# Patient Record
Sex: FEMALE | Race: WHITE | Employment: OTHER
[De-identification: names, ages, dates, MRNs, and addresses within clinical notes are randomized per-mention and may not be internally consistent; named-entity substitution may affect disease eponyms.]

---

## 2017-01-04 ENCOUNTER — OFFICE VISIT (OUTPATIENT)
Dept: FAMILY MEDICINE CLINIC | Facility: CLINIC | Age: 27
End: 2017-01-04

## 2017-01-04 VITALS
DIASTOLIC BLOOD PRESSURE: 70 MMHG | RESPIRATION RATE: 18 BRPM | SYSTOLIC BLOOD PRESSURE: 116 MMHG | HEART RATE: 70 BPM | BODY MASS INDEX: 38.09 KG/M2 | HEIGHT: 63 IN | WEIGHT: 215 LBS

## 2017-01-04 DIAGNOSIS — F31.60 BIPOLAR 1 DISORDER, MIXED (HCC): Primary | ICD-10-CM

## 2017-01-04 DIAGNOSIS — Z23 NEED FOR INFLUENZA VACCINATION: ICD-10-CM

## 2017-01-04 DIAGNOSIS — J30.2 OTHER SEASONAL ALLERGIC RHINITIS: ICD-10-CM

## 2017-01-04 PROCEDURE — 90686 IIV4 VACC NO PRSV 0.5 ML IM: CPT | Performed by: NURSE PRACTITIONER

## 2017-01-04 PROCEDURE — 90471 IMMUNIZATION ADMIN: CPT | Performed by: NURSE PRACTITIONER

## 2017-01-04 PROCEDURE — 99213 OFFICE O/P EST LOW 20 MIN: CPT | Performed by: NURSE PRACTITIONER

## 2017-01-04 RX ORDER — LORATADINE 10 MG/1
10 TABLET ORAL DAILY
Qty: 90 TABLET | Refills: 1 | Status: SHIPPED | OUTPATIENT
Start: 2017-01-04 | End: 2017-11-06 | Stop reason: SDUPTHER

## 2017-01-04 ASSESSMENT — ENCOUNTER SYMPTOMS
SINUS PRESSURE: 0
ABDOMINAL DISTENTION: 0
RHINORRHEA: 0
EYES NEGATIVE: 1
COUGH: 0

## 2017-04-21 ENCOUNTER — HOSPITAL ENCOUNTER (EMERGENCY)
Age: 27
Discharge: PSYCHIATRIC OR SUBSTANCE USE FACILITY | End: 2017-04-22
Attending: EMERGENCY MEDICINE
Payer: COMMERCIAL

## 2017-04-21 DIAGNOSIS — F10.920 ACUTE ALCOHOLIC INTOXICATION, UNCOMPLICATED (HCC): ICD-10-CM

## 2017-04-21 DIAGNOSIS — F31.9 BIPOLAR 1 DISORDER (HCC): Primary | ICD-10-CM

## 2017-04-21 PROCEDURE — 99285 EMERGENCY DEPT VISIT HI MDM: CPT

## 2017-04-21 PROCEDURE — 93005 ELECTROCARDIOGRAM TRACING: CPT

## 2017-04-21 ASSESSMENT — ENCOUNTER SYMPTOMS
ABDOMINAL PAIN: 0
DIARRHEA: 0
NAUSEA: 0
VISUAL CHANGE: 0
INGESTION: 1
VOMITING: 0
COUGH: 0
SHORTNESS OF BREATH: 0

## 2017-04-22 VITALS
HEART RATE: 80 BPM | WEIGHT: 220 LBS | OXYGEN SATURATION: 98 % | RESPIRATION RATE: 18 BRPM | SYSTOLIC BLOOD PRESSURE: 116 MMHG | DIASTOLIC BLOOD PRESSURE: 70 MMHG | BODY MASS INDEX: 38.97 KG/M2 | TEMPERATURE: 98.5 F

## 2017-04-22 LAB
-: NORMAL
ABSOLUTE EOS #: 0.4 K/UL (ref 0–0.4)
ABSOLUTE LYMPH #: 2.9 K/UL (ref 1.1–2.7)
ABSOLUTE MONO #: 0.9 K/UL (ref 0–1)
ACETAMINOPHEN LEVEL: <10 UG/ML (ref 10–30)
AMORPHOUS: NORMAL
AMPHETAMINE SCREEN URINE: NEGATIVE
ANION GAP SERPL CALCULATED.3IONS-SCNC: 18 MMOL/L (ref 9–17)
BACTERIA: NORMAL
BARBITURATE SCREEN URINE: NEGATIVE
BASOPHILS # BLD: 3 % (ref 0–2)
BASOPHILS ABSOLUTE: 0.3 K/UL (ref 0–0.2)
BENZODIAZEPINE SCREEN, URINE: NEGATIVE
BILIRUBIN URINE: NEGATIVE
BUN BLDV-MCNC: 9 MG/DL (ref 6–20)
BUN/CREAT BLD: 14 (ref 9–20)
BUPRENORPHINE URINE: NORMAL
CALCIUM SERPL-MCNC: 9.4 MG/DL (ref 8.6–10.4)
CANNABINOID SCREEN URINE: NEGATIVE
CASTS UA: NORMAL /LPF
CHLORIDE BLD-SCNC: 102 MMOL/L (ref 98–107)
CO2: 21 MMOL/L (ref 20–31)
COCAINE METABOLITE, URINE: NEGATIVE
COLOR: ABNORMAL
COMMENT UA: ABNORMAL
CREAT SERPL-MCNC: 0.63 MG/DL (ref 0.5–0.9)
CRYSTALS, UA: NORMAL /HPF
DIFFERENTIAL TYPE: YES
EOSINOPHILS RELATIVE PERCENT: 4 % (ref 0–5)
EPITHELIAL CELLS UA: NORMAL /HPF
ETHANOL PERCENT: 0.03 %
ETHANOL: 27 MG/DL
GFR AFRICAN AMERICAN: >60 ML/MIN
GFR NON-AFRICAN AMERICAN: >60 ML/MIN
GFR SERPL CREATININE-BSD FRML MDRD: ABNORMAL ML/MIN/{1.73_M2}
GFR SERPL CREATININE-BSD FRML MDRD: ABNORMAL ML/MIN/{1.73_M2}
GLUCOSE BLD-MCNC: 115 MG/DL (ref 70–99)
GLUCOSE URINE: NEGATIVE
HCG(URINE) PREGNANCY TEST: NEGATIVE
HCT VFR BLD CALC: 41.4 % (ref 36–46)
HEMOGLOBIN: 14.1 G/DL (ref 12–16)
KETONES, URINE: NEGATIVE
LEUKOCYTE ESTERASE, URINE: NEGATIVE
LYMPHOCYTES # BLD: 28 % (ref 15–40)
MCH RBC QN AUTO: 28.9 PG (ref 26–34)
MCHC RBC AUTO-ENTMCNC: 34.1 G/DL (ref 31–37)
MCV RBC AUTO: 84.7 FL (ref 80–100)
MDMA URINE: NORMAL
METHADONE SCREEN, URINE: NEGATIVE
METHAMPHETAMINE, URINE: NEGATIVE
MONOCYTES # BLD: 8 % (ref 4–8)
MUCUS: NORMAL
NITRITE, URINE: NEGATIVE
OPIATES, URINE: NEGATIVE
OTHER OBSERVATIONS UA: NORMAL
OXYCODONE SCREEN URINE: NEGATIVE
PDW BLD-RTO: 13.3 % (ref 12.1–15.2)
PH UA: 6.5 (ref 5–8)
PHENCYCLIDINE, URINE: NEGATIVE
PLATELET # BLD: 372 K/UL (ref 140–450)
PLATELET ESTIMATE: ABNORMAL
PMV BLD AUTO: ABNORMAL FL (ref 6–12)
POTASSIUM SERPL-SCNC: 3.9 MMOL/L (ref 3.7–5.3)
PROPOXYPHENE, URINE: NEGATIVE
PROTEIN UA: NEGATIVE
RBC # BLD: 4.89 M/UL (ref 4–5.2)
RBC # BLD: ABNORMAL 10*6/UL
RBC UA: NORMAL /HPF (ref 0–2)
RENAL EPITHELIAL, UA: NORMAL /HPF
SALICYLATE LEVEL: <1 MG/DL (ref 3–10)
SEG NEUTROPHILS: 57 % (ref 47–75)
SEGMENTED NEUTROPHILS ABSOLUTE COUNT: 6.1 K/UL (ref 2.5–7)
SODIUM BLD-SCNC: 141 MMOL/L (ref 135–144)
SPECIFIC GRAVITY UA: 1.01 (ref 1–1.03)
TEST INFORMATION: NORMAL
TRICHOMONAS: NORMAL
TRICYCLIC ANTIDEPRESSANTS, UR: NEGATIVE
TSH SERPL DL<=0.05 MIU/L-ACNC: 6.01 MIU/L (ref 0.3–5)
TURBIDITY: CLEAR
URINE HGB: ABNORMAL
UROBILINOGEN, URINE: NORMAL
WBC # BLD: 10.7 K/UL (ref 3.5–11)
WBC # BLD: ABNORMAL 10*3/UL
WBC UA: NORMAL /HPF
YEAST: NORMAL

## 2017-04-22 PROCEDURE — 80048 BASIC METABOLIC PNL TOTAL CA: CPT

## 2017-04-22 PROCEDURE — 81001 URINALYSIS AUTO W/SCOPE: CPT

## 2017-04-22 PROCEDURE — 6370000000 HC RX 637 (ALT 250 FOR IP)

## 2017-04-22 PROCEDURE — 2580000003 HC RX 258: Performed by: EMERGENCY MEDICINE

## 2017-04-22 PROCEDURE — 85025 COMPLETE CBC W/AUTO DIFF WBC: CPT

## 2017-04-22 PROCEDURE — 84703 CHORIONIC GONADOTROPIN ASSAY: CPT

## 2017-04-22 PROCEDURE — 84443 ASSAY THYROID STIM HORMONE: CPT

## 2017-04-22 PROCEDURE — 80307 DRUG TEST PRSMV CHEM ANLYZR: CPT

## 2017-04-22 PROCEDURE — 80306 DRUG TEST PRSMV INSTRMNT: CPT

## 2017-04-22 PROCEDURE — G0480 DRUG TEST DEF 1-7 CLASSES: HCPCS

## 2017-04-22 RX ORDER — IBUPROFEN 200 MG
400 TABLET ORAL ONCE
Status: COMPLETED | OUTPATIENT
Start: 2017-04-22 | End: 2017-04-22

## 2017-04-22 RX ORDER — IBUPROFEN 200 MG
TABLET ORAL
Status: COMPLETED
Start: 2017-04-22 | End: 2017-04-22

## 2017-04-22 RX ADMIN — SODIUM CHLORIDE 500 ML: 9 INJECTION, SOLUTION INTRAVENOUS at 00:25

## 2017-04-22 RX ADMIN — IBUPROFEN 400 MG: 200 TABLET, FILM COATED ORAL at 04:15

## 2017-04-22 RX ADMIN — Medication 400 MG: at 04:15

## 2017-04-22 ASSESSMENT — PAIN SCALES - GENERAL: PAINLEVEL_OUTOF10: 6

## 2017-04-23 LAB
EKG ATRIAL RATE: 115 BPM
EKG P AXIS: 37 DEGREES
EKG P-R INTERVAL: 128 MS
EKG Q-T INTERVAL: 320 MS
EKG QRS DURATION: 84 MS
EKG QTC CALCULATION (BAZETT): 442 MS
EKG R AXIS: 44 DEGREES
EKG T AXIS: 33 DEGREES
EKG VENTRICULAR RATE: 115 BPM

## 2017-05-02 ENCOUNTER — OFFICE VISIT (OUTPATIENT)
Dept: FAMILY MEDICINE CLINIC | Age: 27
End: 2017-05-02
Payer: COMMERCIAL

## 2017-05-02 VITALS
SYSTOLIC BLOOD PRESSURE: 120 MMHG | HEART RATE: 68 BPM | WEIGHT: 220 LBS | RESPIRATION RATE: 18 BRPM | DIASTOLIC BLOOD PRESSURE: 80 MMHG | HEIGHT: 63 IN | BODY MASS INDEX: 38.98 KG/M2

## 2017-05-02 DIAGNOSIS — M25.461 KNEE EFFUSION, RIGHT: ICD-10-CM

## 2017-05-02 DIAGNOSIS — S76.311A STRAIN OF PIRIFORMIS MUSCLE, RIGHT, INITIAL ENCOUNTER: Primary | ICD-10-CM

## 2017-05-02 DIAGNOSIS — F31.77 BIPOLAR DISORDER, IN PARTIAL REMISSION, MOST RECENT EPISODE MIXED (HCC): ICD-10-CM

## 2017-05-02 PROCEDURE — 99213 OFFICE O/P EST LOW 20 MIN: CPT | Performed by: NURSE PRACTITIONER

## 2017-05-02 RX ORDER — LAMOTRIGINE 25 MG/1
25 TABLET ORAL 2 TIMES DAILY
COMMUNITY
End: 2017-11-06 | Stop reason: ALTCHOICE

## 2017-05-02 RX ORDER — DOXEPIN HYDROCHLORIDE 25 MG/1
50 CAPSULE ORAL NIGHTLY
COMMUNITY
End: 2018-04-13 | Stop reason: ALTCHOICE

## 2017-05-02 RX ORDER — GABAPENTIN 300 MG/1
300 CAPSULE ORAL 3 TIMES DAILY
COMMUNITY
End: 2017-06-16 | Stop reason: ALTCHOICE

## 2017-05-02 RX ORDER — HYDROXYZINE PAMOATE 50 MG/1
50 CAPSULE ORAL 2 TIMES DAILY
COMMUNITY
End: 2018-05-09 | Stop reason: HOSPADM

## 2017-05-03 ASSESSMENT — ENCOUNTER SYMPTOMS
COUGH: 0
ABDOMINAL DISTENTION: 0
EYES NEGATIVE: 1
RHINORRHEA: 0

## 2017-05-04 ENCOUNTER — HOSPITAL ENCOUNTER (EMERGENCY)
Age: 27
Discharge: HOME | End: 2017-05-04
Payer: COMMERCIAL

## 2017-05-04 VITALS
TEMPERATURE: 99 F | SYSTOLIC BLOOD PRESSURE: 104 MMHG | OXYGEN SATURATION: 96 % | HEART RATE: 102 BPM | RESPIRATION RATE: 18 BRPM | DIASTOLIC BLOOD PRESSURE: 62 MMHG

## 2017-05-04 DIAGNOSIS — R19.7 DIARRHEA, UNSPECIFIED TYPE: ICD-10-CM

## 2017-05-04 DIAGNOSIS — R11.0 NAUSEA: Primary | ICD-10-CM

## 2017-05-04 DIAGNOSIS — R11.11 NON-INTRACTABLE VOMITING WITHOUT NAUSEA, UNSPECIFIED VOMITING TYPE: ICD-10-CM

## 2017-05-04 DIAGNOSIS — N39.0 URINARY TRACT INFECTION, SITE UNSPECIFIED: ICD-10-CM

## 2017-05-04 LAB
-: ABNORMAL
ABSOLUTE BANDS #: 0.76 K/UL (ref 0–1)
ABSOLUTE EOS #: 0.19 K/UL (ref 0–0.4)
ABSOLUTE LYMPH #: 0.19 K/UL (ref 1–4.8)
ABSOLUTE MONO #: 0.57 K/UL (ref 0–1)
AMORPHOUS: ABNORMAL
ANION GAP SERPL CALCULATED.3IONS-SCNC: 15 MMOL/L (ref 9–17)
BACTERIA: ABNORMAL
BANDS: 4 %
BASOPHILS # BLD: 0 %
BASOPHILS ABSOLUTE: 0 K/UL (ref 0–0.2)
BILIRUBIN URINE: NEGATIVE
BUN BLDV-MCNC: 15 MG/DL (ref 6–20)
BUN/CREAT BLD: 29 (ref 9–20)
CALCIUM SERPL-MCNC: 9.6 MG/DL (ref 8.6–10.4)
CASTS UA: ABNORMAL /LPF
CHLORIDE BLD-SCNC: 95 MMOL/L (ref 98–107)
CO2: 24 MMOL/L (ref 20–31)
COLOR: YELLOW
COMMENT UA: ABNORMAL
CREAT SERPL-MCNC: 0.51 MG/DL (ref 0.5–0.9)
CRYSTALS, UA: ABNORMAL /HPF
DIFFERENTIAL TYPE: ABNORMAL
EOSINOPHILS RELATIVE PERCENT: 1 %
EPITHELIAL CELLS UA: ABNORMAL /HPF (ref 0–25)
GFR AFRICAN AMERICAN: >60 ML/MIN
GFR NON-AFRICAN AMERICAN: >60 ML/MIN
GFR SERPL CREATININE-BSD FRML MDRD: ABNORMAL ML/MIN/{1.73_M2}
GFR SERPL CREATININE-BSD FRML MDRD: ABNORMAL ML/MIN/{1.73_M2}
GLUCOSE BLD-MCNC: 121 MG/DL (ref 70–99)
GLUCOSE URINE: NEGATIVE
HCG(URINE) PREGNANCY TEST: NEGATIVE
HCT VFR BLD CALC: 45.8 % (ref 36–46)
HEMOGLOBIN: 15.4 G/DL (ref 12–16)
KETONES, URINE: ABNORMAL
LEUKOCYTE ESTERASE, URINE: ABNORMAL
LYMPHOCYTES # BLD: 1 %
MCH RBC QN AUTO: 28.6 PG (ref 26–34)
MCHC RBC AUTO-ENTMCNC: 33.6 G/DL (ref 31–37)
MCV RBC AUTO: 85 FL (ref 80–100)
MONOCYTES # BLD: 3 %
MORPHOLOGY: NORMAL
MUCUS: ABNORMAL
NITRITE, URINE: NEGATIVE
OTHER OBSERVATIONS UA: ABNORMAL
PDW BLD-RTO: 13.2 % (ref 12.1–15.2)
PH UA: 6 (ref 5–9)
PLATELET # BLD: 370 K/UL (ref 140–450)
PLATELET ESTIMATE: ABNORMAL
PMV BLD AUTO: ABNORMAL FL (ref 6–12)
POTASSIUM SERPL-SCNC: 3.8 MMOL/L (ref 3.7–5.3)
PROTEIN UA: NEGATIVE
RBC # BLD: 5.39 M/UL (ref 4–5.2)
RBC # BLD: ABNORMAL 10*6/UL
RBC UA: ABNORMAL /HPF (ref 0–2)
RENAL EPITHELIAL, UA: ABNORMAL /HPF
SEG NEUTROPHILS: 91 %
SEGMENTED NEUTROPHILS ABSOLUTE COUNT: 17.19 K/UL (ref 1.8–7.7)
SODIUM BLD-SCNC: 134 MMOL/L (ref 135–144)
SPECIFIC GRAVITY UA: 1.01 (ref 1.01–1.02)
TRICHOMONAS: ABNORMAL
TURBIDITY: ABNORMAL
URINE HGB: ABNORMAL
UROBILINOGEN, URINE: NORMAL
WBC # BLD: 18.9 K/UL (ref 3.5–11)
WBC # BLD: ABNORMAL 10*3/UL
WBC UA: ABNORMAL /HPF (ref 0–5)
YEAST: ABNORMAL

## 2017-05-04 PROCEDURE — 87086 URINE CULTURE/COLONY COUNT: CPT

## 2017-05-04 PROCEDURE — 6360000002 HC RX W HCPCS: Performed by: PHYSICIAN ASSISTANT

## 2017-05-04 PROCEDURE — 36415 COLL VENOUS BLD VENIPUNCTURE: CPT

## 2017-05-04 PROCEDURE — 80048 BASIC METABOLIC PNL TOTAL CA: CPT

## 2017-05-04 PROCEDURE — 2580000003 HC RX 258: Performed by: PHYSICIAN ASSISTANT

## 2017-05-04 PROCEDURE — 85025 COMPLETE CBC W/AUTO DIFF WBC: CPT

## 2017-05-04 PROCEDURE — 99284 EMERGENCY DEPT VISIT MOD MDM: CPT

## 2017-05-04 PROCEDURE — 96374 THER/PROPH/DIAG INJ IV PUSH: CPT

## 2017-05-04 PROCEDURE — 81001 URINALYSIS AUTO W/SCOPE: CPT

## 2017-05-04 PROCEDURE — 84703 CHORIONIC GONADOTROPIN ASSAY: CPT

## 2017-05-04 PROCEDURE — 96372 THER/PROPH/DIAG INJ SC/IM: CPT

## 2017-05-04 RX ORDER — ONDANSETRON 4 MG/1
4 TABLET, ORALLY DISINTEGRATING ORAL EVERY 8 HOURS PRN
Qty: 12 TABLET | Refills: 0 | Status: SHIPPED | OUTPATIENT
Start: 2017-05-04 | End: 2017-11-26 | Stop reason: ALTCHOICE

## 2017-05-04 RX ORDER — 0.9 % SODIUM CHLORIDE 0.9 %
1000 INTRAVENOUS SOLUTION INTRAVENOUS ONCE
Status: COMPLETED | OUTPATIENT
Start: 2017-05-04 | End: 2017-05-04

## 2017-05-04 RX ORDER — PROMETHAZINE HYDROCHLORIDE 25 MG/ML
12.5 INJECTION, SOLUTION INTRAMUSCULAR; INTRAVENOUS ONCE
Status: COMPLETED | OUTPATIENT
Start: 2017-05-04 | End: 2017-05-04

## 2017-05-04 RX ORDER — ONDANSETRON 2 MG/ML
4 INJECTION INTRAMUSCULAR; INTRAVENOUS ONCE
Status: COMPLETED | OUTPATIENT
Start: 2017-05-04 | End: 2017-05-04

## 2017-05-04 RX ORDER — NITROFURANTOIN 25; 75 MG/1; MG/1
100 CAPSULE ORAL 2 TIMES DAILY
Qty: 14 CAPSULE | Refills: 0 | Status: SHIPPED | OUTPATIENT
Start: 2017-05-04 | End: 2017-05-11

## 2017-05-04 RX ADMIN — SODIUM CHLORIDE 1000 ML: 9 INJECTION, SOLUTION INTRAVENOUS at 15:28

## 2017-05-04 RX ADMIN — ONDANSETRON 4 MG: 2 INJECTION INTRAMUSCULAR; INTRAVENOUS at 13:25

## 2017-05-04 RX ADMIN — SODIUM CHLORIDE 1000 ML: 9 INJECTION, SOLUTION INTRAVENOUS at 13:22

## 2017-05-04 RX ADMIN — PROMETHAZINE HYDROCHLORIDE 12.5 MG: 25 INJECTION INTRAMUSCULAR; INTRAVENOUS at 14:41

## 2017-05-04 ASSESSMENT — ENCOUNTER SYMPTOMS
SHORTNESS OF BREATH: 0
EYE REDNESS: 0
ABDOMINAL PAIN: 0
CHEST TIGHTNESS: 0
SORE THROAT: 0
COUGH: 0
CONSTIPATION: 0
VOMITING: 1
BLOOD IN STOOL: 0
RHINORRHEA: 0
EYE DISCHARGE: 0
WHEEZING: 0
NAUSEA: 1
DIARRHEA: 1
BACK PAIN: 0

## 2017-05-04 ASSESSMENT — PAIN DESCRIPTION - DESCRIPTORS: DESCRIPTORS: CRAMPING

## 2017-05-04 ASSESSMENT — PAIN DESCRIPTION - PAIN TYPE: TYPE: ACUTE PAIN

## 2017-05-04 ASSESSMENT — PAIN DESCRIPTION - LOCATION: LOCATION: ABDOMEN

## 2017-05-04 ASSESSMENT — PAIN SCALES - GENERAL: PAINLEVEL_OUTOF10: 4

## 2017-05-06 LAB
CULTURE: NORMAL
CULTURE: NORMAL
Lab: NORMAL
Lab: NORMAL
SPECIMEN DESCRIPTION: NORMAL
SPECIMEN DESCRIPTION: NORMAL
STATUS: NORMAL

## 2017-06-16 ENCOUNTER — HOSPITAL ENCOUNTER (EMERGENCY)
Age: 27
Discharge: HOME | End: 2017-06-16
Attending: EMERGENCY MEDICINE
Payer: COMMERCIAL

## 2017-06-16 VITALS
TEMPERATURE: 99.5 F | HEART RATE: 107 BPM | OXYGEN SATURATION: 100 % | SYSTOLIC BLOOD PRESSURE: 111 MMHG | DIASTOLIC BLOOD PRESSURE: 74 MMHG | RESPIRATION RATE: 18 BRPM

## 2017-06-16 DIAGNOSIS — R42 DIZZINESS: ICD-10-CM

## 2017-06-16 DIAGNOSIS — T73.3XXA FATIGUE DUE TO EXCESSIVE EXERTION, INITIAL ENCOUNTER: Primary | ICD-10-CM

## 2017-06-16 LAB
-: ABNORMAL
ABSOLUTE EOS #: 0.6 K/UL (ref 0–0.4)
ABSOLUTE LYMPH #: 2.9 K/UL (ref 1–4.8)
ABSOLUTE MONO #: 0.7 K/UL (ref 0.2–0.8)
ALBUMIN SERPL-MCNC: 4.4 G/DL (ref 3.5–5.2)
ALBUMIN/GLOBULIN RATIO: 1.5 (ref 1–2.5)
ALP BLD-CCNC: 60 U/L (ref 35–104)
ALT SERPL-CCNC: 16 U/L (ref 5–33)
AMORPHOUS: ABNORMAL
AMPHETAMINE SCREEN URINE: NEGATIVE
ANION GAP SERPL CALCULATED.3IONS-SCNC: 14 MMOL/L (ref 9–17)
AST SERPL-CCNC: 14 U/L
BACTERIA: ABNORMAL
BARBITURATE SCREEN URINE: NEGATIVE
BASOPHILS # BLD: 2 %
BASOPHILS ABSOLUTE: 0.3 K/UL (ref 0–0.2)
BENZODIAZEPINE SCREEN, URINE: NEGATIVE
BILIRUB SERPL-MCNC: 0.19 MG/DL (ref 0.3–1.2)
BILIRUBIN URINE: NEGATIVE
BUN BLDV-MCNC: 12 MG/DL (ref 6–20)
BUN/CREAT BLD: 15 (ref 9–20)
BUPRENORPHINE URINE: NEGATIVE
CALCIUM SERPL-MCNC: 8.9 MG/DL (ref 8.6–10.4)
CANNABINOID SCREEN URINE: NEGATIVE
CASTS UA: ABNORMAL /LPF
CHLORIDE BLD-SCNC: 100 MMOL/L (ref 98–107)
CO2: 20 MMOL/L (ref 20–31)
COCAINE METABOLITE, URINE: NEGATIVE
COLOR: YELLOW
COMMENT UA: ABNORMAL
CREAT SERPL-MCNC: 0.81 MG/DL (ref 0.5–0.9)
CRYSTALS, UA: ABNORMAL /HPF
DIFFERENTIAL TYPE: ABNORMAL
EOSINOPHILS RELATIVE PERCENT: 5 %
EPITHELIAL CELLS UA: ABNORMAL /HPF (ref 0–25)
GFR AFRICAN AMERICAN: >60 ML/MIN
GFR NON-AFRICAN AMERICAN: >60 ML/MIN
GFR SERPL CREATININE-BSD FRML MDRD: ABNORMAL ML/MIN/{1.73_M2}
GFR SERPL CREATININE-BSD FRML MDRD: ABNORMAL ML/MIN/{1.73_M2}
GLUCOSE BLD-MCNC: 90 MG/DL (ref 70–99)
GLUCOSE URINE: NEGATIVE
HCG(URINE) PREGNANCY TEST: NEGATIVE
HCT VFR BLD CALC: 42.3 % (ref 36–46)
HEMOGLOBIN: 14.5 G/DL (ref 12–16)
KETONES, URINE: NEGATIVE
LEUKOCYTE ESTERASE, URINE: NEGATIVE
LYMPHOCYTES # BLD: 23 %
MCH RBC QN AUTO: 28.8 PG (ref 26–34)
MCHC RBC AUTO-ENTMCNC: 34.3 G/DL (ref 31–37)
MCV RBC AUTO: 84.1 FL (ref 80–100)
MDMA URINE: ABNORMAL
METHADONE SCREEN, URINE: NEGATIVE
METHAMPHETAMINE, URINE: NEGATIVE
MONOCYTES # BLD: 6 %
MUCUS: ABNORMAL
NITRITE, URINE: NEGATIVE
OPIATES, URINE: NEGATIVE
OTHER OBSERVATIONS UA: ABNORMAL
OXYCODONE SCREEN URINE: NEGATIVE
PDW BLD-RTO: 12.2 % (ref 12.1–15.2)
PH UA: 6 (ref 5–9)
PHENCYCLIDINE, URINE: NEGATIVE
PLATELET # BLD: 389 K/UL (ref 140–450)
PLATELET ESTIMATE: ABNORMAL
PMV BLD AUTO: ABNORMAL FL (ref 6–12)
POTASSIUM SERPL-SCNC: 4.1 MMOL/L (ref 3.7–5.3)
PROPOXYPHENE, URINE: NEGATIVE
PROTEIN UA: NEGATIVE
RBC # BLD: 5.02 M/UL (ref 4–5.2)
RBC # BLD: ABNORMAL 10*6/UL
RBC UA: ABNORMAL /HPF (ref 0–2)
RENAL EPITHELIAL, UA: ABNORMAL /HPF
SEG NEUTROPHILS: 64 %
SEGMENTED NEUTROPHILS ABSOLUTE COUNT: 8.2 K/UL (ref 1.8–7.7)
SODIUM BLD-SCNC: 134 MMOL/L (ref 135–144)
SPECIFIC GRAVITY UA: 1.02 (ref 1.01–1.02)
TEST INFORMATION: ABNORMAL
TOTAL PROTEIN: 7.4 G/DL (ref 6.4–8.3)
TRICHOMONAS: ABNORMAL
TRICYCLIC ANTIDEPRESSANTS, UR: POSITIVE
TSH SERPL DL<=0.05 MIU/L-ACNC: 2.69 MIU/L (ref 0.3–5)
TURBIDITY: CLEAR
URINE HGB: ABNORMAL
UROBILINOGEN, URINE: NORMAL
WBC # BLD: 12.7 K/UL (ref 3.5–11)
WBC # BLD: ABNORMAL 10*3/UL
WBC UA: ABNORMAL /HPF (ref 0–5)
YEAST: ABNORMAL

## 2017-06-16 PROCEDURE — 84443 ASSAY THYROID STIM HORMONE: CPT

## 2017-06-16 PROCEDURE — 80053 COMPREHEN METABOLIC PANEL: CPT

## 2017-06-16 PROCEDURE — 99284 EMERGENCY DEPT VISIT MOD MDM: CPT

## 2017-06-16 PROCEDURE — 80306 DRUG TEST PRSMV INSTRMNT: CPT

## 2017-06-16 PROCEDURE — 84703 CHORIONIC GONADOTROPIN ASSAY: CPT

## 2017-06-16 PROCEDURE — 81001 URINALYSIS AUTO W/SCOPE: CPT

## 2017-06-16 PROCEDURE — 85025 COMPLETE CBC W/AUTO DIFF WBC: CPT

## 2017-06-16 RX ORDER — ZOLPIDEM TARTRATE 5 MG/1
5 TABLET ORAL NIGHTLY PRN
COMMUNITY
End: 2017-11-26 | Stop reason: ALTCHOICE

## 2017-06-16 ASSESSMENT — ENCOUNTER SYMPTOMS
EYES NEGATIVE: 1
RESPIRATORY NEGATIVE: 1
ALLERGIC/IMMUNOLOGIC NEGATIVE: 1
GASTROINTESTINAL NEGATIVE: 1

## 2017-08-16 ENCOUNTER — HOSPITAL ENCOUNTER (EMERGENCY)
Age: 27
Discharge: HOME | End: 2017-08-16
Payer: COMMERCIAL

## 2017-08-16 VITALS
HEART RATE: 104 BPM | HEIGHT: 63 IN | BODY MASS INDEX: 38.98 KG/M2 | DIASTOLIC BLOOD PRESSURE: 71 MMHG | OXYGEN SATURATION: 96 % | RESPIRATION RATE: 16 BRPM | SYSTOLIC BLOOD PRESSURE: 127 MMHG | TEMPERATURE: 98.5 F | WEIGHT: 220 LBS

## 2017-08-16 DIAGNOSIS — T14.8XXA MUSCLE STRAIN: Primary | ICD-10-CM

## 2017-08-16 PROCEDURE — 99282 EMERGENCY DEPT VISIT SF MDM: CPT

## 2017-08-16 RX ORDER — IBUPROFEN 800 MG/1
800 TABLET ORAL EVERY 8 HOURS PRN
Qty: 21 TABLET | Refills: 0 | Status: SHIPPED | OUTPATIENT
Start: 2017-08-16 | End: 2017-11-28 | Stop reason: ALTCHOICE

## 2017-08-16 ASSESSMENT — ENCOUNTER SYMPTOMS
CHEST TIGHTNESS: 0
BACK PAIN: 0
EYE DISCHARGE: 0
COUGH: 0
DIARRHEA: 0
NAUSEA: 0
ABDOMINAL PAIN: 0
SHORTNESS OF BREATH: 0
WHEEZING: 0
SORE THROAT: 0
RHINORRHEA: 0
VOMITING: 0
BLOOD IN STOOL: 0
CONSTIPATION: 0
EYE REDNESS: 0

## 2017-08-16 ASSESSMENT — PAIN DESCRIPTION - ORIENTATION: ORIENTATION: RIGHT;LEFT;UPPER

## 2017-08-16 ASSESSMENT — PAIN DESCRIPTION - PAIN TYPE: TYPE: ACUTE PAIN

## 2017-08-16 ASSESSMENT — PAIN SCALES - GENERAL: PAINLEVEL_OUTOF10: 8

## 2017-08-16 ASSESSMENT — PAIN DESCRIPTION - DESCRIPTORS: DESCRIPTORS: SHARP

## 2017-08-16 ASSESSMENT — PAIN DESCRIPTION - LOCATION: LOCATION: LEG

## 2017-08-16 ASSESSMENT — PAIN DESCRIPTION - FREQUENCY: FREQUENCY: INTERMITTENT

## 2017-09-04 ENCOUNTER — APPOINTMENT (OUTPATIENT)
Dept: GENERAL RADIOLOGY | Age: 27
End: 2017-09-04
Payer: COMMERCIAL

## 2017-09-04 ENCOUNTER — HOSPITAL ENCOUNTER (EMERGENCY)
Age: 27
Discharge: HOME | End: 2017-09-04
Attending: FAMILY MEDICINE
Payer: COMMERCIAL

## 2017-09-04 VITALS
SYSTOLIC BLOOD PRESSURE: 136 MMHG | DIASTOLIC BLOOD PRESSURE: 93 MMHG | TEMPERATURE: 98.3 F | HEART RATE: 105 BPM | RESPIRATION RATE: 18 BRPM | OXYGEN SATURATION: 97 %

## 2017-09-04 DIAGNOSIS — R07.81 PLEURITIC CHEST PAIN: Primary | ICD-10-CM

## 2017-09-04 DIAGNOSIS — R79.82 ELEVATED C-REACTIVE PROTEIN (CRP): ICD-10-CM

## 2017-09-04 LAB
ABSOLUTE EOS #: 0.4 K/UL (ref 0–0.4)
ABSOLUTE LYMPH #: 3.7 K/UL (ref 1–4.8)
ABSOLUTE MONO #: 0.9 K/UL (ref 0–1)
ANION GAP SERPL CALCULATED.3IONS-SCNC: 16 MMOL/L (ref 9–17)
BASOPHILS # BLD: 1 %
BASOPHILS ABSOLUTE: 0.2 K/UL (ref 0–0.2)
BUN BLDV-MCNC: 11 MG/DL (ref 6–20)
BUN/CREAT BLD: 16 (ref 9–20)
C-REACTIVE PROTEIN: 9.3 MG/L (ref 0–5)
CALCIUM SERPL-MCNC: 9.7 MG/DL (ref 8.6–10.4)
CHLORIDE BLD-SCNC: 100 MMOL/L (ref 98–107)
CO2: 21 MMOL/L (ref 20–31)
CREAT SERPL-MCNC: 0.69 MG/DL (ref 0.5–0.9)
D-DIMER QUANTITATIVE: <0.19 MG/L FEU (ref 0.19–0.5)
DIFFERENTIAL TYPE: ABNORMAL
EOSINOPHILS RELATIVE PERCENT: 3 %
GFR AFRICAN AMERICAN: >60 ML/MIN
GFR NON-AFRICAN AMERICAN: >60 ML/MIN
GFR SERPL CREATININE-BSD FRML MDRD: ABNORMAL ML/MIN/{1.73_M2}
GFR SERPL CREATININE-BSD FRML MDRD: ABNORMAL ML/MIN/{1.73_M2}
GLUCOSE BLD-MCNC: 91 MG/DL (ref 70–99)
HCT VFR BLD CALC: 43.2 % (ref 36–46)
HEMOGLOBIN: 14.9 G/DL (ref 12–16)
INR BLD: 1 (ref 0.9–1.2)
LYMPHOCYTES # BLD: 27 %
MCH RBC QN AUTO: 29.1 PG (ref 26–34)
MCHC RBC AUTO-ENTMCNC: 34.5 G/DL (ref 31–37)
MCV RBC AUTO: 84.3 FL (ref 80–100)
MONOCYTES # BLD: 7 %
PDW BLD-RTO: 13.2 % (ref 12.1–15.2)
PLATELET # BLD: 400 K/UL (ref 140–450)
PLATELET ESTIMATE: ABNORMAL
PMV BLD AUTO: 8 FL (ref 6–12)
POTASSIUM SERPL-SCNC: 3.6 MMOL/L (ref 3.7–5.3)
PROTHROMBIN TIME: 10.4 SEC (ref 9.7–12.2)
RBC # BLD: 5.13 M/UL (ref 4–5.2)
RBC # BLD: ABNORMAL 10*6/UL
SEG NEUTROPHILS: 62 %
SEGMENTED NEUTROPHILS ABSOLUTE COUNT: 8.6 K/UL (ref 1.8–7.7)
SODIUM BLD-SCNC: 137 MMOL/L (ref 135–144)
TROPONIN INTERP: NORMAL
TROPONIN T: <0.03 NG/ML
WBC # BLD: 13.8 K/UL (ref 3.5–11)
WBC # BLD: ABNORMAL 10*3/UL

## 2017-09-04 PROCEDURE — 6370000000 HC RX 637 (ALT 250 FOR IP): Performed by: FAMILY MEDICINE

## 2017-09-04 PROCEDURE — 85610 PROTHROMBIN TIME: CPT

## 2017-09-04 PROCEDURE — 71010 XR CHEST PORTABLE: CPT

## 2017-09-04 PROCEDURE — 85379 FIBRIN DEGRADATION QUANT: CPT

## 2017-09-04 PROCEDURE — 80048 BASIC METABOLIC PNL TOTAL CA: CPT

## 2017-09-04 PROCEDURE — 93005 ELECTROCARDIOGRAM TRACING: CPT

## 2017-09-04 PROCEDURE — 36415 COLL VENOUS BLD VENIPUNCTURE: CPT

## 2017-09-04 PROCEDURE — 99285 EMERGENCY DEPT VISIT HI MDM: CPT

## 2017-09-04 PROCEDURE — 86140 C-REACTIVE PROTEIN: CPT

## 2017-09-04 PROCEDURE — 84484 ASSAY OF TROPONIN QUANT: CPT

## 2017-09-04 PROCEDURE — 85025 COMPLETE CBC W/AUTO DIFF WBC: CPT

## 2017-09-04 RX ORDER — AZITHROMYCIN 250 MG/1
TABLET, FILM COATED ORAL
Qty: 6 TABLET | Refills: 0 | Status: SHIPPED | OUTPATIENT
Start: 2017-09-04 | End: 2017-09-14

## 2017-09-04 RX ORDER — ASPIRIN 81 MG/1
324 TABLET, CHEWABLE ORAL ONCE
Status: COMPLETED | OUTPATIENT
Start: 2017-09-04 | End: 2017-09-04

## 2017-09-04 RX ORDER — NAPROXEN 500 MG/1
500 TABLET ORAL 2 TIMES DAILY
Qty: 30 TABLET | Refills: 0 | Status: SHIPPED | OUTPATIENT
Start: 2017-09-04 | End: 2020-06-08

## 2017-09-04 RX ADMIN — ASPIRIN 81 MG 324 MG: 81 TABLET ORAL at 22:14

## 2017-09-04 ASSESSMENT — PAIN SCALES - GENERAL: PAINLEVEL_OUTOF10: 6

## 2017-09-04 ASSESSMENT — PAIN DESCRIPTION - LOCATION: LOCATION: CHEST

## 2017-09-04 ASSESSMENT — PAIN DESCRIPTION - PAIN TYPE: TYPE: ACUTE PAIN

## 2017-09-06 LAB
EKG ATRIAL RATE: 91 BPM
EKG P AXIS: 47 DEGREES
EKG P-R INTERVAL: 130 MS
EKG Q-T INTERVAL: 352 MS
EKG QRS DURATION: 86 MS
EKG QTC CALCULATION (BAZETT): 432 MS
EKG R AXIS: 51 DEGREES
EKG T AXIS: 44 DEGREES
EKG VENTRICULAR RATE: 91 BPM

## 2017-10-09 ENCOUNTER — HOSPITAL ENCOUNTER (EMERGENCY)
Age: 27
Discharge: HOME | End: 2017-10-09
Attending: FAMILY MEDICINE
Payer: COMMERCIAL

## 2017-10-09 VITALS
HEIGHT: 62 IN | OXYGEN SATURATION: 97 % | HEART RATE: 118 BPM | DIASTOLIC BLOOD PRESSURE: 74 MMHG | SYSTOLIC BLOOD PRESSURE: 111 MMHG | WEIGHT: 210 LBS | BODY MASS INDEX: 38.64 KG/M2 | RESPIRATION RATE: 16 BRPM | TEMPERATURE: 98.7 F

## 2017-10-09 DIAGNOSIS — G47.00 INSOMNIA, UNSPECIFIED TYPE: Primary | ICD-10-CM

## 2017-10-09 PROCEDURE — 99283 EMERGENCY DEPT VISIT LOW MDM: CPT

## 2017-10-10 NOTE — ED PROVIDER NOTES
tablet, R-0Print      hydrOXYzine (VISTARIL) 50 MG capsule Take 50 mg by mouth 2 times daily Indications: Take one capsule by mouth twice daily as needed for anxietyHistorical Med      lamoTRIgine (LAMICTAL) 25 MG tablet Take 25 mg by mouth 2 times daily Indications: Take one tablet by mouth once daily Historical Med      ibuprofen (ADVIL;MOTRIN) 800 MG tablet Take 1 tablet by mouth every 8 hours as needed for Pain, Disp-21 tablet, R-0Print      zolpidem (AMBIEN) 5 MG tablet Take 5 mg by mouth nightly as needed for SleepHistorical Med      ondansetron (ZOFRAN ODT) 4 MG disintegrating tablet Take 1 tablet by mouth every 8 hours as needed for Nausea or Vomiting, Disp-12 tablet, R-0Print      doxepin (SINEQUAN) 25 MG capsule Take 50 mg by mouth nightly Indications: take once capsule by mouth once daily at bedtime. Historical Med      loratadine (CLARITIN) 10 MG tablet Take 1 tablet by mouth daily, Disp-90 tablet, R-1      Multiple Vitamin (MULTI VITAMIN PO) Take 1 tablet by mouth daily             ALLERGIES     is allergic to doxycycline; other; pollen extract; seroquel [quetiapine fumarate]; symbicort [budesonide-formoterol fumarate]; and topamax [topiramate]. FAMILY HISTORY     indicated that her mother is alive. She indicated that her father is alive. family history includes Asthma in her mother; Diabetes in her father; High Blood Pressure in her father. SOCIAL HISTORY      reports that she has never smoked. She has never used smokeless tobacco. She reports that she does not drink alcohol or use drugs. PHYSICAL EXAM     INITIAL VITALS:  height is 5' 2\" (1.575 m) and weight is 210 lb (95.3 kg). Her tympanic temperature is 98.7 °F (37.1 °C). Her blood pressure is 111/74 and her pulse is 118. Her respiration is 16 and oxygen saturation is 97%. Physical Exam   Constitutional: Patient is oriented to person, place, and time. Patient appears well-developed and well-nourished.  Patient is active and medications, importance of follow up with either her psychiatrist or PCP. Patient will be driven home by patient's boyfriend who is present at bedside throughout. FINAL IMPRESSION      1. Insomnia, unspecified type          DISPOSITION/PLAN   Discharge    PATIENT REFERRED TO:  ADRIAN Osborne [address] [facility]  [phone]    Call       Judith Mccordlin  [address] [place] [address]  [phone]    Call         DISCHARGE MEDICATIONS:  Discharge Medication List as of 10/9/2017 10:32 PM              Summation      Patient Course:  Discharge    ED Medications administered this visit:  Medications - No data to display    New Prescriptions from this visit:    Discharge Medication List as of 10/9/2017 10:32 PM          Follow-up:  ADRIAN Osborne [address] [facility]  [phone]    Call       Judith Mccordlin  [address] [place] [address]  [phone]    Call           Final Impression:   1.  Insomnia, unspecified type               (Please note that portions of this note were completed with a voice recognition program.  Efforts were made to edit the dictations but occasionally words are mis-transcribed.)    MD Seda Hawkins MD  10/09/17 6442

## 2017-11-06 ENCOUNTER — OFFICE VISIT (OUTPATIENT)
Dept: FAMILY MEDICINE CLINIC | Age: 27
End: 2017-11-06
Payer: COMMERCIAL

## 2017-11-06 VITALS
WEIGHT: 213 LBS | HEIGHT: 62 IN | OXYGEN SATURATION: 98 % | DIASTOLIC BLOOD PRESSURE: 80 MMHG | RESPIRATION RATE: 18 BRPM | HEART RATE: 95 BPM | SYSTOLIC BLOOD PRESSURE: 120 MMHG | BODY MASS INDEX: 39.2 KG/M2

## 2017-11-06 DIAGNOSIS — J30.2 OTHER SEASONAL ALLERGIC RHINITIS: Primary | ICD-10-CM

## 2017-11-06 DIAGNOSIS — F31.4 SEVERE BIPOLAR I DISORDER, CURRENT OR MOST RECENT EPISODE DEPRESSED (HCC): ICD-10-CM

## 2017-11-06 DIAGNOSIS — Z23 INFLUENZA VACCINE NEEDED: ICD-10-CM

## 2017-11-06 PROCEDURE — 90686 IIV4 VACC NO PRSV 0.5 ML IM: CPT | Performed by: NURSE PRACTITIONER

## 2017-11-06 PROCEDURE — 99213 OFFICE O/P EST LOW 20 MIN: CPT | Performed by: NURSE PRACTITIONER

## 2017-11-06 PROCEDURE — G8427 DOCREV CUR MEDS BY ELIG CLIN: HCPCS | Performed by: NURSE PRACTITIONER

## 2017-11-06 PROCEDURE — 90471 IMMUNIZATION ADMIN: CPT | Performed by: NURSE PRACTITIONER

## 2017-11-06 PROCEDURE — G8484 FLU IMMUNIZE NO ADMIN: HCPCS | Performed by: NURSE PRACTITIONER

## 2017-11-06 PROCEDURE — G8417 CALC BMI ABV UP PARAM F/U: HCPCS | Performed by: NURSE PRACTITIONER

## 2017-11-06 PROCEDURE — 1036F TOBACCO NON-USER: CPT | Performed by: NURSE PRACTITIONER

## 2017-11-06 RX ORDER — LAMOTRIGINE 100 MG/1
100 TABLET ORAL 2 TIMES DAILY
COMMUNITY
Start: 2017-10-23 | End: 2019-02-04 | Stop reason: ALTCHOICE

## 2017-11-06 RX ORDER — ALBUTEROL SULFATE 90 UG/1
2 AEROSOL, METERED RESPIRATORY (INHALATION) EVERY 6 HOURS PRN
Qty: 1 INHALER | Refills: 3 | Status: ON HOLD | OUTPATIENT
Start: 2017-11-06 | End: 2019-12-31 | Stop reason: HOSPADM

## 2017-11-06 RX ORDER — LORATADINE 10 MG/1
10 TABLET ORAL DAILY
Qty: 90 TABLET | Refills: 1 | Status: SHIPPED | OUTPATIENT
Start: 2017-11-06 | End: 2018-04-13 | Stop reason: ALTCHOICE

## 2017-11-06 RX ORDER — MONTELUKAST SODIUM 10 MG/1
10 TABLET ORAL NIGHTLY
Qty: 30 TABLET | Refills: 1 | Status: SHIPPED | OUTPATIENT
Start: 2017-11-06 | End: 2018-04-13 | Stop reason: SDUPTHER

## 2017-11-06 ASSESSMENT — ENCOUNTER SYMPTOMS
SINUS PRESSURE: 0
ABDOMINAL DISTENTION: 0
SHORTNESS OF BREATH: 1
EYES NEGATIVE: 1
SORE THROAT: 0
CHEST TIGHTNESS: 0

## 2017-11-06 NOTE — PROGRESS NOTES
[facility]  [facility]  [address] [place] [id number]  Dept: [phone]  Dept Fax: [phone]    Last encounter Visit date not found  Visit Information    Have you changed or started any medications since your last visit including any over-the-counter medicines, vitamins, or herbal medicines? no   Are you having any side effects from any of your medications? -  no  Have you stopped taking any of your medications? Is so, why? -  yes - Manulea Gilman     Have you seen any other physician or provider since your last visit? Yes - Records Obtained  Have you had any other diagnostic tests since your last visit? Yes - Records Obtained  Have you been seen in the emergency room and/or had an admission to a hospital since we last saw you? Yes - Records Obtained  Have you had your routine dental cleaning in the past 6 months? no    Have you activated your [employer] account? If not, what are your barriers? Yes     Patient Care Team:  Jai Jeter NP as PCP - General    Medical History Review  Past Medical, Family, and Social History reviewed and does contribute to the patient presenting condition    Health Maintenance   Topic Date Due    Cervical cancer screen  06/15/2013    DTaP/Tdap/Td vaccine (2 - Td) 06/25/2024    Flu vaccine  Completed    HIV screen  Completed       HPI:   Laura Burton is a 24 y.o. female who presents today for her medical conditions/complaints as noted below. Laura Burton is c/o of Check-Up (Flu vaccine bumps on neck has had 3years. Seen Tyra Person 1 year ago but has knew bumps on right side. Also check high blood pressure. Keeps getting UTI )      HPI      Recurrent UTI- recently with bactrim treatment. Cotton underwear. Taking   Showers instead of baths, living with boyfriend. Describes boyfriend as kind, raised [employer]ism [employer]'s witness and is a . Nexplannon implant in right upper arm. 2017 January.      Planned Wheezing 1 Inhaler 3    loratadine (CLARITIN) 10 MG tablet Take 1 tablet by mouth daily 90 tablet 1    hydrOXYzine (VISTARIL) 50 MG capsule Take 50 mg by mouth 2 times daily Indications: Take one capsule by mouth twice daily as needed for anxiety      doxepin (SINEQUAN) 25 MG capsule Take 50 mg by mouth nightly Indications: take once capsule by mouth once daily at bedtime.  Multiple Vitamin (MULTI VITAMIN PO) Take 1 tablet by mouth daily      naproxen (NAPROSYN) 500 MG tablet Take 1 tablet by mouth 2 times daily 30 tablet 0    ibuprofen (ADVIL;MOTRIN) 800 MG tablet Take 1 tablet by mouth every 8 hours as needed for Pain 21 tablet 0    zolpidem (AMBIEN) 5 MG tablet Take 5 mg by mouth nightly as needed for Sleep      ondansetron (ZOFRAN ODT) 4 MG disintegrating tablet Take 1 tablet by mouth every 8 hours as needed for Nausea or Vomiting 12 tablet 0     No current facility-administered medications for this visit. Allergies   Allergen Reactions    Doxycycline      Tongue itch, throat began to swell    Other      Serriquill -   Paranoid made mood worse     Pollen Extract      Grass    Seroquel [Quetiapine Fumarate] Other (See Comments)     Paranoid made mood worse    Symbicort [Budesonide-Formoterol Fumarate] Other (See Comments)     Delusional, paranoid, hallucinations.  Topamax [Topiramate] Other (See Comments)     Numbness in legs       Health Maintenance   Topic Date Due    Cervical cancer screen  06/15/2013    DTaP/Tdap/Td vaccine (2 - Td) 06/25/2024    Flu vaccine  Completed    HIV screen  Completed       Subjective:      Review of Systems   Constitutional: Negative for activity change, appetite change and fever. HENT: Negative for congestion, postnasal drip, sinus pressure and sore throat. Eyes: Negative. Respiratory: Positive for shortness of breath (with hiking recently). Negative for chest tightness. Cardiovascular: Negative for chest pain, palpitations and leg swelling. Gastrointestinal: Negative for abdominal distention. Genitourinary: Negative for difficulty urinating. Musculoskeletal: Negative for arthralgias. Psychiatric/Behavioral: Negative for agitation. Objective:     Physical Exam   Constitutional: She is oriented to person, place, and time. She appears well-developed and well-nourished. No distress. HENT:   Head: Normocephalic and atraumatic. Right Ear: External ear normal.   Left Ear: External ear normal.   Mouth/Throat: No oropharyngeal exudate. Eyes: EOM are normal. Pupils are equal, round, and reactive to light. Neck: Normal range of motion. Neck supple. No thyromegaly present. Occipital protuberance tenderness no lymphadenopathy   Cardiovascular: Normal rate, regular rhythm and normal heart sounds. No murmur heard. Pulmonary/Chest: Effort normal and breath sounds normal. She has no wheezes. She has no rales. Abdominal: Soft. She exhibits no distension. There is no tenderness. Musculoskeletal: She exhibits no edema. Lymphadenopathy:     She has no cervical adenopathy. Neurological: She is alert and oriented to person, place, and time. No cranial nerve deficit. Skin: Skin is warm and dry. No rash noted. Psychiatric: She has a normal mood and affect. Her behavior is normal. Judgment and thought content normal.   Nursing note and vitals reviewed.     /80 (Site: Left Arm, Position: Sitting, Cuff Size: Medium Adult)   Pulse 95   Resp 18   Ht 5' 2\" (1.575 m)   Wt 213 lb (96.6 kg)   SpO2 98%   BMI 38.96 kg/m²     Data:     Lab Results   Component Value Date     09/04/2017    K 3.6 09/04/2017     09/04/2017    CO2 21 09/04/2017    BUN 11 09/04/2017    CREATININE 0.69 09/04/2017    GLUCOSE 91 09/04/2017    PROT 7.4 06/16/2017    LABALBU 4.4 06/16/2017    BILITOT 0.19 06/16/2017    ALKPHOS 60 06/16/2017    AST 14 06/16/2017    ALT 16 06/16/2017     Lab Results   Component Value Date    WBC 13.8 09/04/2017    RBC 5.13 Refill:  1     Orders Placed This Encounter   Procedures    INFLUENZA, QUADV, 3 YRS AND OLDER, IM, PF, PREFILL SYR OR SDV, 0.5ML (FLUZONE QUADV, PF)         Rachael received counseling on the following healthy behaviors: nutrition, exercise and medication adherence  Reviewed prior labs and health maintenance. Continue current medications, diet and exercise. Discussed use, benefit, and side effects of prescribed medications. Barriers to medication compliance addressed. Patient given educational materials - see patient instructions. All patient questions answered. Patient voiced understanding.           Electronically signed by Hemant Suero NP on 11/6/2017 at 11:13 PM

## 2017-11-26 ENCOUNTER — HOSPITAL ENCOUNTER (EMERGENCY)
Age: 27
Discharge: HOME | End: 2017-11-26
Attending: FAMILY MEDICINE
Payer: COMMERCIAL

## 2017-11-26 VITALS
TEMPERATURE: 98.7 F | WEIGHT: 208 LBS | OXYGEN SATURATION: 98 % | DIASTOLIC BLOOD PRESSURE: 77 MMHG | SYSTOLIC BLOOD PRESSURE: 125 MMHG | BODY MASS INDEX: 38.28 KG/M2 | HEART RATE: 90 BPM | HEIGHT: 62 IN | RESPIRATION RATE: 16 BRPM

## 2017-11-26 DIAGNOSIS — R30.0 DYSURIA: Primary | ICD-10-CM

## 2017-11-26 LAB
BILIRUBIN URINE: NEGATIVE
COLOR: YELLOW
COMMENT UA: NORMAL
GLUCOSE URINE: NEGATIVE
HCG(URINE) PREGNANCY TEST: NEGATIVE
KETONES, URINE: NEGATIVE
LEUKOCYTE ESTERASE, URINE: NEGATIVE
NITRITE, URINE: NEGATIVE
PH UA: 7 (ref 5–8)
PROTEIN UA: NEGATIVE
SPECIFIC GRAVITY UA: 1 (ref 1–1.03)
TURBIDITY: CLEAR
URINE HGB: NEGATIVE
UROBILINOGEN, URINE: NORMAL

## 2017-11-26 PROCEDURE — 81003 URINALYSIS AUTO W/O SCOPE: CPT

## 2017-11-26 PROCEDURE — 84703 CHORIONIC GONADOTROPIN ASSAY: CPT

## 2017-11-26 PROCEDURE — 99283 EMERGENCY DEPT VISIT LOW MDM: CPT

## 2017-11-26 RX ORDER — CIPROFLOXACIN 250 MG/1
250 TABLET, FILM COATED ORAL 2 TIMES DAILY
Qty: 6 TABLET | Refills: 0 | Status: SHIPPED | OUTPATIENT
Start: 2017-11-26 | End: 2018-01-23 | Stop reason: ALTCHOICE

## 2017-11-26 NOTE — ED PROVIDER NOTES
eMERGENCY dEPARTMENT eNCOUnter      Sharri Hernandez    Chief Complaint   Patient presents with   Susy Kanaris Infection       HPI    Ella Eastman is a 24 y.o. female who presents with dysuria for the last 2 days. She took one dose of the nasal dye yesterday. She does have urgency. This did not help the pain. The pain is noted when she urinates. No hematuria. No flank pain. No fever or diaphoresis. FDLMP 2 months ago. No pelvic pain. REVIEW OF SYSTEMS    All body systems reviewed and otherwise negative. PAST MEDICAL HISTORY    Past Medical History:   Diagnosis Date    Asthma     Back pain     Bipolar 1 disorder ([address]     Chicken pox     Lordosis     Pneumonia     UTI (urinary tract infection)        SURGICAL HISTORY    Past Surgical History:   Procedure Laterality Date    NECK SURGERY Left lesion biopsy    UPPER GASTROINTESTINAL ENDOSCOPY      chicken stuck in throat    WISDOM TOOTH EXTRACTION         CURRENT MEDICATIONS  Has progesterone-type contraceptive implant in her arm  Current Outpatient Rx   Medication Sig Dispense Refill    lamoTRIgine (LAMICTAL) 100 MG tablet       lurasidone (LATUDA) 40 MG TABS tablet       montelukast (SINGULAIR) 10 MG tablet Take 1 tablet by mouth nightly 30 tablet 1    albuterol sulfate HFA (PROAIR HFA) 108 (90 Base) MCG/ACT inhaler Inhale 2 puffs into the lungs every 6 hours as needed for Wheezing 1 Inhaler 3    loratadine (CLARITIN) 10 MG tablet Take 1 tablet by mouth daily 90 tablet 1    hydrOXYzine (VISTARIL) 50 MG capsule Take 50 mg by mouth 2 times daily Indications: Take one capsule by mouth twice daily as needed for anxiety      doxepin (SINEQUAN) 25 MG capsule Take 50 mg by mouth nightly Indications: take once capsule by mouth once daily at bedtime.        naproxen (NAPROSYN) 500 MG tablet Take 1 tablet by mouth 2 times daily 30 tablet 0    ibuprofen (ADVIL;MOTRIN) 800 MG tablet Take 1 tablet by mouth every 8 hours as needed for Pain is aligned with no paraspinal tenderness. Integument:  Well hydrated   Neurologic:  Alert & oriented x 3, no focal deficits     ED COURSE & MEDICAL DECISION MAKING    Pertinent Labs & Imaging studies reviewed. (See chart for details)     Summation      Patient Course: 25 yo female with dysuria is evaluate. History of UTI's. Did take phenazo tablet. UA stable and HCG negative. Cipro for 3 days. Return to ER if condition worsens. ED Medications administered this visit:  Medications - No data to display    New Prescriptions from this visit:    New Prescriptions    CIPROFLOXACIN (CIPRO) 250 MG TABLET    Take 1 tablet by mouth 2 times daily       Follow-up:  [facility]  [address] [address]  [phone]  Go to   If symptoms worsen        Final Impression:   1.  Dysuria               (Please note that portions of this note were completed with a voice recognition program.  Efforts were made to edit the dictations but occasionally words are mis-transcribed.)          Cheyenne Lemon, DO  11/28/17 0107

## 2017-11-28 ENCOUNTER — HOSPITAL ENCOUNTER (EMERGENCY)
Age: 27
Discharge: HOME | End: 2017-11-28
Attending: EMERGENCY MEDICINE
Payer: COMMERCIAL

## 2017-11-28 VITALS
HEART RATE: 97 BPM | SYSTOLIC BLOOD PRESSURE: 134 MMHG | DIASTOLIC BLOOD PRESSURE: 80 MMHG | OXYGEN SATURATION: 100 % | RESPIRATION RATE: 18 BRPM | TEMPERATURE: 98.4 F

## 2017-11-28 DIAGNOSIS — N73.0 PID (ACUTE PELVIC INFLAMMATORY DISEASE): Primary | ICD-10-CM

## 2017-11-28 LAB
-: NORMAL
AMORPHOUS: NORMAL
BACTERIA: NORMAL
BILIRUBIN URINE: NEGATIVE
CASTS UA: NORMAL /LPF
COLOR: YELLOW
COMMENT UA: ABNORMAL
CRYSTALS, UA: NORMAL /HPF
DIRECT EXAM: ABNORMAL
EPITHELIAL CELLS UA: NORMAL /HPF
GLUCOSE URINE: NEGATIVE
KETONES, URINE: NEGATIVE
LEUKOCYTE ESTERASE, URINE: NEGATIVE
Lab: ABNORMAL
MUCUS: NORMAL
NITRITE, URINE: NEGATIVE
OTHER OBSERVATIONS UA: NORMAL
PH UA: 6.5 (ref 5–8)
PROTEIN UA: NEGATIVE
RBC UA: NORMAL /HPF (ref 0–2)
RENAL EPITHELIAL, UA: NORMAL /HPF
SPECIFIC GRAVITY UA: 1.01 (ref 1–1.03)
SPECIMEN DESCRIPTION: ABNORMAL
STATUS: ABNORMAL
TRICHOMONAS: NORMAL
TURBIDITY: CLEAR
URINE HGB: ABNORMAL
UROBILINOGEN, URINE: NORMAL
WBC UA: NORMAL /HPF
YEAST: NORMAL

## 2017-11-28 PROCEDURE — 6360000002 HC RX W HCPCS: Performed by: EMERGENCY MEDICINE

## 2017-11-28 PROCEDURE — 96372 THER/PROPH/DIAG INJ SC/IM: CPT

## 2017-11-28 PROCEDURE — 2500000003 HC RX 250 WO HCPCS: Performed by: EMERGENCY MEDICINE

## 2017-11-28 PROCEDURE — 87491 CHLMYD TRACH DNA AMP PROBE: CPT

## 2017-11-28 PROCEDURE — 99284 EMERGENCY DEPT VISIT MOD MDM: CPT

## 2017-11-28 PROCEDURE — 87591 N.GONORRHOEAE DNA AMP PROB: CPT

## 2017-11-28 PROCEDURE — 81001 URINALYSIS AUTO W/SCOPE: CPT

## 2017-11-28 PROCEDURE — 87210 SMEAR WET MOUNT SALINE/INK: CPT

## 2017-11-28 RX ORDER — CLINDAMYCIN HYDROCHLORIDE 300 MG/1
300 CAPSULE ORAL 3 TIMES DAILY
Qty: 30 CAPSULE | Refills: 0 | Status: SHIPPED | OUTPATIENT
Start: 2017-11-28 | End: 2017-12-08

## 2017-11-28 RX ADMIN — LIDOCAINE HYDROCHLORIDE 1 G: 10 INJECTION, SOLUTION INFILTRATION; PERINEURAL at 22:30

## 2017-11-28 ASSESSMENT — PAIN DESCRIPTION - LOCATION: LOCATION: ABDOMEN

## 2017-11-28 ASSESSMENT — PAIN DESCRIPTION - DESCRIPTORS: DESCRIPTORS: STABBING

## 2017-11-28 ASSESSMENT — PAIN DESCRIPTION - ORIENTATION: ORIENTATION: LOWER

## 2017-11-28 ASSESSMENT — PAIN SCALES - GENERAL
PAINLEVEL_OUTOF10: 8
PAINLEVEL_OUTOF10: 6

## 2017-11-28 ASSESSMENT — PAIN DESCRIPTION - PAIN TYPE: TYPE: ACUTE PAIN

## 2017-11-29 NOTE — ED TRIAGE NOTES
List of medications patient is currently taking is complete. Source of medications in list are per patient.

## 2017-11-29 NOTE — ED PROVIDER NOTES
MG tablet Take 1 tablet by mouth 2 times daily (Patient taking differently: Take 250 mg by mouth 2 times daily ) 30 tablet 0    hydrOXYzine (VISTARIL) 50 MG capsule Take 50 mg by mouth 2 times daily Indications: Take one capsule by mouth twice daily as needed for anxiety      doxepin (SINEQUAN) 25 MG capsule Take 50 mg by mouth nightly Indications: take once capsule by mouth once daily at bedtime.  ciprofloxacin (CIPRO) 250 MG tablet Take 1 tablet by mouth 2 times daily 6 tablet 0       ALLERGIES    Allergies   Allergen Reactions    Doxycycline      Tongue itch, throat began to swell    Other      Serriquill -   Paranoid made mood worse     Pollen Extract      Grass    Seroquel [Quetiapine Fumarate] Other (See Comments)     Paranoid made mood worse    Symbicort [Budesonide-Formoterol Fumarate] Other (See Comments)     Delusional, paranoid, hallucinations.  Topamax [Topiramate] Other (See Comments)     Numbness in legs       FAMILY HISTORY    Family History   Problem Relation Age of Onset    Asthma Mother     High Blood Pressure Father     Diabetes Father        SOCIAL HISTORY    Social History     Social History    Marital status: Single     Spouse name: N/A    Number of children: N/A    Years of education: N/A     Social History Main Topics    Smoking status: Never Smoker    Smokeless tobacco: Never Used    Alcohol use No      Comment: Rare    Drug use: No    Sexual activity: Not Asked     Other Topics Concern    None     Social History Narrative    None       PHYSICAL EXAM    VITAL SIGNS: /80   Pulse 97   Temp 98.4 °F (36.9 °C) (Oral)   Resp 18   LMP 09/25/2017 Comment: approx.  2 months ago  SpO2 100%   Constitutional:  Well developed, well nourished, no acute distress, non-toxic appearance   Eyes:  PERRL, conjunctiva normal   HENT:  Atraumatic, external ears normal, nose normal, oropharynx moist.  Neck- supple   Respiratory:  No respiratory distress, normal breath sounds

## 2017-11-30 LAB
C TRACH DNA GENITAL QL NAA+PROBE: NEGATIVE
N. GONORRHOEAE DNA: NEGATIVE

## 2017-12-16 ENCOUNTER — HOSPITAL ENCOUNTER (EMERGENCY)
Age: 27
Discharge: PSYCHIATRIC OR SUBSTANCE USE FACILITY | End: 2017-12-16
Payer: COMMERCIAL

## 2017-12-16 VITALS
DIASTOLIC BLOOD PRESSURE: 94 MMHG | RESPIRATION RATE: 16 BRPM | OXYGEN SATURATION: 99 % | SYSTOLIC BLOOD PRESSURE: 126 MMHG | TEMPERATURE: 99.2 F | HEART RATE: 113 BPM

## 2017-12-16 DIAGNOSIS — R45.851 SUICIDAL IDEATION: ICD-10-CM

## 2017-12-16 DIAGNOSIS — R44.3 HALLUCINATIONS: Primary | ICD-10-CM

## 2017-12-16 LAB
-: NORMAL
ABSOLUTE EOS #: 0.8 K/UL (ref 0–0.4)
ABSOLUTE IMMATURE GRANULOCYTE: ABNORMAL K/UL (ref 0–0.3)
ABSOLUTE LYMPH #: 2.7 K/UL (ref 1–4.8)
ABSOLUTE MONO #: 0.7 K/UL (ref 0.2–0.8)
ACETAMINOPHEN LEVEL: <10 UG/ML (ref 10–30)
AMORPHOUS: NORMAL
AMPHETAMINE SCREEN URINE: NEGATIVE
ANION GAP SERPL CALCULATED.3IONS-SCNC: 15 MMOL/L (ref 9–17)
BACTERIA: NORMAL
BARBITURATE SCREEN URINE: NEGATIVE
BASOPHILS # BLD: 0 % (ref 0–2)
BASOPHILS ABSOLUTE: 0 K/UL (ref 0–0.2)
BENZODIAZEPINE SCREEN, URINE: NEGATIVE
BILIRUBIN URINE: NEGATIVE
BUN BLDV-MCNC: 11 MG/DL (ref 6–20)
BUN/CREAT BLD: 15 (ref 9–20)
BUPRENORPHINE URINE: NEGATIVE
CALCIUM SERPL-MCNC: 9.1 MG/DL (ref 8.6–10.4)
CANNABINOID SCREEN URINE: NEGATIVE
CASTS UA: NORMAL /LPF
CHLORIDE BLD-SCNC: 101 MMOL/L (ref 98–107)
CO2: 22 MMOL/L (ref 20–31)
COCAINE METABOLITE, URINE: NEGATIVE
COLOR: YELLOW
COMMENT UA: ABNORMAL
CREAT SERPL-MCNC: 0.71 MG/DL (ref 0.5–0.9)
CRYSTALS, UA: NORMAL /HPF
DIFFERENTIAL TYPE: ABNORMAL
EKG ATRIAL RATE: 99 BPM
EKG P AXIS: 33 DEGREES
EKG P-R INTERVAL: 114 MS
EKG Q-T INTERVAL: 342 MS
EKG QRS DURATION: 86 MS
EKG QTC CALCULATION (BAZETT): 438 MS
EKG R AXIS: 45 DEGREES
EKG T AXIS: 39 DEGREES
EKG VENTRICULAR RATE: 99 BPM
EOSINOPHILS RELATIVE PERCENT: 8 % (ref 1–4)
EPITHELIAL CELLS UA: NORMAL /HPF (ref 0–25)
ETHANOL PERCENT: <0.01 %
ETHANOL: <10 MG/DL
GFR AFRICAN AMERICAN: >60 ML/MIN
GFR NON-AFRICAN AMERICAN: >60 ML/MIN
GFR SERPL CREATININE-BSD FRML MDRD: ABNORMAL ML/MIN/{1.73_M2}
GFR SERPL CREATININE-BSD FRML MDRD: ABNORMAL ML/MIN/{1.73_M2}
GLUCOSE BLD-MCNC: 119 MG/DL (ref 70–99)
GLUCOSE URINE: NEGATIVE
HCG(URINE) PREGNANCY TEST: NEGATIVE
HCT VFR BLD CALC: 42.3 % (ref 36–46)
HEMOGLOBIN: 14.4 G/DL (ref 12–16)
IMMATURE GRANULOCYTES: ABNORMAL %
KETONES, URINE: NEGATIVE
LEUKOCYTE ESTERASE, URINE: NEGATIVE
LYMPHOCYTES # BLD: 27 % (ref 24–44)
MCH RBC QN AUTO: 28.3 PG (ref 26–34)
MCHC RBC AUTO-ENTMCNC: 34 G/DL (ref 31–37)
MCV RBC AUTO: 83.1 FL (ref 80–100)
MDMA URINE: NORMAL
METHADONE SCREEN, URINE: NEGATIVE
METHAMPHETAMINE, URINE: NEGATIVE
MONOCYTES # BLD: 7 % (ref 1–7)
MUCUS: NORMAL
NITRITE, URINE: NEGATIVE
OPIATES, URINE: NEGATIVE
OTHER OBSERVATIONS UA: NORMAL
OXYCODONE SCREEN URINE: NEGATIVE
PDW BLD-RTO: 12 % (ref 12.1–15.2)
PH UA: 7 (ref 5–9)
PHENCYCLIDINE, URINE: NEGATIVE
PLATELET # BLD: 475 K/UL (ref 140–450)
PLATELET ESTIMATE: ABNORMAL
PMV BLD AUTO: 7.3 FL (ref 6–12)
POTASSIUM SERPL-SCNC: 3.8 MMOL/L (ref 3.7–5.3)
PROPOXYPHENE, URINE: NEGATIVE
PROTEIN UA: NEGATIVE
RBC # BLD: 5.09 M/UL (ref 4–5.2)
RBC # BLD: ABNORMAL 10*6/UL
RBC UA: NORMAL /HPF (ref 0–2)
RENAL EPITHELIAL, UA: NORMAL /HPF
SALICYLATE LEVEL: 1 MG/DL (ref 3–10)
SEG NEUTROPHILS: 58 % (ref 36–66)
SEGMENTED NEUTROPHILS ABSOLUTE COUNT: 6 K/UL (ref 1.8–7.7)
SODIUM BLD-SCNC: 138 MMOL/L (ref 135–144)
SPECIFIC GRAVITY UA: 1.01 (ref 1.01–1.02)
TEST INFORMATION: NORMAL
TRICHOMONAS: NORMAL
TRICYCLIC ANTIDEPRESSANTS, UR: NEGATIVE
TSH SERPL DL<=0.05 MIU/L-ACNC: 2.24 MIU/L (ref 0.3–5)
TURBIDITY: CLEAR
URINE HGB: ABNORMAL
UROBILINOGEN, URINE: NORMAL
WBC # BLD: 10.2 K/UL (ref 3.5–11)
WBC # BLD: ABNORMAL 10*3/UL
WBC UA: NORMAL /HPF (ref 0–5)
YEAST: NORMAL

## 2017-12-16 PROCEDURE — 93005 ELECTROCARDIOGRAM TRACING: CPT

## 2017-12-16 PROCEDURE — 84703 CHORIONIC GONADOTROPIN ASSAY: CPT

## 2017-12-16 PROCEDURE — 80306 DRUG TEST PRSMV INSTRMNT: CPT

## 2017-12-16 PROCEDURE — 81001 URINALYSIS AUTO W/SCOPE: CPT

## 2017-12-16 PROCEDURE — G0480 DRUG TEST DEF 1-7 CLASSES: HCPCS

## 2017-12-16 PROCEDURE — 80307 DRUG TEST PRSMV CHEM ANLYZR: CPT

## 2017-12-16 PROCEDURE — 80048 BASIC METABOLIC PNL TOTAL CA: CPT

## 2017-12-16 PROCEDURE — 84443 ASSAY THYROID STIM HORMONE: CPT

## 2017-12-16 PROCEDURE — 36415 COLL VENOUS BLD VENIPUNCTURE: CPT

## 2017-12-16 PROCEDURE — 85025 COMPLETE CBC W/AUTO DIFF WBC: CPT

## 2017-12-16 PROCEDURE — 99285 EMERGENCY DEPT VISIT HI MDM: CPT

## 2017-12-16 ASSESSMENT — SLEEP AND FATIGUE QUESTIONNAIRES
RESTFUL SLEEP: YES
SLEEP PATTERN: NORMAL
DIFFICULTY ARISING: NO
DIFFICULTY STAYING ASLEEP: NO
DO YOU HAVE DIFFICULTY SLEEPING: NO
DIFFICULTY FALLING ASLEEP: NO
AVERAGE NUMBER OF SLEEP HOURS: 10

## 2017-12-16 ASSESSMENT — ENCOUNTER SYMPTOMS
NAUSEA: 0
SORE THROAT: 0
WHEEZING: 0
COUGH: 0
VOMITING: 0
RHINORRHEA: 0
CONSTIPATION: 0
BACK PAIN: 0
SHORTNESS OF BREATH: 0
CHEST TIGHTNESS: 0
DIARRHEA: 0
BLOOD IN STOOL: 0
ABDOMINAL PAIN: 0
EYE REDNESS: 0
EYE DISCHARGE: 0

## 2017-12-16 ASSESSMENT — LIFESTYLE VARIABLES: HISTORY_ALCOHOL_USE: YES
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
## 2017-12-17 NOTE — ED PROVIDER NOTES
[facility] ED  eMERGENCY dEPARTMENT eNCOUnter      Pt Name: Eric Rodriguez  MRN: [id number]  [place] 1990  Date of evaluation: 12/16/2017  Provider: Logan Maguire Dr       Chief Complaint   Patient presents with    Hallucinations     patient is having visual hallucinations for past 2 days    Mental Health Problem         HISTORY OF PRESENT ILLNESS   (Location/Symptom, Timing/Onset, Context/Setting, Quality, Duration, Modifying Factors, Severity)  Note limiting factors. Eric Rodriguez is a 24 y.o. female who presents to the emergency departmentWith complaints of increased hallucinations over the past 2 days. Patient reports that these audible visual hallucinations are telling her to hurt herself. Reports she's had multiple admissions for this similar reasons 3 times this year so far. Reports that she typically has a couple days of increased hallucinations and they want to keep her in the hospital for further evaluation. She reports no plan or homicidal ideation. Reports no access to weapons. She is here with her boyfriend who she states they're getting along. She otherwise has no other complaints at this time denies chest pain shortness of breath denies any headache dizziness. No other complaints. HPI    Nursing Notes were reviewed. REVIEW OF SYSTEMS    (2-9 systems for level 4, 10 or more for level 5)     Review of Systems   Constitutional: Negative for chills, diaphoresis and fever. HENT: Negative for congestion, ear pain, rhinorrhea and sore throat. Eyes: Negative for discharge, redness and visual disturbance. Respiratory: Negative for cough, chest tightness, shortness of breath and wheezing. Cardiovascular: Negative for chest pain and palpitations. Gastrointestinal: Negative for abdominal pain, blood in stool, constipation, diarrhea, nausea and vomiting. Endocrine: Negative for polydipsia, polyphagia and polyuria.    Genitourinary: Negative for decreased urine volume, difficulty urinating, dysuria, frequency and hematuria. Musculoskeletal: Negative for arthralgias, back pain and myalgias. Skin: Negative for pallor and rash. Allergic/Immunologic: Negative for food allergies and immunocompromised state. Neurological: Negative for dizziness, syncope, weakness and light-headedness. Hematological: Negative for adenopathy. Does not bruise/bleed easily. Psychiatric/Behavioral: Positive for hallucinations and suicidal ideas. Negative for behavioral problems. The patient is not nervous/anxious. Except as noted above the remainder of the review of systems was reviewed and negative. PAST MEDICAL HISTORY     Past Medical History:   Diagnosis Date    Asthma     Back pain     Bipolar 1 disorder ([address]     Chicken pox     Lordosis     Pneumonia     UTI (urinary tract infection)          SURGICAL HISTORY       Past Surgical History:   Procedure Laterality Date    NECK SURGERY Left lesion biopsy    UPPER GASTROINTESTINAL ENDOSCOPY      chicken stuck in throat    WISDOM TOOTH EXTRACTION           CURRENT MEDICATIONS       Previous Medications    ALBUTEROL SULFATE HFA (PROAIR HFA) 108 (90 BASE) MCG/ACT INHALER    Inhale 2 puffs into the lungs every 6 hours as needed for Wheezing    CIPROFLOXACIN (CIPRO) 250 MG TABLET    Take 1 tablet by mouth 2 times daily    DOXEPIN (SINEQUAN) 25 MG CAPSULE    Take 50 mg by mouth nightly Indications: take once capsule by mouth once daily at bedtime. HYDROXYZINE (VISTARIL) 50 MG CAPSULE    Take 50 mg by mouth 2 times daily Indications:  Take one capsule by mouth twice daily as needed for anxiety    LAMOTRIGINE (LAMICTAL) 100 MG TABLET        LORATADINE (CLARITIN) 10 MG TABLET    Take 1 tablet by mouth daily    LURASIDONE (LATUDA) 40 MG TABS TABLET        MONTELUKAST (SINGULAIR) 10 MG TABLET    Take 1 tablet by mouth nightly    NAPROXEN (NAPROSYN) 500 MG TABLET    Take 1 tablet by mouth 2 times daily       ALLERGIES     Doxycycline; Other; Pollen extract; Seroquel [quetiapine fumarate]; Symbicort [budesonide-formoterol fumarate]; and Topamax [topiramate]    FAMILY HISTORY       Family History   Problem Relation Age of Onset    Asthma Mother     High Blood Pressure Father     Diabetes Father           SOCIAL HISTORY       Social History     Social History    Marital status: Single     Spouse name: N/A    Number of children: N/A    Years of education: N/A     Social History Main Topics    Smoking status: Never Smoker    Smokeless tobacco: Never Used    Alcohol use No      Comment: Rare    Drug use: No    Sexual activity: Not Asked     Other Topics Concern    None     Social History Narrative    None       SCREENINGS    [place]ace Coma Scale  Eye Opening: Spontaneous  Best Verbal Response: Oriented  Best Motor Response: Obeys commands  Wilson Coma Scale Score: 15        PHYSICAL EXAM    (up to 7 for level 4, 8 or more for level 5)     ED Triage Vitals [12/16/17 1749]   BP Temp Temp Source Pulse Resp SpO2 Height Weight   (!) 126/94 99.2 °F (37.3 °C) Tympanic 113 16 99 % -- --       Physical Exam   Constitutional: She is oriented to person, place, and time. She appears well-developed and well-nourished. No distress. HENT:   Head: Normocephalic and atraumatic. Right Ear: External ear normal.   Left Ear: External ear normal.   Mouth/Throat: Oropharynx is clear and moist. No oropharyngeal exudate. Eyes: Conjunctivae and EOM are normal. Pupils are equal, round, and reactive to light. Right eye exhibits no discharge. Left eye exhibits no discharge. No scleral icterus. Neck: Normal range of motion. Neck supple. No tracheal deviation present. Cardiovascular: Normal rate, regular rhythm and intact distal pulses. Exam reveals no gallop and no friction rub. No murmur heard. Pulmonary/Chest: Effort normal and breath sounds normal. No stridor. No respiratory distress. She has no wheezes. Abdominal: Soft. Bowel sounds are normal. She exhibits no distension. There is no tenderness. There is no rebound and no guarding. Musculoskeletal: Normal range of motion. She exhibits no edema, tenderness or deformity. Neurological: She is alert and oriented to person, place, and time. No cranial nerve deficit. Skin: Skin is warm and dry. No rash noted. She is not diaphoretic. No erythema. Psychiatric: She has a normal mood and affect. She is actively hallucinating. She expresses suicidal ideation. Nursing note and vitals reviewed.       DIAGNOSTIC RESULTS     EKG: All EKG's are interpreted by the Emergency Department Physician who either signs or Co-signs this chart in the absence of a cardiologist.      RADIOLOGY:   Non-plain film images such as CT, Ultrasound and MRI are read by the radiologist. Plain radiographic images are visualized and preliminarily interpreted by the emergency physician with the below findings:      Interpretation per the Radiologist below, if available at the time of this note:    No orders to display         ED BEDSIDE ULTRASOUND:   Performed by ED Physician - none    LABS:  Labs Reviewed   CBC WITH AUTO DIFFERENTIAL - Abnormal; Notable for the following:        Result Value    RDW 12.0 (*)     Platelets 973 (*)     Eosinophils % 8 (*)     Absolute Eos # 0.80 (*)     All other components within normal limits   BASIC METABOLIC PANEL - Abnormal; Notable for the following:     Glucose 119 (*)     All other components within normal limits   SALICYLATE LEVEL - Abnormal; Notable for the following:     Salicylate Lvl 1 (*)     All other components within normal limits   ACETAMINOPHEN LEVEL - Abnormal; Notable for the following:     Acetaminophen Level <10 (*)     All other components within normal limits   UA W/REFLEX CULTURE - Abnormal; Notable for the following:     Urine Hgb TRACE (*)     All other components within normal limits   PREGNANCY, URINE   URINE DRUG SCREEN   ETHANOL TSH WITHOUT REFLEX   MICROSCOPIC URINALYSIS       All other labs were within normal range or not returned as of this dictation. EMERGENCY DEPARTMENT COURSE and DIFFERENTIAL DIAGNOSIS/MDM:   Vitals:    Vitals:    12/16/17 1749   BP: (!) 126/94   Pulse: 113   Resp: 16   Temp: 99.2 °F (37.3 °C)   TempSrc: Tympanic   SpO2: 99%         MDM  24-year-old female with a long history of psychiatric disorders who presents due to visual and audible hallucinations which are worse over the past 2 days. They're telling her to hurt herself. This point we'll get labs to get patient medically appropriate for acute psychiatric care      12/16/17  7:29 PM  I see no medical reason that this patient cannot be treated acutely by psychiatric facility due to her audible and visual hallucinations which are telling her to hurt herself. Patient felt like the [employer] and was irritating her skin she had just a slight blanchable rash noted look like an irritant dermatitis all ready resolving once she was changed. She feels well no respiratory distress. Patient's daughter tachycardic. [facility]    Procedures    FINAL IMPRESSION      1. Hallucinations    2. Suicidal ideation          DISPOSITION/PLAN   DISPOSITION     PATIENT REFERRED TO:  No follow-up provider specified. DISCHARGE MEDICATIONS:  New Prescriptions    No medications on file              Summation      Patient Course:      ED Medications administered this visit:  Medications - No data to display    New Prescriptions from this visit:    New Prescriptions    No medications on file       Follow-up:  No follow-up provider specified. Final Impression:   1. Hallucinations    2.  Suicidal ideation               (Please note that portions of this note were completed with a voice recognition program.  Efforts were made to edit the dictations but occasionally words are mis-transcribed.)            Huy Mejia, ROSIE  12/16/17 [address] [place], [place]  12/16/17 [address] [facility], ROSIE  12/16/17 [address] [place], [place]  12/16/17 8110

## 2017-12-17 NOTE — ED NOTES
Contacted [facility] per [facility] request.  Will return my call within in the next 15 minutes.      Indu BEAULIEU Reser  12/16/17 1931

## 2018-01-23 ENCOUNTER — OFFICE VISIT (OUTPATIENT)
Dept: FAMILY MEDICINE CLINIC | Age: 28
End: 2018-01-23
Payer: COMMERCIAL

## 2018-01-23 VITALS
WEIGHT: 224 LBS | HEIGHT: 63 IN | BODY MASS INDEX: 39.69 KG/M2 | OXYGEN SATURATION: 98 % | RESPIRATION RATE: 18 BRPM | HEART RATE: 96 BPM | SYSTOLIC BLOOD PRESSURE: 110 MMHG | DIASTOLIC BLOOD PRESSURE: 68 MMHG

## 2018-01-23 DIAGNOSIS — Z00.00 ANNUAL PHYSICAL EXAM: Primary | ICD-10-CM

## 2018-01-23 DIAGNOSIS — F31.4 SEVERE BIPOLAR I DISORDER, CURRENT OR MOST RECENT EPISODE DEPRESSED (HCC): ICD-10-CM

## 2018-01-23 DIAGNOSIS — R13.14 PHARYNGOESOPHAGEAL DYSPHAGIA: ICD-10-CM

## 2018-01-23 DIAGNOSIS — K21.9 GASTROESOPHAGEAL REFLUX DISEASE WITHOUT ESOPHAGITIS: ICD-10-CM

## 2018-01-23 PROCEDURE — 99395 PREV VISIT EST AGE 18-39: CPT | Performed by: NURSE PRACTITIONER

## 2018-01-23 RX ORDER — RANITIDINE 150 MG/1
150 TABLET ORAL NIGHTLY
Qty: 30 TABLET | Refills: 1 | Status: SHIPPED | OUTPATIENT
Start: 2018-01-23 | End: 2018-04-13 | Stop reason: ALTCHOICE

## 2018-01-23 RX ORDER — LURASIDONE HYDROCHLORIDE 60 MG/1
120 TABLET, FILM COATED ORAL DAILY
COMMUNITY
Start: 2018-01-20 | End: 2018-04-13 | Stop reason: ALTCHOICE

## 2018-01-23 ASSESSMENT — ENCOUNTER SYMPTOMS
SINUS PRESSURE: 0
SINUS PAIN: 0
ABDOMINAL DISTENTION: 0
COUGH: 0
TROUBLE SWALLOWING: 1
RHINORRHEA: 0
EYES NEGATIVE: 1
SORE THROAT: 1

## 2018-01-23 NOTE — PROGRESS NOTES
body mass index (BMI) of 38.0 to 38.9 in adult  Increase exercise   Monitor portion sizes   Avoid high concentrated sweets   Limit caffeine    4. Severe bipolar I disorder, current or most recent episode depressed ([address]  Currently on medications no suicidal ideation, living with boyfriend of 10 months. Parents supportive    5. Gastroesophageal reflux disease without esophagitis  Restart zantac with throat irritation    - ranitidine (ZANTAC) 150 MG tablet; Take 1 tablet by mouth nightly  Dispense: 30 tablet; Refill: 1  - External Referral To Ent                  Completed Refills   Requested Prescriptions     Signed Prescriptions Disp Refills    ranitidine (ZANTAC) 150 MG tablet 30 tablet 1     Sig: Take 1 tablet by mouth nightly     No Follow-up on file. Orders Placed This Encounter   Medications    ranitidine (ZANTAC) 150 MG tablet     Sig: Take 1 tablet by mouth nightly     Dispense:  30 tablet     Refill:  1     Orders Placed This Encounter   Procedures    External Referral To Ent     Referral Priority:   Routine     Referral Type:   Consult for Advice and Opinion     Referral Reason:   Specialty Services Required     Referred to Provider:   Nikole Juarez. Requested Specialty:   Otolaryngology     Number of Visits Requested:   1         Rachael received counseling on the following healthy behaviors: nutrition, exercise and medication adherence  Reviewed prior labs and health maintenance. Continue current medications, diet and exercise. Discussed use, benefit, and side effects of prescribed medications. Barriers to medication compliance addressed. Patient given educational materials - see patient instructions. All patient questions answered. Patient voiced understanding.           Electronically signed by Koby Ashton NP on 1/23/2018 at 11:40 PM

## 2018-01-23 NOTE — PATIENT INSTRUCTIONS
Patient Education        Learning About the Diet for Swallowing Problems  What are swallowing problems? Difficulty swallowing is also called dysphagia (say \"sif-EEO-hue-uh\"). It is most often a sign of a problem with your throat or esophagus. This is the tube that moves food and liquids from the back of your mouth to your stomach. Trouble swallowing can occur when the muscles and nerves that move food through the throat and esophagus are not working right. To help you swallow food, your doctor or speech therapist may advise a special dysphagia diet for you. Why is a special diet important? A dysphagia diet can help you handle some problems that can occur when it's hard to swallow food and liquids easily. These problems can include:  · Malnutrition. This means you aren't getting enough healthy foods to keep your body working well. · Dehydration. This means you aren't getting enough liquids to keep your body healthy. · Aspiration. This means that food, liquid, or saliva goes down your windpipe (trachea) into your lungs, instead of down your esophagus to your stomach. This can lead to aspiration pneumonia, which is an inflammation of the lungs. What is the dysphagia diet? In the dysphagia diet, you change the foods you eat and the liquids you drink to make it easier to swallow them. You may:  · Change the texture of the foods you eat. Your doctor or speech therapist may advise you to eat one of these types of foods:  ¨ Solid, soft foods that have been cut up into small pieces. Extra gravy or sauce can help make these foods easier to swallow. ¨ Semi-solid foods, like ground meats or fruits and vegetables that are mashed with a fork. These foods require some chewing. Extra gravy or sauce is often served. ¨ Foods that are the texture of pudding. You don't have to chew these foods. Examples include mashed potatoes with gravy; yogurt; pudding; and pureed meats, fruits, and vegetables.   · Thicken the liquids you

## 2018-03-24 ENCOUNTER — HOSPITAL ENCOUNTER (EMERGENCY)
Age: 28
Discharge: HOME | End: 2018-03-24
Attending: EMERGENCY MEDICINE
Payer: COMMERCIAL

## 2018-03-24 VITALS
OXYGEN SATURATION: 99 % | RESPIRATION RATE: 17 BRPM | SYSTOLIC BLOOD PRESSURE: 120 MMHG | DIASTOLIC BLOOD PRESSURE: 89 MMHG | HEART RATE: 86 BPM

## 2018-03-24 DIAGNOSIS — F32.A DEPRESSION, UNSPECIFIED DEPRESSION TYPE: Primary | ICD-10-CM

## 2018-03-24 PROCEDURE — 99283 EMERGENCY DEPT VISIT LOW MDM: CPT

## 2018-03-25 NOTE — ED NOTES
Discharge instructions reviewed with patient, patient provided opportunity to ask questions, no questions/conerns at this time.         Peng Gauthier RN  03/24/18 0473

## 2018-03-25 NOTE — ED PROVIDER NOTES
[facility]  Emergency Medicine Department    Pt Name: Brianne Hernandez  MRN: [id number]  [place] 1990  Date of evaluation: 3/24/2018  Provider: Ryan Randall MD    CHIEF COMPLAINT       Chief Complaint   Patient presents with    Depression     pt states she is very, very depressed, just got out of [facility] and states she doesnt see her doctor until the 4th       HISTORY OF PRESENT ILLNESS  (Location/Symptom, Timing/Onset, Context/Setting, Quality, Duration, Modifying Factors, Severity.)   Brianne Hernandez is a 24 y.o. female with a history of depression and anxiety who presents to the emergency department complaining of feeling very very depressed this evening. She was recently discharged after an inpatient stay at [facility] this previous Thursday. She was feeling well upon discharge and only earlier this evening became feeling very depressed and weepy. She denies any thoughts of suicidal ideation. She denies any auditory or visual hallucinations. She admits to missing a dose of her Vistaril earlier today. She is otherwise been compliant with her medications. Nursing Notes were reviewed. ALLERGIES     Doxycycline; Pollen extract; Seroquel [quetiapine fumarate];  Symbicort [budesonide-formoterol fumarate]; and Topamax [topiramate]    CURRENT MEDICATIONS       Discharge Medication List as of 3/24/2018 11:40 PM      CONTINUE these medications which have NOT CHANGED    Details   !! LATUDA 60 MG TABS tablet DAWHistorical Med      ranitidine (ZANTAC) 150 MG tablet Take 1 tablet by mouth nightly, Disp-30 tablet, R-1Normal      lamoTRIgine (LAMICTAL) 100 MG tablet Historical Med      !! lurasidone (LATUDA) 40 MG TABS tablet Historical Med      montelukast (SINGULAIR) 10 MG tablet Take 1 tablet by mouth nightly, Disp-30 tablet, R-1Normal      albuterol sulfate HFA (PROAIR HFA) 108 (90 Base) MCG/ACT inhaler Inhale 2 puffs into the lungs every 6 hours as needed for Wheezing, Disp-1 Inhaler, R-3Normal      loratadine (CLARITIN) 10 MG tablet Take 1 tablet by mouth daily, Disp-90 tablet, R-1Normal      naproxen (NAPROSYN) 500 MG tablet Take 1 tablet by mouth 2 times daily, Disp-30 tablet, R-0Print      hydrOXYzine (VISTARIL) 50 MG capsule Take 50 mg by mouth 2 times daily Indications: Take one capsule by mouth twice daily as needed for anxietyHistorical Med      doxepin (SINEQUAN) 25 MG capsule Take 50 mg by mouth nightly Indications: take once capsule by mouth once daily at bedtime. Historical Med       !! - Potential duplicate medications found. Please discuss with provider. PAST MEDICAL HISTORY         Diagnosis Date    Asthma     Back pain     Bipolar 1 disorder (HCC)     Chicken pox     Lordosis     Pneumonia     UTI (urinary tract infection)        SURGICAL HISTORY           Procedure Laterality Date    NECK SURGERY Left lesion biopsy    UPPER GASTROINTESTINAL ENDOSCOPY      chicken stuck in throat    WISDOM TOOTH EXTRACTION         FAMILY HISTORY           Problem Relation Age of Onset    Asthma Mother     High Blood Pressure Father     Diabetes Father      Family Status   Relation Status    Mother Alive    Father Alive        SOCIAL HISTORY      reports that she has never smoked. She has never used smokeless tobacco. She reports that she does not drink alcohol or use drugs. REVIEW OF SYSTEMS    (2-9 systems for level 4, 10 or more for level 5)     Review of Systems  GEN: no fevers  GI: No abdominal pain, No Nausea, No Vomiting  Neuro: No HA, No numbness. No weakness  MSK: No msk pain, No msk injuries  Skin: No rashes  Pysch: +depression, No SI/HI, no hallucinations    Except as noted above the remainder of the review of systems was reviewed and negative.      PHYSICAL EXAM    (up to 7 for level 4, 8 or more for level 5)     ED Triage Vitals   BP Temp Temp src Pulse Resp SpO2 Height Weight   03/24/18 2350 -- -- 03/24/18 2350 03/24/18 2120 03/24/18 2120 -- -- 120/89   86 17 98 %       Physical Exam   Constitutional: She is oriented to person, place, and time. She appears well-developed and well-nourished. No distress. HENT:   Head: Normocephalic and atraumatic. Eyes: EOM are normal. Pupils are equal, round, and reactive to light. Neck: Normal range of motion. Neck supple. Cardiovascular: Normal rate, regular rhythm, normal heart sounds and intact distal pulses. Pulmonary/Chest: Effort normal and breath sounds normal. No respiratory distress. Abdominal: Soft. There is no tenderness. Musculoskeletal: Normal range of motion. She exhibits no tenderness or deformity. Neurological: She is alert and oriented to person, place, and time. Skin: Skin is warm and dry. Psychiatric: Her behavior is normal. Judgment normal.   Reports depressed mood   Nursing note and vitals reviewed. DIAGNOSTIC RESULTS     RADIOLOGY:   Non-plain film images such as CT, Ultrasound and MRI are read by the radiologist. Plain radiographic images are visualized and preliminarily interpreted by the emergency physician with the below findings:    None indicated    ED BEDSIDE ULTRASOUND:   Performed by ED Physician - none    LABS:  Labs Reviewed - No data to display    All other labs were within normal range or not returned as of this dictation. EMERGENCY DEPARTMENT COURSE and DIFFERENTIAL DIAGNOSIS/MDM:   Vitals:    Vitals:    03/24/18 2120 03/24/18 2350   BP:  120/89   Pulse:  86   Resp: 17    SpO2: 98% 80     57-year-old female with a history of anxiety and depression presenting with what she states is severe acute depressed mood without suicidal ideation. After taking her evening meds and being observed here in the ER while waiting for a social work consult, she reports her mood has improved significantly. She is observed to be in very good spirits. Seeing as that she did not at any point express any suicidal ideation, I feel she is safe for discharge home.   Discussed this with her mom at bedside who also feels the patient would be safe for discharge. We'll discharge home with return precautions which the patient has verbalized her understanding of. CONSULTS:  None    PROCEDURES:  None indicated    FINAL IMPRESSION      1.  Depression, unspecified depression type          DISPOSITION/PLAN   DISPOSITION Decision To Discharge 03/24/2018 11:39:59 PM    PATIENT REFERRED TO:   [place] [facility]    Call in 2 days  For Follow up    [facility] ED  [address] [facility]  [phone]  Go to   if experiencing any thoughts of self harm    DISCHARGE MEDICATIONS:     Discharge Medication List as of 3/24/2018 11:40 PM        (Please note that portions of this note were completed with a voice recognition program.  Efforts were made to edit the dictations but occasionally words are mis-transcribed.)    Cris Mayes MD  Attending Emergency Physician          Cris Mayes MD  03/25/18 6533

## 2018-03-25 NOTE — ED NOTES
Called [facility] spoke with Siomara Reynolds he is with another patient will call back when he is finished [address] [facility] Shivani Hodges  03/24/18 2208

## 2018-03-28 ENCOUNTER — HOSPITAL ENCOUNTER (EMERGENCY)
Age: 28
Discharge: HOME | End: 2018-03-28
Attending: EMERGENCY MEDICINE
Payer: COMMERCIAL

## 2018-03-28 VITALS
BODY MASS INDEX: 38.97 KG/M2 | OXYGEN SATURATION: 97 % | WEIGHT: 220 LBS | DIASTOLIC BLOOD PRESSURE: 80 MMHG | HEART RATE: 95 BPM | TEMPERATURE: 99.4 F | SYSTOLIC BLOOD PRESSURE: 122 MMHG | RESPIRATION RATE: 17 BRPM

## 2018-03-28 DIAGNOSIS — F32.0 MILD SINGLE CURRENT EPISODE OF MAJOR DEPRESSIVE DISORDER (HCC): Primary | ICD-10-CM

## 2018-03-28 LAB
ABSOLUTE EOS #: <0.03 K/UL (ref 0–0.44)
ABSOLUTE IMMATURE GRANULOCYTE: 0.06 K/UL (ref 0–0.3)
ABSOLUTE LYMPH #: 2.64 K/UL (ref 1.1–3.7)
ABSOLUTE MONO #: 0.73 K/UL (ref 0.1–1.2)
ACETAMINOPHEN LEVEL: <10 UG/ML (ref 10–30)
ALBUMIN SERPL-MCNC: 4.4 G/DL (ref 3.5–5.2)
ALBUMIN/GLOBULIN RATIO: 1.3 (ref 1–2.5)
ALP BLD-CCNC: 84 U/L (ref 35–104)
ALT SERPL-CCNC: 20 U/L (ref 5–33)
AMPHETAMINE SCREEN URINE: NEGATIVE
ANION GAP SERPL CALCULATED.3IONS-SCNC: 14 MMOL/L (ref 9–17)
AST SERPL-CCNC: 17 U/L
BARBITURATE SCREEN URINE: NEGATIVE
BASOPHILS # BLD: 0 % (ref 0–2)
BASOPHILS ABSOLUTE: <0.03 K/UL (ref 0–0.2)
BENZODIAZEPINE SCREEN, URINE: NEGATIVE
BILIRUB SERPL-MCNC: 0.16 MG/DL (ref 0.3–1.2)
BUN BLDV-MCNC: 11 MG/DL (ref 6–20)
BUN/CREAT BLD: 15 (ref 9–20)
BUPRENORPHINE URINE: NEGATIVE
CALCIUM SERPL-MCNC: 9.6 MG/DL (ref 8.6–10.4)
CANNABINOID SCREEN URINE: NEGATIVE
CHLORIDE BLD-SCNC: 103 MMOL/L (ref 98–107)
CO2: 22 MMOL/L (ref 20–31)
COCAINE METABOLITE, URINE: NEGATIVE
CREAT SERPL-MCNC: 0.74 MG/DL (ref 0.5–0.9)
DIFFERENTIAL TYPE: ABNORMAL
EKG ATRIAL RATE: 96 BPM
EKG P AXIS: 11 DEGREES
EKG P-R INTERVAL: 132 MS
EKG Q-T INTERVAL: 354 MS
EKG QRS DURATION: 86 MS
EKG QTC CALCULATION (BAZETT): 447 MS
EKG R AXIS: 21 DEGREES
EKG T AXIS: 22 DEGREES
EKG VENTRICULAR RATE: 96 BPM
EOSINOPHILS RELATIVE PERCENT: 0 % (ref 1–4)
ETHANOL PERCENT: <0.01 %
ETHANOL: <10 MG/DL
GFR AFRICAN AMERICAN: >60 ML/MIN
GFR NON-AFRICAN AMERICAN: >60 ML/MIN
GFR SERPL CREATININE-BSD FRML MDRD: ABNORMAL ML/MIN/{1.73_M2}
GFR SERPL CREATININE-BSD FRML MDRD: ABNORMAL ML/MIN/{1.73_M2}
GLUCOSE BLD-MCNC: 89 MG/DL (ref 70–99)
HCG QUALITATIVE: NEGATIVE
HCT VFR BLD CALC: 42.8 % (ref 36.3–47.1)
HEMOGLOBIN: 14.5 G/DL (ref 11.9–15.1)
IMMATURE GRANULOCYTES: 1 %
LYMPHOCYTES # BLD: 28 % (ref 24–43)
MCH RBC QN AUTO: 28.5 PG (ref 25.2–33.5)
MCHC RBC AUTO-ENTMCNC: 33.9 G/DL (ref 28.4–34.8)
MCV RBC AUTO: 84.1 FL (ref 82.6–102.9)
MDMA URINE: NORMAL
METHADONE SCREEN, URINE: NEGATIVE
METHAMPHETAMINE, URINE: NEGATIVE
MONOCYTES # BLD: 8 % (ref 3–12)
NRBC AUTOMATED: 0 PER 100 WBC
OPIATES, URINE: NEGATIVE
OXYCODONE SCREEN URINE: NEGATIVE
PDW BLD-RTO: 12.1 % (ref 11.8–14.4)
PHENCYCLIDINE, URINE: NEGATIVE
PLATELET # BLD: 387 K/UL (ref 138–453)
PLATELET ESTIMATE: ABNORMAL
PMV BLD AUTO: 9.1 FL (ref 8.1–13.5)
POTASSIUM SERPL-SCNC: 4.2 MMOL/L (ref 3.7–5.3)
PROPOXYPHENE, URINE: NEGATIVE
RBC # BLD: 5.09 M/UL (ref 3.95–5.11)
RBC # BLD: ABNORMAL 10*6/UL
SALICYLATE LEVEL: <1 MG/DL (ref 3–10)
SEG NEUTROPHILS: 64 % (ref 36–65)
SEGMENTED NEUTROPHILS ABSOLUTE COUNT: 6.08 K/UL (ref 1.5–8.1)
SODIUM BLD-SCNC: 139 MMOL/L (ref 135–144)
TEST INFORMATION: NORMAL
TOTAL PROTEIN: 7.9 G/DL (ref 6.4–8.3)
TRICYCLIC ANTIDEPRESSANTS, UR: NEGATIVE
WBC # BLD: 9.5 K/UL (ref 3.5–11.3)
WBC # BLD: ABNORMAL 10*3/UL

## 2018-03-28 PROCEDURE — 99285 EMERGENCY DEPT VISIT HI MDM: CPT

## 2018-03-28 PROCEDURE — 84703 CHORIONIC GONADOTROPIN ASSAY: CPT

## 2018-03-28 PROCEDURE — 80053 COMPREHEN METABOLIC PANEL: CPT

## 2018-03-28 PROCEDURE — 80307 DRUG TEST PRSMV CHEM ANLYZR: CPT

## 2018-03-28 PROCEDURE — G0480 DRUG TEST DEF 1-7 CLASSES: HCPCS

## 2018-03-28 PROCEDURE — 80306 DRUG TEST PRSMV INSTRMNT: CPT

## 2018-03-28 PROCEDURE — 36415 COLL VENOUS BLD VENIPUNCTURE: CPT

## 2018-03-28 PROCEDURE — 85025 COMPLETE CBC W/AUTO DIFF WBC: CPT

## 2018-03-28 PROCEDURE — 93005 ELECTROCARDIOGRAM TRACING: CPT

## 2018-03-28 ASSESSMENT — ENCOUNTER SYMPTOMS
RESPIRATORY NEGATIVE: 1
GASTROINTESTINAL NEGATIVE: 1

## 2018-04-06 ENCOUNTER — APPOINTMENT (OUTPATIENT)
Dept: CT IMAGING | Age: 28
End: 2018-04-06
Payer: COMMERCIAL

## 2018-04-06 ENCOUNTER — HOSPITAL ENCOUNTER (EMERGENCY)
Age: 28
Discharge: HOME | End: 2018-04-06
Payer: COMMERCIAL

## 2018-04-06 VITALS
TEMPERATURE: 99.1 F | RESPIRATION RATE: 14 BRPM | OXYGEN SATURATION: 99 % | SYSTOLIC BLOOD PRESSURE: 131 MMHG | DIASTOLIC BLOOD PRESSURE: 93 MMHG | HEART RATE: 105 BPM

## 2018-04-06 DIAGNOSIS — R10.9 FLANK PAIN: ICD-10-CM

## 2018-04-06 DIAGNOSIS — R30.0 DYSURIA: Primary | ICD-10-CM

## 2018-04-06 LAB
-: NORMAL
AMORPHOUS: NORMAL
BACTERIA: NORMAL
BILIRUBIN URINE: NEGATIVE
CASTS UA: NORMAL /LPF
COLOR: YELLOW
COMMENT UA: ABNORMAL
CRYSTALS, UA: NORMAL /HPF
EPITHELIAL CELLS UA: NORMAL /HPF (ref 0–25)
GLUCOSE URINE: NEGATIVE
HCG(URINE) PREGNANCY TEST: NEGATIVE
KETONES, URINE: NEGATIVE
LEUKOCYTE ESTERASE, URINE: NEGATIVE
MUCUS: NORMAL
NITRITE, URINE: NEGATIVE
OTHER OBSERVATIONS UA: NORMAL
PH UA: 6.5 (ref 5–9)
PROTEIN UA: NEGATIVE
RBC UA: NORMAL /HPF (ref 0–2)
RENAL EPITHELIAL, UA: NORMAL /HPF
SPECIFIC GRAVITY UA: <1.005 (ref 1.01–1.02)
TRICHOMONAS: NORMAL
TURBIDITY: CLEAR
URINE HGB: NEGATIVE
UROBILINOGEN, URINE: NORMAL
WBC UA: NORMAL /HPF (ref 0–5)
YEAST: NORMAL

## 2018-04-06 PROCEDURE — 84703 CHORIONIC GONADOTROPIN ASSAY: CPT

## 2018-04-06 PROCEDURE — 74176 CT ABD & PELVIS W/O CONTRAST: CPT

## 2018-04-06 PROCEDURE — 81001 URINALYSIS AUTO W/SCOPE: CPT

## 2018-04-06 PROCEDURE — 99284 EMERGENCY DEPT VISIT MOD MDM: CPT

## 2018-04-06 RX ORDER — ACETAMINOPHEN AND CODEINE PHOSPHATE 300; 30 MG/1; MG/1
1 TABLET ORAL EVERY 4 HOURS PRN
Qty: 20 TABLET | Refills: 0 | Status: SHIPPED | OUTPATIENT
Start: 2018-04-06 | End: 2018-04-11

## 2018-04-06 RX ORDER — PHENAZOPYRIDINE HYDROCHLORIDE 200 MG/1
200 TABLET, FILM COATED ORAL 3 TIMES DAILY PRN
Qty: 15 TABLET | Refills: 0 | Status: SHIPPED | OUTPATIENT
Start: 2018-04-06 | End: 2018-04-09

## 2018-04-06 ASSESSMENT — PAIN DESCRIPTION - DESCRIPTORS: DESCRIPTORS: BURNING

## 2018-04-06 ASSESSMENT — PAIN DESCRIPTION - ORIENTATION: ORIENTATION: LOWER

## 2018-04-06 ASSESSMENT — PAIN DESCRIPTION - PAIN TYPE: TYPE: ACUTE PAIN

## 2018-04-06 ASSESSMENT — PAIN DESCRIPTION - LOCATION: LOCATION: ABDOMEN

## 2018-04-09 ENCOUNTER — HOSPITAL ENCOUNTER (EMERGENCY)
Age: 28
Discharge: HOME | End: 2018-04-09
Payer: COMMERCIAL

## 2018-04-09 ENCOUNTER — HOSPITAL ENCOUNTER (OUTPATIENT)
Age: 28
Setting detail: SPECIMEN
Discharge: HOME | End: 2018-04-09
Payer: COMMERCIAL

## 2018-04-09 VITALS
OXYGEN SATURATION: 97 % | TEMPERATURE: 98.9 F | HEART RATE: 100 BPM | RESPIRATION RATE: 16 BRPM | SYSTOLIC BLOOD PRESSURE: 115 MMHG | DIASTOLIC BLOOD PRESSURE: 83 MMHG

## 2018-04-09 DIAGNOSIS — N73.9 FEMALE PELVIC INFLAMMATORY DISEASE: Primary | ICD-10-CM

## 2018-04-09 LAB
-: ABNORMAL
ALBUMIN SERPL-MCNC: 4.1 G/DL (ref 3.5–5.2)
ALBUMIN/GLOBULIN RATIO: 1.2 (ref 1–2.5)
ALP BLD-CCNC: 83 U/L (ref 35–104)
ALT SERPL-CCNC: 19 U/L (ref 5–33)
AMORPHOUS: ABNORMAL
ANION GAP SERPL CALCULATED.3IONS-SCNC: 15 MMOL/L (ref 9–17)
AST SERPL-CCNC: 15 U/L
BACTERIA: ABNORMAL
BILIRUB SERPL-MCNC: 0.17 MG/DL (ref 0.3–1.2)
BILIRUBIN URINE: NEGATIVE
BUN BLDV-MCNC: 12 MG/DL (ref 6–20)
BUN/CREAT BLD: 16 (ref 9–20)
CALCIUM SERPL-MCNC: 8.9 MG/DL (ref 8.6–10.4)
CASTS UA: ABNORMAL /LPF
CHLORIDE BLD-SCNC: 102 MMOL/L (ref 98–107)
CO2: 23 MMOL/L (ref 20–31)
COLOR: ABNORMAL
COMMENT UA: ABNORMAL
CREAT SERPL-MCNC: 0.74 MG/DL (ref 0.5–0.9)
CRYSTALS, UA: ABNORMAL /HPF
DIRECT EXAM: NORMAL
EPITHELIAL CELLS UA: ABNORMAL /HPF (ref 0–25)
GFR AFRICAN AMERICAN: >60 ML/MIN
GFR NON-AFRICAN AMERICAN: >60 ML/MIN
GFR SERPL CREATININE-BSD FRML MDRD: ABNORMAL ML/MIN/{1.73_M2}
GFR SERPL CREATININE-BSD FRML MDRD: ABNORMAL ML/MIN/{1.73_M2}
GLUCOSE BLD-MCNC: 133 MG/DL (ref 70–99)
GLUCOSE URINE: NEGATIVE
HCG(URINE) PREGNANCY TEST: NEGATIVE
HCT VFR BLD CALC: 42.8 % (ref 36.3–47.1)
HEMOGLOBIN: 14.5 G/DL (ref 11.9–15.1)
KETONES, URINE: NEGATIVE
LACTIC ACID, WHOLE BLOOD: NORMAL MMOL/L (ref 0.7–2.1)
LACTIC ACID: 1.3 MMOL/L (ref 0.5–2.2)
LEUKOCYTE ESTERASE, URINE: NEGATIVE
LIPASE: 42 U/L (ref 13–60)
Lab: NORMAL
MCH RBC QN AUTO: 28.4 PG (ref 25.2–33.5)
MCHC RBC AUTO-ENTMCNC: 33.9 G/DL (ref 28.4–34.8)
MCV RBC AUTO: 83.8 FL (ref 82.6–102.9)
MUCUS: ABNORMAL
NITRITE, URINE: ABNORMAL
NRBC AUTOMATED: 0 PER 100 WBC
OTHER OBSERVATIONS UA: ABNORMAL
PDW BLD-RTO: 12.1 % (ref 11.8–14.4)
PH UA: 6 (ref 5–9)
PLATELET # BLD: 379 K/UL (ref 138–453)
PMV BLD AUTO: 9.1 FL (ref 8.1–13.5)
POTASSIUM SERPL-SCNC: 3.7 MMOL/L (ref 3.7–5.3)
PROTEIN UA: ABNORMAL
RBC # BLD: 5.11 M/UL (ref 3.95–5.11)
RBC UA: ABNORMAL /HPF (ref 0–2)
RENAL EPITHELIAL, UA: ABNORMAL /HPF
SODIUM BLD-SCNC: 140 MMOL/L (ref 135–144)
SPECIFIC GRAVITY UA: >1.03 (ref 1.01–1.02)
SPECIMEN DESCRIPTION: NORMAL
STATUS: NORMAL
TOTAL PROTEIN: 7.4 G/DL (ref 6.4–8.3)
TRICHOMONAS: ABNORMAL
TURBIDITY: CLEAR
URINE HGB: NEGATIVE
UROBILINOGEN, URINE: NORMAL
WBC # BLD: 9.4 K/UL (ref 3.5–11.3)
WBC UA: ABNORMAL /HPF (ref 0–5)
YEAST: ABNORMAL

## 2018-04-09 PROCEDURE — 83605 ASSAY OF LACTIC ACID: CPT

## 2018-04-09 PROCEDURE — 96372 THER/PROPH/DIAG INJ SC/IM: CPT

## 2018-04-09 PROCEDURE — 6370000000 HC RX 637 (ALT 250 FOR IP): Performed by: EMERGENCY MEDICINE

## 2018-04-09 PROCEDURE — 87086 URINE CULTURE/COLONY COUNT: CPT

## 2018-04-09 PROCEDURE — 99283 EMERGENCY DEPT VISIT LOW MDM: CPT

## 2018-04-09 PROCEDURE — 87491 CHLMYD TRACH DNA AMP PROBE: CPT

## 2018-04-09 PROCEDURE — 81001 URINALYSIS AUTO W/SCOPE: CPT

## 2018-04-09 PROCEDURE — 6360000002 HC RX W HCPCS: Performed by: PHYSICIAN ASSISTANT

## 2018-04-09 PROCEDURE — 85027 COMPLETE CBC AUTOMATED: CPT

## 2018-04-09 PROCEDURE — 83690 ASSAY OF LIPASE: CPT

## 2018-04-09 PROCEDURE — 36415 COLL VENOUS BLD VENIPUNCTURE: CPT

## 2018-04-09 PROCEDURE — 84443 ASSAY THYROID STIM HORMONE: CPT

## 2018-04-09 PROCEDURE — 84703 CHORIONIC GONADOTROPIN ASSAY: CPT

## 2018-04-09 PROCEDURE — 6370000000 HC RX 637 (ALT 250 FOR IP): Performed by: PHYSICIAN ASSISTANT

## 2018-04-09 PROCEDURE — 87591 N.GONORRHOEAE DNA AMP PROB: CPT

## 2018-04-09 PROCEDURE — 87210 SMEAR WET MOUNT SALINE/INK: CPT

## 2018-04-09 PROCEDURE — 80053 COMPREHEN METABOLIC PANEL: CPT

## 2018-04-09 RX ORDER — METRONIDAZOLE 250 MG/1
500 TABLET ORAL ONCE
Status: COMPLETED | OUTPATIENT
Start: 2018-04-09 | End: 2018-04-09

## 2018-04-09 RX ORDER — AZITHROMYCIN 250 MG/1
1000 TABLET, FILM COATED ORAL DAILY
Status: DISCONTINUED | OUTPATIENT
Start: 2018-04-10 | End: 2018-04-09

## 2018-04-09 RX ORDER — CEFTRIAXONE SODIUM 250 MG/1
250 INJECTION, POWDER, FOR SOLUTION INTRAMUSCULAR; INTRAVENOUS ONCE
Status: COMPLETED | OUTPATIENT
Start: 2018-04-09 | End: 2018-04-09

## 2018-04-09 RX ORDER — METRONIDAZOLE 500 MG/1
500 TABLET ORAL 2 TIMES DAILY
Qty: 20 TABLET | Refills: 0 | Status: SHIPPED | OUTPATIENT
Start: 2018-04-09 | End: 2018-04-19

## 2018-04-09 RX ORDER — AZITHROMYCIN 250 MG/1
1000 TABLET, FILM COATED ORAL ONCE
Status: COMPLETED | OUTPATIENT
Start: 2018-04-09 | End: 2018-04-09

## 2018-04-09 RX ADMIN — AZITHROMYCIN 1000 MG: 250 TABLET, FILM COATED ORAL at 22:25

## 2018-04-09 RX ADMIN — CEFTRIAXONE SODIUM 250 MG: 250 INJECTION, POWDER, FOR SOLUTION INTRAMUSCULAR; INTRAVENOUS at 22:04

## 2018-04-09 RX ADMIN — METRONIDAZOLE 500 MG: 250 TABLET ORAL at 22:24

## 2018-04-09 ASSESSMENT — PAIN SCALES - GENERAL
PAINLEVEL_OUTOF10: 8
PAINLEVEL_OUTOF10: 0

## 2018-04-09 ASSESSMENT — PAIN DESCRIPTION - PAIN TYPE: TYPE: ACUTE PAIN

## 2018-04-09 ASSESSMENT — PAIN DESCRIPTION - LOCATION: LOCATION: ABDOMEN

## 2018-04-09 ASSESSMENT — PAIN DESCRIPTION - DESCRIPTORS: DESCRIPTORS: SHARP

## 2018-04-10 ENCOUNTER — TELEPHONE (OUTPATIENT)
Dept: FAMILY MEDICINE CLINIC | Age: 28
End: 2018-04-10

## 2018-04-11 LAB
C. TRACHOMATIS DNA ,URINE: NEGATIVE
N. GONORRHOEAE DNA, URINE: NEGATIVE

## 2018-04-13 ENCOUNTER — OFFICE VISIT (OUTPATIENT)
Dept: FAMILY MEDICINE CLINIC | Age: 28
End: 2018-04-13
Payer: COMMERCIAL

## 2018-04-13 VITALS
HEART RATE: 89 BPM | DIASTOLIC BLOOD PRESSURE: 84 MMHG | TEMPERATURE: 98.4 F | OXYGEN SATURATION: 98 % | WEIGHT: 225 LBS | HEIGHT: 63 IN | SYSTOLIC BLOOD PRESSURE: 118 MMHG | BODY MASS INDEX: 39.87 KG/M2

## 2018-04-13 DIAGNOSIS — F31.4 SEVERE BIPOLAR I DISORDER, CURRENT OR MOST RECENT EPISODE DEPRESSED (HCC): ICD-10-CM

## 2018-04-13 DIAGNOSIS — H61.22 HEARING LOSS DUE TO CERUMEN IMPACTION, LEFT: ICD-10-CM

## 2018-04-13 DIAGNOSIS — Z13.29 SCREENING FOR THYROID DISORDER: ICD-10-CM

## 2018-04-13 DIAGNOSIS — N73.0 PID (ACUTE PELVIC INFLAMMATORY DISEASE): Primary | ICD-10-CM

## 2018-04-13 LAB — TSH SERPL DL<=0.05 MIU/L-ACNC: 2.01 MIU/L (ref 0.3–5)

## 2018-04-13 PROCEDURE — G8417 CALC BMI ABV UP PARAM F/U: HCPCS | Performed by: NURSE PRACTITIONER

## 2018-04-13 PROCEDURE — 1036F TOBACCO NON-USER: CPT | Performed by: NURSE PRACTITIONER

## 2018-04-13 PROCEDURE — G8427 DOCREV CUR MEDS BY ELIG CLIN: HCPCS | Performed by: NURSE PRACTITIONER

## 2018-04-13 PROCEDURE — 99214 OFFICE O/P EST MOD 30 MIN: CPT | Performed by: NURSE PRACTITIONER

## 2018-04-13 RX ORDER — LURASIDONE HYDROCHLORIDE 80 MG/1
80 TABLET, FILM COATED ORAL DAILY
COMMUNITY
Start: 2018-04-04 | End: 2018-05-15 | Stop reason: DRUGHIGH

## 2018-04-13 RX ORDER — MONTELUKAST SODIUM 10 MG/1
10 TABLET ORAL NIGHTLY
Qty: 30 TABLET | Refills: 5 | Status: SHIPPED | OUTPATIENT
Start: 2018-04-13 | End: 2018-09-12 | Stop reason: ALTCHOICE

## 2018-04-13 RX ORDER — DOXEPIN HYDROCHLORIDE 75 MG/1
75 CAPSULE ORAL NIGHTLY
COMMUNITY
Start: 2018-02-26 | End: 2018-05-15 | Stop reason: DRUGHIGH

## 2018-04-13 RX ORDER — PHENAZOPYRIDINE HYDROCHLORIDE 100 MG/1
100 TABLET, FILM COATED ORAL 3 TIMES DAILY PRN
Qty: 9 TABLET | Refills: 0 | Status: SHIPPED | OUTPATIENT
Start: 2018-04-13 | End: 2018-04-16

## 2018-04-13 ASSESSMENT — ENCOUNTER SYMPTOMS
SHORTNESS OF BREATH: 0
ABDOMINAL DISTENTION: 0
SINUS PRESSURE: 0
NAUSEA: 0
EYES NEGATIVE: 1

## 2018-04-14 ENCOUNTER — HOSPITAL ENCOUNTER (EMERGENCY)
Age: 28
Discharge: ANOTHER HOSPITAL | End: 2018-04-14
Attending: EMERGENCY MEDICINE
Payer: COMMERCIAL

## 2018-04-14 VITALS
DIASTOLIC BLOOD PRESSURE: 81 MMHG | OXYGEN SATURATION: 98 % | HEART RATE: 104 BPM | RESPIRATION RATE: 16 BRPM | WEIGHT: 226 LBS | SYSTOLIC BLOOD PRESSURE: 136 MMHG | TEMPERATURE: 96.9 F | BODY MASS INDEX: 40.03 KG/M2

## 2018-04-14 DIAGNOSIS — F32.A DEPRESSION WITH SUICIDAL IDEATION: ICD-10-CM

## 2018-04-14 DIAGNOSIS — R45.851 DEPRESSION WITH SUICIDAL IDEATION: ICD-10-CM

## 2018-04-14 DIAGNOSIS — R45.851 SUICIDAL IDEATION: Primary | ICD-10-CM

## 2018-04-14 LAB
-: ABNORMAL
ABSOLUTE EOS #: <0.03 K/UL (ref 0–0.44)
ABSOLUTE IMMATURE GRANULOCYTE: 0.03 K/UL (ref 0–0.3)
ABSOLUTE LYMPH #: 2.13 K/UL (ref 1.1–3.7)
ABSOLUTE MONO #: 0.64 K/UL (ref 0.1–1.2)
ACETAMINOPHEN LEVEL: <10 UG/ML (ref 10–30)
ALBUMIN SERPL-MCNC: 4.4 G/DL (ref 3.5–5.2)
ALBUMIN/GLOBULIN RATIO: 1.3 (ref 1–2.5)
ALP BLD-CCNC: 76 U/L (ref 35–104)
ALT SERPL-CCNC: 31 U/L (ref 5–33)
AMORPHOUS: ABNORMAL
AMPHETAMINE SCREEN URINE: NEGATIVE
ANION GAP SERPL CALCULATED.3IONS-SCNC: 13 MMOL/L (ref 9–17)
AST SERPL-CCNC: 21 U/L
BACTERIA: ABNORMAL
BARBITURATE SCREEN URINE: NEGATIVE
BASOPHILS # BLD: 0 % (ref 0–2)
BASOPHILS ABSOLUTE: <0.03 K/UL (ref 0–0.2)
BENZODIAZEPINE SCREEN, URINE: NEGATIVE
BILIRUB SERPL-MCNC: 0.35 MG/DL (ref 0.3–1.2)
BILIRUBIN URINE: NEGATIVE
BUN BLDV-MCNC: 9 MG/DL (ref 6–20)
BUN/CREAT BLD: 11 (ref 9–20)
BUPRENORPHINE URINE: NEGATIVE
CALCIUM SERPL-MCNC: 9.5 MG/DL (ref 8.6–10.4)
CANNABINOID SCREEN URINE: NEGATIVE
CASTS UA: ABNORMAL /LPF
CHLORIDE BLD-SCNC: 103 MMOL/L (ref 98–107)
CO2: 22 MMOL/L (ref 20–31)
COCAINE METABOLITE, URINE: NEGATIVE
COLOR: ABNORMAL
COMMENT UA: ABNORMAL
CREAT SERPL-MCNC: 0.85 MG/DL (ref 0.5–0.9)
CRYSTALS, UA: ABNORMAL /HPF
DIFFERENTIAL TYPE: ABNORMAL
EKG ATRIAL RATE: 88 BPM
EKG P AXIS: 13 DEGREES
EKG P-R INTERVAL: 120 MS
EKG Q-T INTERVAL: 360 MS
EKG QRS DURATION: 86 MS
EKG QTC CALCULATION (BAZETT): 435 MS
EKG R AXIS: 20 DEGREES
EKG T AXIS: 23 DEGREES
EKG VENTRICULAR RATE: 88 BPM
EOSINOPHILS RELATIVE PERCENT: 0 % (ref 1–4)
EPITHELIAL CELLS UA: ABNORMAL /HPF (ref 0–25)
ETHANOL PERCENT: <0.01 %
ETHANOL: <10 MG/DL
GFR AFRICAN AMERICAN: >60 ML/MIN
GFR NON-AFRICAN AMERICAN: >60 ML/MIN
GFR SERPL CREATININE-BSD FRML MDRD: ABNORMAL ML/MIN/{1.73_M2}
GFR SERPL CREATININE-BSD FRML MDRD: ABNORMAL ML/MIN/{1.73_M2}
GLUCOSE BLD-MCNC: 102 MG/DL (ref 70–99)
GLUCOSE URINE: ABNORMAL
HCG QUALITATIVE: NEGATIVE
HCT VFR BLD CALC: 43.7 % (ref 36.3–47.1)
HEMOGLOBIN: 14.9 G/DL (ref 11.9–15.1)
IMMATURE GRANULOCYTES: 0 %
KETONES, URINE: NEGATIVE
LEUKOCYTE ESTERASE, URINE: NEGATIVE
LIPASE: 34 U/L (ref 13–60)
LYMPHOCYTES # BLD: 32 % (ref 24–43)
MCH RBC QN AUTO: 28.4 PG (ref 25.2–33.5)
MCHC RBC AUTO-ENTMCNC: 34.1 G/DL (ref 28.4–34.8)
MCV RBC AUTO: 83.4 FL (ref 82.6–102.9)
MDMA URINE: ABNORMAL
METHADONE SCREEN, URINE: NEGATIVE
METHAMPHETAMINE, URINE: NEGATIVE
MONOCYTES # BLD: 10 % (ref 3–12)
MUCUS: ABNORMAL
NITRITE, URINE: POSITIVE
NRBC AUTOMATED: 0 PER 100 WBC
OPIATES, URINE: POSITIVE
OTHER OBSERVATIONS UA: ABNORMAL
OXYCODONE SCREEN URINE: NEGATIVE
PDW BLD-RTO: 12.3 % (ref 11.8–14.4)
PH UA: 7.5 (ref 5–9)
PHENCYCLIDINE, URINE: NEGATIVE
PLATELET # BLD: 367 K/UL (ref 138–453)
PLATELET ESTIMATE: ABNORMAL
PMV BLD AUTO: 9.2 FL (ref 8.1–13.5)
POTASSIUM SERPL-SCNC: 4.2 MMOL/L (ref 3.7–5.3)
PROPOXYPHENE, URINE: NEGATIVE
PROTEIN UA: ABNORMAL
RBC # BLD: 5.24 M/UL (ref 3.95–5.11)
RBC # BLD: ABNORMAL 10*6/UL
RBC UA: ABNORMAL /HPF (ref 0–2)
RENAL EPITHELIAL, UA: ABNORMAL /HPF
SALICYLATE LEVEL: <1 MG/DL (ref 3–10)
SEG NEUTROPHILS: 58 % (ref 36–65)
SEGMENTED NEUTROPHILS ABSOLUTE COUNT: 3.96 K/UL (ref 1.5–8.1)
SODIUM BLD-SCNC: 138 MMOL/L (ref 135–144)
SPECIFIC GRAVITY UA: 1.01 (ref 1.01–1.02)
TEST INFORMATION: ABNORMAL
THYROXINE, FREE: 1.36 NG/DL (ref 0.93–1.7)
TOTAL PROTEIN: 7.7 G/DL (ref 6.4–8.3)
TRICHOMONAS: ABNORMAL
TRICYCLIC ANTIDEPRESSANTS, UR: POSITIVE
TSH SERPL DL<=0.05 MIU/L-ACNC: 2.71 MIU/L (ref 0.3–5)
TURBIDITY: ABNORMAL
URINE HGB: NEGATIVE
UROBILINOGEN, URINE: ABNORMAL
WBC # BLD: 6.8 K/UL (ref 3.5–11.3)
WBC # BLD: ABNORMAL 10*3/UL
WBC UA: ABNORMAL /HPF (ref 0–5)
YEAST: ABNORMAL

## 2018-04-14 PROCEDURE — 84439 ASSAY OF FREE THYROXINE: CPT

## 2018-04-14 PROCEDURE — 85025 COMPLETE CBC W/AUTO DIFF WBC: CPT

## 2018-04-14 PROCEDURE — 81001 URINALYSIS AUTO W/SCOPE: CPT

## 2018-04-14 PROCEDURE — 84703 CHORIONIC GONADOTROPIN ASSAY: CPT

## 2018-04-14 PROCEDURE — 83690 ASSAY OF LIPASE: CPT

## 2018-04-14 PROCEDURE — G0480 DRUG TEST DEF 1-7 CLASSES: HCPCS

## 2018-04-14 PROCEDURE — 99285 EMERGENCY DEPT VISIT HI MDM: CPT

## 2018-04-14 PROCEDURE — 84443 ASSAY THYROID STIM HORMONE: CPT

## 2018-04-14 PROCEDURE — 80307 DRUG TEST PRSMV CHEM ANLYZR: CPT

## 2018-04-14 PROCEDURE — 36415 COLL VENOUS BLD VENIPUNCTURE: CPT

## 2018-04-14 PROCEDURE — 93005 ELECTROCARDIOGRAM TRACING: CPT

## 2018-04-14 PROCEDURE — 80053 COMPREHEN METABOLIC PANEL: CPT

## 2018-04-14 PROCEDURE — 80306 DRUG TEST PRSMV INSTRMNT: CPT

## 2018-04-14 PROCEDURE — 6370000000 HC RX 637 (ALT 250 FOR IP): Performed by: EMERGENCY MEDICINE

## 2018-04-14 RX ORDER — HYDROXYZINE 50 MG/1
50 TABLET, FILM COATED ORAL ONCE
Status: COMPLETED | OUTPATIENT
Start: 2018-04-14 | End: 2018-04-14

## 2018-04-14 RX ADMIN — HYDROXYZINE HYDROCHLORIDE 50 MG: 50 TABLET, FILM COATED ORAL at 16:28

## 2018-04-14 ASSESSMENT — PAIN DESCRIPTION - PAIN TYPE: TYPE: ACUTE PAIN

## 2018-04-14 ASSESSMENT — ENCOUNTER SYMPTOMS
BACK PAIN: 0
ABDOMINAL PAIN: 1
SHORTNESS OF BREATH: 0
COLOR CHANGE: 0
COUGH: 0
ABDOMINAL DISTENTION: 0

## 2018-04-14 ASSESSMENT — SLEEP AND FATIGUE QUESTIONNAIRES
DIFFICULTY STAYING ASLEEP: YES
RESTFUL SLEEP: NO
AVERAGE NUMBER OF SLEEP HOURS: 8
DO YOU USE A SLEEP AID: YES
DIFFICULTY FALLING ASLEEP: YES
SLEEP PATTERN: INSOMNIA;DIFFICULTY FALLING ASLEEP
DO YOU HAVE DIFFICULTY SLEEPING: YES
DIFFICULTY ARISING: YES

## 2018-04-14 ASSESSMENT — PAIN DESCRIPTION - ORIENTATION: ORIENTATION: LOWER;MID

## 2018-04-14 ASSESSMENT — LIFESTYLE VARIABLES: HISTORY_ALCOHOL_USE: NO

## 2018-04-14 ASSESSMENT — PAIN DESCRIPTION - LOCATION: LOCATION: ABDOMEN

## 2018-04-14 ASSESSMENT — PATIENT HEALTH QUESTIONNAIRE - PHQ9: SUM OF ALL RESPONSES TO PHQ QUESTIONS 1-9: 20

## 2018-04-14 ASSESSMENT — PAIN SCALES - GENERAL: PAINLEVEL_OUTOF10: 8

## 2018-04-15 ASSESSMENT — ENCOUNTER SYMPTOMS
COUGH: 0
BACK PAIN: 0
ABDOMINAL DISTENTION: 0
ABDOMINAL PAIN: 1
SHORTNESS OF BREATH: 0
COLOR CHANGE: 0

## 2018-04-20 ENCOUNTER — TELEPHONE (OUTPATIENT)
Dept: FAMILY MEDICINE CLINIC | Age: 28
End: 2018-04-20

## 2018-04-20 DIAGNOSIS — R00.0 TACHYCARDIA: Primary | ICD-10-CM

## 2018-04-24 ENCOUNTER — HOSPITAL ENCOUNTER (OUTPATIENT)
Dept: NON INVASIVE DIAGNOSTICS | Age: 28
Discharge: HOME | End: 2018-04-24
Payer: COMMERCIAL

## 2018-04-24 DIAGNOSIS — R00.0 TACHYCARDIA: ICD-10-CM

## 2018-04-24 PROCEDURE — 93225 XTRNL ECG REC<48 HRS REC: CPT

## 2018-04-30 DIAGNOSIS — R00.0 TACHYCARDIA: Primary | ICD-10-CM

## 2018-05-04 ENCOUNTER — HOSPITAL ENCOUNTER (OUTPATIENT)
Age: 28
Setting detail: SPECIMEN
Discharge: HOME | End: 2018-05-04
Payer: COMMERCIAL

## 2018-05-04 PROCEDURE — 81050 URINALYSIS VOLUME MEASURE: CPT

## 2018-05-04 PROCEDURE — 82384 ASSAY THREE CATECHOLAMINES: CPT

## 2018-05-04 PROCEDURE — 83835 ASSAY OF METANEPHRINES: CPT

## 2018-05-07 LAB
CATECHOL/URINE INTERP: NORMAL
CREATININE URINE /24 HR: 1304 MG/D (ref 700–1600)
CREATININE URINE /VOLUME: 111 MG/DL
DOPAMINE 24 HOUR URINE: 209 UG/D (ref 77–324)
DOPAMINE, (UG/L): 178 UG/L
DOPAMINE, UR/G CRT: 160 UG/G CRT (ref 0–250)
EPINEPHRINE 24 HOUR URINE: 1 UG/D (ref 1–7)
EPINEPHRINE, (UG/L): 1 UG/L
EPINEPHRINE, UR/G CRT: 1 UG/G CRT (ref 0–20)
EPINEPHRINE, UR/G CRT: 23 UG/G CRT (ref 0–45)
HOURS COLLECTED: 24
NOREPINEPHRINE 24 HOUR URINE: 31 UG/D (ref 16–71)
NOREPINEPHRINE, (UG/L): 26 UG/L
URINE VOLUME: 1175

## 2018-05-08 LAB
CREATININE URINE /24 HR: 1363 MG/D (ref 700–1600)
CREATININE URINE /VOLUME: 116 MG/DL
HOURS COLLECTED: 24
METANEPHRINE UF INTERPRETATION: NORMAL
METANEPHRINE UG/G CRE: 53 UG/G CRT (ref 0–300)
METANEPHRINES 24 HOUR URINE: 72 UG/D (ref 39–143)
METANEPHRINES, URINE (UMOL/L): 61 UG/L
NORMETANEPHRINE, (G/CRT): 241 UG/G CRT (ref 0–400)
NORMETANEPHRINE, (NMOL/DAY): 329 UG/D (ref 109–393)
NORMETANEPHRINES, NMOL/L: 280 UG/L
URINE VOLUME: 1175

## 2018-05-09 ENCOUNTER — HOSPITAL ENCOUNTER (EMERGENCY)
Age: 28
Discharge: HOME | End: 2018-05-09
Payer: COMMERCIAL

## 2018-05-09 VITALS
RESPIRATION RATE: 16 BRPM | WEIGHT: 222 LBS | HEIGHT: 63 IN | HEART RATE: 92 BPM | SYSTOLIC BLOOD PRESSURE: 120 MMHG | BODY MASS INDEX: 39.34 KG/M2 | TEMPERATURE: 98.1 F | DIASTOLIC BLOOD PRESSURE: 72 MMHG | OXYGEN SATURATION: 99 %

## 2018-05-09 DIAGNOSIS — J02.9 SORE THROAT: Primary | ICD-10-CM

## 2018-05-09 LAB
DIRECT EXAM: NORMAL
Lab: NORMAL
SPECIMEN DESCRIPTION: NORMAL
STATUS: NORMAL

## 2018-05-09 PROCEDURE — 99283 EMERGENCY DEPT VISIT LOW MDM: CPT

## 2018-05-09 PROCEDURE — 87651 STREP A DNA AMP PROBE: CPT

## 2018-05-09 RX ORDER — ARIPIPRAZOLE 20 MG/1
10 TABLET ORAL DAILY
COMMUNITY
End: 2019-03-04

## 2018-05-09 ASSESSMENT — ENCOUNTER SYMPTOMS
DIARRHEA: 0
RHINORRHEA: 0
EYE DISCHARGE: 0
SHORTNESS OF BREATH: 0
EYE REDNESS: 0
ABDOMINAL PAIN: 0
NAUSEA: 0
WHEEZING: 0
CONSTIPATION: 0
BACK PAIN: 0
BLOOD IN STOOL: 0
VOMITING: 0
COUGH: 0
SORE THROAT: 1
CHEST TIGHTNESS: 0

## 2018-05-09 ASSESSMENT — PAIN DESCRIPTION - PAIN TYPE: TYPE: ACUTE PAIN

## 2018-05-09 ASSESSMENT — PAIN DESCRIPTION - DESCRIPTORS: DESCRIPTORS: BURNING

## 2018-05-09 ASSESSMENT — PAIN SCALES - GENERAL: PAINLEVEL_OUTOF10: 8

## 2018-05-09 ASSESSMENT — PAIN DESCRIPTION - LOCATION: LOCATION: THROAT

## 2018-05-10 ENCOUNTER — CARE COORDINATION (OUTPATIENT)
Dept: CARE COORDINATION | Age: 28
End: 2018-05-10

## 2018-05-10 LAB
DIRECT EXAM: NORMAL
DIRECT EXAM: NORMAL
Lab: NORMAL
SPECIMEN DESCRIPTION: NORMAL
SPECIMEN DESCRIPTION: NORMAL
STATUS: NORMAL

## 2018-05-15 ENCOUNTER — HOSPITAL ENCOUNTER (OUTPATIENT)
Age: 28
Setting detail: SPECIMEN
Discharge: HOME | End: 2018-05-15
Payer: COMMERCIAL

## 2018-05-15 ENCOUNTER — OFFICE VISIT (OUTPATIENT)
Dept: FAMILY MEDICINE CLINIC | Age: 28
End: 2018-05-15
Payer: COMMERCIAL

## 2018-05-15 VITALS
WEIGHT: 229 LBS | HEIGHT: 63 IN | OXYGEN SATURATION: 98 % | RESPIRATION RATE: 18 BRPM | HEART RATE: 124 BPM | DIASTOLIC BLOOD PRESSURE: 64 MMHG | SYSTOLIC BLOOD PRESSURE: 122 MMHG | BODY MASS INDEX: 40.57 KG/M2

## 2018-05-15 DIAGNOSIS — F31.4 SEVERE BIPOLAR I DISORDER, CURRENT OR MOST RECENT EPISODE DEPRESSED (HCC): ICD-10-CM

## 2018-05-15 DIAGNOSIS — R00.0 TACHYCARDIA: Primary | ICD-10-CM

## 2018-05-15 DIAGNOSIS — J03.80 ACUTE BACTERIAL TONSILLITIS: ICD-10-CM

## 2018-05-15 DIAGNOSIS — B96.89 ACUTE BACTERIAL TONSILLITIS: ICD-10-CM

## 2018-05-15 LAB
AMPHETAMINE SCREEN URINE: NEGATIVE
BARBITURATE SCREEN URINE: NEGATIVE
BENZODIAZEPINE SCREEN, URINE: NEGATIVE
BUPRENORPHINE URINE: ABNORMAL
CANNABINOID SCREEN URINE: NEGATIVE
COCAINE METABOLITE, URINE: NEGATIVE
MDMA URINE: ABNORMAL
METHADONE SCREEN, URINE: NEGATIVE
METHAMPHETAMINE, URINE: NEGATIVE
OPIATES, URINE: NEGATIVE
OXYCODONE SCREEN URINE: NEGATIVE
PHENCYCLIDINE, URINE: NEGATIVE
PROPOXYPHENE, URINE: NEGATIVE
TEST INFORMATION: ABNORMAL
TRICYCLIC ANTIDEPRESSANTS, UR: POSITIVE

## 2018-05-15 PROCEDURE — 99214 OFFICE O/P EST MOD 30 MIN: CPT | Performed by: NURSE PRACTITIONER

## 2018-05-15 PROCEDURE — G8427 DOCREV CUR MEDS BY ELIG CLIN: HCPCS | Performed by: NURSE PRACTITIONER

## 2018-05-15 PROCEDURE — G8417 CALC BMI ABV UP PARAM F/U: HCPCS | Performed by: NURSE PRACTITIONER

## 2018-05-15 PROCEDURE — 1036F TOBACCO NON-USER: CPT | Performed by: NURSE PRACTITIONER

## 2018-05-15 PROCEDURE — 80306 DRUG TEST PRSMV INSTRMNT: CPT

## 2018-05-15 RX ORDER — LURASIDONE HYDROCHLORIDE 20 MG/1
TABLET, FILM COATED ORAL
COMMUNITY
Start: 2018-05-08 | End: 2018-09-12 | Stop reason: ALTCHOICE

## 2018-05-15 RX ORDER — DOXEPIN HYDROCHLORIDE 100 MG/1
CAPSULE ORAL
COMMUNITY
Start: 2018-04-18 | End: 2019-03-19 | Stop reason: ALTCHOICE

## 2018-05-15 RX ORDER — CEPHALEXIN 250 MG/5ML
500 POWDER, FOR SUSPENSION ORAL 2 TIMES DAILY
Qty: 140 ML | Refills: 0 | Status: SHIPPED | OUTPATIENT
Start: 2018-05-15 | End: 2018-05-22

## 2018-05-16 ASSESSMENT — ENCOUNTER SYMPTOMS
SORE THROAT: 0
EYES NEGATIVE: 1
WHEEZING: 0
COUGH: 0
RHINORRHEA: 0
CHEST TIGHTNESS: 0
SHORTNESS OF BREATH: 0
ABDOMINAL DISTENTION: 0

## 2018-05-25 ENCOUNTER — TELEPHONE (OUTPATIENT)
Dept: FAMILY MEDICINE CLINIC | Age: 28
End: 2018-05-25

## 2018-05-25 ENCOUNTER — HOSPITAL ENCOUNTER (OUTPATIENT)
Dept: NON INVASIVE DIAGNOSTICS | Age: 28
Discharge: HOME | End: 2018-05-25
Payer: COMMERCIAL

## 2018-05-25 DIAGNOSIS — B96.89 ACUTE BACTERIAL TONSILLITIS: ICD-10-CM

## 2018-05-25 DIAGNOSIS — R00.0 TACHYCARDIA: ICD-10-CM

## 2018-05-25 DIAGNOSIS — J03.80 ACUTE BACTERIAL TONSILLITIS: ICD-10-CM

## 2018-05-25 LAB
LV EF: 60 %
LVEF MODALITY: NORMAL

## 2018-05-25 PROCEDURE — 93306 TTE W/DOPPLER COMPLETE: CPT

## 2018-05-25 RX ORDER — FLUCONAZOLE 150 MG/1
150 TABLET ORAL DAILY
Qty: 3 TABLET | Refills: 0 | Status: SHIPPED | OUTPATIENT
Start: 2018-05-25 | End: 2018-05-28

## 2018-05-31 ENCOUNTER — OFFICE VISIT (OUTPATIENT)
Dept: CARDIOLOGY | Age: 28
End: 2018-05-31
Payer: COMMERCIAL

## 2018-05-31 VITALS
BODY MASS INDEX: 40.68 KG/M2 | HEIGHT: 63 IN | OXYGEN SATURATION: 98 % | WEIGHT: 229.6 LBS | SYSTOLIC BLOOD PRESSURE: 129 MMHG | RESPIRATION RATE: 18 BRPM | DIASTOLIC BLOOD PRESSURE: 84 MMHG | HEART RATE: 109 BPM

## 2018-05-31 DIAGNOSIS — R00.0 TACHYCARDIA: ICD-10-CM

## 2018-05-31 DIAGNOSIS — R42 EPISODE OF DIZZINESS: Primary | ICD-10-CM

## 2018-05-31 DIAGNOSIS — R00.2 HEART PALPITATIONS: ICD-10-CM

## 2018-05-31 PROCEDURE — G8417 CALC BMI ABV UP PARAM F/U: HCPCS | Performed by: INTERNAL MEDICINE

## 2018-05-31 PROCEDURE — 99243 OFF/OP CNSLTJ NEW/EST LOW 30: CPT | Performed by: INTERNAL MEDICINE

## 2018-05-31 PROCEDURE — G8427 DOCREV CUR MEDS BY ELIG CLIN: HCPCS | Performed by: INTERNAL MEDICINE

## 2018-05-31 RX ORDER — DILTIAZEM HYDROCHLORIDE 180 MG/1
180 CAPSULE, EXTENDED RELEASE ORAL DAILY
Qty: 90 CAPSULE | Refills: 3 | Status: SHIPPED | OUTPATIENT
Start: 2018-05-31 | End: 2018-06-04 | Stop reason: DRUGHIGH

## 2018-06-04 RX ORDER — DILTIAZEM HYDROCHLORIDE 60 MG/1
60 TABLET, FILM COATED ORAL 3 TIMES DAILY
Qty: 270 TABLET | Refills: 3 | Status: SHIPPED | OUTPATIENT
Start: 2018-06-04 | End: 2018-09-12 | Stop reason: SINTOL

## 2018-06-05 ENCOUNTER — TELEPHONE (OUTPATIENT)
Dept: CARDIOLOGY | Age: 28
End: 2018-06-05

## 2018-08-18 ENCOUNTER — HOSPITAL ENCOUNTER (EMERGENCY)
Age: 28
Discharge: HOME | End: 2018-08-18
Attending: EMERGENCY MEDICINE
Payer: COMMERCIAL

## 2018-08-18 ENCOUNTER — APPOINTMENT (OUTPATIENT)
Dept: GENERAL RADIOLOGY | Age: 28
End: 2018-08-18
Payer: COMMERCIAL

## 2018-08-18 VITALS
HEIGHT: 63 IN | HEART RATE: 97 BPM | OXYGEN SATURATION: 96 % | RESPIRATION RATE: 20 BRPM | SYSTOLIC BLOOD PRESSURE: 116 MMHG | TEMPERATURE: 98.6 F | DIASTOLIC BLOOD PRESSURE: 76 MMHG | WEIGHT: 220 LBS | BODY MASS INDEX: 38.98 KG/M2

## 2018-08-18 DIAGNOSIS — I73.00 RAYNAUD'S DISEASE WITHOUT GANGRENE: ICD-10-CM

## 2018-08-18 DIAGNOSIS — R07.9 CHEST PAIN, UNSPECIFIED TYPE: Primary | ICD-10-CM

## 2018-08-18 LAB
ABSOLUTE EOS #: <0.03 K/UL (ref 0–0.44)
ABSOLUTE IMMATURE GRANULOCYTE: 0.03 K/UL (ref 0–0.3)
ABSOLUTE LYMPH #: 1.35 K/UL (ref 1.1–3.7)
ABSOLUTE MONO #: 0.55 K/UL (ref 0.1–1.2)
ANION GAP SERPL CALCULATED.3IONS-SCNC: 13 MMOL/L (ref 9–17)
BASOPHILS # BLD: 0 % (ref 0–2)
BASOPHILS ABSOLUTE: 0.03 K/UL (ref 0–0.2)
BUN BLDV-MCNC: 9 MG/DL (ref 6–20)
BUN/CREAT BLD: 14 (ref 9–20)
CALCIUM SERPL-MCNC: 9.1 MG/DL (ref 8.6–10.4)
CHLORIDE BLD-SCNC: 101 MMOL/L (ref 98–107)
CO2: 22 MMOL/L (ref 20–31)
CREAT SERPL-MCNC: 0.66 MG/DL (ref 0.5–0.9)
DIFFERENTIAL TYPE: ABNORMAL
EKG ATRIAL RATE: 112 BPM
EKG P AXIS: 51 DEGREES
EKG P-R INTERVAL: 128 MS
EKG Q-T INTERVAL: 328 MS
EKG QRS DURATION: 86 MS
EKG QTC CALCULATION (BAZETT): 447 MS
EKG R AXIS: 46 DEGREES
EKG T AXIS: 49 DEGREES
EKG VENTRICULAR RATE: 112 BPM
EOSINOPHILS RELATIVE PERCENT: 0 % (ref 1–4)
GFR AFRICAN AMERICAN: >60 ML/MIN
GFR NON-AFRICAN AMERICAN: >60 ML/MIN
GFR SERPL CREATININE-BSD FRML MDRD: ABNORMAL ML/MIN/{1.73_M2}
GFR SERPL CREATININE-BSD FRML MDRD: ABNORMAL ML/MIN/{1.73_M2}
GLUCOSE BLD-MCNC: 125 MG/DL (ref 70–99)
HCT VFR BLD CALC: 42.4 % (ref 36.3–47.1)
HEMOGLOBIN: 14.5 G/DL (ref 11.9–15.1)
IMMATURE GRANULOCYTES: 0 %
LYMPHOCYTES # BLD: 17 % (ref 24–43)
MCH RBC QN AUTO: 28.7 PG (ref 25.2–33.5)
MCHC RBC AUTO-ENTMCNC: 34.2 G/DL (ref 28.4–34.8)
MCV RBC AUTO: 83.8 FL (ref 82.6–102.9)
MONOCYTES # BLD: 7 % (ref 3–12)
NRBC AUTOMATED: 0 PER 100 WBC
PDW BLD-RTO: 12.7 % (ref 11.8–14.4)
PLATELET # BLD: 322 K/UL (ref 138–453)
PLATELET ESTIMATE: ABNORMAL
PMV BLD AUTO: 9.7 FL (ref 8.1–13.5)
POTASSIUM SERPL-SCNC: 3.4 MMOL/L (ref 3.7–5.3)
RBC # BLD: 5.06 M/UL (ref 3.95–5.11)
RBC # BLD: ABNORMAL 10*6/UL
RHEUMATOID FACTOR: <10 IU/ML
SEG NEUTROPHILS: 76 % (ref 36–65)
SEGMENTED NEUTROPHILS ABSOLUTE COUNT: 6.05 K/UL (ref 1.5–8.1)
SODIUM BLD-SCNC: 136 MMOL/L (ref 135–144)
TROPONIN INTERP: NORMAL
TROPONIN T: <0.03 NG/ML
WBC # BLD: 8 K/UL (ref 3.5–11.3)
WBC # BLD: ABNORMAL 10*3/UL

## 2018-08-18 PROCEDURE — 86431 RHEUMATOID FACTOR QUANT: CPT

## 2018-08-18 PROCEDURE — 36415 COLL VENOUS BLD VENIPUNCTURE: CPT

## 2018-08-18 PROCEDURE — 99285 EMERGENCY DEPT VISIT HI MDM: CPT

## 2018-08-18 PROCEDURE — 86038 ANTINUCLEAR ANTIBODIES: CPT

## 2018-08-18 PROCEDURE — 93005 ELECTROCARDIOGRAM TRACING: CPT

## 2018-08-18 PROCEDURE — 85025 COMPLETE CBC W/AUTO DIFF WBC: CPT

## 2018-08-18 PROCEDURE — 71045 X-RAY EXAM CHEST 1 VIEW: CPT

## 2018-08-18 PROCEDURE — 6370000000 HC RX 637 (ALT 250 FOR IP): Performed by: EMERGENCY MEDICINE

## 2018-08-18 PROCEDURE — 84484 ASSAY OF TROPONIN QUANT: CPT

## 2018-08-18 PROCEDURE — 80048 BASIC METABOLIC PNL TOTAL CA: CPT

## 2018-08-18 RX ADMIN — LIDOCAINE HYDROCHLORIDE: 20 SOLUTION ORAL; TOPICAL at 17:15

## 2018-08-18 ASSESSMENT — PAIN SCALES - GENERAL: PAINLEVEL_OUTOF10: 2

## 2018-08-18 ASSESSMENT — PAIN DESCRIPTION - ORIENTATION: ORIENTATION: MID

## 2018-08-18 ASSESSMENT — PAIN DESCRIPTION - LOCATION: LOCATION: CHEST

## 2018-08-18 ASSESSMENT — PAIN DESCRIPTION - PAIN TYPE: TYPE: ACUTE PAIN

## 2018-08-18 NOTE — ED NOTES
Discharge education reviewed with patient at this time, she verbalizes understanding of need to follow-up with cardiology, rheumatology, and PCP. She denies further questions/concerns at this time.       Nae Tijerina RN  08/18/18 0231

## 2018-08-18 NOTE — ED NOTES
Patient ambulated to bathroom at this time and tolerated well. Denies any dizziness/lightheadedness.       Ana Santamaria RN  08/18/18 6402

## 2018-08-22 LAB — ANTI-NUCLEAR ANTIBODY (ANA): ABNORMAL

## 2018-08-24 NOTE — ED PROVIDER NOTES
Problem Relation Age of Onset    Asthma Mother     High Blood Pressure Father     Diabetes Father           SOCIAL HISTORY       Social History     Social History    Marital status: Single     Spouse name: N/A    Number of children: N/A    Years of education: N/A     Social History Main Topics    Smoking status: Never Smoker    Smokeless tobacco: Never Used    Alcohol use No      Comment: Rare    Drug use: No    Sexual activity: Not Asked     Other Topics Concern    None     Social History Narrative    None       SCREENINGS    Wilson Coma Scale  Eye Opening: Spontaneous  Best Verbal Response: Oriented  Best Motor Response: Obeys commands  [place] Coma Scale Score: 15        PHYSICAL EXAM    (up to 7 for level 4, 8 or more for level 5)     ED Triage Vitals   BP Temp Temp Source Pulse Resp SpO2 Height Weight   08/18/18 1552 08/18/18 1553 08/18/18 1553 08/18/18 1552 08/18/18 1552 08/18/18 1552 08/18/18 1553 08/18/18 1553   130/84 98.6 °F (37 °C) Tympanic 118 22 98 % 5' 3\" (1.6 m) 220 lb (99.8 kg)       Physical Exam   Constitutional: She is oriented to person, place, and time. She appears well-developed and well-nourished. HENT:   Head: Normocephalic and atraumatic. Right Ear: External ear normal.   Left Ear: External ear normal.   Nose: Nose normal.   Eyes: Pupils are equal, round, and reactive to light. Conjunctivae and EOM are normal.   Neck: Normal range of motion. Neck supple. Cardiovascular: Normal rate, regular rhythm and normal heart sounds. Exam reveals no friction rub. No murmur heard. Pulmonary/Chest: Effort normal and breath sounds normal.   Abdominal: Soft. Bowel sounds are normal. There is tenderness (epigastric abdominal that reproduces \"chest pain' on palpation). Musculoskeletal: Normal range of motion. Neurological: She is alert and oriented to person, place, and time. Skin: Skin is warm and dry. Bluish tint to all fingers of bilateral hands. Normal rom.   Normal

## 2018-08-26 ENCOUNTER — HOSPITAL ENCOUNTER (EMERGENCY)
Age: 28
Discharge: HOME | End: 2018-08-26
Attending: EMERGENCY MEDICINE
Payer: COMMERCIAL

## 2018-08-26 VITALS
OXYGEN SATURATION: 98 % | HEART RATE: 90 BPM | TEMPERATURE: 98.2 F | DIASTOLIC BLOOD PRESSURE: 80 MMHG | RESPIRATION RATE: 16 BRPM | SYSTOLIC BLOOD PRESSURE: 110 MMHG

## 2018-08-26 DIAGNOSIS — R19.7 DIARRHEA, UNSPECIFIED TYPE: Primary | ICD-10-CM

## 2018-08-26 LAB
ABSOLUTE EOS #: 0.23 K/UL (ref 0–0.44)
ABSOLUTE IMMATURE GRANULOCYTE: 0.07 K/UL (ref 0–0.3)
ABSOLUTE LYMPH #: 2.02 K/UL (ref 1.1–3.7)
ABSOLUTE MONO #: 0.66 K/UL (ref 0.1–1.2)
ALBUMIN SERPL-MCNC: 4.5 G/DL (ref 3.5–5.2)
ALBUMIN/GLOBULIN RATIO: 1.6 (ref 1–2.5)
ALP BLD-CCNC: 87 U/L (ref 35–104)
ALT SERPL-CCNC: 21 U/L (ref 5–33)
ANION GAP SERPL CALCULATED.3IONS-SCNC: 11 MMOL/L (ref 9–17)
AST SERPL-CCNC: 14 U/L
BASOPHILS # BLD: 1 % (ref 0–2)
BASOPHILS ABSOLUTE: 0.05 K/UL (ref 0–0.2)
BILIRUB SERPL-MCNC: 0.35 MG/DL (ref 0.3–1.2)
BUN BLDV-MCNC: 13 MG/DL (ref 6–20)
BUN/CREAT BLD: 18 (ref 9–20)
CALCIUM SERPL-MCNC: 9.5 MG/DL (ref 8.6–10.4)
CHLORIDE BLD-SCNC: 104 MMOL/L (ref 98–107)
CO2: 24 MMOL/L (ref 20–31)
CREAT SERPL-MCNC: 0.74 MG/DL (ref 0.5–0.9)
DIFFERENTIAL TYPE: ABNORMAL
EOSINOPHILS RELATIVE PERCENT: 3 % (ref 1–4)
GFR AFRICAN AMERICAN: >60 ML/MIN
GFR NON-AFRICAN AMERICAN: >60 ML/MIN
GFR SERPL CREATININE-BSD FRML MDRD: NORMAL ML/MIN/{1.73_M2}
GFR SERPL CREATININE-BSD FRML MDRD: NORMAL ML/MIN/{1.73_M2}
GLUCOSE BLD-MCNC: 87 MG/DL (ref 70–99)
HCT VFR BLD CALC: 42.5 % (ref 36.3–47.1)
HEMOGLOBIN: 14.6 G/DL (ref 11.9–15.1)
IMMATURE GRANULOCYTES: 1 %
LIPASE: 31 U/L (ref 13–60)
LYMPHOCYTES # BLD: 26 % (ref 24–43)
MCH RBC QN AUTO: 28.8 PG (ref 25.2–33.5)
MCHC RBC AUTO-ENTMCNC: 34.4 G/DL (ref 28.4–34.8)
MCV RBC AUTO: 83.8 FL (ref 82.6–102.9)
MONOCYTES # BLD: 9 % (ref 3–12)
NRBC AUTOMATED: 0 PER 100 WBC
PDW BLD-RTO: 12.7 % (ref 11.8–14.4)
PLATELET # BLD: 329 K/UL (ref 138–453)
PLATELET ESTIMATE: ABNORMAL
PMV BLD AUTO: 9.2 FL (ref 8.1–13.5)
POTASSIUM SERPL-SCNC: 4.3 MMOL/L (ref 3.7–5.3)
RBC # BLD: 5.07 M/UL (ref 3.95–5.11)
RBC # BLD: ABNORMAL 10*6/UL
SEG NEUTROPHILS: 60 % (ref 36–65)
SEGMENTED NEUTROPHILS ABSOLUTE COUNT: 4.7 K/UL (ref 1.5–8.1)
SODIUM BLD-SCNC: 139 MMOL/L (ref 135–144)
TOTAL PROTEIN: 7.4 G/DL (ref 6.4–8.3)
WBC # BLD: 7.7 K/UL (ref 3.5–11.3)
WBC # BLD: ABNORMAL 10*3/UL

## 2018-08-26 PROCEDURE — 6360000002 HC RX W HCPCS: Performed by: EMERGENCY MEDICINE

## 2018-08-26 PROCEDURE — 80053 COMPREHEN METABOLIC PANEL: CPT

## 2018-08-26 PROCEDURE — 85025 COMPLETE CBC W/AUTO DIFF WBC: CPT

## 2018-08-26 PROCEDURE — 2580000003 HC RX 258: Performed by: EMERGENCY MEDICINE

## 2018-08-26 PROCEDURE — 83690 ASSAY OF LIPASE: CPT

## 2018-08-26 PROCEDURE — 96374 THER/PROPH/DIAG INJ IV PUSH: CPT

## 2018-08-26 PROCEDURE — 36415 COLL VENOUS BLD VENIPUNCTURE: CPT

## 2018-08-26 PROCEDURE — 99284 EMERGENCY DEPT VISIT MOD MDM: CPT

## 2018-08-26 RX ORDER — 0.9 % SODIUM CHLORIDE 0.9 %
1000 INTRAVENOUS SOLUTION INTRAVENOUS ONCE
Status: COMPLETED | OUTPATIENT
Start: 2018-08-26 | End: 2018-08-26

## 2018-08-26 RX ORDER — KETOROLAC TROMETHAMINE 15 MG/ML
15 INJECTION, SOLUTION INTRAMUSCULAR; INTRAVENOUS ONCE
Status: COMPLETED | OUTPATIENT
Start: 2018-08-26 | End: 2018-08-26

## 2018-08-26 RX ADMIN — KETOROLAC TROMETHAMINE 15 MG: 15 INJECTION, SOLUTION INTRAMUSCULAR; INTRAVENOUS at 13:59

## 2018-08-26 RX ADMIN — SODIUM CHLORIDE 1000 ML: 9 INJECTION, SOLUTION INTRAVENOUS at 13:59

## 2018-08-26 ASSESSMENT — ENCOUNTER SYMPTOMS
DIARRHEA: 0
EYE PAIN: 0
RECTAL PAIN: 0
CHEST TIGHTNESS: 0
VOMITING: 0
ABDOMINAL PAIN: 0
NAUSEA: 0
DIARRHEA: 1
COLOR CHANGE: 1
SHORTNESS OF BREATH: 0
WHEEZING: 0
RESPIRATORY NEGATIVE: 1
ABDOMINAL PAIN: 0
SHORTNESS OF BREATH: 0

## 2018-08-26 ASSESSMENT — PAIN SCALES - GENERAL: PAINLEVEL_OUTOF10: 5

## 2018-08-26 ASSESSMENT — PAIN DESCRIPTION - PAIN TYPE: TYPE: ACUTE PAIN

## 2018-08-26 ASSESSMENT — PAIN DESCRIPTION - LOCATION: LOCATION: RIB CAGE

## 2018-08-26 ASSESSMENT — PAIN DESCRIPTION - ORIENTATION: ORIENTATION: LEFT

## 2018-08-26 NOTE — ED PROVIDER NOTES
[facility] ED  eMERGENCY dEPARTMENT eNCOUnter      Pt Name: Viviana Mason  MRN: [id number]  [place] 1990  Date of evaluation: 8/26/2018  Provider: Chani Sotelo MD    CHIEF COMPLAINT       Chief Complaint   Patient presents with    Diarrhea     diarrhea started yesterday morning.  Other     patient states she was seen here last week for her hands turning blue and having episodes of increased heart rate. she has had a few more episodes of this since last week. her insurance is expiring at the end of the month and she would like to have this problem fixed before then    Leg Swelling     hand and feet swelling       HISTORY OF PRESENT Miladis Tang is a 34 y.o. female who presents to the emergency department For evaluation of diarrhea. Symptoms have been ongoing for the past 2 days. She states she has 5 episodes of diarrhea daily. She denies any associated pain with this. She states she did have swelling in her feet yesterday but this has since improved. Patient also complains of her hands turning blue intermittently over the last several weeks. Patient was seen here in the emergency department for this that time. She denies any change to this. She denies any chest pain, shortness of breath, abdominal pain, nausea, vomiting. PAST MEDICAL HISTORY     Past Medical History:   Diagnosis Date    Asthma     Back pain     Bipolar 1 disorder ([facility] [address]     Chicken pox     H/O echocardiogram 05/18/2018    EF 60%    History of Holter monitoring 04/27/2018    Baseline ECG shows sinus rhythm. Holter showed sinus tachy 61% of the time. Rare premature ventricular complexes were recorded. No episodes of superventricuarl or ventriuclar tacycardial was seen. Pt reported episodes of SOB and speedy heart with monitroing showing sinus tachy.      Lordosis     Pneumonia     PTSD (post-traumatic stress disorder)     UTI (urinary tract infection)        SURGICAL HISTORY Past Surgical History:   Procedure Laterality Date    NECK SURGERY Left lesion biopsy    UPPER GASTROINTESTINAL ENDOSCOPY      chicken stuck in throat    WISDOM TOOTH EXTRACTION         CURRENT MEDICATIONS       Previous Medications    ALBUTEROL SULFATE HFA (PROAIR HFA) 108 (90 BASE) MCG/ACT INHALER    Inhale 2 puffs into the lungs every 6 hours as needed for Wheezing    ARIPIPRAZOLE (ABILIFY) 20 MG TABLET    Take 20 mg by mouth daily    DILTIAZEM (CARDIZEM) 60 MG TABLET    Take 1 tablet by mouth 3 times daily    DOXEPIN (SINEQUAN) 100 MG CAPSULE        LAMOTRIGINE (LAMICTAL) 100 MG TABLET    Take 100 mg by mouth 2 times daily     LATUDA 20 MG TABS TABLET        MAGIC MOUTHWASH (MIRACLE MOUTHWASH)    1 part viscous lidocaine 2% +1 part Maalox +1 part diphenhydramine 12.5 mg per 5 mL elixir area    Swish, gargle and spit 1-2 teaspoonfuls every 4-6 hours as needed for sore throat. MONTELUKAST (SINGULAIR) 10 MG TABLET    Take 1 tablet by mouth nightly    NAPROXEN (NAPROSYN) 500 MG TABLET    Take 1 tablet by mouth 2 times daily       ALLERGIES     Doxycycline; Pollen extract; Seroquel [quetiapine fumarate]; Symbicort [budesonide-formoterol fumarate]; and Topamax [topiramate]    FAMILY HISTORY       Family History   Problem Relation Age of Onset    Asthma Mother     High Blood Pressure Father     Diabetes Father         SOCIAL HISTORY       Social History     Social History    Marital status: Single     Spouse name: N/A    Number of children: N/A    Years of education: N/A     Social History Main Topics    Smoking status: Never Smoker    Smokeless tobacco: Never Used    Alcohol use No      Comment: Rare    Drug use: No    Sexual activity: Not Asked     Other Topics Concern    None     Social History Narrative    None       REVIEW OF SYSTEMS       Review of Systems   Constitutional: Negative for fever. HENT: Negative for congestion. Eyes: Negative for pain.    Respiratory: Negative for shortness of breath. Cardiovascular: Negative for chest pain. Gastrointestinal: Positive for diarrhea. Negative for abdominal pain. Genitourinary: Negative for dysuria. Skin: Negative for rash. Allergic/Immunologic: Negative for immunocompromised state. Neurological: Negative for headaches. PHYSICAL EXAM    (up to 7 for level 4, 8 or more for level 5)     ED Triage Vitals [08/26/18 1327]   BP Temp Temp Source Pulse Resp SpO2 Height Weight   (!) 116/97 98.2 °F (36.8 °C) Tympanic 114 14 98 % -- --       Physical Exam   Constitutional: She is oriented to person, place, and time. She appears well-developed. No distress. HENT:   Head: Normocephalic and atraumatic. Eyes: Conjunctivae are normal. Right eye exhibits no discharge. Left eye exhibits no discharge. Neck: Neck supple. Cardiovascular: Regular rhythm. Pulmonary/Chest: Effort normal. No stridor. No respiratory distress. Abdominal: Soft. She exhibits no distension. There is no tenderness. There is no rebound and no guarding. Musculoskeletal: She exhibits no edema or tenderness. Neurological: She is alert and oriented to person, place, and time. Skin: Skin is warm and dry. No cyanosis or erythema. No pallor. Nails show no clubbing. Capillary refill less than 3 seconds bilateral upper extremities. Psychiatric: She has a normal mood and affect. Nursing note and vitals reviewed. DIAGNOSTIC RESULTS     RADIOLOGY: (none if blank)   Interpretation per the Radiologist below, if available at the time of this note:    No orders to display       LABS:  Labs Reviewed   CBC WITH AUTO DIFFERENTIAL - Abnormal; Notable for the following:        Result Value    Immature Granulocytes 1 (*)     All other components within normal limits   COMPREHENSIVE METABOLIC PANEL   LIPASE       All other labs were within normal range or not returned as of this dictation. EMERGENCY DEPARTMENT COURSE and Medical Decision Making:      On evaluation, patient is

## 2018-08-27 ENCOUNTER — CARE COORDINATION (OUTPATIENT)
Dept: CARE COORDINATION | Age: 28
End: 2018-08-27

## 2018-09-11 ENCOUNTER — OFFICE VISIT (OUTPATIENT)
Dept: PRIMARY CARE CLINIC | Age: 28
End: 2018-09-11
Payer: MEDICAID

## 2018-09-11 VITALS
WEIGHT: 231 LBS | BODY MASS INDEX: 40.92 KG/M2 | SYSTOLIC BLOOD PRESSURE: 124 MMHG | DIASTOLIC BLOOD PRESSURE: 84 MMHG | OXYGEN SATURATION: 97 % | TEMPERATURE: 98 F | HEART RATE: 104 BPM

## 2018-09-11 DIAGNOSIS — J02.9 SORE THROAT: ICD-10-CM

## 2018-09-11 DIAGNOSIS — K52.9 GASTROENTERITIS: Primary | ICD-10-CM

## 2018-09-11 LAB
CONTROL: PRESENT
PREGNANCY TEST URINE, POC: NEGATIVE
S PYO AG THROAT QL: NORMAL

## 2018-09-11 PROCEDURE — 99213 OFFICE O/P EST LOW 20 MIN: CPT | Performed by: NURSE PRACTITIONER

## 2018-09-11 PROCEDURE — 81025 URINE PREGNANCY TEST: CPT | Performed by: NURSE PRACTITIONER

## 2018-09-11 PROCEDURE — 87880 STREP A ASSAY W/OPTIC: CPT | Performed by: NURSE PRACTITIONER

## 2018-09-11 ASSESSMENT — ENCOUNTER SYMPTOMS
COUGH: 0
NAUSEA: 1
SORE THROAT: 0

## 2018-09-11 NOTE — PROGRESS NOTES
[facility] [facility] WALK-IN [facility] Jeyson Leung [id number]  Dept: [phone]  Dept Fax: [phone]    Pura Serrano is a 34 y.o. female who presents to the [address] [place] [address] in Care today for her medical conditions/complaints as noted below. Pura Serrano is c/o of Pharyngitis (patient presents today for sore throat, headache, nausea since yesterday, Patient states she took old [address] at home for vomiting, patient states no fever but has body aches)      HPI:   HPI  Pura Serrano is a 34 y.o. female who presents x 2 days of continued sore throat with headache, nausea and nausea. She reports that she awakened during the night with numerous amounts of emesis that turned into dry heaving. She reports taking some old phenergan with good relief and she was able to eat a granola bar with water and retain around noon. She reports last emesis as \"during the night sometime\". There has been no abdominal pain. No fever. No diarrhea. She feels her sore throat is related to the emesis as well as her body aches and headache. Maria G Smith has a [place] but reports she did become pregnant approximately \"3 months ago\" that resulted in a miscarriage. Rachael denies use of recent antibiotic use. No recent travel. No suspicious foods or water exposure. 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 She attempted to go to work today but left early. Headache is reported as dull and does not interfere with her vision. She has not tried any OTC tylenol of ibuprofen.   Rachael reports that she normally has a \"fast heart rate\" and she is currently being followed up by a cardiologist.    Past Medical History:   Diagnosis Date    Asthma     Back pain     Bipolar 1 disorder ([address]     Chicken pox     H/O echocardiogram 05/18/2018    EF 60%    History of Holter monitoring 04/27/2018    Baseline ECG shows sinus rhythm. Holter showed sinus tachy 61% of the time. Rare premature ventricular complexes were recorded. No episodes of superventricuarl or ventriuclar tacycardial was seen. Pt reported episodes of SOB and speedy heart with monitroing showing sinus tachy.  Lordosis     Pneumonia     PTSD (post-traumatic stress disorder)     UTI (urinary tract infection)         Current Outpatient Prescriptions   Medication Sig Dispense Refill    diltiazem (CARDIZEM) 60 MG tablet Take 1 tablet by mouth 3 times daily 270 tablet 3    doxepin (SINEQUAN) 100 MG capsule       ARIPiprazole (ABILIFY) 20 MG tablet Take 20 mg by mouth daily      montelukast (SINGULAIR) 10 MG tablet Take 1 tablet by mouth nightly 30 tablet 5    lamoTRIgine (LAMICTAL) 100 MG tablet Take 100 mg by mouth 2 times daily       albuterol sulfate HFA (PROAIR HFA) 108 (90 Base) MCG/ACT inhaler Inhale 2 puffs into the lungs every 6 hours as needed for Wheezing 1 Inhaler 3    naproxen (NAPROSYN) 500 MG tablet Take 1 tablet by mouth 2 times daily (Patient taking differently: Take 250 mg by mouth 2 times daily ) 30 tablet 0    LATUDA 20 MG TABS tablet        No current facility-administered medications for this visit. Allergies   Allergen Reactions    Doxycycline      Tongue itch, throat began to swell    Pollen Extract      Grass    Seroquel [Quetiapine Fumarate] Other (See Comments)     Paranoid made mood worse    Symbicort [Budesonide-Formoterol Fumarate] Other (See Comments)     Delusional, paranoid, hallucinations.  Topamax [Topiramate] Other (See Comments)     Numbness in legs       Subjective:      Review of Systems   HENT: Negative for sore throat. Respiratory: Negative for cough. Gastrointestinal: Positive for nausea. Neurological: Positive for headaches. Objective:     Physical Exam   Constitutional: She does not appear ill.    Appears to be of stated age with warm, dry skin; normal coloration without rash of the exposed does not induce vomiting; encouraged to sip on broth and or sport drinks and discouraged from sodas and fruit juices. Strict follow up and return precautions have been discussed; If symptoms start to get worse or any concerns, they are to go to the ER for further evaluation. All patient questions answered. Pt voiced understanding. Return for ED for worsening symptoms, As Previously Scheduled with Your PCP. No orders of the defined types were placed in this encounter.            Electronically signed by OSKAR Banuelos CNP on 9/11/2018 at 1:57 PM

## 2018-09-11 NOTE — LETTER
[facility] [id number]  Phone: [phone]  Fax: [address] [facility], [address], APRN - CNP        September 11, 2018     Patient: Ashley Jones   YOB: 1990   Date of Visit: 9/11/2018       To Whom it May Concern:    Sterling Gonzalez was seen in my clinic on 9/11/2018. She may return to work on 09/12/2018. If you have any questions or concerns, please don't hesitate to call.     Sincerely,         Hernan Bowens, APRN - CNP

## 2018-09-11 NOTE — PATIENT INSTRUCTIONS
hot foods hot and cold foods cold. · Do not eat meats, dressings, salads, or other foods that have been kept at room temperature for more than 2 hours. · Use a thermometer to check your refrigerator. It should be between 34°F and 40°F.  · Defrost meats in the refrigerator or microwave, not on the kitchen counter. · Keep your hands and your kitchen clean. Wash your hands, cutting boards, and countertops with hot soapy water frequently. · Cook meat until it is well done. · Do not eat raw eggs or uncooked sauces made with raw eggs. · Do not take chances. If food looks or tastes spoiled, throw it out. When should you call for help? Call 911 anytime you think you may need emergency care. For example, call if:    · You vomit blood or what looks like coffee grounds.     · You passed out (lost consciousness).     · You pass maroon or very bloody stools.    Call your doctor now or seek immediate medical care if:    · You have severe belly pain.     · You have signs of needing more fluids. You have sunken eyes, a dry mouth, and pass only a little dark urine.     · You feel like you are going to faint.     · You have increased belly pain that does not go away in 1 to 2 days.     · You have new or increased nausea, or you are vomiting.     · You have a new or higher fever.     · Your stools are black and tarlike or have streaks of blood.    Watch closely for changes in your health, and be sure to contact your doctor if:    · You are dizzy or lightheaded.     · You urinate less than usual, or your urine is dark yellow or brown.     · You do not feel better with each day that goes by. Where can you learn more? Go to [email or web address] org and sign in to your [address] account. Enter N142 in the Triplify box to learn more about \"Gastroenteritis: Care Instructions. \"     If you do not have an account, please click on the \"Sign Up Now\" link.   Current as of: November 18, 2017  Content [address] Version: 3.03. Revision date: 12/9/2016. Care instructions adapted under license by [facility] ([facility]). If you have questions about a medical condition or this instruction, always ask your healthcare professional. [place] [address] any warranty or liability for your use of this information.

## 2018-09-12 ENCOUNTER — OFFICE VISIT (OUTPATIENT)
Dept: FAMILY MEDICINE CLINIC | Age: 28
End: 2018-09-12
Payer: MEDICAID

## 2018-09-12 VITALS
HEART RATE: 94 BPM | SYSTOLIC BLOOD PRESSURE: 118 MMHG | TEMPERATURE: 98.1 F | OXYGEN SATURATION: 98 % | DIASTOLIC BLOOD PRESSURE: 60 MMHG | WEIGHT: 235 LBS | BODY MASS INDEX: 41.63 KG/M2

## 2018-09-12 DIAGNOSIS — E66.01 CLASS 3 SEVERE OBESITY DUE TO EXCESS CALORIES WITHOUT SERIOUS COMORBIDITY WITH BODY MASS INDEX (BMI) OF 40.0 TO 44.9 IN ADULT (HCC): ICD-10-CM

## 2018-09-12 DIAGNOSIS — E78.2 MIXED HYPERLIPIDEMIA: Primary | ICD-10-CM

## 2018-09-12 PROCEDURE — 99214 OFFICE O/P EST MOD 30 MIN: CPT | Performed by: NURSE PRACTITIONER

## 2018-09-12 ASSESSMENT — ENCOUNTER SYMPTOMS
COUGH: 0
NAUSEA: 0
DIARRHEA: 0
SHORTNESS OF BREATH: 0
VOMITING: 0

## 2018-09-12 NOTE — PROGRESS NOTES
HPI Notes    Name: Pura Serrano  : 1990         Chief Complaint:     Chief Complaint   Patient presents with    Weight Gain     Patient here today for weight gain over the past few months.  Discuss Labs     Patient would like to discuss having labs done, she said her labs done at [facility] and lipids were elevated. History of Present Illness:        HPI  Pt is a 35 yo female who reports for complaints of weight gain. Pt would also like to discuss getting blood work done. Pt was at [facility] and had a finger stick done. [facility] told her that her cholesterol was elevated and should have it checked. Pt noticed that over the past 6 months she has slowly gained weight. Over the past month she has gained 15#. Pt states that she goes to the gym 5 days a week. Combination of treadmill, sit ups, push ups, and medicine ball work. Pt does drink water throughout the day. Pt states that she \"eats healthy\". Typical breakfast is 2 eggs and 2 pieces of toast  Lunch: salad with spinach with light [place] dressing (2tsp)  Snack: nutrigrain bar  Dinner: some type of meat, if pasta she only eats 1/2cup, broccoli    Past Medical History:     Past Medical History:   Diagnosis Date    Asthma     Back pain     Bipolar 1 disorder ([address]     Chicken pox     H/O echocardiogram 2018    EF 60%    History of Holter monitoring 2018    Baseline ECG shows sinus rhythm. Holter showed sinus tachy 61% of the time. Rare premature ventricular complexes were recorded. No episodes of superventricuarl or ventriuclar tacycardial was seen. Pt reported episodes of SOB and speedy heart with monitroing showing sinus tachy.      Lordosis     Pneumonia     PTSD (post-traumatic stress disorder)     UTI (urinary tract infection)       Reviewed all health maintenance requirements and ordered appropriate tests  Health Maintenance Due   Topic Date Due    Cervical cancer screen  06/15/2013    Flu vaccine (1) 09/01/2018       Past Surgical History:     Past Surgical History:   Procedure Laterality Date    NECK SURGERY Left lesion biopsy    UPPER GASTROINTESTINAL ENDOSCOPY      chicken stuck in throat    WISDOM TOOTH EXTRACTION          Medications:       Prior to Admission medications    Medication Sig Start Date End Date Taking? Authorizing Provider   doxepin (SINEQUAN) 100 MG capsule  4/18/18  Yes Historical Provider, MD   ARIPiprazole (ABILIFY) 20 MG tablet Take 20 mg by mouth daily   Yes Historical Provider, MD   lamoTRIgine (LAMICTAL) 100 MG tablet Take 100 mg by mouth 2 times daily  10/23/17  Yes Historical Provider, MD   albuterol sulfate HFA (PROAIR HFA) 108 (90 Base) MCG/ACT inhaler Inhale 2 puffs into the lungs every 6 hours as needed for Wheezing 11/6/17   OSKAR Tinoco CNP   naproxen (NAPROSYN) 500 MG tablet Take 1 tablet by mouth 2 times daily  Patient taking differently: Take 250 mg by mouth 2 times daily  9/4/17   Yareli Shipley MD        Allergies:       Doxycycline; Pollen extract; Seroquel [quetiapine fumarate]; Symbicort [budesonide-formoterol fumarate]; and Topamax [topiramate]    Social History:     Tobacco:    reports that she has never smoked. She has never used smokeless tobacco.  Alcohol:      reports that she does not drink alcohol. Drug Use:  reports that she does not use drugs. Family History:     Family History   Problem Relation Age of Onset    Asthma Mother     High Blood Pressure Father     Diabetes Father        Review of Systems:         Review of Systems   Constitutional: Negative for chills and fever. Respiratory: Negative for cough and shortness of breath. Cardiovascular: Negative for chest pain and palpitations. Gastrointestinal: Negative for diarrhea, nausea and vomiting. Psychiatric/Behavioral: Negative for depression, substance abuse and suicidal ideas. The patient is not nervous/anxious.           Physical Exam:     Vitals:  /60   Pulse 94   Temp 98.1 °F (36.7 °C) (Oral)   Wt 235 lb (106.6 kg)   SpO2 98%   BMI 41.63 kg/m²       Physical Exam   Constitutional: She is oriented to person, place, and time. Vital signs are normal. She appears well-developed and well-nourished. HENT:   Head: Normocephalic. Right Ear: Hearing, tympanic membrane and external ear normal.   Left Ear: Hearing, tympanic membrane and external ear normal.   Nose: Nose normal.   Mouth/Throat: Uvula is midline, oropharynx is clear and moist and mucous membranes are normal.   Eyes: Pupils are equal, round, and reactive to light. Conjunctivae and EOM are normal.   Neck: Trachea normal and normal range of motion. Carotid bruit is not present. No thyroid mass present. Cardiovascular: Normal rate, regular rhythm, S1 normal, S2 normal, normal heart sounds and intact distal pulses. Pulses:       Dorsalis pedis pulses are 2+ on the right side, and 2+ on the left side. Pulmonary/Chest: Effort normal and breath sounds normal.   Abdominal: Soft. Normal appearance. There is no tenderness. Musculoskeletal: Normal range of motion. Neurological: She is alert and oriented to person, place, and time. She has normal strength and normal reflexes. GCS eye subscore is 4. GCS verbal subscore is 5. GCS motor subscore is 6. Skin: Skin is warm, dry and intact. No rash noted. Psychiatric: She has a normal mood and affect. Her speech is normal and behavior is normal. Judgment and thought content normal. Cognition and memory are normal.   Nursing note and vitals reviewed.             Data:     Lab Results   Component Value Date     08/26/2018    K 4.3 08/26/2018     08/26/2018    CO2 24 08/26/2018    BUN 13 08/26/2018    CREATININE 0.74 08/26/2018    GLUCOSE 87 08/26/2018    PROT 7.4 08/26/2018    LABALBU 4.5 08/26/2018    BILITOT 0.35 08/26/2018    ALKPHOS 87 08/26/2018    AST 14 08/26/2018    ALT 21 08/26/2018     Lab Results   Component Value Date    WBC 7.7 08/26/2018

## 2018-09-12 NOTE — PATIENT INSTRUCTIONS
SURVEY:    You may be receiving a survey from [employer] regarding your visit today. Please complete the survey to enable us to provide the highest quality of care to you and your family. If you cannot score us a very good on any question, please call the office to discuss how we could have made your experience a very good one. Thank you.

## 2018-09-25 ENCOUNTER — HOSPITAL ENCOUNTER (EMERGENCY)
Age: 28
Discharge: HOME | End: 2018-09-25
Attending: EMERGENCY MEDICINE

## 2018-09-25 ENCOUNTER — TELEPHONE (OUTPATIENT)
Dept: FAMILY MEDICINE CLINIC | Age: 28
End: 2018-09-25

## 2018-09-25 ENCOUNTER — APPOINTMENT (OUTPATIENT)
Dept: GENERAL RADIOLOGY | Age: 28
End: 2018-09-25

## 2018-09-25 VITALS
DIASTOLIC BLOOD PRESSURE: 78 MMHG | HEART RATE: 82 BPM | OXYGEN SATURATION: 100 % | RESPIRATION RATE: 12 BRPM | TEMPERATURE: 98.1 F | SYSTOLIC BLOOD PRESSURE: 123 MMHG

## 2018-09-25 DIAGNOSIS — R00.0 TACHYCARDIA: Primary | ICD-10-CM

## 2018-09-25 LAB
-: ABNORMAL
AMORPHOUS: ABNORMAL
AMPHETAMINE SCREEN URINE: NEGATIVE
ANION GAP SERPL CALCULATED.3IONS-SCNC: 11 MMOL/L (ref 9–17)
BACTERIA: ABNORMAL
BARBITURATE SCREEN URINE: NEGATIVE
BENZODIAZEPINE SCREEN, URINE: NEGATIVE
BILIRUBIN URINE: NEGATIVE
BNP INTERPRETATION: NORMAL
BUN BLDV-MCNC: 9 MG/DL (ref 6–20)
BUN/CREAT BLD: 17 (ref 9–20)
BUPRENORPHINE URINE: NEGATIVE
CALCIUM SERPL-MCNC: 9.5 MG/DL (ref 8.6–10.4)
CANNABINOID SCREEN URINE: NEGATIVE
CASTS UA: ABNORMAL /LPF
CHLORIDE BLD-SCNC: 101 MMOL/L (ref 98–107)
CHOLESTEROL/HDL RATIO: 4.4
CHOLESTEROL: 201 MG/DL
CO2: 23 MMOL/L (ref 20–31)
COCAINE METABOLITE, URINE: NEGATIVE
COLOR: YELLOW
COMMENT UA: ABNORMAL
CREAT SERPL-MCNC: 0.54 MG/DL (ref 0.5–0.9)
CRYSTALS, UA: ABNORMAL /HPF
D-DIMER QUANTITATIVE: 0.3 MG/L FEU (ref 0.19–0.5)
EKG ATRIAL RATE: 103 BPM
EKG P AXIS: 45 DEGREES
EKG P-R INTERVAL: 126 MS
EKG Q-T INTERVAL: 348 MS
EKG QRS DURATION: 88 MS
EKG QTC CALCULATION (BAZETT): 455 MS
EKG R AXIS: 37 DEGREES
EKG T AXIS: 50 DEGREES
EKG VENTRICULAR RATE: 103 BPM
EPITHELIAL CELLS UA: ABNORMAL /HPF (ref 0–25)
GFR AFRICAN AMERICAN: >60 ML/MIN
GFR NON-AFRICAN AMERICAN: >60 ML/MIN
GFR SERPL CREATININE-BSD FRML MDRD: ABNORMAL ML/MIN/{1.73_M2}
GFR SERPL CREATININE-BSD FRML MDRD: ABNORMAL ML/MIN/{1.73_M2}
GLUCOSE BLD-MCNC: 103 MG/DL (ref 70–99)
GLUCOSE URINE: NEGATIVE
HCT VFR BLD CALC: 44 % (ref 36.3–47.1)
HDLC SERPL-MCNC: 46 MG/DL
HEMOGLOBIN: 15 G/DL (ref 11.9–15.1)
KETONES, URINE: NEGATIVE
LDL CHOLESTEROL: 128 MG/DL (ref 0–130)
LEUKOCYTE ESTERASE, URINE: NEGATIVE
MCH RBC QN AUTO: 28.6 PG (ref 25.2–33.5)
MCHC RBC AUTO-ENTMCNC: 34.1 G/DL (ref 28.4–34.8)
MCV RBC AUTO: 84 FL (ref 82.6–102.9)
MDMA URINE: NORMAL
METHADONE SCREEN, URINE: NEGATIVE
METHAMPHETAMINE, URINE: NEGATIVE
MUCUS: ABNORMAL
NITRITE, URINE: NEGATIVE
NRBC AUTOMATED: 0 PER 100 WBC
OPIATES, URINE: NEGATIVE
OTHER OBSERVATIONS UA: ABNORMAL
OXYCODONE SCREEN URINE: NEGATIVE
PDW BLD-RTO: 12.8 % (ref 11.8–14.4)
PH UA: 7 (ref 5–9)
PHENCYCLIDINE, URINE: NEGATIVE
PLATELET # BLD: 391 K/UL (ref 138–453)
PMV BLD AUTO: 9.5 FL (ref 8.1–13.5)
POTASSIUM SERPL-SCNC: 4.1 MMOL/L (ref 3.7–5.3)
PRO-BNP: <20 PG/ML
PROPOXYPHENE, URINE: NEGATIVE
PROTEIN UA: NEGATIVE
RBC # BLD: 5.24 M/UL (ref 3.95–5.11)
RBC UA: ABNORMAL /HPF (ref 0–2)
RENAL EPITHELIAL, UA: ABNORMAL /HPF
SODIUM BLD-SCNC: 135 MMOL/L (ref 135–144)
SPECIFIC GRAVITY UA: 1.01 (ref 1.01–1.02)
TEST INFORMATION: NORMAL
TRICHOMONAS: ABNORMAL
TRICYCLIC ANTIDEPRESSANTS, UR: NEGATIVE
TRIGL SERPL-MCNC: 133 MG/DL
TSH SERPL DL<=0.05 MIU/L-ACNC: 2.66 MIU/L (ref 0.3–5)
TURBIDITY: CLEAR
URINE HGB: ABNORMAL
UROBILINOGEN, URINE: NORMAL
VLDLC SERPL CALC-MCNC: ABNORMAL MG/DL (ref 1–30)
WBC # BLD: 7.1 K/UL (ref 3.5–11.3)
WBC UA: ABNORMAL /HPF (ref 0–5)
YEAST: ABNORMAL

## 2018-09-25 PROCEDURE — 85379 FIBRIN DEGRADATION QUANT: CPT

## 2018-09-25 PROCEDURE — 93005 ELECTROCARDIOGRAM TRACING: CPT

## 2018-09-25 PROCEDURE — 81001 URINALYSIS AUTO W/SCOPE: CPT

## 2018-09-25 PROCEDURE — 2580000003 HC RX 258: Performed by: EMERGENCY MEDICINE

## 2018-09-25 PROCEDURE — 85027 COMPLETE CBC AUTOMATED: CPT

## 2018-09-25 PROCEDURE — 80048 BASIC METABOLIC PNL TOTAL CA: CPT

## 2018-09-25 PROCEDURE — 71046 X-RAY EXAM CHEST 2 VIEWS: CPT

## 2018-09-25 PROCEDURE — 80061 LIPID PANEL: CPT

## 2018-09-25 PROCEDURE — 84443 ASSAY THYROID STIM HORMONE: CPT

## 2018-09-25 PROCEDURE — 83880 ASSAY OF NATRIURETIC PEPTIDE: CPT

## 2018-09-25 PROCEDURE — 80306 DRUG TEST PRSMV INSTRMNT: CPT

## 2018-09-25 PROCEDURE — 36415 COLL VENOUS BLD VENIPUNCTURE: CPT

## 2018-09-25 PROCEDURE — 99285 EMERGENCY DEPT VISIT HI MDM: CPT

## 2018-09-25 RX ORDER — HYDROXYZINE PAMOATE 50 MG/1
50 CAPSULE ORAL 4 TIMES DAILY PRN
Status: ON HOLD | COMMUNITY
End: 2019-12-31 | Stop reason: HOSPADM

## 2018-09-25 RX ORDER — 0.9 % SODIUM CHLORIDE 0.9 %
1000 INTRAVENOUS SOLUTION INTRAVENOUS ONCE
Status: COMPLETED | OUTPATIENT
Start: 2018-09-25 | End: 2018-09-25

## 2018-09-25 RX ADMIN — SODIUM CHLORIDE 1000 ML: 9 INJECTION, SOLUTION INTRAVENOUS at 12:25

## 2018-09-25 ASSESSMENT — ENCOUNTER SYMPTOMS
SORE THROAT: 0
NAUSEA: 0
RHINORRHEA: 0
WHEEZING: 0
DIARRHEA: 0
COUGH: 0
SHORTNESS OF BREATH: 1
VOMITING: 0
CONSTIPATION: 0
ABDOMINAL DISTENTION: 0

## 2018-09-25 NOTE — ED PROVIDER NOTES
NEG    Urobilinogen, Urine Normal NORM    Nitrite, Urine NEGATIVE NEG    Leukocyte Esterase, Urine NEGATIVE NEG    Urinalysis Comments NOT REPORTED    D-dimer, quantitative   Result Value Ref Range    D-Dimer, Quant 0.30 0.19 - 0.50 mg/L FEU   Brain Natriuretic Peptide   Result Value Ref Range    Pro-BNP <20 <300 pg/mL    BNP Interpretation         Lipid Panel   Result Value Ref Range    Cholesterol 201 (H) <200 mg/dL    HDL 46 >40 mg/dL    LDL Cholesterol 128 0 - 130 mg/dL    Chol/HDL Ratio 4.4 <5    Triglycerides 133 <150 mg/dL    VLDL NOT REPORTED 1 - 30 mg/dL   Microscopic Urinalysis   Result Value Ref Range    -          WBC, UA None 0 - 5 /HPF    RBC, UA 5 TO 10 0 - 2 /HPF    Casts UA NOT REPORTED /LPF    Crystals UA NOT REPORTED NONE /HPF    Epithelial Cells UA 0 TO 2 0 - 25 /HPF    Renal Epithelial, Urine NOT REPORTED 0 /HPF    Bacteria, UA TRACE (A) NONE    Mucus, UA NOT REPORTED NONE    Trichomonas, UA NOT REPORTED NONE    Amorphous, UA NOT REPORTED NONE    Other Observations UA NOT REPORTED NREQ    Yeast, UA NOT REPORTED NONE       IMPRESSION: Palpitations    RADIOLOGY:  Xr Chest Standard (2 Vw)    Result Date: 9/25/2018  EXAMINATION: TWO VIEWS OF THE CHEST 9/25/2018 12:24 pm COMPARISON: August 18, 2018 HISTORY: ORDERING SYSTEM PROVIDED HISTORY: palpiations FINDINGS: The lungs are without acute focal process. There is no effusion or pneumothorax. The cardiomediastinal silhouette is normal.  The osseous structures are intact without acute process. Unremarkable chest.       EKG:  EKG shows sinus tachycardia, Q waves present in lead 3 and no other ST or T-wave changes noted, ventricular rate of 103, AK interval of 126, QRS of 88, QTC of 455        EMERGENCY DEPARTMENT COURSE:  Updated patient on results, and plan for discharge at this time. She is tolerating oral intake. We'll have her follow-up with her nurse practitioner, and cardiology.   She has follow up appointment with [facility] in 2 days      FINAL IMPRESSION      1.  Tachycardia          DISPOSITION / PLAN     DISPOSITION  discharge      PATIENT REFERRED TO:  OSKAR Acosta - CNP  [facility] [address] [place] [address]  [phone]    Schedule an appointment as soon as possible for a visit in 2 days  for your scheduled appointment      DISCHARGE MEDICATIONS:  New Prescriptions    No medications on file       Angela Onofre  1:36 PM    Attending Emergency Physician  [facility] ED    (Please note that portions of this note were completed with a voice recognition program.  Efforts were made to edit the dictations but occasionally words are mis-transcribed.)              Jude Mercado DO  09/25/18 2213

## 2018-09-26 ENCOUNTER — CARE COORDINATION (OUTPATIENT)
Dept: CARE COORDINATION | Age: 28
End: 2018-09-26

## 2018-09-27 ENCOUNTER — OFFICE VISIT (OUTPATIENT)
Dept: FAMILY MEDICINE CLINIC | Age: 28
End: 2018-09-27
Payer: MEDICAID

## 2018-09-27 VITALS
BODY MASS INDEX: 41.27 KG/M2 | WEIGHT: 233 LBS | SYSTOLIC BLOOD PRESSURE: 112 MMHG | HEART RATE: 84 BPM | DIASTOLIC BLOOD PRESSURE: 80 MMHG | TEMPERATURE: 98.4 F | OXYGEN SATURATION: 98 %

## 2018-09-27 DIAGNOSIS — R00.0 TACHYCARDIA: Primary | ICD-10-CM

## 2018-09-27 DIAGNOSIS — F31.4 SEVERE BIPOLAR I DISORDER, CURRENT OR MOST RECENT EPISODE DEPRESSED (HCC): ICD-10-CM

## 2018-09-27 DIAGNOSIS — K21.9 GASTROESOPHAGEAL REFLUX DISEASE WITHOUT ESOPHAGITIS: ICD-10-CM

## 2018-09-27 DIAGNOSIS — R63.5 WEIGHT GAIN: ICD-10-CM

## 2018-09-27 PROCEDURE — 99213 OFFICE O/P EST LOW 20 MIN: CPT | Performed by: NURSE PRACTITIONER

## 2018-09-27 RX ORDER — OMEPRAZOLE 20 MG/1
20 CAPSULE, DELAYED RELEASE ORAL
Qty: 30 CAPSULE | Refills: 2 | Status: SHIPPED | OUTPATIENT
Start: 2018-09-27 | End: 2019-01-10 | Stop reason: ALTCHOICE

## 2018-09-27 RX ORDER — AMLODIPINE BESYLATE 2.5 MG/1
2.5 TABLET ORAL DAILY
Qty: 30 TABLET | Refills: 0 | Status: SHIPPED | OUTPATIENT
Start: 2018-09-27 | End: 2019-02-04 | Stop reason: SDUPTHER
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
## 2018-09-29 ASSESSMENT — ENCOUNTER SYMPTOMS
ABDOMINAL DISTENTION: 0
EYES NEGATIVE: 1
CHEST TIGHTNESS: 0
SINUS PAIN: 0
COUGH: 0

## 2018-10-16 ENCOUNTER — HOSPITAL ENCOUNTER (EMERGENCY)
Age: 28
Discharge: HOME | End: 2018-10-16

## 2018-10-16 VITALS
OXYGEN SATURATION: 99 % | SYSTOLIC BLOOD PRESSURE: 126 MMHG | RESPIRATION RATE: 16 BRPM | HEART RATE: 87 BPM | TEMPERATURE: 98.4 F | DIASTOLIC BLOOD PRESSURE: 78 MMHG

## 2018-10-16 DIAGNOSIS — F33.9 RECURRENT MAJOR DEPRESSIVE DISORDER, REMISSION STATUS UNSPECIFIED (HCC): Primary | ICD-10-CM

## 2018-10-16 LAB
-: ABNORMAL
ACETAMINOPHEN LEVEL: <5 UG/ML (ref 10–30)
AMORPHOUS: ABNORMAL
AMPHETAMINE SCREEN URINE: NEGATIVE
ANION GAP SERPL CALCULATED.3IONS-SCNC: 13 MMOL/L (ref 9–17)
BACTERIA: ABNORMAL
BARBITURATE SCREEN URINE: NEGATIVE
BENZODIAZEPINE SCREEN, URINE: NEGATIVE
BILIRUBIN URINE: NEGATIVE
BUN BLDV-MCNC: 8 MG/DL (ref 6–20)
BUN/CREAT BLD: 15 (ref 9–20)
BUPRENORPHINE URINE: NEGATIVE
CALCIUM SERPL-MCNC: 9.1 MG/DL (ref 8.6–10.4)
CANNABINOID SCREEN URINE: NEGATIVE
CASTS UA: ABNORMAL /LPF
CHLORIDE BLD-SCNC: 101 MMOL/L (ref 98–107)
CO2: 23 MMOL/L (ref 20–31)
COCAINE METABOLITE, URINE: NEGATIVE
COLOR: YELLOW
COMMENT UA: ABNORMAL
CREAT SERPL-MCNC: 0.55 MG/DL (ref 0.5–0.9)
CRYSTALS, UA: ABNORMAL /HPF
EPITHELIAL CELLS UA: ABNORMAL /HPF (ref 0–25)
ETHANOL PERCENT: <0.01 %
ETHANOL: <10 MG/DL
GFR AFRICAN AMERICAN: >60 ML/MIN
GFR NON-AFRICAN AMERICAN: >60 ML/MIN
GFR SERPL CREATININE-BSD FRML MDRD: NORMAL ML/MIN/{1.73_M2}
GFR SERPL CREATININE-BSD FRML MDRD: NORMAL ML/MIN/{1.73_M2}
GLUCOSE BLD-MCNC: 87 MG/DL (ref 70–99)
GLUCOSE URINE: NEGATIVE
HCG(URINE) PREGNANCY TEST: NEGATIVE
HCT VFR BLD CALC: 43.9 % (ref 36.3–47.1)
HEMOGLOBIN: 15 G/DL (ref 11.9–15.1)
KETONES, URINE: NEGATIVE
LEUKOCYTE ESTERASE, URINE: NEGATIVE
MCH RBC QN AUTO: 29.1 PG (ref 25.2–33.5)
MCHC RBC AUTO-ENTMCNC: 34.2 G/DL (ref 28.4–34.8)
MCV RBC AUTO: 85.1 FL (ref 82.6–102.9)
MDMA URINE: NORMAL
METHADONE SCREEN, URINE: NEGATIVE
METHAMPHETAMINE, URINE: NEGATIVE
MUCUS: ABNORMAL
NITRITE, URINE: NEGATIVE
NRBC AUTOMATED: 0 PER 100 WBC
OPIATES, URINE: NEGATIVE
OTHER OBSERVATIONS UA: ABNORMAL
OXYCODONE SCREEN URINE: NEGATIVE
PDW BLD-RTO: 13.1 % (ref 11.8–14.4)
PH UA: 6 (ref 5–9)
PHENCYCLIDINE, URINE: NEGATIVE
PLATELET # BLD: 400 K/UL (ref 138–453)
PMV BLD AUTO: 9.4 FL (ref 8.1–13.5)
POTASSIUM SERPL-SCNC: 3.8 MMOL/L (ref 3.7–5.3)
PROPOXYPHENE, URINE: NEGATIVE
PROTEIN UA: NEGATIVE
RBC # BLD: 5.16 M/UL (ref 3.95–5.11)
RBC UA: ABNORMAL /HPF (ref 0–2)
RENAL EPITHELIAL, UA: ABNORMAL /HPF
SALICYLATE LEVEL: <1 MG/DL (ref 3–10)
SODIUM BLD-SCNC: 137 MMOL/L (ref 135–144)
SPECIFIC GRAVITY UA: 1.02 (ref 1.01–1.02)
TEST INFORMATION: NORMAL
TRICHOMONAS: ABNORMAL
TRICYCLIC ANTIDEPRESSANTS, UR: NEGATIVE
TURBIDITY: CLEAR
URINE HGB: ABNORMAL
UROBILINOGEN, URINE: NORMAL
WBC # BLD: 11.2 K/UL (ref 3.5–11.3)
WBC UA: ABNORMAL /HPF (ref 0–5)
YEAST: ABNORMAL

## 2018-10-16 PROCEDURE — 81001 URINALYSIS AUTO W/SCOPE: CPT

## 2018-10-16 PROCEDURE — 80306 DRUG TEST PRSMV INSTRMNT: CPT

## 2018-10-16 PROCEDURE — 85027 COMPLETE CBC AUTOMATED: CPT

## 2018-10-16 PROCEDURE — 80048 BASIC METABOLIC PNL TOTAL CA: CPT

## 2018-10-16 PROCEDURE — 80307 DRUG TEST PRSMV CHEM ANLYZR: CPT

## 2018-10-16 PROCEDURE — 36415 COLL VENOUS BLD VENIPUNCTURE: CPT

## 2018-10-16 PROCEDURE — G0480 DRUG TEST DEF 1-7 CLASSES: HCPCS

## 2018-10-16 PROCEDURE — 84703 CHORIONIC GONADOTROPIN ASSAY: CPT

## 2018-10-16 PROCEDURE — 99284 EMERGENCY DEPT VISIT MOD MDM: CPT

## 2018-10-16 ASSESSMENT — ENCOUNTER SYMPTOMS
EYE DISCHARGE: 0
CHEST TIGHTNESS: 0
WHEEZING: 0
SORE THROAT: 0
BACK PAIN: 0
CONSTIPATION: 0
NAUSEA: 0
BLOOD IN STOOL: 0
RHINORRHEA: 0
DIARRHEA: 0
ABDOMINAL PAIN: 0
COUGH: 0
SHORTNESS OF BREATH: 0
EYE REDNESS: 0
VOMITING: 0

## 2018-10-16 NOTE — ED NOTES
Patient ambulated to bathroom at this time to obtain urine specimen.       Paul Regalado RN  10/16/18 7486

## 2018-10-16 NOTE — ED PROVIDER NOTES
icterus. Neck: Normal range of motion. Neck supple. No tracheal deviation present. Cardiovascular: Normal rate, regular rhythm and intact distal pulses. Exam reveals no gallop and no friction rub. No murmur heard. Pulmonary/Chest: Effort normal and breath sounds normal. No stridor. No respiratory distress. She has no wheezes. She has no rales. Abdominal: Soft. Bowel sounds are normal. She exhibits no distension and no mass. There is no tenderness. There is no rebound and no guarding. Musculoskeletal: Normal range of motion. She exhibits no edema, tenderness or deformity. Neurological: She is alert and oriented to person, place, and time. She has normal reflexes. She displays normal reflexes. No cranial nerve deficit. She exhibits normal muscle tone. Coordination normal.   Skin: Skin is warm and dry. No rash noted. She is not diaphoretic. No erythema. Psychiatric: Her behavior is normal. She exhibits a depressed mood. She expresses suicidal ideation. She expresses no homicidal ideation. She expresses no suicidal plans and no homicidal plans. Nursing note and vitals reviewed.       DIAGNOSTIC RESULTS     EKG: All EKG's are interpreted by the Emergency Department Physician who either signs orCo-signs this chart in the absence of a cardiologist.      RADIOLOGY:   Non-plainfilm images such as CT, Ultrasound and MRI are read by the radiologist. Plain radiographic images are visualized and preliminarily interpreted by the emergency physician with the below findings:      Interpretationper the Radiologist below, if available at the time of this note:    No orders to display         ED BEDSIDE ULTRASOUND:   Performed by ED Physician - none    LABS:  Labs Reviewed   CBC - Abnormal; Notable for the following:        Result Value    RBC 5.16 (*)     All other components within normal limits   URINE RT REFLEX TO CULTURE - Abnormal; Notable for the following:     Specific Gravity, UA 1.025 (*)     Urine Hgb 2+

## 2018-10-17 ENCOUNTER — CARE COORDINATION (OUTPATIENT)
Dept: CARE COORDINATION | Age: 28
End: 2018-10-17

## 2018-11-30 ENCOUNTER — APPOINTMENT (OUTPATIENT)
Dept: CT IMAGING | Age: 28
End: 2018-11-30
Payer: COMMERCIAL

## 2018-11-30 ENCOUNTER — HOSPITAL ENCOUNTER (EMERGENCY)
Age: 28
Discharge: HOME | End: 2018-11-30
Attending: EMERGENCY MEDICINE
Payer: COMMERCIAL

## 2018-11-30 VITALS
TEMPERATURE: 98.8 F | RESPIRATION RATE: 14 BRPM | HEART RATE: 88 BPM | DIASTOLIC BLOOD PRESSURE: 83 MMHG | SYSTOLIC BLOOD PRESSURE: 117 MMHG | OXYGEN SATURATION: 97 %

## 2018-11-30 DIAGNOSIS — R07.89 CHEST WALL PAIN: Primary | ICD-10-CM

## 2018-11-30 LAB
-: ABNORMAL
AMORPHOUS: ABNORMAL
BACTERIA: ABNORMAL
BILIRUBIN URINE: NEGATIVE
CASTS UA: ABNORMAL /LPF
COLOR: YELLOW
COMMENT UA: ABNORMAL
CRYSTALS, UA: ABNORMAL /HPF
EPITHELIAL CELLS UA: ABNORMAL /HPF (ref 0–25)
GLUCOSE URINE: NEGATIVE
HCG(URINE) PREGNANCY TEST: NEGATIVE
KETONES, URINE: NEGATIVE
LEUKOCYTE ESTERASE, URINE: NEGATIVE
MUCUS: ABNORMAL
NITRITE, URINE: NEGATIVE
OTHER OBSERVATIONS UA: ABNORMAL
PH UA: 6.5 (ref 5–9)
PROTEIN UA: NEGATIVE
RBC UA: ABNORMAL /HPF (ref 0–2)
RENAL EPITHELIAL, UA: ABNORMAL /HPF
SPECIFIC GRAVITY UA: <1.005 (ref 1.01–1.02)
TRICHOMONAS: ABNORMAL
TURBIDITY: CLEAR
URINE HGB: NEGATIVE
UROBILINOGEN, URINE: NORMAL
WBC UA: ABNORMAL /HPF (ref 0–5)
YEAST: ABNORMAL

## 2018-11-30 PROCEDURE — 81001 URINALYSIS AUTO W/SCOPE: CPT

## 2018-11-30 PROCEDURE — 99284 EMERGENCY DEPT VISIT MOD MDM: CPT

## 2018-11-30 PROCEDURE — 71260 CT THORAX DX C+: CPT

## 2018-11-30 PROCEDURE — 6360000004 HC RX CONTRAST MEDICATION: Performed by: PHYSICIAN ASSISTANT

## 2018-11-30 PROCEDURE — 84703 CHORIONIC GONADOTROPIN ASSAY: CPT

## 2018-11-30 RX ORDER — IBUPROFEN 600 MG/1
600 TABLET ORAL 4 TIMES DAILY PRN
Qty: 30 TABLET | Refills: 0 | Status: ON HOLD | OUTPATIENT
Start: 2018-11-30 | End: 2019-12-31 | Stop reason: HOSPADM

## 2018-11-30 RX ADMIN — IOPAMIDOL 75 ML: 755 INJECTION, SOLUTION INTRAVENOUS at 17:05

## 2018-11-30 ASSESSMENT — ENCOUNTER SYMPTOMS
SHORTNESS OF BREATH: 0
COUGH: 0
NAUSEA: 0
VOMITING: 0
ABDOMINAL PAIN: 0

## 2018-11-30 ASSESSMENT — PAIN DESCRIPTION - FREQUENCY: FREQUENCY: CONTINUOUS

## 2018-11-30 ASSESSMENT — PAIN SCALES - GENERAL: PAINLEVEL_OUTOF10: 7

## 2018-11-30 ASSESSMENT — PAIN DESCRIPTION - PAIN TYPE: TYPE: ACUTE PAIN

## 2018-11-30 ASSESSMENT — PAIN DESCRIPTION - ORIENTATION: ORIENTATION: LEFT

## 2018-11-30 ASSESSMENT — PAIN DESCRIPTION - LOCATION: LOCATION: RIB CAGE

## 2018-11-30 ASSESSMENT — PAIN DESCRIPTION - DESCRIPTORS: DESCRIPTORS: ACHING;DISCOMFORT;SHARP

## 2018-11-30 NOTE — ED PROVIDER NOTES
[facility] ED  eMERGENCY dEPARTMENT eNCOUnter      Pt Name: Isidro Garcia  MRN: [id number]  [place] 1990  Date of evaluation: 11/30/2018  Provider: Carolyn Rocha PA-C,    [address] [place] [address]       Chief Complaint   Patient presents with    Flank Pain     left sided under the rib cage; hurts with touch and movement; onset 2 days ago       Marti Fung is a 34 y.o. female who presents to theemergency department from home. Patient complains of pain left lateral anterior chest wall just under her breast she states is gone for several months has gotten worse over the past 2 days. Patient states the pain is worse with palpation and certain movements. She denies any shortness of breath any nausea vomiting fever chills cough hemoptysis abdominal pain flank pain or any urinary symptoms. [employer] and Nursing notes were reviewed by myself. Any discrepancies are addressed above. PAST MEDICAL HISTORY     Past Medical History:   Diagnosis Date    Asthma     Back pain     Bipolar 1 disorder ([address]     Chicken pox     H/O echocardiogram 05/18/2018    EF 60%    History of Holter monitoring 04/27/2018    Baseline ECG shows sinus rhythm. Holter showed sinus tachy 61% of the time. Rare premature ventricular complexes were recorded. No episodes of superventricuarl or ventriuclar tacycardial was seen. Pt reported episodes of SOB and speedy heart with monitroing showing sinus tachy.      Lordosis     Pneumonia     PTSD (post-traumatic stress disorder)     UTI (urinary tract infection)        SURGICAL HISTORY       Past Surgical History:   Procedure Laterality Date    NECK SURGERY Left lesion biopsy    UPPER GASTROINTESTINAL ENDOSCOPY      chicken stuck in throat    WISDOM TOOTH EXTRACTION         CURRENT MEDICATIONS       Discharge Medication List as of 11/30/2018  5:42 PM      CONTINUE these medications which have NOT CHANGED    Details   omeprazole flank pain. Neurological: Negative for numbness. All other systems reviewed and are negative. Review of systems as in HPI & PMH otherwise negative    PHYSICAL EXAM    (up to 7 for level 4, 8 ormore for level 5)     ED Triage Vitals [11/30/18 1446]   BP Temp Temp Source Pulse Resp SpO2 Height Weight   117/83 98.8 °F (37.1 °C) Tympanic 88 14 97 % -- --       Physical Exam   Constitutional: She is oriented to person, place, and time. She appears well-developed and well-nourished. HENT:   Head: Normocephalic and atraumatic. Mouth/Throat: Oropharynx is clear and moist.   Eyes: Conjunctivae are normal. No scleral icterus. Neck: Neck supple. Cardiovascular: Normal rate and regular rhythm. Pulmonary/Chest: Effort normal and breath sounds normal.   Abdominal: Soft. Musculoskeletal: Normal range of motion. Pain on palpation right anterior lateral upper chest wall   Neurological: She is alert and oriented to person, place, and time. No cranial nerve deficit. Coordination normal.   Skin: Skin is warm and dry. Psychiatric: She has a normal mood and affect. Her behavior is normal. Judgment and thought content normal.   Nursing note and vitals reviewed. DIAGNOSTIC RESULTS     EKG: (none if blank)  All EKG's are interpreted by the Emergency Department Physician who either signs or Co-signs this chart in the absence of a cardiologist.        RADIOLOGY: (none if blank)   Interpretation per the Radiologist below, if available at the time of this note:    CT CHEST PULMONARY EMBOLISM W CONTRAST   Final Result   No evidence of pulmonary embolism or acute pulmonary abnormality.              LABS:  Labs Reviewed   URINE RT REFLEX TO CULTURE - Abnormal; Notable for the following:        Result Value    Specific Gravity, UA <1.005 (*)     All other components within normal limits   MICROSCOPIC URINALYSIS - Abnormal; Notable for the following:     Bacteria, UA TRACE (*)     All other components within normal limits

## 2018-12-01 ENCOUNTER — APPOINTMENT (OUTPATIENT)
Dept: CT IMAGING | Age: 28
End: 2018-12-01
Payer: COMMERCIAL

## 2018-12-01 ENCOUNTER — HOSPITAL ENCOUNTER (EMERGENCY)
Age: 28
Discharge: HOME | End: 2018-12-01
Attending: EMERGENCY MEDICINE
Payer: COMMERCIAL

## 2018-12-01 VITALS
BODY MASS INDEX: 40.75 KG/M2 | RESPIRATION RATE: 18 BRPM | DIASTOLIC BLOOD PRESSURE: 81 MMHG | WEIGHT: 230 LBS | SYSTOLIC BLOOD PRESSURE: 127 MMHG | OXYGEN SATURATION: 98 % | HEART RATE: 92 BPM | HEIGHT: 63 IN | TEMPERATURE: 99.4 F

## 2018-12-01 DIAGNOSIS — R51.9 ACUTE NONINTRACTABLE HEADACHE, UNSPECIFIED HEADACHE TYPE: Primary | ICD-10-CM

## 2018-12-01 LAB
EKG ATRIAL RATE: 88 BPM
EKG P AXIS: 25 DEGREES
EKG P-R INTERVAL: 124 MS
EKG Q-T INTERVAL: 356 MS
EKG QRS DURATION: 90 MS
EKG QTC CALCULATION (BAZETT): 430 MS
EKG R AXIS: 55 DEGREES
EKG T AXIS: 49 DEGREES
EKG VENTRICULAR RATE: 88 BPM

## 2018-12-01 PROCEDURE — 6370000000 HC RX 637 (ALT 250 FOR IP): Performed by: EMERGENCY MEDICINE

## 2018-12-01 PROCEDURE — 99284 EMERGENCY DEPT VISIT MOD MDM: CPT

## 2018-12-01 PROCEDURE — 70450 CT HEAD/BRAIN W/O DYE: CPT

## 2018-12-01 PROCEDURE — 93005 ELECTROCARDIOGRAM TRACING: CPT

## 2018-12-01 RX ADMIN — IBUPROFEN 800 MG: 100 SUSPENSION ORAL at 21:58

## 2018-12-01 ASSESSMENT — PAIN SCALES - GENERAL: PAINLEVEL_OUTOF10: 6

## 2018-12-02 NOTE — ED PROVIDER NOTES
nonintractable headache, unspecified headache type        PLAN:   Outpatient treatment, follow-up, and discharge instructions (see EMR)        Electronically signed by: Ping Nash MD, 12/1/2018 10:08 PM          Kecia Castillo MD  12/01/18 1414

## 2019-01-10 ENCOUNTER — OFFICE VISIT (OUTPATIENT)
Dept: PRIMARY CARE CLINIC | Age: 29
End: 2019-01-10
Payer: COMMERCIAL

## 2019-01-10 VITALS
DIASTOLIC BLOOD PRESSURE: 74 MMHG | WEIGHT: 232.2 LBS | HEART RATE: 81 BPM | TEMPERATURE: 98.4 F | SYSTOLIC BLOOD PRESSURE: 109 MMHG | BODY MASS INDEX: 41.13 KG/M2

## 2019-01-10 DIAGNOSIS — K52.9 AGE (ACUTE GASTROENTERITIS): Primary | ICD-10-CM

## 2019-01-10 PROCEDURE — 99213 OFFICE O/P EST LOW 20 MIN: CPT | Performed by: NURSE PRACTITIONER

## 2019-01-10 PROCEDURE — G8417 CALC BMI ABV UP PARAM F/U: HCPCS | Performed by: NURSE PRACTITIONER

## 2019-01-10 PROCEDURE — G8484 FLU IMMUNIZE NO ADMIN: HCPCS | Performed by: NURSE PRACTITIONER

## 2019-01-10 PROCEDURE — G8427 DOCREV CUR MEDS BY ELIG CLIN: HCPCS | Performed by: NURSE PRACTITIONER

## 2019-01-10 PROCEDURE — 1036F TOBACCO NON-USER: CPT | Performed by: NURSE PRACTITIONER

## 2019-01-10 RX ORDER — ESOMEPRAZOLE MAGNESIUM 20 MG/1
20 FOR SUSPENSION ORAL DAILY
COMMUNITY
End: 2019-02-04 | Stop reason: ALTCHOICE

## 2019-01-10 RX ORDER — PROMETHAZINE HYDROCHLORIDE 25 MG/1
25 TABLET ORAL 4 TIMES DAILY PRN
Qty: 20 TABLET | Refills: 0 | Status: SHIPPED | OUTPATIENT
Start: 2019-01-10 | End: 2019-01-17

## 2019-01-10 ASSESSMENT — ENCOUNTER SYMPTOMS
ABDOMINAL PAIN: 1
VOMITING: 1
DIARRHEA: 1
COUGH: 0

## 2019-01-22 ENCOUNTER — HOSPITAL ENCOUNTER (EMERGENCY)
Age: 29
Discharge: PSYCHIATRIC OR SUBSTANCE USE FACILITY | End: 2019-01-22
Attending: EMERGENCY MEDICINE
Payer: COMMERCIAL

## 2019-01-22 VITALS
DIASTOLIC BLOOD PRESSURE: 83 MMHG | BODY MASS INDEX: 40.92 KG/M2 | OXYGEN SATURATION: 97 % | RESPIRATION RATE: 16 BRPM | SYSTOLIC BLOOD PRESSURE: 141 MMHG | HEART RATE: 97 BPM | WEIGHT: 231 LBS | TEMPERATURE: 97.4 F

## 2019-01-22 DIAGNOSIS — F31.10 BIPOLAR I DISORDER WITH MANIA (HCC): Primary | ICD-10-CM

## 2019-01-22 LAB
ABSOLUTE EOS #: 0.39 K/UL (ref 0–0.44)
ABSOLUTE IMMATURE GRANULOCYTE: 0.05 K/UL (ref 0–0.3)
ABSOLUTE LYMPH #: 2.36 K/UL (ref 1.1–3.7)
ABSOLUTE MONO #: 0.7 K/UL (ref 0.1–1.2)
ACETAMINOPHEN LEVEL: <5 UG/ML (ref 10–30)
AMPHETAMINE SCREEN URINE: NEGATIVE
ANION GAP SERPL CALCULATED.3IONS-SCNC: 14 MMOL/L (ref 9–17)
BARBITURATE SCREEN URINE: NEGATIVE
BASOPHILS # BLD: 1 % (ref 0–2)
BASOPHILS ABSOLUTE: 0.06 K/UL (ref 0–0.2)
BENZODIAZEPINE SCREEN, URINE: NEGATIVE
BUN BLDV-MCNC: 11 MG/DL (ref 6–20)
BUN/CREAT BLD: 22 (ref 9–20)
BUPRENORPHINE URINE: NEGATIVE
CALCIUM SERPL-MCNC: 9 MG/DL (ref 8.6–10.4)
CANNABINOID SCREEN URINE: NEGATIVE
CHLORIDE BLD-SCNC: 103 MMOL/L (ref 98–107)
CO2: 21 MMOL/L (ref 20–31)
COCAINE METABOLITE, URINE: NEGATIVE
CREAT SERPL-MCNC: 0.51 MG/DL (ref 0.5–0.9)
DIFFERENTIAL TYPE: ABNORMAL
EOSINOPHILS RELATIVE PERCENT: 4 % (ref 1–4)
ETHANOL PERCENT: <0.01 %
ETHANOL: <10 MG/DL
GFR AFRICAN AMERICAN: >60 ML/MIN
GFR NON-AFRICAN AMERICAN: >60 ML/MIN
GFR SERPL CREATININE-BSD FRML MDRD: ABNORMAL ML/MIN/{1.73_M2}
GFR SERPL CREATININE-BSD FRML MDRD: ABNORMAL ML/MIN/{1.73_M2}
GLUCOSE BLD-MCNC: 103 MG/DL (ref 70–99)
HCG(URINE) PREGNANCY TEST: NEGATIVE
HCT VFR BLD CALC: 43.2 % (ref 36.3–47.1)
HEMOGLOBIN: 14.7 G/DL (ref 11.9–15.1)
IMMATURE GRANULOCYTES: 1 %
LYMPHOCYTES # BLD: 22 % (ref 24–43)
MCH RBC QN AUTO: 29.3 PG (ref 25.2–33.5)
MCHC RBC AUTO-ENTMCNC: 34 G/DL (ref 28.4–34.8)
MCV RBC AUTO: 86.1 FL (ref 82.6–102.9)
MDMA URINE: NORMAL
METHADONE SCREEN, URINE: NEGATIVE
METHAMPHETAMINE, URINE: NEGATIVE
MONOCYTES # BLD: 6 % (ref 3–12)
NRBC AUTOMATED: 0 PER 100 WBC
OPIATES, URINE: NEGATIVE
OXYCODONE SCREEN URINE: NEGATIVE
PDW BLD-RTO: 12.7 % (ref 11.8–14.4)
PHENCYCLIDINE, URINE: NEGATIVE
PLATELET # BLD: 378 K/UL (ref 138–453)
PLATELET ESTIMATE: ABNORMAL
PMV BLD AUTO: 9.4 FL (ref 8.1–13.5)
POTASSIUM SERPL-SCNC: 3.9 MMOL/L (ref 3.7–5.3)
PROPOXYPHENE, URINE: NEGATIVE
RBC # BLD: 5.02 M/UL (ref 3.95–5.11)
RBC # BLD: ABNORMAL 10*6/UL
SALICYLATE LEVEL: <1 MG/DL (ref 3–10)
SEG NEUTROPHILS: 66 % (ref 36–65)
SEGMENTED NEUTROPHILS ABSOLUTE COUNT: 7.43 K/UL (ref 1.5–8.1)
SODIUM BLD-SCNC: 138 MMOL/L (ref 135–144)
TEST INFORMATION: NORMAL
TRICYCLIC ANTIDEPRESSANTS, UR: NEGATIVE
WBC # BLD: 11 K/UL (ref 3.5–11.3)
WBC # BLD: ABNORMAL 10*3/UL

## 2019-01-22 PROCEDURE — 36415 COLL VENOUS BLD VENIPUNCTURE: CPT

## 2019-01-22 PROCEDURE — G0480 DRUG TEST DEF 1-7 CLASSES: HCPCS

## 2019-01-22 PROCEDURE — 96372 THER/PROPH/DIAG INJ SC/IM: CPT

## 2019-01-22 PROCEDURE — 80306 DRUG TEST PRSMV INSTRMNT: CPT

## 2019-01-22 PROCEDURE — 80048 BASIC METABOLIC PNL TOTAL CA: CPT

## 2019-01-22 PROCEDURE — 85025 COMPLETE CBC W/AUTO DIFF WBC: CPT

## 2019-01-22 PROCEDURE — 99284 EMERGENCY DEPT VISIT MOD MDM: CPT

## 2019-01-22 PROCEDURE — 80307 DRUG TEST PRSMV CHEM ANLYZR: CPT

## 2019-01-22 PROCEDURE — 6360000002 HC RX W HCPCS: Performed by: EMERGENCY MEDICINE

## 2019-01-22 PROCEDURE — 6370000000 HC RX 637 (ALT 250 FOR IP): Performed by: PHYSICIAN ASSISTANT

## 2019-01-22 PROCEDURE — 84703 CHORIONIC GONADOTROPIN ASSAY: CPT

## 2019-01-22 RX ORDER — ACETAMINOPHEN 325 MG/1
650 TABLET ORAL ONCE
Status: COMPLETED | OUTPATIENT
Start: 2019-01-22 | End: 2019-01-22

## 2019-01-22 RX ORDER — HALOPERIDOL 5 MG/ML
5 INJECTION INTRAMUSCULAR ONCE
Status: COMPLETED | OUTPATIENT
Start: 2019-01-22 | End: 2019-01-22

## 2019-01-22 RX ADMIN — HALOPERIDOL LACTATE 5 MG: 5 INJECTION INTRAMUSCULAR at 18:26

## 2019-01-22 RX ADMIN — ACETAMINOPHEN 650 MG: 325 TABLET, FILM COATED ORAL at 21:42

## 2019-01-22 ASSESSMENT — PAIN SCALES - GENERAL
PAINLEVEL_OUTOF10: 7
PAINLEVEL_OUTOF10: 0

## 2019-02-03 ENCOUNTER — HOSPITAL ENCOUNTER (EMERGENCY)
Age: 29
Discharge: HOME | End: 2019-02-03
Payer: COMMERCIAL

## 2019-02-03 ENCOUNTER — APPOINTMENT (OUTPATIENT)
Dept: GENERAL RADIOLOGY | Age: 29
End: 2019-02-03
Payer: COMMERCIAL

## 2019-02-03 VITALS
TEMPERATURE: 98 F | RESPIRATION RATE: 17 BRPM | SYSTOLIC BLOOD PRESSURE: 120 MMHG | HEART RATE: 90 BPM | OXYGEN SATURATION: 98 % | DIASTOLIC BLOOD PRESSURE: 90 MMHG

## 2019-02-03 DIAGNOSIS — S69.92XA INJURY OF LEFT WRIST, INITIAL ENCOUNTER: Primary | ICD-10-CM

## 2019-02-03 PROCEDURE — 99283 EMERGENCY DEPT VISIT LOW MDM: CPT

## 2019-02-03 PROCEDURE — 73110 X-RAY EXAM OF WRIST: CPT

## 2019-02-03 ASSESSMENT — ENCOUNTER SYMPTOMS
NAUSEA: 0
CHEST TIGHTNESS: 0
DIARRHEA: 0
SORE THROAT: 0
CONSTIPATION: 0
WHEEZING: 0
ABDOMINAL PAIN: 0
EYE DISCHARGE: 0
BACK PAIN: 0
SHORTNESS OF BREATH: 0
RHINORRHEA: 0
BLOOD IN STOOL: 0
COUGH: 0
VOMITING: 0
EYE REDNESS: 0

## 2019-02-03 ASSESSMENT — PAIN DESCRIPTION - LOCATION: LOCATION: WRIST

## 2019-02-03 ASSESSMENT — PAIN DESCRIPTION - PAIN TYPE: TYPE: ACUTE PAIN

## 2019-02-03 ASSESSMENT — PAIN DESCRIPTION - ORIENTATION: ORIENTATION: LEFT

## 2019-02-03 ASSESSMENT — PAIN SCALES - GENERAL: PAINLEVEL_OUTOF10: 6

## 2019-02-04 ENCOUNTER — OFFICE VISIT (OUTPATIENT)
Dept: FAMILY MEDICINE CLINIC | Age: 29
End: 2019-02-04
Payer: COMMERCIAL

## 2019-02-04 VITALS
TEMPERATURE: 97.8 F | WEIGHT: 233.6 LBS | HEART RATE: 83 BPM | DIASTOLIC BLOOD PRESSURE: 88 MMHG | BODY MASS INDEX: 41.38 KG/M2 | SYSTOLIC BLOOD PRESSURE: 128 MMHG | OXYGEN SATURATION: 98 %

## 2019-02-04 DIAGNOSIS — F31.4 SEVERE BIPOLAR I DISORDER, CURRENT OR MOST RECENT EPISODE DEPRESSED (HCC): ICD-10-CM

## 2019-02-04 DIAGNOSIS — S63.502D SPRAIN OF LEFT WRIST, SUBSEQUENT ENCOUNTER: Primary | ICD-10-CM

## 2019-02-04 DIAGNOSIS — R00.0 TACHYCARDIA: ICD-10-CM

## 2019-02-04 PROCEDURE — 99213 OFFICE O/P EST LOW 20 MIN: CPT | Performed by: NURSE PRACTITIONER

## 2019-02-04 PROCEDURE — G8427 DOCREV CUR MEDS BY ELIG CLIN: HCPCS | Performed by: NURSE PRACTITIONER

## 2019-02-04 PROCEDURE — 1036F TOBACCO NON-USER: CPT | Performed by: NURSE PRACTITIONER

## 2019-02-04 PROCEDURE — G8484 FLU IMMUNIZE NO ADMIN: HCPCS | Performed by: NURSE PRACTITIONER

## 2019-02-04 PROCEDURE — G8417 CALC BMI ABV UP PARAM F/U: HCPCS | Performed by: NURSE PRACTITIONER

## 2019-02-04 RX ORDER — VALPROIC ACID 250 MG/5ML
10 SOLUTION ORAL 2 TIMES DAILY
COMMUNITY
Start: 2019-01-29 | End: 2019-02-19 | Stop reason: DRUGHIGH

## 2019-02-04 RX ORDER — AMLODIPINE BESYLATE 2.5 MG/1
2.5 TABLET ORAL DAILY
Qty: 30 TABLET | Refills: 11 | Status: ON HOLD | OUTPATIENT
Start: 2019-02-04 | End: 2019-12-31 | Stop reason: HOSPADM

## 2019-02-04 ASSESSMENT — ENCOUNTER SYMPTOMS
ABDOMINAL DISTENTION: 0
SORE THROAT: 0
SHORTNESS OF BREATH: 0
CHEST TIGHTNESS: 0
EYES NEGATIVE: 1
BACK PAIN: 0

## 2019-02-09 ENCOUNTER — HOSPITAL ENCOUNTER (OUTPATIENT)
Age: 29
Discharge: HOME | End: 2019-02-09
Payer: COMMERCIAL

## 2019-02-09 LAB
ABSOLUTE EOS #: 0.76 K/UL (ref 0–0.44)
ABSOLUTE IMMATURE GRANULOCYTE: 0.15 K/UL (ref 0–0.3)
ABSOLUTE LYMPH #: 2.8 K/UL (ref 1.1–3.7)
ABSOLUTE MONO #: 0.61 K/UL (ref 0.1–1.2)
ALBUMIN SERPL-MCNC: 4 G/DL (ref 3.5–5.2)
ALBUMIN/GLOBULIN RATIO: 1.2 (ref 1–2.5)
ALP BLD-CCNC: 73 U/L (ref 35–104)
ALT SERPL-CCNC: 15 U/L (ref 5–33)
ANION GAP SERPL CALCULATED.3IONS-SCNC: 11 MMOL/L (ref 9–17)
AST SERPL-CCNC: 13 U/L
BASOPHILS # BLD: 1 % (ref 0–2)
BASOPHILS ABSOLUTE: 0.05 K/UL (ref 0–0.2)
BILIRUB SERPL-MCNC: 0.19 MG/DL (ref 0.3–1.2)
BUN BLDV-MCNC: 10 MG/DL (ref 6–20)
BUN/CREAT BLD: 15 (ref 9–20)
CALCIUM SERPL-MCNC: 9.1 MG/DL (ref 8.6–10.4)
CHLORIDE BLD-SCNC: 103 MMOL/L (ref 98–107)
CHOLESTEROL/HDL RATIO: 4.3
CHOLESTEROL: 190 MG/DL
CO2: 24 MMOL/L (ref 20–31)
CREAT SERPL-MCNC: 0.66 MG/DL (ref 0.5–0.9)
DIFFERENTIAL TYPE: ABNORMAL
EOSINOPHILS RELATIVE PERCENT: 8 % (ref 1–4)
GFR AFRICAN AMERICAN: >60 ML/MIN
GFR NON-AFRICAN AMERICAN: >60 ML/MIN
GFR SERPL CREATININE-BSD FRML MDRD: ABNORMAL ML/MIN/{1.73_M2}
GFR SERPL CREATININE-BSD FRML MDRD: ABNORMAL ML/MIN/{1.73_M2}
GLUCOSE BLD-MCNC: 133 MG/DL (ref 70–99)
HCT VFR BLD CALC: 43.7 % (ref 36.3–47.1)
HDLC SERPL-MCNC: 44 MG/DL
HEMOGLOBIN: 14.8 G/DL (ref 11.9–15.1)
IMMATURE GRANULOCYTES: 2 %
LDL CHOLESTEROL: 100 MG/DL (ref 0–130)
LYMPHOCYTES # BLD: 30 % (ref 24–43)
MCH RBC QN AUTO: 29 PG (ref 25.2–33.5)
MCHC RBC AUTO-ENTMCNC: 33.9 G/DL (ref 28.4–34.8)
MCV RBC AUTO: 85.5 FL (ref 82.6–102.9)
MONOCYTES # BLD: 7 % (ref 3–12)
NRBC AUTOMATED: 0 PER 100 WBC
PDW BLD-RTO: 12.6 % (ref 11.8–14.4)
PLATELET # BLD: 385 K/UL (ref 138–453)
PLATELET ESTIMATE: ABNORMAL
PMV BLD AUTO: 9.6 FL (ref 8.1–13.5)
POTASSIUM SERPL-SCNC: 4.1 MMOL/L (ref 3.7–5.3)
RBC # BLD: 5.11 M/UL (ref 3.95–5.11)
RBC # BLD: ABNORMAL 10*6/UL
SEG NEUTROPHILS: 52 % (ref 36–65)
SEGMENTED NEUTROPHILS ABSOLUTE COUNT: 5.08 K/UL (ref 1.5–8.1)
SODIUM BLD-SCNC: 138 MMOL/L (ref 135–144)
TOTAL PROTEIN: 7.4 G/DL (ref 6.4–8.3)
TRIGL SERPL-MCNC: 230 MG/DL
VALPROIC ACID LEVEL: 150 UG/ML (ref 50–125)
VALPROIC DATE LAST DOSE: ABNORMAL
VALPROIC DOSE AMOUNT: ABNORMAL
VALPROIC TIME LAST DOSE: ABNORMAL
VLDLC SERPL CALC-MCNC: ABNORMAL MG/DL (ref 1–30)
WBC # BLD: 9.5 K/UL (ref 3.5–11.3)
WBC # BLD: ABNORMAL 10*3/UL

## 2019-02-09 PROCEDURE — 80053 COMPREHEN METABOLIC PANEL: CPT

## 2019-02-09 PROCEDURE — 36415 COLL VENOUS BLD VENIPUNCTURE: CPT

## 2019-02-09 PROCEDURE — 85025 COMPLETE CBC W/AUTO DIFF WBC: CPT

## 2019-02-09 PROCEDURE — 80164 ASSAY DIPROPYLACETIC ACD TOT: CPT

## 2019-02-09 PROCEDURE — 80061 LIPID PANEL: CPT

## 2019-02-11 ENCOUNTER — TELEPHONE (OUTPATIENT)
Dept: FAMILY MEDICINE CLINIC | Age: 29
End: 2019-02-11

## 2019-02-19 ENCOUNTER — OFFICE VISIT (OUTPATIENT)
Dept: FAMILY MEDICINE CLINIC | Age: 29
End: 2019-02-19
Payer: COMMERCIAL

## 2019-02-19 VITALS
BODY MASS INDEX: 41.7 KG/M2 | WEIGHT: 235.4 LBS | OXYGEN SATURATION: 98 % | HEART RATE: 94 BPM | DIASTOLIC BLOOD PRESSURE: 78 MMHG | TEMPERATURE: 98.1 F | SYSTOLIC BLOOD PRESSURE: 118 MMHG

## 2019-02-19 DIAGNOSIS — Z02.1 PHYSICAL EXAM, PRE-EMPLOYMENT: Primary | ICD-10-CM

## 2019-02-19 DIAGNOSIS — R73.01 IFG (IMPAIRED FASTING GLUCOSE): ICD-10-CM

## 2019-02-19 DIAGNOSIS — R35.0 URINARY FREQUENCY: ICD-10-CM

## 2019-02-19 DIAGNOSIS — B37.31 VAGINAL YEAST INFECTION: ICD-10-CM

## 2019-02-19 LAB
BILIRUBIN, POC: NORMAL
BLOOD URINE, POC: NORMAL
CLARITY, POC: CLEAR
COLOR, POC: YELLOW
GLUCOSE URINE, POC: NORMAL
HBA1C MFR BLD: 5.7 %
KETONES, POC: NORMAL
LEUKOCYTE EST, POC: NORMAL
NITRITE, POC: NORMAL
PH, POC: 7.5
PROTEIN, POC: NORMAL
SPECIFIC GRAVITY, POC: 1.01
UROBILINOGEN, POC: 0.2

## 2019-02-19 PROCEDURE — 83036 HEMOGLOBIN GLYCOSYLATED A1C: CPT | Performed by: NURSE PRACTITIONER

## 2019-02-19 PROCEDURE — G8484 FLU IMMUNIZE NO ADMIN: HCPCS | Performed by: NURSE PRACTITIONER

## 2019-02-19 PROCEDURE — 81002 URINALYSIS NONAUTO W/O SCOPE: CPT | Performed by: NURSE PRACTITIONER

## 2019-02-19 PROCEDURE — 99395 PREV VISIT EST AGE 18-39: CPT | Performed by: NURSE PRACTITIONER

## 2019-02-20 ASSESSMENT — ENCOUNTER SYMPTOMS
COUGH: 0
SORE THROAT: 0
ABDOMINAL DISTENTION: 0
EYES NEGATIVE: 1

## 2019-03-04 ENCOUNTER — HOSPITAL ENCOUNTER (EMERGENCY)
Age: 29
Discharge: PSYCHIATRIC OR SUBSTANCE USE FACILITY | End: 2019-03-04
Payer: COMMERCIAL

## 2019-03-04 VITALS
SYSTOLIC BLOOD PRESSURE: 125 MMHG | HEART RATE: 93 BPM | WEIGHT: 230 LBS | DIASTOLIC BLOOD PRESSURE: 70 MMHG | OXYGEN SATURATION: 98 % | RESPIRATION RATE: 20 BRPM | BODY MASS INDEX: 40.74 KG/M2 | TEMPERATURE: 98 F

## 2019-03-04 DIAGNOSIS — Z13.9 ENCOUNTER FOR MEDICAL SCREENING EXAMINATION: ICD-10-CM

## 2019-03-04 DIAGNOSIS — R45.851 SUICIDAL IDEATION: Primary | ICD-10-CM

## 2019-03-04 LAB
-: NORMAL
ACETAMINOPHEN LEVEL: <5 UG/ML (ref 10–30)
AMORPHOUS: NORMAL
AMPHETAMINE SCREEN URINE: NEGATIVE
ANION GAP SERPL CALCULATED.3IONS-SCNC: 14 MMOL/L (ref 9–17)
BACTERIA: NORMAL
BARBITURATE SCREEN URINE: NEGATIVE
BENZODIAZEPINE SCREEN, URINE: NEGATIVE
BILIRUBIN URINE: NEGATIVE
BUN BLDV-MCNC: 13 MG/DL (ref 6–20)
BUN/CREAT BLD: 24 (ref 9–20)
BUPRENORPHINE URINE: NEGATIVE
CALCIUM SERPL-MCNC: 9.3 MG/DL (ref 8.6–10.4)
CANNABINOID SCREEN URINE: NEGATIVE
CASTS UA: NORMAL /LPF
CHLORIDE BLD-SCNC: 104 MMOL/L (ref 98–107)
CO2: 20 MMOL/L (ref 20–31)
COCAINE METABOLITE, URINE: NEGATIVE
COLOR: YELLOW
COMMENT UA: ABNORMAL
CREAT SERPL-MCNC: 0.55 MG/DL (ref 0.5–0.9)
CRYSTALS, UA: NORMAL /HPF
EPITHELIAL CELLS UA: NORMAL /HPF (ref 0–25)
GFR AFRICAN AMERICAN: >60 ML/MIN
GFR NON-AFRICAN AMERICAN: >60 ML/MIN
GFR SERPL CREATININE-BSD FRML MDRD: ABNORMAL ML/MIN/{1.73_M2}
GFR SERPL CREATININE-BSD FRML MDRD: ABNORMAL ML/MIN/{1.73_M2}
GLUCOSE BLD-MCNC: 98 MG/DL (ref 70–99)
GLUCOSE URINE: NEGATIVE
HCG(URINE) PREGNANCY TEST: NEGATIVE
HCT VFR BLD CALC: 41.1 % (ref 36.3–47.1)
HEMOGLOBIN: 13.7 G/DL (ref 11.9–15.1)
KETONES, URINE: ABNORMAL
LEUKOCYTE ESTERASE, URINE: NEGATIVE
MCH RBC QN AUTO: 29 PG (ref 25.2–33.5)
MCHC RBC AUTO-ENTMCNC: 33.3 G/DL (ref 28.4–34.8)
MCV RBC AUTO: 86.9 FL (ref 82.6–102.9)
MDMA URINE: NORMAL
METHADONE SCREEN, URINE: NEGATIVE
METHAMPHETAMINE, URINE: NEGATIVE
MUCUS: NORMAL
NITRITE, URINE: NEGATIVE
NRBC AUTOMATED: 0 PER 100 WBC
OPIATES, URINE: NEGATIVE
OTHER OBSERVATIONS UA: NORMAL
OXYCODONE SCREEN URINE: NEGATIVE
PDW BLD-RTO: 12.9 % (ref 11.8–14.4)
PH UA: 5.5 (ref 5–9)
PHENCYCLIDINE, URINE: NEGATIVE
PLATELET # BLD: 348 K/UL (ref 138–453)
PMV BLD AUTO: 9.6 FL (ref 8.1–13.5)
POTASSIUM SERPL-SCNC: 4 MMOL/L (ref 3.7–5.3)
PROPOXYPHENE, URINE: NEGATIVE
PROTEIN UA: NEGATIVE
RBC # BLD: 4.73 M/UL (ref 3.95–5.11)
RBC UA: NORMAL /HPF (ref 0–2)
RENAL EPITHELIAL, UA: NORMAL /HPF
SALICYLATE LEVEL: <1 MG/DL (ref 3–10)
SODIUM BLD-SCNC: 138 MMOL/L (ref 135–144)
SPECIFIC GRAVITY UA: 1.02 (ref 1.01–1.02)
TEST INFORMATION: NORMAL
TRICHOMONAS: NORMAL
TRICYCLIC ANTIDEPRESSANTS, UR: NEGATIVE
TURBIDITY: CLEAR
URINE HGB: NEGATIVE
UROBILINOGEN, URINE: NORMAL
WBC # BLD: 9.4 K/UL (ref 3.5–11.3)
WBC UA: NORMAL /HPF (ref 0–5)
YEAST: NORMAL

## 2019-03-04 PROCEDURE — 84703 CHORIONIC GONADOTROPIN ASSAY: CPT

## 2019-03-04 PROCEDURE — 85027 COMPLETE CBC AUTOMATED: CPT

## 2019-03-04 PROCEDURE — 36415 COLL VENOUS BLD VENIPUNCTURE: CPT

## 2019-03-04 PROCEDURE — 99285 EMERGENCY DEPT VISIT HI MDM: CPT

## 2019-03-04 PROCEDURE — 80307 DRUG TEST PRSMV CHEM ANLYZR: CPT

## 2019-03-04 PROCEDURE — 81001 URINALYSIS AUTO W/SCOPE: CPT

## 2019-03-04 PROCEDURE — 80306 DRUG TEST PRSMV INSTRMNT: CPT

## 2019-03-04 PROCEDURE — 80048 BASIC METABOLIC PNL TOTAL CA: CPT

## 2019-03-04 ASSESSMENT — ENCOUNTER SYMPTOMS
VOMITING: 0
COUGH: 0
SHORTNESS OF BREATH: 0
EYE REDNESS: 0
SORE THROAT: 0
EYE DISCHARGE: 0
CHEST TIGHTNESS: 0
WHEEZING: 0
BLOOD IN STOOL: 0
RHINORRHEA: 0
NAUSEA: 0
DIARRHEA: 0
CONSTIPATION: 0
BACK PAIN: 0
ABDOMINAL PAIN: 0

## 2019-03-04 ASSESSMENT — PAIN SCALES - GENERAL: PAINLEVEL_OUTOF10: 6

## 2019-03-04 ASSESSMENT — PAIN DESCRIPTION - LOCATION: LOCATION: ABDOMEN

## 2019-03-04 ASSESSMENT — PAIN DESCRIPTION - DESCRIPTORS: DESCRIPTORS: SHARP;TENDER

## 2019-03-04 ASSESSMENT — PAIN DESCRIPTION - PAIN TYPE: TYPE: ACUTE PAIN

## 2019-03-04 ASSESSMENT — PAIN DESCRIPTION - ORIENTATION: ORIENTATION: LEFT;UPPER

## 2019-03-19 ENCOUNTER — OFFICE VISIT (OUTPATIENT)
Dept: FAMILY MEDICINE CLINIC | Age: 29
End: 2019-03-19
Payer: COMMERCIAL

## 2019-03-19 VITALS
DIASTOLIC BLOOD PRESSURE: 68 MMHG | SYSTOLIC BLOOD PRESSURE: 104 MMHG | TEMPERATURE: 97.5 F | WEIGHT: 238.2 LBS | HEART RATE: 92 BPM | BODY MASS INDEX: 42.2 KG/M2 | OXYGEN SATURATION: 98 %

## 2019-03-19 DIAGNOSIS — E66.01 OBESITY, MORBID (HCC): ICD-10-CM

## 2019-03-19 DIAGNOSIS — R53.83 FATIGUE, UNSPECIFIED TYPE: ICD-10-CM

## 2019-03-19 DIAGNOSIS — R16.1 SPLENOMEGALY: ICD-10-CM

## 2019-03-19 DIAGNOSIS — R00.0 TACHYCARDIA: Primary | ICD-10-CM

## 2019-03-19 PROCEDURE — G8427 DOCREV CUR MEDS BY ELIG CLIN: HCPCS | Performed by: NURSE PRACTITIONER

## 2019-03-19 PROCEDURE — G8484 FLU IMMUNIZE NO ADMIN: HCPCS | Performed by: NURSE PRACTITIONER

## 2019-03-19 PROCEDURE — G8417 CALC BMI ABV UP PARAM F/U: HCPCS | Performed by: NURSE PRACTITIONER

## 2019-03-19 PROCEDURE — 1036F TOBACCO NON-USER: CPT | Performed by: NURSE PRACTITIONER

## 2019-03-19 PROCEDURE — 99213 OFFICE O/P EST LOW 20 MIN: CPT | Performed by: NURSE PRACTITIONER

## 2019-03-19 RX ORDER — LURASIDONE HYDROCHLORIDE 60 MG/1
60 TABLET, FILM COATED ORAL NIGHTLY
COMMUNITY
Start: 2019-03-17 | End: 2019-11-12 | Stop reason: ALTCHOICE

## 2019-03-19 RX ORDER — MONTELUKAST SODIUM 10 MG/1
10 TABLET ORAL DAILY
COMMUNITY
Start: 2019-02-22 | End: 2019-04-24 | Stop reason: SDUPTHER

## 2019-03-19 RX ORDER — HYDROXYZINE 50 MG/1
50 TABLET, FILM COATED ORAL 4 TIMES DAILY
COMMUNITY
Start: 2019-03-16 | End: 2019-03-19 | Stop reason: SDUPTHER

## 2019-03-19 RX ORDER — TRAZODONE HYDROCHLORIDE 50 MG/1
50 TABLET ORAL NIGHTLY
Status: ON HOLD | COMMUNITY
Start: 2019-03-08 | End: 2019-12-31 | Stop reason: HOSPADM

## 2019-03-19 ASSESSMENT — ENCOUNTER SYMPTOMS
EYES NEGATIVE: 1
BLOOD IN STOOL: 0
DIARRHEA: 0
SINUS PAIN: 0
ABDOMINAL PAIN: 1
SHORTNESS OF BREATH: 1
SINUS PRESSURE: 0
VOMITING: 0
NAUSEA: 0
CONSTIPATION: 0

## 2019-03-21 ENCOUNTER — HOSPITAL ENCOUNTER (OUTPATIENT)
Age: 29
Discharge: HOME | End: 2019-03-21
Payer: COMMERCIAL

## 2019-03-21 DIAGNOSIS — R53.83 FATIGUE, UNSPECIFIED TYPE: ICD-10-CM

## 2019-03-21 DIAGNOSIS — R16.1 SPLENOMEGALY: ICD-10-CM

## 2019-03-21 LAB
MONONUCLEOSIS SCREEN: NEGATIVE
VALPROIC ACID LEVEL: 38 UG/ML (ref 50–125)
VALPROIC DATE LAST DOSE: ABNORMAL
VALPROIC DOSE AMOUNT: ABNORMAL
VALPROIC TIME LAST DOSE: ABNORMAL

## 2019-03-21 PROCEDURE — 80164 ASSAY DIPROPYLACETIC ACD TOT: CPT

## 2019-03-21 PROCEDURE — 36415 COLL VENOUS BLD VENIPUNCTURE: CPT

## 2019-03-21 PROCEDURE — 86308 HETEROPHILE ANTIBODY SCREEN: CPT

## 2019-04-10 ENCOUNTER — APPOINTMENT (OUTPATIENT)
Dept: GENERAL RADIOLOGY | Age: 29
End: 2019-04-10
Payer: COMMERCIAL

## 2019-04-10 ENCOUNTER — HOSPITAL ENCOUNTER (EMERGENCY)
Age: 29
Discharge: HOME | End: 2019-04-10
Payer: COMMERCIAL

## 2019-04-10 VITALS
SYSTOLIC BLOOD PRESSURE: 110 MMHG | OXYGEN SATURATION: 97 % | DIASTOLIC BLOOD PRESSURE: 64 MMHG | WEIGHT: 236 LBS | HEART RATE: 95 BPM | BODY MASS INDEX: 41.81 KG/M2 | RESPIRATION RATE: 18 BRPM | TEMPERATURE: 98.2 F

## 2019-04-10 DIAGNOSIS — M54.50 ACUTE LEFT-SIDED LOW BACK PAIN WITHOUT SCIATICA: ICD-10-CM
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
## 2019-04-10 PROCEDURE — 72170 X-RAY EXAM OF PELVIS: CPT

## 2019-04-10 PROCEDURE — 72100 X-RAY EXAM L-S SPINE 2/3 VWS: CPT

## 2019-04-10 PROCEDURE — 99283 EMERGENCY DEPT VISIT LOW MDM: CPT

## 2019-04-10 RX ORDER — CYCLOBENZAPRINE HCL 10 MG
10 TABLET ORAL 3 TIMES DAILY PRN
COMMUNITY
End: 2019-04-29 | Stop reason: ALTCHOICE

## 2019-04-10 ASSESSMENT — ENCOUNTER SYMPTOMS
CONSTIPATION: 0
EYE DISCHARGE: 0
BLOOD IN STOOL: 0
ABDOMINAL PAIN: 0
BACK PAIN: 1
CHEST TIGHTNESS: 0
SHORTNESS OF BREATH: 0
WHEEZING: 0
DIARRHEA: 0
SORE THROAT: 0
COUGH: 0
NAUSEA: 0
EYE REDNESS: 0
RHINORRHEA: 0
VOMITING: 0

## 2019-04-10 ASSESSMENT — PAIN - FUNCTIONAL ASSESSMENT: PAIN_FUNCTIONAL_ASSESSMENT: 0-10

## 2019-04-10 ASSESSMENT — PAIN DESCRIPTION - DESCRIPTORS: DESCRIPTORS: SPASM

## 2019-04-10 ASSESSMENT — PAIN SCALES - GENERAL
PAINLEVEL_OUTOF10: 9
PAINLEVEL_OUTOF10: 9

## 2019-04-10 ASSESSMENT — PAIN DESCRIPTION - PAIN TYPE: TYPE: ACUTE PAIN

## 2019-04-10 ASSESSMENT — PAIN DESCRIPTION - LOCATION: LOCATION: BACK

## 2019-04-10 ASSESSMENT — PAIN DESCRIPTION - ORIENTATION: ORIENTATION: LOWER

## 2019-04-10 NOTE — ED PROVIDER NOTES
[facility] ED  eMERGENCY dEPARTMENT eNCOUnter      Pt Name: Federico Littlejohn  MRN: [id number]  [place] 1990  Date of evaluation: 4/10/2019  Provider: [facility], [address]     Chief Complaint   Patient presents with    Back Pain     pt states she fell in the shower this am at home and contiues to have low back pain         HISTORY OF PRESENT ILLNESS   (Location/Symptom, Timing/Onset, Context/Setting,Quality, Duration, Modifying Factors, Severity)  Note limiting factors. Federico Littlejohn is a35 y.o. female who presents to the emergency department with complaints of lower back pain. Patient reports that it occurred this morning when she actually slipped in the shower and fell landing on her back. She denies any head injury or loss of consciousness. She reports that she still has pain in her lower back so she came here for further evaluation. Denies any saddle anesthesia denies any bowel or bladder incontinence. Denies any extremity injury. She denies any chest pain or shortness of breath denies abdominal pain nausea vomiting. No other complaints at this time. HPI    Nursing Notes werereviewed. REVIEW OF SYSTEMS    (2-9 systems for level 4, 10 or more for level 5)     Review of Systems   Constitutional: Negative for chills, diaphoresis and fever. HENT: Negative for congestion, ear pain, rhinorrhea and sore throat. Eyes: Negative for discharge, redness and visual disturbance. Respiratory: Negative for cough, chest tightness, shortness of breath and wheezing. Cardiovascular: Negative for chest pain and palpitations. Gastrointestinal: Negative for abdominal pain, blood in stool, constipation, diarrhea, nausea and vomiting. Endocrine: Negative for polydipsia, polyphagia and polyuria. Genitourinary: Negative for decreased urine volume, difficulty urinating, dysuria, frequency and hematuria. Musculoskeletal: Positive for back pain.  Negative for arthralgias and myalgias. Skin: Negative for pallor and rash. Allergic/Immunologic: Negative for food allergies and immunocompromised state. Neurological: Negative for dizziness, syncope, weakness and light-headedness. Hematological: Negative for adenopathy. Does not bruise/bleed easily. Psychiatric/Behavioral: Negative for behavioral problems and suicidal ideas. The patient is not nervous/anxious. Except as noted above the remainder of the review of systems was reviewed and negative. PAST MEDICAL HISTORY     Past Medical History:   Diagnosis Date    Asthma     Back pain     B[address] 1 disorder ([facility] [address]     Chicken pox     H/O echocardiogram 05/18/2018    EF 60%    History of Holter monitoring 04/27/2018    Baseline ECG shows sinus rhythm. Holter showed sinus tachy 61% of the time. Rare premature ventricular complexes were recorded. No episodes of superventricuarl or ventriuclar tacycardial was seen. Pt reported episodes of SOB and speedy heart with monitroing showing sinus tachy.      Lordosis     Pneumonia     PTSD (post-traumatic stress disorder)     UTI (urinary tract infection)          SURGICALHISTORY       Past Surgical History:   Procedure Laterality Date    NECK SURGERY Left lesion biopsy    UPPER GASTROINTESTINAL ENDOSCOPY      chicken stuck in throat    WISDOM TOOTH EXTRACTION           CURRENT MEDICATIONS       Previous Medications    ALBUTEROL SULFATE HFA (PROAIR HFA) 108 (90 BASE) MCG/ACT INHALER    Inhale 2 puffs into the lungs every 6 hours as needed for Wheezing    AMLODIPINE (NORVASC) 2.5 MG TABLET    Take 1 tablet by mouth daily    ARIPIPRAZOLE (ABILIFY) 9.75 MG/1.3ML INJECTION    Inject 9.75 mg into the muscle every 30 days    CYCLOBENZAPRINE (FLEXERIL) 10 MG TABLET    Take 10 mg by mouth 3 times daily as needed for Muscle spasms    ESOMEPRAZOLE (NEXIUM) 20 MG DELAYED RELEASE CAPSULE    Take 1 capsule by mouth every morning (before breakfast)    HYDROXYZINE (VISTARIL) 50 MG CAPSULE    Take 50 mg by mouth 4 times daily as needed for Itching    IBUPROFEN (ADVIL;MOTRIN) 600 MG TABLET    Take 1 tablet by mouth 4 times daily as needed for Pain    LATUDA 60 MG TABS TABLET    Take 60 mg by mouth nightly    MONTELUKAST (SINGULAIR) 10 MG TABLET    Take 10 mg by mouth daily    NAPROXEN (NAPROSYN) 500 MG TABLET    Take 1 tablet by mouth 2 times daily    TRAZODONE (DESYREL) 50 MG TABLET    Take 50 mg by mouth nightly    VALPROATE (DEPAKENE) 250 MG/5ML SOLUTION    Take 5 mLs by mouth 2 times daily       ALLERGIES     Doxycycline; Pollen extract; Seroquel [quetiapine fumarate];  Symbicort [budesonide-formoterol fumarate]; and Topamax [topiramate]    FAMILY HISTORY       Family History   Problem Relation Age of Onset    Asthma Mother     High Blood Pressure Father     Diabetes Father           SOCIAL HISTORY       Social History     Socioeconomic History    Marital status: Single     Spouse name: None    Number of children: None    Years of education: None    Highest education level: None   Occupational History    None   Social Needs    Financial resource strain: None    Food insecurity:     Worry: None     Inability: None    Transportation needs:     Medical: None     Non-medical: None   Tobacco Use    Smoking status: Never Smoker    Smokeless tobacco: Never Used   Substance and Sexual Activity    Alcohol use: No     Comment: Rare    Drug use: No    Sexual activity: None   Lifestyle    Physical activity:     Days per week: None     Minutes per session: None    Stress: None   Relationships    Social connections:     Talks on phone: None     Gets together: None     Attends Yarsanism service: None     Active member of club or organization: None     Attends meetings of clubs or organizations: None     Relationship status: None    Intimate partner violence:     Fear of current or ex partner: None     Emotionally abused: None     Physically abused: None     Forced sexual activity: None   Other Topics Concern    None   Social History Narrative    None       SCREENINGS    [place] Coma Scale  Eye Opening: Spontaneous  Best Verbal Response: Oriented  Best Motor Response: Obeys commands  [place] Coma Scale Score: 15 @FLOW([id number])@      PHYSICAL EXAM    (up to 7 for level 4, 8 or more for level 5)     ED Triage Vitals [04/10/19 1613]   BP Temp Temp Source Pulse Resp SpO2 Height Weight   116/83 98.2 °F (36.8 °C) Tympanic 95 18 97 % -- 236 lb (107 kg)       Physical Exam   Constitutional: She is oriented to person, place, and time. She appears well-developed and well-nourished. No distress. HENT:   Head: Normocephalic and atraumatic. Right Ear: External ear normal.   Left Ear: External ear normal.   Mouth/Throat: Oropharynx is clear and moist. No oropharyngeal exudate. Eyes: Pupils are equal, round, and reactive to light. Conjunctivae and EOM are normal. Right eye exhibits no discharge. Left eye exhibits no discharge. No scleral icterus. Neck: Normal range of motion. Neck supple. No tracheal deviation present. Cardiovascular: Normal rate, regular rhythm and intact distal pulses. Exam reveals no gallop and no friction rub. No murmur heard. Pulmonary/Chest: Effort normal and breath sounds normal. No stridor. No respiratory distress. She has no wheezes. Abdominal: Soft. Bowel sounds are normal. She exhibits no distension and no mass. There is no tenderness. There is no rebound and no guarding. Musculoskeletal: Normal range of motion. She exhibits tenderness. She exhibits no edema or deformity. Patient has tenderness in the paraspinous region of the lumbar spine worsen the left. There is no midline bony spinal tenderness or step-off. Slight tenderness of the thigh. Patient is up and ambulatory. Intact distal pulses and sensation chemicals less than 3 seconds. No saddle anesthesia. Neurological: She is alert and oriented to person, place, and time.  She has warnings were given. Patient will return to the emergency room as needed with new or worsening complaints. Patient has been given information for orthopedic follow-up. They will call tomorrow to arrange follow-up within the next 48 hours. Procedures    FINAL IMPRESSION      1. Fall, initial encounter    2. Acute left-sided low back pain without sciatica        DISPOSITION/PLAN   DISPOSITION Decision To Discharge 04/10/2019 05:30:44 PM      PATIENT REFERRED TO:  [facility] ED  [address] [facility]  [address]  [phone]    If symptoms worsen, As needed    OSKAR Mena CNP  [facility] [address]  [phone]    Schedule an appointment as soon as possible for a visit in 1 day      Chung Vela MD  [address] Jackson [address] [place] [address]  [phone]            DISCHARGE MEDICATIONS:  New Prescriptions    No medications on file              [place]      Patient Course:      ED Medications administered this visit:  Medications - No data to display    New Prescriptions from this visit:    New Prescriptions    No medications on file       Follow-up:  [facility] ED  [address] [facility]  [address]  [phone]    If symptoms worsen, As needed    OSKAR Mena CNP  [facility] [address]  [phone]    Schedule an appointment as soon as possible for a visit in 1 day      Chung Vela MD  [address] C[address] Jackson [address] [place] [address]  [phone]              Final Impression:   1. Fall, initial encounter    2.  Acute left-sided low back pain without sciatica               (Please note that portions of this note were completed with a voice recognition program.  Efforts were made to edit the dictations but occasionally words are mis-transcribed.)           Melissa Ohara PA-C  04/10/19 3711

## 2019-04-12 ENCOUNTER — TELEPHONE (OUTPATIENT)
Dept: FAMILY MEDICINE CLINIC | Age: 29
End: 2019-04-12

## 2019-04-16 NOTE — TELEPHONE ENCOUNTER
Inform patient muscle relaxants are only used short term and increased hydration and stretching usually helps back spasms. Muscle relaxants actually cause more re-injury when taking long term after acute injury. Massage, warm shower, salon pas or biofreeze.

## 2019-04-19 ENCOUNTER — HOSPITAL ENCOUNTER (OUTPATIENT)
Dept: GENERAL RADIOLOGY | Age: 29
Discharge: HOME | End: 2019-04-21
Payer: COMMERCIAL

## 2019-04-19 DIAGNOSIS — R13.14 PHARYNGOESOPHAGEAL DYSPHAGIA: ICD-10-CM

## 2019-04-19 PROCEDURE — 6360000004 HC RX CONTRAST MEDICATION: Performed by: OTOLARYNGOLOGY

## 2019-04-19 PROCEDURE — 74220 X-RAY XM ESOPHAGUS 1CNTRST: CPT

## 2019-04-19 PROCEDURE — A4641 RADIOPHARM DX AGENT NOC: HCPCS | Performed by: OTOLARYNGOLOGY

## 2019-04-19 RX ADMIN — BARIUM SULFATE 355 ML: 0.6 SUSPENSION ORAL at 11:19

## 2019-04-24 ENCOUNTER — OFFICE VISIT (OUTPATIENT)
Dept: FAMILY MEDICINE CLINIC | Age: 29
End: 2019-04-24
Payer: COMMERCIAL

## 2019-04-24 VITALS
BODY MASS INDEX: 42.09 KG/M2 | DIASTOLIC BLOOD PRESSURE: 82 MMHG | TEMPERATURE: 97.8 F | SYSTOLIC BLOOD PRESSURE: 108 MMHG | HEART RATE: 91 BPM | WEIGHT: 237.6 LBS | OXYGEN SATURATION: 98 %

## 2019-04-24 DIAGNOSIS — S39.012A LUMBAR STRAIN, INITIAL ENCOUNTER: Primary | ICD-10-CM

## 2019-04-24 DIAGNOSIS — M46.1 SACROILIITIS (HCC): ICD-10-CM

## 2019-04-24 DIAGNOSIS — J30.2 OTHER SEASONAL ALLERGIC RHINITIS: ICD-10-CM

## 2019-04-24 PROCEDURE — 1036F TOBACCO NON-USER: CPT | Performed by: NURSE PRACTITIONER

## 2019-04-24 PROCEDURE — 99214 OFFICE O/P EST MOD 30 MIN: CPT | Performed by: NURSE PRACTITIONER

## 2019-04-24 PROCEDURE — G8417 CALC BMI ABV UP PARAM F/U: HCPCS | Performed by: NURSE PRACTITIONER

## 2019-04-24 PROCEDURE — G8427 DOCREV CUR MEDS BY ELIG CLIN: HCPCS | Performed by: NURSE PRACTITIONER

## 2019-04-24 RX ORDER — MONTELUKAST SODIUM 10 MG/1
10 TABLET ORAL DAILY
Qty: 90 TABLET | Refills: 1 | Status: SHIPPED | OUTPATIENT
Start: 2019-04-24 | End: 2019-09-19 | Stop reason: SDUPTHER

## 2019-04-24 ASSESSMENT — ENCOUNTER SYMPTOMS
EYES NEGATIVE: 1
ABDOMINAL DISTENTION: 0
SORE THROAT: 0
BACK PAIN: 1
COUGH: 0
BOWEL INCONTINENCE: 0
ABDOMINAL PAIN: 0

## 2019-04-24 NOTE — PATIENT INSTRUCTIONS
SURVEY:    You may be receiving a survey from [employer] regarding your visit today. Please complete the survey to enable us to provide the highest quality of care to you and your family. If you cannot score us a very good on any question, please call the office to discuss how we could have made your experience a very good one. Thank you. Patient Education        Back Strain: Care Instructions  Overview    A back strain happens when you overstretch, or pull, a muscle in your back. You may hurt your back in an accident or when you exercise or lift something. Sometimes you may not know how you hurt your back. Most back pain will get better with rest and time. You can take care of yourself at home to help your back heal.  Follow-up care is a key part of your treatment and safety. Be sure to make and go to all appointments, and call your doctor if you are having problems. It's also a good idea to know your test results and keep a list of the medicines you take. How can you care for yourself at home? · Try to stay as active as you can, but stop or reduce any activity that causes pain. · Put ice or a cold pack on the sore muscle for 10 to 20 minutes at a time to stop swelling. Try this every 1 to 2 hours for 3 days (when you are awake) or until the swelling goes down. Put a thin cloth between the ice pack and your skin. · After 2 or 3 days, apply a heating pad on low or a warm cloth to your back. Some doctors suggest that you go back and forth between hot and cold treatments. · Take pain medicines exactly as directed. ? If the doctor gave you a prescription medicine for pain, take it as prescribed. ? If you are not taking a prescription pain medicine, ask your doctor if you can take an over-the-counter medicine. · Try sleeping on your side with a pillow between your legs. Or put a pillow under your knees when you lie on your back. These measures can ease pain in your lower back.   · Return to your usual level of activity slowly. When should you call for help? Call 911 anytime you think you may need emergency care. For example, call if:    · You are unable to move a leg at all.   [facility] your doctor now or seek immediate medical care if:    · You have new or worse symptoms in your legs, belly, or buttocks. Symptoms may include:  ? Numbness or tingling. ? Weakness. ? Pain.     · You lose bladder or bowel control.    Watch closely for changes in your health, and be sure to contact your doctor if:    · You have a fever, lose weight, or don't feel well.     · You are not getting better as expected. Where can you learn more? Go to https://[email or web address]. org and sign in to your [employer] account. Enter G819 in the [employer] box to learn more about \"Back Strain: Care Instructions. \"     If you do not have an account, please click on the \"Sign Up Now\" link. Current as of: September 20, 2018  Content Version: 11.9  © 2271-2180 [employer]. Care instructions adapted under license by [facility] [place] ([facility]). If you have questions about a medical condition or this instruction, always ask your healthcare professional. [place] [address] any warranty or liability for your use of this information. Patient Education        Piriformis Syndrome: Exercises  Your Care Instructions  Here are some examples of typical rehabilitation exercises for your condition. Start each exercise slowly. Ease off the exercise if you start to have pain. Your doctor or physical therapist will tell you when you can start these exercises and which ones will work best for you. How to do the exercises  Hip rotator stretch    1. Lie on your back with both knees bent and your feet flat on the floor. 2. Put the ankle of your affected leg on your opposite thigh near your knee.   3. Use your hand to gently push your knee (on your affected leg) away from your body until you feel a gentle stretch around your hip. 4. Hold the stretch for 15 to 30 seconds. 5. Repeat 2 to 4 times. 6. Switch legs and repeat steps 1 through 5. Piriformis stretch    1. Lie on your back with your legs straight. 2. Lift your affected leg and bend your knee. With your opposite hand, reach across your body, and then gently pull your knee toward your opposite shoulder. 3. Hold the stretch for 15 to 30 seconds. 4. Repeat with your other leg. 5. Repeat 2 to 4 times on each side. Lower abdominal strengthening    1. Lie on your back with your knees bent and your feet flat on the floor. 2. Tighten your belly muscles by pulling your belly button in toward your spine. 3. Lift one foot off the floor and bring your knee toward your chest, so that your knee is straight above your hip and your leg is bent like the letter \"L. \"  4. Lift the other knee up to the same position. 5. Lower one leg at a time to the starting position. 6. Keep alternating legs until you have lifted each leg 8 to 12 times. 7. Be sure to keep your belly muscles tight and your back still as you are moving your legs. Be sure to breathe normally. Follow-up care is a key part of your treatment and safety. Be sure to make and go to all appointments, and call your doctor if you are having problems. It's also a good idea to know your test results and keep a list of the medicines you take. Current as of: September 20, 2018  Content Version: 11.9  © 3626-8317 [employer]. Care instructions adapted under license by [facility] ([facility]). If you have questions about a medical condition or this instruction, always ask your healthcare professional. [place] [address] any warranty or liability for your use of this information.

## 2019-04-24 NOTE — PROGRESS NOTES
[facility] PHYSICIANS  [facility] [place]  [facility] [facility] [address]  Dept: [phone]  Dept Fax: [phone]    Last encounter 3/19/2019    HPI:   Cheryln Gitelman is a 34 y.o. female who presentstoday for her medical conditions/complaints as noted below. Cheryln Gitelman is c/o of Back Pain (from fall on 4/10/19)      Back Pain   This is a chronic problem. The current episode started 1 to 4 weeks ago. The problem has been waxing and waning since onset. The pain is present in the lumbar spine and sacro-iliac. The quality of the pain is described as aching. The pain does not radiate. The pain is mild. The pain is worse during the day. The symptoms are aggravated by sitting and bending. Stiffness is present all day. Pertinent negatives include no abdominal pain, bladder incontinence, bowel incontinence, chest pain, fever, headaches, leg pain, pelvic pain, perianal numbness or weakness. Risk factors include lack of exercise, poor posture and sedentary lifestyle. She has tried muscle relaxant, NSAIDs, walking, ice, heat and home exercises for the symptoms. The treatment provided mild relief. Fall at home in shower on 4/10/19 turning around and slipped went sideways fell on right side, hurts still in low back. Seen in ER imaging done  No change in bowel or bladder habits. Pain in right SI joint   Taking naprosyn with food. Helps it quit a bit. Moist shower, heating pad  Tried ice  Gentle stretching. Even ground with good fitting shoes.      4/10/19 pelvis xray     Impression   No acute osseous abnormality of the lumbar spine or pelvis.             4/10/19 lumbar xray  Impression   No acute osseous abnormality of the lumbar spine or pelvis.               Past Medical History:   Diagnosis Date    Asthma     Back pain     Bipolar 1 disorder ([facility] [address]     Chicken pox     H/O echocardiogram 05/18/2018    EF 60%    History of Holter monitoring 04/27/2018 Baseline ECG shows sinus rhythm. Holter showed sinus tachy 61% of the time. Rare premature ventricular complexes were recorded. No episodes of superventricuarl or ventriuclar tacycardial was seen. Pt reported episodes of SOB and speedy heart with monitroing showing sinus tachy.      Lordosis     Pneumonia     PTSD (post-traumatic stress disorder)     UTI (urinary tract infection)       Past Surgical History:   Procedure Laterality Date    NECK SURGERY Left lesion biopsy    UPPER GASTROINTESTINAL ENDOSCOPY      chicken stuck in throat    WISDOM TOOTH EXTRACTION         Family History   Problem Relation Age of Onset    Asthma Mother     High Blood Pressure Father     Diabetes Father           Social History     Tobacco Use    Smoking status: Never Smoker    Smokeless tobacco: Never Used   Substance Use Topics    Alcohol use: No     Comment: Rare      Current Outpatient Medications   Medication Sig Dispense Refill    SPRINTEC 28 0.25-35 MG-MCG per tablet Take 1 tablet by mouth daily      montelukast (SINGULAIR) 10 MG tablet Take 1 tablet by mouth daily 90 tablet 1    cyclobenzaprine (FLEXERIL) 10 MG tablet Take 10 mg by mouth 3 times daily as needed for Muscle spasms      LATUDA 60 MG TABS tablet Take 60 mg by mouth nightly      traZODone (DESYREL) 50 MG tablet Take 50 mg by mouth nightly      ARIPiprazole (ABILIFY) 9.75 MG/1.3ML injection Inject 9.75 mg into the muscle every 30 days      valproate (DEPAKENE) 250 MG/5ML solution Take 5 mLs by mouth 2 times daily      amLODIPine (NORVASC) 2.5 MG tablet Take 1 tablet by mouth daily 30 tablet 11    esomeprazole (NEXIUM) 20 MG delayed release capsule Take 1 capsule by mouth every morning (before breakfast) 30 capsule 2    ibuprofen (ADVIL;MOTRIN) 600 MG tablet Take 1 tablet by mouth 4 times daily as needed for Pain 30 tablet 0    hydrOXYzine (VISTARIL) 50 MG capsule Take 50 mg by mouth 4 times daily as needed for Itching      albuterol sulfate HFA (PROAIR HFA) 108 (90 Base) MCG/ACT inhaler Inhale 2 puffs into the lungs every 6 hours as needed for Wheezing 1 Inhaler 3    naproxen (NAPROSYN) 500 MG tablet Take 1 tablet by mouth 2 times daily (Patient taking differently: Take 250 mg by mouth 2 times daily ) 30 tablet 0     No current facility-administered medications for this visit. Allergies   Allergen Reactions    Doxycycline      Tongue itch, throat began to swell    Pollen Extract      Grass    Seroquel [Quetiapine Fumarate] Other (See Comments)     Paranoid made mood worse    Symbicort [Budesonide-Formoterol Fumarate] Other (See Comments)     Delusional, paranoid, hallucinations.  Topamax [Topiramate] Other (See Comments)     Numbness in legs       Health Maintenance   Topic Date Due    HPV vaccine (1 - Female 3-dose series) 06/15/2007    Cervical cancer screen  06/15/2013    A1C test (Diabetic or Prediabetic)  02/19/2020    DTaP/Tdap/Td vaccine (2 - Td) 06/25/2024    Flu vaccine  Completed    HIV screen  Completed    Pneumococcal 0-64 years Vaccine  Aged Out       Subjective:      Review of Systems   Constitutional: Negative for activity change, appetite change and fever. HENT: Negative for congestion, postnasal drip and sore throat. Eyes: Negative. Respiratory: Negative for cough. Cardiovascular: Negative for chest pain. Gastrointestinal: Negative for abdominal distention, abdominal pain and bowel incontinence. Genitourinary: Negative for bladder incontinence, difficulty urinating and pelvic pain. Musculoskeletal: Positive for back pain. Neurological: Negative for weakness and headaches. Objective:     Physical Exam   Constitutional: She is oriented to person, place, and time. She appears well-developed and well-nourished. No distress. HENT:   Head: Normocephalic and atraumatic.    Right Ear: External ear normal.   Left Ear: External ear normal.   Mouth/Throat: Oropharynx is clear and moist.   Eyes: Pupils are equal, round, and reactive to light. No scleral icterus. Neck: Normal range of motion. Neck supple. No thyromegaly present. Cardiovascular: Normal rate, regular rhythm and normal heart sounds. No murmur heard. Pulmonary/Chest: Effort normal and breath sounds normal. No respiratory distress. She has no wheezes. Abdominal: Soft. There is no tenderness. Musculoskeletal: Normal range of motion. She exhibits tenderness (right SI joint. no spinal or paraspinal tenderness alf). Lymphadenopathy:     She has no cervical adenopathy. Neurological: She is alert and oriented to person, place, and time. Neg SLR alf   Skin: Skin is warm and dry. Psychiatric: She has a normal mood and affect. Her behavior is normal. Judgment and thought content normal.   Nursing note and vitals reviewed. /82 (Site: Left Upper Arm, Position: Sitting, Cuff Size: Large Adult)   Pulse 91   Temp 97.8 °F (36.6 °C) (Oral)   Wt 237 lb 9.6 oz (107.8 kg)   LMP 04/02/2019   SpO2 98%   BMI 42.09 kg/m²     Data:     Lab Results   Component Value Date     03/04/2019    K 4.0 03/04/2019     03/04/2019    CO2 20 03/04/2019    BUN 13 03/04/2019    CREATININE 0.55 03/04/2019    GLUCOSE 98 03/04/2019    PROT 7.4 02/09/2019    LABALBU 4.0 02/09/2019    BILITOT 0.19 02/09/2019    ALKPHOS 73 02/09/2019    AST 13 02/09/2019    ALT 15 02/09/2019     Lab Results   Component Value Date    WBC 9.4 03/04/2019    RBC 4.73 03/04/2019    HGB 13.7 03/04/2019    HCT 41.1 03/04/2019    MCV 86.9 03/04/2019    MCH 29.0 03/04/2019    MCHC 33.3 03/04/2019    RDW 12.9 03/04/2019     03/04/2019    MPV 9.6 03/04/2019     Lab Results   Component Value Date    TSH 2.66 09/25/2018     Lab Results   Component Value Date    CHOL 190 02/09/2019    HDL 44 02/09/2019    LABA1C 5.7 02/19/2019          Assessment & Plan       1.  Lumbar strain, initial encounter  Improving but slowly  Discussed anatomy with patient , gentle stretching and daily

## 2019-04-25 ENCOUNTER — HOSPITAL ENCOUNTER (OUTPATIENT)
Dept: PHYSICAL THERAPY | Age: 29
Setting detail: THERAPIES SERIES
Discharge: HOME | End: 2019-04-25
Payer: COMMERCIAL

## 2019-04-25 PROCEDURE — G0283 ELEC STIM OTHER THAN WOUND: HCPCS

## 2019-04-25 PROCEDURE — 97161 PT EVAL LOW COMPLEX 20 MIN: CPT

## 2019-04-25 PROCEDURE — 97110 THERAPEUTIC EXERCISES: CPT

## 2019-04-25 ASSESSMENT — PAIN SCALES - QUEBEC BACK PAIN DISABILITY SCALE
SIT IN A CHAIR FOR SEVERAL HOURS: 4
PULL OR PUSH HEAVY DOORS: 2
MAKE YOUR BED: 4
BEND OVER TO CLEAN THE BATHTUB: 4
QUEBEC DISABILITY INDEX: 40-59%
CLIMB ONE FLIGHT OF STAIRS: 2
THROW A BALL: 3
WALK SEVERAL KILOMETERS  OR MILES: 4
REACH UP TO HIGH SHELVES: 2
SLEEP THROUGH THE NIGHT: 0
LIFT AND CARRY A HEAVY SUITCASE: 4
PUT ON SOCKS OR PANYHOSE: 3
GET OUT OF BED: 1
STAND UP FOR 20 TO 30 MINUTES: 2
TAKE FOOD OUT OF THE REFRIGERATOR: 3
WALK A FEW BLOCKS OR 300 TO 400M: 2
MOVE A CHAIR: 2
CARRY TWO BAGS OF GROCERIES: 2
TURN OVER IN BED: 1
TOTAL SCORE: 53
QUEBEC CMS MODIFIER: CK
RIDE IN A CAR: 3
RUN ONE BLOCK OR 100M: 5

## 2019-04-25 NOTE — PROGRESS NOTES
Phone: [address]          Fax: [phone]                      Outpatient Physical Therapy                                                                      Evaluation  Date: 2019  Patient: Daniel Caal  : 1990  CSN #: [id number]  Referring Practitioner: Zunilda Martinez    Referral Date : 19     Diagnosis: Lumbar strain, S39.012A    Treatment Diagnosis: Low back pain  Onset Date: 04/10/19  PT Insurance Information: [address]  Total # of Visits Approved: 18   Total # of Visits to Date: 1  No Show: 0  Canceled Appointment: 0     Subjective  Subjective: Pt. reports she fell in the shower a couple weeks ago and fell on her R side and now has pain in the lower back and R side of the pelvis. Pt. had xrays on 4/10/19 which revealed DDD but no fracture. Pt. reports 4/10 pain on average and 7/10 at the worst with sitting too long. Pt. reports aleve and standing help with the pain and laying down relieves pain and she is able to sleep at night.    Additional Pertinent Hx: Panic attacks, anxiety, depression             Objective     Observation/Palpation  Posture: Fair  Palpation: TTP R PSIS, R lumbar paraspinals  Observation: Pt. stands with lumbar hyperlordosis and decreased thoracic kyphosis         Spine  Lumbar: flexion: able to touch toes with pain; B SB: able to touch B knee joint lines with no pain           Strength RLE  Comment: hip flexion/abd/add: 4-/5, knee flex/ext: 4/5, ankle DF: 4/5  Strength LLE  Comment: hip flexion/abd/add: 4-/5, knee flex/ext: 4/5, ankle DF: 4/5  Strength Other  Other: Abdominal: 4/5    Additional Measures  Special Tests: (-) B SLR, (+) R ELIZABETH, mild R hemipelvic rotation corrected with MET    Functional Outcome Measures  Get out of bed: Minimally difficult  Sleep through the night: Not difficult at all  Turn over in bed: Minimally difficult  Ride in a car: Fairly difficult  Stand up for 20-30 minutes: Somewhat difficult  Sit in a chair for several hours: Very difficult  Climb one flight of stairs: Somewhat difficult  Walk a few blocks (300-400 m): Somewhat difficult  Walk several kilometers - miles: Very difficult  Reach up to high shelves: Somewhat difficult  Throw a ball: Fairly difficult  Run one block (about 100 m): Unable to do  Take food out of the refrigerator: Fairly difficult  Make your bed: Very difficult  Put on socks (pantyhose): Fairly difficult  Bend over to clean the bathtub: Very difficult  Move a chair: Somewhat difficult  Pull or push heavy doors: Somewhat difficult  Carry two bags of groceries: Somewhat difficult  Lift and carry a heavy suitcase: Very difficult  Tej Total Score: 53      Assessment  Assessment: Pt. is 34year old female with dx of low back pain who presents with increased pain 4/10 constantly and 7/10 at worst with sitting for long periods of time. Pt. stands with lumbar hyperlordosis and decreased thoracic kyphosis and is TTP R PSIS, R lumbar paraspinals. Pt. has increased pain with lumbar AROM flexion but is able to reach her toes in standing. Pt. has decreased core strength: 4/5 and decreased B hip strength: hip flexion/abd/add: 4-/5, knee flex/ext: 4/5, ankle DF: 4/5. (-) B SLR, (+) R ELIZABETH, mild R hemipelvic rotation corrected with MET. Pt. to benefit from physical therapy to address these concerns and improve ROM and strength and decrease pain to improve functional mobility. Prognosis: Good        Decision Making: Low Complexity    Patient Education  Patient Education: PT eval, POC, HEP  Pt verbalized/demonstrated good understanding:     [X] Yes         [] No, pt required further clarification.       Goals  Short term goals  Time Frame for Short term goals: 3 weeks  Short term goal 1: Pt. to initiate HEP for core and B hip strengthening   Short term goal 2: Pt. to be able to bend forward and touch toes with decreased pain 2/10 at worst for improved mobility  Short term goal 3: Initiate modalities/manual techniques prn to decrease pain    Long term goals  Time Frame for Long term goals : 6 weeks  Long term goal 1: Pt. to demo improved core and B hip strength to 4+/5 grossly to improve posture in sitting and standing to improve functional mobility. Long term goal 2: Pt. to report decreased pain to 1/10 at worst and ability to sit for >/=1 hour for improved QOL. Long term goal 3: Pt. to have good pelvic alignment with no rotation of R hemipelvis for improved posture and functional mobility.       Patient goals : \"Manage and eliminate pain in the back, learn new techniques\"        Minutes Tracking:  Time In: 1000  Time Out: 1050  Minutes: Lesli 189, PT, DPT   4/25/2019

## 2019-04-25 NOTE — PLAN OF CARE
[facility]           Phone: [phone]             Outpatient Physical Therapy  Fax: [phone]                                           Date: 2019  Patient: Félix Coughlin : 1990 CSN #: [id number]   Referring Practitioner:  Maine Ferrell Referral Date:  19       [x] Plan of Care   [] Updated Plan of Care    Dates of Service to Include: 2019 to 19    Diagnosis:  Lumbar strain, S39.012A    Rehab (Treatment) Diagnosis:  Low back pain             Onset Date:  04/10/19    Attendance  Total # of Visits to Date: 1 No Show: 0 Canceled Appointment: 0    Assessment  Assessment: Pt. is 34year old female with dx of low back pain who presents with increased pain 4/10 constantly and 7/10 at worst with sitting for long periods of time. Pt. stands with lumbar hyperlordosis and decreased thoracic kyphosis and is TTP R PSIS, R lumbar paraspinals. Pt. has increased pain with lumbar AROM flexion but is able to reach her toes in standing. Pt. has decreased core strength: 4/5 and decreased B hip strength: hip flexion/abd/add: 4-/5, knee flex/ext: 4/5, ankle DF: 4/5. (-) B SLR, (+) R ELIZABETH, mild R hemipelvic rotation corrected with MET. Pt. to benefit from physical therapy to address these concerns and improve ROM and strength and decrease pain to improve functional mobility.        Goals  Short term goals  Time Frame for Short term goals: 3 weeks  Short term goal 1: Pt. to initiate HEP for core and B hip strengthening   Short term goal 2: Pt. to be able to bend forward and touch toes with decreased pain 2/10 at worst for improved mobility  Short term goal 3: Initiate modalities/manual techniques prn to decrease pain  Long term goals  Time Frame for Long term goals : 6 weeks  Long term goal 1: Pt. to demo improved core and B hip strength to 4+/5 grossly to improve posture in sitting and standing to improve functional mobility. Long term goal 2: Pt. to report decreased pain to 1/10 at worst and ability to sit for >/=1 hour for improved QOL. Long term goal 3: Pt. to have good pelvic alignment with no rotation of R hemipelvis for improved posture and functional mobility.      Prognosis  Prognosis: Good    Treatment Plan   Times per week: 3  Plan weeks: 4-6  [x] HP/CP      [x] Electrical Stim   [x] Therapeutic Exercise      [x] Gait Training  [] Aquatics   [] Ultrasound         [x] Patient Education/HEP   [x] Manual Therapy  [x] Traction    [x] Neuro-marybeth        [x] Soft Tissue Mobs            [] Home TENS  [] Iontophoresis    [] Orthotic casting/fitting      [x] Dry Needling             Electronically signed by: Elisabet Virk PT, DPT    Date: 4/25/2019      ______________________________________ Date: 4/25/2019   Physician Signature

## 2019-04-29 ENCOUNTER — OFFICE VISIT (OUTPATIENT)
Dept: PRIMARY CARE CLINIC | Age: 29
End: 2019-04-29
Payer: COMMERCIAL

## 2019-04-29 ENCOUNTER — HOSPITAL ENCOUNTER (OUTPATIENT)
Dept: PHYSICAL THERAPY | Age: 29
Setting detail: THERAPIES SERIES
Discharge: HOME | End: 2019-04-29
Payer: COMMERCIAL

## 2019-04-29 VITALS
RESPIRATION RATE: 18 BRPM | BODY MASS INDEX: 41.68 KG/M2 | DIASTOLIC BLOOD PRESSURE: 78 MMHG | HEART RATE: 82 BPM | WEIGHT: 235.3 LBS | TEMPERATURE: 98.2 F | SYSTOLIC BLOOD PRESSURE: 113 MMHG

## 2019-04-29 DIAGNOSIS — J30.1 SEASONAL ALLERGIC RHINITIS DUE TO POLLEN: Primary | ICD-10-CM

## 2019-04-29 DIAGNOSIS — J02.9 SORE THROAT: ICD-10-CM

## 2019-04-29 LAB — S PYO AG THROAT QL: NORMAL

## 2019-04-29 PROCEDURE — G8427 DOCREV CUR MEDS BY ELIG CLIN: HCPCS | Performed by: NURSE PRACTITIONER

## 2019-04-29 PROCEDURE — G8417 CALC BMI ABV UP PARAM F/U: HCPCS | Performed by: NURSE PRACTITIONER

## 2019-04-29 PROCEDURE — 87880 STREP A ASSAY W/OPTIC: CPT | Performed by: NURSE PRACTITIONER

## 2019-04-29 PROCEDURE — 99213 OFFICE O/P EST LOW 20 MIN: CPT | Performed by: NURSE PRACTITIONER

## 2019-04-29 PROCEDURE — 97110 THERAPEUTIC EXERCISES: CPT

## 2019-04-29 PROCEDURE — G0283 ELEC STIM OTHER THAN WOUND: HCPCS

## 2019-04-29 PROCEDURE — 1036F TOBACCO NON-USER: CPT | Performed by: NURSE PRACTITIONER

## 2019-04-29 RX ORDER — CETIRIZINE HYDROCHLORIDE 5 MG/1
10 TABLET ORAL DAILY
Qty: 300 ML | Refills: 2 | Status: SHIPPED | OUTPATIENT
Start: 2019-04-29 | End: 2019-12-28 | Stop reason: ALTCHOICE

## 2019-04-29 ASSESSMENT — ENCOUNTER SYMPTOMS
SINUS PAIN: 0
ABDOMINAL PAIN: 0
VOMITING: 0
SORE THROAT: 1
SWOLLEN GLANDS: 0
DIARRHEA: 0
WHEEZING: 0
NAUSEA: 0
COUGH: 0
RHINORRHEA: 1

## 2019-04-29 NOTE — PROGRESS NOTES
[facility] PHYSICIANS  [facility]  [address] [facility] [address]  Dept: [phone]  Dept Fax: [phone]    Dc Brown is a 34 y.o. female who presentsto the [facility] in Care today for her medical conditions/complaints as noted below. Dc Brown is c/o of Pharyngitis (started this AM, no fever, )      HPI:     Rona Kaminski is here today for a walk in care visit. URI    This is a recurrent problem. The current episode started 1 to 4 weeks ago. The problem has been waxing and waning. There has been no fever. Associated symptoms include congestion, rhinorrhea, sneezing and a sore throat. Pertinent negatives include no abdominal pain, chest pain, coughing, diarrhea, dysuria, ear pain, headaches, joint pain, joint swelling, nausea, neck pain, plugged ear sensation, rash, sinus pain, swollen glands, vomiting or wheezing. She has tried nothing for the symptoms. The treatment provided no relief. Past Medical History:   Diagnosis Date    Asthma     Back pain     Bipolar 1 disorder ([address]     Chicken pox     H/O echocardiogram 05/18/2018    EF 60%    History of Holter monitoring 04/27/2018    Baseline ECG shows sinus rhythm. Holter showed sinus tachy 61% of the time. Rare premature ventricular complexes were recorded. No episodes of superventricuarl or ventriuclar tacycardial was seen. Pt reported episodes of SOB and speedy heart with monitroing showing sinus tachy.      Lordosis     Pneumonia     PTSD (post-traumatic stress disorder)     UTI (urinary tract infection)         Current Outpatient Medications   Medication Sig Dispense Refill    cetirizine HCl (ZYRTEC) 5 MG/5ML SOLN Take 10 mLs by mouth daily 300 mL 2    SPRINTEC 28 0.25-35 MG-MCG per tablet Take 1 tablet by mouth daily      montelukast (SINGULAIR) 10 MG tablet Take 1 tablet by mouth daily 90 tablet 1    LATUDA 60 MG TABS tablet Take 60 mg by mouth nightly      traZODone (DESYREL) 50 Objective:     Physical Exam   Constitutional: She appears well-developed and well-nourished. No distress. HENT:   Nose: Mucosal edema present. No rhinorrhea. Mouth/Throat: Posterior oropharyngeal erythema (mild) present. Pale boggy turbinates bilaterally  Mild post nasal drip noted   Eyes: Conjunctivae are normal.   Neck: Normal range of motion. Neck supple. Cardiovascular: Normal rate and regular rhythm. Pulmonary/Chest: Effort normal and breath sounds normal. She has no wheezes. Lymphadenopathy:     She has no cervical adenopathy. Neurological: She is alert. Skin: Skin is warm and dry. No rash noted. Nursing note and vitals reviewed. /78 (Site: Left Upper Arm, Position: Sitting, Cuff Size: Large Adult)   Pulse 82   Temp 98.2 °F (36.8 °C) (Temporal)   Resp 18   Wt 235 lb 4.8 oz (106.7 kg)   LMP 04/02/2019   BMI 41.68 kg/m²     Assessment:      Diagnosis Orders   1. Seasonal allergic rhinitis due to pollen  cetirizine HCl (ZYRTEC) 5 MG/5ML SOLN   2. Sore throat  POCT rapid strep A       Plan:             Discussed exam, POCT findings, plan of care (including prescriptive and supportive as listed below) and follow-up atlength with patient and or Patient. Reviewed all prescribed and recommended medications, administration and side effects. Encouraged to return to [address] [place] [address] for noimprovement and or worsening of symptoms. To ER or call 911 if any difficulty breathing, shortness of breath, inability to swallow, hives or temp greater than 103 degrees. Questions answered. They verbalized understandingand agreement. Return if symptoms worsen or fail to improve. Orders Placed This Encounter   Medications    cetirizine HCl (ZYRTEC) 5 MG/5ML SOLN     Sig: Take 10 mLs by mouth daily     Dispense:  300 mL     Refill:  2          All patient questions answered. Pt voiced understanding.       Electronically signed by OSKAR Main CNP on 4/29/2019 at 6:02 PM

## 2019-04-29 NOTE — PROGRESS NOTES
term goal 2: Pt. to be able to bend forward and touch toes with decreased pain 2/10 at worst for improved mobility  []Met   []Partially met  []Not met   Short term goal 3: Initiate modalities/manual techniques prn to decrease pain - MET  [x]Met   []Partially met  []Not met      []Met   []Partially met  []Not met     Long Term Goals - Time Frame for Long term goals : 6 weeks  Long term goal 1: Pt. to demo improved core and B hip strength to 4+/5 grossly to improve posture in sitting and standing to improve functional mobility. []Met  []Partially met  []Not met   Long term goal 2: Pt. to report decreased pain to 1/10 at worst and ability to sit for >/=1 hour for improved QOL. []Met  []Partially met  []Not met   Long term goal 3: Pt. to have good pelvic alignment with no rotation of R hemipelvis for improved posture and functional mobility.  []Met  []Partially met  []Not met     []Met  []Partially met  []Not met     []Met  []Partially met  []Not met       Minutes Tracking:  Time In: 1430  Time Out: 1528  Minutes: 7[address] Lisa Prince  PT, DPT        Date: 4/29/2019

## 2019-04-29 NOTE — PATIENT INSTRUCTIONS
Force fluids    SURVEY:    You may be receiving a survey from [employer] regarding your visit today. Please complete the survey to enable us to provide the highest quality of care to you and your family. If you cannot score us a very good on any question, please call the office to discuss how we could have made your experience a very good one. Thank you. Patient Education        Rhinitis: Care Instructions  Your Care Instructions  Rhinitis is swelling and irritation in the nose. Allergies and infections are often the cause. Your nose may run or feel stuffy. Other symptoms are itchy and sore eyes, ears, throat, and mouth. If allergies are the cause, your doctor may do tests to find out what you are allergic to. You may be able to stop symptoms if you avoid the things that cause them. Your doctor may suggest or prescribe medicine to ease your symptoms. Follow-up care is a key part of your treatment and safety. Be sure to make and go to all appointments, and call your doctor if you are having problems. It's also a good idea to know your test results and keep a list of the medicines you take. How can you care for yourself at home? · If your rhinitis is caused by allergies, try to find out what sets off (triggers) your symptoms. Take steps to avoid these triggers. ? Avoid yard work. It can stir up both pollen and mold. ? Do not smoke or allow others to smoke around you. If you need help quitting, talk to your doctor about stop-smoking programs and medicines. These can increase your chances of quitting for good. ? Do not use aerosol sprays, cleaning products, or perfumes. ? If pollen is one of your triggers, close your house and car windows during blooming season. ? Clean your house often to control dust.  ? Keep pets outside. · If your doctor recommends over-the-counter medicines to relieve symptoms, take your medicines exactly as prescribed.  Call your doctor if you think you are having a problem with your medicine. · Use saline (saltwater) nasal washes to help keep your nasal passages open and wash out mucus and bacteria. You can buy saline nose drops at a grocery store or drugstore. Or you can make your own at home by adding 1 teaspoon of salt and 1 teaspoon of baking soda to 2 cups of distilled water. If you make your own, fill a bulb syringe with the solution, insert the tip into your nostril, and squeeze gently. Jennye Garb your nose. When should you call for help? Call your doctor now or seek immediate medical care if:    · You are having trouble breathing.    Watch closely for changes in your health, and be sure to contact your doctor if:    · Mucus from your nose gets thicker (like pus) or has new blood in it.     · You have new or worse symptoms.     · You do not get better as expected. Where can you learn more? Go to https://[employer]. org and sign in to your [employer] account. Enter M030 in the "[employer] box to learn more about \"Rhinitis: Care Instructions. \"     If you do not have an account, please click on the \"Sign Up Now\" link. Current as of: March 27, 2018  Content Version: 11.9  © 1565-8014 [employer]. Care instructions adapted under license by [facility] ([facility]). If you have questions about a medical condition or this instruction, always ask your healthcare professional. [place] [address] any warranty or liability for your use of this information.

## 2019-05-03 ENCOUNTER — APPOINTMENT (OUTPATIENT)
Dept: PHYSICAL THERAPY | Age: 29
End: 2019-05-03
Payer: COMMERCIAL

## 2019-05-08 ENCOUNTER — OFFICE VISIT (OUTPATIENT)
Dept: FAMILY MEDICINE CLINIC | Age: 29
End: 2019-05-08
Payer: COMMERCIAL

## 2019-05-08 ENCOUNTER — HOSPITAL ENCOUNTER (OUTPATIENT)
Dept: PHYSICAL THERAPY | Age: 29
Setting detail: THERAPIES SERIES
Discharge: HOME | End: 2019-05-08
Payer: COMMERCIAL

## 2019-05-08 VITALS
OXYGEN SATURATION: 98 % | TEMPERATURE: 98.1 F | SYSTOLIC BLOOD PRESSURE: 112 MMHG | WEIGHT: 236.2 LBS | BODY MASS INDEX: 41.84 KG/M2 | DIASTOLIC BLOOD PRESSURE: 74 MMHG | HEART RATE: 99 BPM

## 2019-05-08 DIAGNOSIS — F31.4 SEVERE BIPOLAR I DISORDER, CURRENT OR MOST RECENT EPISODE DEPRESSED (HCC): ICD-10-CM

## 2019-05-08 DIAGNOSIS — K21.9 GASTROESOPHAGEAL REFLUX DISEASE WITHOUT ESOPHAGITIS: Primary | ICD-10-CM

## 2019-05-08 DIAGNOSIS — J40 BRONCHITIS: ICD-10-CM

## 2019-05-08 PROCEDURE — 1036F TOBACCO NON-USER: CPT | Performed by: NURSE PRACTITIONER

## 2019-05-08 PROCEDURE — G8427 DOCREV CUR MEDS BY ELIG CLIN: HCPCS | Performed by: NURSE PRACTITIONER

## 2019-05-08 PROCEDURE — G8417 CALC BMI ABV UP PARAM F/U: HCPCS | Performed by: NURSE PRACTITIONER

## 2019-05-08 PROCEDURE — 97110 THERAPEUTIC EXERCISES: CPT

## 2019-05-08 PROCEDURE — 99213 OFFICE O/P EST LOW 20 MIN: CPT | Performed by: NURSE PRACTITIONER

## 2019-05-08 RX ORDER — FAMOTIDINE 40 MG/1
40 TABLET, FILM COATED ORAL EVERY EVENING
Qty: 30 TABLET | Refills: 0 | Status: SHIPPED | OUTPATIENT
Start: 2019-05-08 | End: 2019-09-27

## 2019-05-08 RX ORDER — AZITHROMYCIN 250 MG/1
TABLET, FILM COATED ORAL
Qty: 1 PACKET | Refills: 0 | Status: SHIPPED | OUTPATIENT
Start: 2019-05-08 | End: 2019-05-18

## 2019-05-08 ASSESSMENT — ENCOUNTER SYMPTOMS
CHEST TIGHTNESS: 0
COUGH: 1
SORE THROAT: 1
WHEEZING: 0
RHINORRHEA: 1
EYES NEGATIVE: 1
VOICE CHANGE: 1
FACIAL SWELLING: 0

## 2019-05-08 NOTE — PROGRESS NOTES
Phone: Felix           Fax: [phone]                           Outpatient Physical Therapy                                                                            Daily Note    Patient: Audi Mata : 1990  CSN #: [id number]   Referring Practitioner:  Kate Zarate    Referral Date : 19     Date: 2019    Diagnosis: Lumbar strain, S39.012A  Treatment Diagnosis: Low back pain    Onset Date: 04/10/19  PT Insurance Information: Reva  Total # of Visits Approved: 18 Per Physician Order  Total # of Visits to Date: 3  No Show: 1  Canceled Appointment: 2      Pre-Treatment Pain:  0/10  Subjective: Pt. reports 0/10 low back pain and is compliant with her HEP and it has been helping a lot. Exercises:  Exercise 1: HEP: LTR, SKTC, DKTC, supine PPT; added 19 bridges, SL hip abd, bird dog, paloff press, rows/ext with tband, standing PPT, wall squats  Exercise 6: Clams x15 R and L  Exercise 7: Scifit hills L2.5 x10 minutes  Exercise 8: Seated therapy ball rollouts 10x ea way  Exercise 9: Seated therapy ball abdominal isometrics [id number] hold  Exercise 10: Purple tband rows/ext seated on therapy ball 15x ea   Exercise 11: Standing PPT's against wall 15x 3 sec hold  Exercise 12: Paloff press 15x ea way purple tband one handle  Exercise 13: Bridges 15x DL  Exercise 14: Sidelying hip abduction 15x ea leg    Assessment  Assessment: Pt. has met all short term goals for improved mobility and is able to touch toes with no pain and is able to sit for as long as she wants with no pain and demo's good pelvic alignment. Pt. given update HEP handout and instructed on all new exercises and demo's good understanding. Pt. plans to workout at the  regularly and requests d/c from therapy at this time due to feeling better and having to start a new job. Will d/c patient at this time due to having met most goals and being independent and compliant with HEP.

## 2019-05-08 NOTE — PROGRESS NOTES
[facility] PHYSICIANS  [facility] OF [place]  [facility] [facility] [address]  Dept: [phone]  Dept Fax: [phone]    Last encounter 4/24/2019    HPI:   Antione Dos Santos is a 34 y.o. female who presentstoday for her medical conditions/complaints as noted below. Antione Dos Santos is c/o of Cough (lost voice)      HPI  Patient with onset allergy feeling and happened before sister's wedding. Around April 29th  Nasal stuffiness, scratchy throat. No fever. Started slight cough, now with laryngitis. Cough has progressed and now has productive yellow sputum  Patient with some laryngitis with nasal drainage being so thick    Patient bipolar with varying control. Overall stable and going to counseling. His work physical form for the [employer] that she would like completed today    Back  strain patient attended physical therapy has completed this and is pain-free with her back able to lift  Past Medical History:   Diagnosis Date    Asthma     Back pain     Bipolar 1 disorder ([facility] [address]     Chicken pox     H/O echocardiogram 05/18/2018    EF 60%    History of Holter monitoring 04/27/2018    Baseline ECG shows sinus rhythm. Holter showed sinus tachy 61% of the time. Rare premature ventricular complexes were recorded. No episodes of superventricuarl or ventriuclar tacycardial was seen. Pt reported episodes of SOB and speedy heart with monitroing showing sinus tachy.      Lordosis     Pneumonia     PTSD (post-traumatic stress disorder)     UTI (urinary tract infection)       Past Surgical History:   Procedure Laterality Date    NECK SURGERY Left lesion biopsy    UPPER GASTROINTESTINAL ENDOSCOPY      chicken stuck in throat    WISDOM TOOTH EXTRACTION         Family History   Problem Relation Age of Onset    Asthma Mother     High Blood Pressure Father     Diabetes Father           Social History     Tobacco Use    Smoking status: Never Smoker    Smokeless tobacco: Never Used   Substance Use Topics    Alcohol use: No     Comment: Rare      Current Outpatient Medications   Medication Sig Dispense Refill    azithromycin (ZITHROMAX Z-DEANNA) 250 MG tablet Two tablets by mouth the first day  then one tablet daily for 4 days. 1 packet 0    famotidine (PEPCID) 40 MG tablet Take 1 tablet by mouth every evening 30 tablet 0    cetirizine HCl (ZYRTEC) 5 MG/5ML SOLN Take 10 mLs by mouth daily 300 mL 2    SPRINTEC 28 0.25-35 MG-MCG per tablet Take 1 tablet by mouth daily      montelukast (SINGULAIR) 10 MG tablet Take 1 tablet by mouth daily 90 tablet 1    LATUDA 60 MG TABS tablet Take 60 mg by mouth nightly      traZODone (DESYREL) 50 MG tablet Take 50 mg by mouth nightly      ARIPiprazole (ABILIFY) 9.75 MG/1.3ML injection Inject 9.75 mg into the muscle every 30 days      valproate (DEPAKENE) 250 MG/5ML solution Take 5 mLs by mouth 2 times daily      amLODIPine (NORVASC) 2.5 MG tablet Take 1 tablet by mouth daily 30 tablet 11    esomeprazole (NEXIUM) 20 MG delayed release capsule Take 1 capsule by mouth every morning (before breakfast) 30 capsule 2    ibuprofen (ADVIL;MOTRIN) 600 MG tablet Take 1 tablet by mouth 4 times daily as needed for Pain 30 tablet 0    hydrOXYzine (VISTARIL) 50 MG capsule Take 50 mg by mouth 4 times daily as needed for Itching      albuterol sulfate HFA (PROAIR HFA) 108 (90 Base) MCG/ACT inhaler Inhale 2 puffs into the lungs every 6 hours as needed for Wheezing 1 Inhaler 3    naproxen (NAPROSYN) 500 MG tablet Take 1 tablet by mouth 2 times daily (Patient taking differently: Take 250 mg by mouth 2 times daily ) 30 tablet 0     No current facility-administered medications for this visit.       Allergies   Allergen Reactions    Doxycycline      Tongue itch, throat began to swell    Pollen Extract      Grass    Seroquel [Quetiapine Fumarate] Other (See Comments)     Paranoid made mood worse    Symbicort [Budesonide-Formoterol Fumarate] Other (See Comments) Delusional, paranoid, hallucinations.  Topamax [Topiramate] Other (See Comments)     Numbness in legs       Health Maintenance   Topic Date Due    HPV vaccine (1 - Female 3-dose series) 06/15/2007    Cervical cancer screen  06/15/2013    A1C test (Diabetic or Prediabetic)  02/19/2020    DTaP/Tdap/Td vaccine (2 - Td) 06/25/2024    Flu vaccine  Completed    HIV screen  Completed    Pneumococcal 0-64 years Vaccine  Aged Out       Subjective:      Review of Systems   Constitutional: Negative for activity change and fever. HENT: Positive for postnasal drip, rhinorrhea, sore throat and voice change. Negative for congestion and facial swelling. Eyes: Negative. Respiratory: Positive for cough. Negative for chest tightness and wheezing. Genitourinary: Negative for difficulty urinating. Musculoskeletal: Negative for arthralgias. Neurological: Negative for dizziness and headaches. Objective:     Physical Exam   Constitutional: She is oriented to person, place, and time. She appears well-developed. No distress. HENT:   Head: Normocephalic and atraumatic. Right Ear: External ear normal.   Left Ear: External ear normal.   Mouth/Throat: Oropharynx is clear and moist.   Sinus fullness present more in the right than the left visible postnasal drip with yellow thick drainage   Eyes: Pupils are equal, round, and reactive to light. No scleral icterus. Neck: Normal range of motion. Neck supple. No thyromegaly present. Cardiovascular: Normal rate, regular rhythm and normal heart sounds. No murmur heard. Pulmonary/Chest: Effort normal. No respiratory distress. She has no wheezes. She has rales (fine in right base with harsh cough). Abdominal: Soft. There is no tenderness. Musculoskeletal: Normal range of motion. Straight leg raise negative bilaterally   Lymphadenopathy:     She has no cervical adenopathy. Neurological: She is alert and oriented to person, place, and time.    Skin: Skin is warm and dry. Psychiatric: She has a normal mood and affect. Her behavior is normal. Judgment and thought content normal.   Nursing note and vitals reviewed. /74 (Site: Left Upper Arm, Position: Sitting, Cuff Size: Large Adult)   Pulse 99   Temp 98.1 °F (36.7 °C) (Oral)   Wt 236 lb 3.2 oz (107.1 kg)   LMP 04/28/2019   SpO2 98%   BMI 41.84 kg/m²     Data:     Lab Results   Component Value Date     03/04/2019    K 4.0 03/04/2019     03/04/2019    CO2 20 03/04/2019    BUN 13 03/04/2019    CREATININE 0.55 03/04/2019    GLUCOSE 98 03/04/2019    PROT 7.4 02/09/2019    LABALBU 4.0 02/09/2019    BILITOT 0.19 02/09/2019    ALKPHOS 73 02/09/2019    AST 13 02/09/2019    ALT 15 02/09/2019     Lab Results   Component Value Date    WBC 9.4 03/04/2019    RBC 4.73 03/04/2019    HGB 13.7 03/04/2019    HCT 41.1 03/04/2019    MCV 86.9 03/04/2019    MCH 29.0 03/04/2019    MCHC 33.3 03/04/2019    RDW 12.9 03/04/2019     03/04/2019    MPV 9.6 03/04/2019     Lab Results   Component Value Date    TSH 2.66 09/25/2018     Lab Results   Component Value Date    CHOL 190 02/09/2019    HDL 44 02/09/2019    LABA1C 5.7 02/19/2019          Assessment & Plan       1. Gastroesophageal reflux disease without esophagitis  Good hydration  L Pepcid for 10 days  - famotidine (PEPCID) 40 MG tablet; Take 1 tablet by mouth every evening  Dispense: 30 tablet; Refill: 0    2. Bronchitis  Transitioning from bronchitis to risk of community-acquired pneumonia will treat with Zithromax  - azithromycin (ZITHROMAX Z-DEANNA) 250 MG tablet; Two tablets by mouth the first day  then one tablet daily for 4 days. Dispense: 1 packet; Refill: 0    3.  Severe bipolar I disorder, current or most recent episode depressed ([address]  Remains on consistent medications with counseling and no recent hospitalizations within the last 3 months        Work form completed with history of recent back strain also is controlled on medication for her mental health disorder           Completed Refills   Requested Prescriptions     Signed Prescriptions Disp Refills    azithromycin (ZITHROMAX Z-DEANNA) 250 MG tablet 1 packet 0     Sig: Two tablets by mouth the first day  then one tablet daily for 4 days.  famotidine (PEPCID) 40 MG tablet 30 tablet 0     Sig: Take 1 tablet by mouth every evening     Return in about 6 months (around 11/8/2019). Orders Placed This Encounter   Medications    azithromycin (ZITHROMAX Z-DEANNA) 250 MG tablet     Sig: Two tablets by mouth the first day  then one tablet daily for 4 days. Dispense:  1 packet     Refill:  0    famotidine (PEPCID) 40 MG tablet     Sig: Take 1 tablet by mouth every evening     Dispense:  30 tablet     Refill:  0     No orders of the defined types were placed in this encounter. Rachael received counseling on the following healthy behaviors: nutrition, exercise and medication adherence  Reviewed prior labs and health maintenance. Continue current medications, diet and exercise. Discussed use, benefit, and side effects of prescribed medications. Barriers to medication compliance addressed. Patient given educational materials - see patient instructions. All patient questions answered. Patient voiced understanding.           Electronically signed by OSKAR Olvera CNP on 5/8/2019 at 6:13 PM

## 2019-05-08 NOTE — DISCHARGE SUMMARY
Phone: Felix          Fax: [phone]                            Outpatient Physical Therapy                                                                    Discharge Summary    Patient: Hector Larsen  : 1990  CSN #: [id number]   Referring physician: No admitting provider for patient encounter. Referring Practitioner: Dotty More      Diagnosis: Lumbar strain, S39.012A      Date Treatment Initiated: 19  Date of Last Treatment: 19      PT Visit Information  Onset Date: 04/10/19  PT Insurance Information: Reva  Total # of Visits Approved: 18  Total # of Visits to Date: 3  Plan of Care/Certification Expiration Date: 19  No Show: 1  Canceled Appointment: 2      Frequency/Duration  3 times per week  4-6 weeks      Treatment Received  [x] HP/CP      [x] Electrical Stim   [x] Therapeutic Exercise      [x] Gait Training  [] Aquatics   [] Ultrasound         [x] Patient Education/HEP   [x] Manual Therapy  [x] Traction    [x] Neuro-marybeth        [x] Soft Tissue Mobs            [] Home TENS  [] Iontophoresis    [] Orthotic casting/fitting      [x] Dry Needling    Assessment  Assessment: Pt. has met all short term goals for improved mobility and is able to touch toes with no pain and is able to sit for as long as she wants with no pain and demo's good pelvic alignment. Pt. given update HEP handout and instructed on all new exercises and demo's good understanding. Pt. plans to workout at the  regularly and requests d/c from therapy at this time due to feeling better and having to start a new job. Will d/c patient at this time due to having met most goals and being independent and compliant with HEP.         Goals  Short term goals  Time Frame for Short term goals: 3 weeks  Short term goal 1: Pt. to initiate HEP for core and B hip strengthening - MET  Short term goal 2: Pt. to be able to bend forward and touch toes with decreased pain 2/10 at worst

## 2019-05-08 NOTE — PATIENT INSTRUCTIONS
SURVEY:    You may be receiving a survey from [employer] regarding your visit today. Please complete the survey to enable us to provide the highest quality of care to you and your family. If you cannot score us a very good on any question, please call the office to discuss how we could have made your experience a very good one. Thank you.

## 2019-05-10 ENCOUNTER — APPOINTMENT (OUTPATIENT)
Dept: PHYSICAL THERAPY | Age: 29
End: 2019-05-10
Payer: COMMERCIAL

## 2019-05-13 ENCOUNTER — APPOINTMENT (OUTPATIENT)
Dept: PHYSICAL THERAPY | Age: 29
End: 2019-05-13
Payer: COMMERCIAL

## 2019-05-15 ENCOUNTER — APPOINTMENT (OUTPATIENT)
Dept: PHYSICAL THERAPY | Age: 29
End: 2019-05-15
Payer: COMMERCIAL

## 2019-05-17 ENCOUNTER — APPOINTMENT (OUTPATIENT)
Dept: PHYSICAL THERAPY | Age: 29
End: 2019-05-17
Payer: COMMERCIAL

## 2019-05-20 ENCOUNTER — APPOINTMENT (OUTPATIENT)
Dept: PHYSICAL THERAPY | Age: 29
End: 2019-05-20
Payer: COMMERCIAL

## 2019-05-21 ENCOUNTER — TELEPHONE (OUTPATIENT)
Dept: FAMILY MEDICINE CLINIC | Age: 29
End: 2019-05-21

## 2019-05-22 ENCOUNTER — APPOINTMENT (OUTPATIENT)
Dept: PHYSICAL THERAPY | Age: 29
End: 2019-05-22
Payer: COMMERCIAL

## 2019-05-24 ENCOUNTER — APPOINTMENT (OUTPATIENT)
Dept: PHYSICAL THERAPY | Age: 29
End: 2019-05-24
Payer: COMMERCIAL

## 2019-05-28 ENCOUNTER — APPOINTMENT (OUTPATIENT)
Dept: PHYSICAL THERAPY | Age: 29
End: 2019-05-28
Payer: COMMERCIAL

## 2019-05-29 ENCOUNTER — APPOINTMENT (OUTPATIENT)
Dept: PHYSICAL THERAPY | Age: 29
End: 2019-05-29
Payer: COMMERCIAL

## 2019-05-31 ENCOUNTER — APPOINTMENT (OUTPATIENT)
Dept: PHYSICAL THERAPY | Age: 29
End: 2019-05-31
Payer: COMMERCIAL

## 2019-08-26 ENCOUNTER — OFFICE VISIT (OUTPATIENT)
Dept: PRIMARY CARE CLINIC | Age: 29
End: 2019-08-26
Payer: COMMERCIAL

## 2019-08-26 VITALS
DIASTOLIC BLOOD PRESSURE: 79 MMHG | WEIGHT: 228.2 LBS | SYSTOLIC BLOOD PRESSURE: 102 MMHG | BODY MASS INDEX: 40.43 KG/M2 | HEIGHT: 63 IN | TEMPERATURE: 98.9 F | RESPIRATION RATE: 16 BRPM | HEART RATE: 106 BPM

## 2019-08-26 DIAGNOSIS — K52.9 GASTROENTERITIS, ACUTE: Primary | ICD-10-CM

## 2019-08-26 PROCEDURE — G8417 CALC BMI ABV UP PARAM F/U: HCPCS | Performed by: NURSE PRACTITIONER

## 2019-08-26 PROCEDURE — G8427 DOCREV CUR MEDS BY ELIG CLIN: HCPCS | Performed by: NURSE PRACTITIONER

## 2019-08-26 PROCEDURE — 1036F TOBACCO NON-USER: CPT | Performed by: NURSE PRACTITIONER

## 2019-08-26 PROCEDURE — 99213 OFFICE O/P EST LOW 20 MIN: CPT | Performed by: NURSE PRACTITIONER

## 2019-08-26 RX ORDER — ONDANSETRON 4 MG/1
4 TABLET, FILM COATED ORAL EVERY 8 HOURS PRN
Qty: 15 TABLET | Refills: 0 | Status: SHIPPED | OUTPATIENT
Start: 2019-08-26 | End: 2019-08-31

## 2019-08-26 ASSESSMENT — ENCOUNTER SYMPTOMS
NAUSEA: 1
DIARRHEA: 1
RESPIRATORY NEGATIVE: 1
VOMITING: 1
EYES NEGATIVE: 1

## 2019-08-26 NOTE — PROGRESS NOTES
mouth 4 times daily as needed for Itching      famotidine (PEPCID) 40 MG tablet Take 1 tablet by mouth every evening (Patient not taking: Reported on 8/26/2019) 30 tablet 0    cetirizine HCl (ZYRTEC) 5 MG/5ML SOLN Take 10 mLs by mouth daily (Patient not taking: Reported on 8/26/2019) 300 mL 2    SPRINTEC 28 0.25-35 MG-MCG per tablet Take 1 tablet by mouth daily      montelukast (SINGULAIR) 10 MG tablet Take 1 tablet by mouth daily (Patient not taking: Reported on 8/26/2019) 90 tablet 1    LATUDA 60 MG TABS tablet Take 60 mg by mouth nightly      valproate (DEPAKENE) 250 MG/5ML solution Take 5 mLs by mouth 2 times daily      esomeprazole (NEXIUM) 20 MG delayed release capsule Take 1 capsule by mouth every morning (before breakfast) (Patient not taking: Reported on 8/26/2019) 30 capsule 2    ibuprofen (ADVIL;MOTRIN) 600 MG tablet Take 1 tablet by mouth 4 times daily as needed for Pain (Patient not taking: Reported on 8/26/2019) 30 tablet 0    albuterol sulfate HFA (PROAIR HFA) 108 (90 Base) MCG/ACT inhaler Inhale 2 puffs into the lungs every 6 hours as needed for Wheezing (Patient not taking: Reported on 8/26/2019) 1 Inhaler 3    naproxen (NAPROSYN) 500 MG tablet Take 1 tablet by mouth 2 times daily (Patient not taking: Reported on 8/26/2019) 30 tablet 0     No current facility-administered medications for this visit. Allergies   Allergen Reactions    Doxycycline      Tongue itch, throat began to swell    Pollen Extract      Grass    Seroquel [Quetiapine Fumarate] Other (See Comments)     Paranoid made mood worse    Symbicort [Budesonide-Formoterol Fumarate] Other (See Comments)     Delusional, paranoid, hallucinations.  Topamax [Topiramate] Other (See Comments)     Numbness in legs       Subjective:      Review of Systems   Constitutional: Positive for appetite change, chills and fatigue. HENT: Positive for congestion (feels better today after taking OTC decongenstant). Eyes: Negative. Respiratory: Negative. Cardiovascular: Negative. Gastrointestinal: Positive for diarrhea, nausea and vomiting. Endocrine: Negative. Genitourinary: Negative. Musculoskeletal: Negative. Skin: Negative. Allergic/Immunologic: Positive for environmental allergies. Neurological: Positive for headaches. Hematological: Negative. Psychiatric/Behavioral: Negative. Objective:     Physical Exam   Constitutional: She is oriented to person, place, and time. She appears well-developed and well-nourished. HENT:   Head: Normocephalic. Right Ear: External ear normal.   Left Ear: External ear normal.   Mouth/Throat: Oropharynx is clear and moist.   Eyes: Pupils are equal, round, and reactive to light. Conjunctivae and EOM are normal.   Neck: Normal range of motion. Neck supple. Cardiovascular: Normal rate, regular rhythm and normal heart sounds. Pulmonary/Chest: Effort normal and breath sounds normal.   Abdominal: Soft. She exhibits no distension. Bowel sounds are increased. There is no tenderness. Musculoskeletal: Normal range of motion. Neurological: She is alert and oriented to person, place, and time. Skin: Skin is warm. Capillary refill takes less than 2 seconds. Psychiatric: She has a normal mood and affect. Her behavior is normal.   Nursing note and vitals reviewed. /79 (Site: Right Upper Arm, Position: Sitting, Cuff Size: Large Adult)   Pulse 106   Temp 98.9 °F (37.2 °C) (Temporal)   Resp 16   Ht 5' 3\" (1.6 m)   Wt 228 lb 3.2 oz (103.5 kg)   LMP 08/12/2019 (Approximate)   BMI 40.42 kg/m²     Assessment:      Diagnosis Orders   1. Gastroenteritis, acute  ondansetron (ZOFRAN) 4 MG tablet     No results found for this visit on 08/26/19. Plan:   · Encouraged to increase fluids, gatorade and electrolyte solutions, 6-8 glasses per day. Avoid soft drinks and fruit juices.   · Clear liquid diet for next 24 hours, broths, jello and oral rehydration solutions

## 2019-08-26 NOTE — PATIENT INSTRUCTIONS
clean. Wash your hands, cutting boards, and countertops with hot soapy water frequently. · Cook meat until it is well done. · Do not eat raw eggs or uncooked sauces made with raw eggs. · Do not take chances. If food looks or tastes spoiled, throw it out. When should you call for help? Call 911 anytime you think you may need emergency care. For example, call if:    · You vomit blood or what looks like coffee grounds.     · You passed out (lost consciousness).     · You pass maroon or very bloody stools.    Call your doctor now or seek immediate medical care if:    · You have severe belly pain.     · You have signs of needing more fluids. You have sunken eyes, a dry mouth, and pass only a little dark urine.     · You feel like you are going to faint.     · You have increased belly pain that does not go away in 1 to 2 days.     · You have new or increased nausea, or you are vomiting.     · You have a new or higher fever.     · Your stools are black and tarlike or have streaks of blood.    Watch closely for changes in your health, and be sure to contact your doctor if:    · You are dizzy or lightheaded.     · You urinate less than usual, or your urine is dark yellow or brown.     · You do not feel better with each day that goes by. Where can you learn more? Go to https://RevealperonIxchelsiseb.YES.TAP. org and sign in to your [facility] account. Enter N142 in the [facility] box to learn more about \"Gastroenteritis: Care Instructions. \"     If you do not have an account, please click on the \"Sign Up Now\" link. Current as of: July 30, 2018  Content Version: 12.1  © 8636-5943 [employer]. Care instructions adapted under license by [facility] ([facility]). If you have questions about a medical condition or this instruction, always ask your healthcare professional. [address]  any warranty or liability for your use of this information.          Patient Education        ondansetron instructions very carefully. Take the ondansetron regular tablet  with a full glass of water. To take the orally disintegrating tablet (Zofran ODT):  · Keep the tablet in its blister pack until you are ready to take it. Open the package and peel back the foil. Do not push a tablet through the foil or you may damage the tablet. · Use dry hands to remove the tablet and place it in your mouth. · Do not swallow the tablet whole. Allow it to dissolve in your mouth without chewing. · Swallow several times as the tablet dissolves. To use ondansetron oral soluble film (strip) (Bradley Lehighton):  · Keep the strip in the foil pouch until you are ready to use the medicine. · Using dry hands, remove the strip and place it on your tongue. It will begin to dissolve right away. · Do not swallow the strip whole. Allow it to dissolve in your mouth without chewing. · Swallow several times after the strip dissolves. If desired, you may drink liquid to help swallow the dissolved strip. · Wash your hands after using Bradley Lehighton. Measure liquid medicine with the dosing syringe provided, or with a special dose-measuring spoon or medicine cup. If you do not have a dose-measuring device, ask your pharmacist for one. Store at room temperature away from moisture, heat, and light. Store liquid medicine in an upright position. What happens if I miss a dose? Take the missed dose as soon as you remember. Skip the missed dose if it is almost time for your next scheduled dose. Do not  take extra medicine to make up the missed dose. What happens if I overdose? Seek emergency medical attention or call the Poison Help line at 1-518.844.1017. Overdose symptoms may include sudden loss of vision, severe constipation, feeling light-headed, or fainting. What should I avoid while taking ondansetron? Ondansetron may impair your thinking or reactions. Be careful if you drive or do anything that requires you to be alert.   What are the possible side herbal products. Give a list of all your medicines to any healthcare provider who treats you. Where can I get more information? Your pharmacist can provide more information about ondansetron. Remember, keep this and all other medicines out of the reach of children, never share your medicines with others, and use this medication only for the indication prescribed. Every effort has been made to ensure that the information provided by [address] [place] [address]  is accurate, up-to-date, and complete, but no guarantee is made to that effect. Drug information contained herein may be time sensitive. [facility] information has been compiled for use by healthcare practitioners and consumers in the [place] and therefore [facility] does not warrant that uses outside of the [place] are appropriate, unless specifically indicated otherwise. [facility]'s drug information does not endorse drugs, diagnose patients or recommend therapy. [facility]'s drug information is an informational resource designed to assist licensed healthcare practitioners in caring for their patients and/or to serve consumers viewing this service as a supplement to, and not a substitute for, the expertise, skill, knowledge and judgment of healthcare practitioners. The absence of a warning for a given drug or drug combination in no way should be construed to indicate that the drug or drug combination is safe, effective or appropriate for any given patient. [facility] does not assume any responsibility for any aspect of healthcare administered with the aid of information [facility] provides. The information contained herein is not intended to cover all possible uses, directions, precautions, warnings, drug interactions, allergic reactions, or adverse effects. If you have questions about the drugs you are taking, check with your doctor, nurse or pharmacist.  Copyright 8926-8603 Jak [address] [place] [address] Avenue: 13.01. Revision date: 10/21/2016.   Care instructions adapted

## 2019-09-13 ENCOUNTER — HOSPITAL ENCOUNTER (EMERGENCY)
Age: 29
Discharge: ANOTHER HOSPITAL | End: 2019-09-14
Payer: COMMERCIAL

## 2019-09-13 DIAGNOSIS — R45.851 SUICIDAL IDEATION: Primary | ICD-10-CM

## 2019-09-13 LAB
-: ABNORMAL
ACETAMINOPHEN LEVEL: <5 UG/ML (ref 10–30)
AMORPHOUS: ABNORMAL
AMPHETAMINE SCREEN URINE: NEGATIVE
ANION GAP SERPL CALCULATED.3IONS-SCNC: 15 MMOL/L (ref 9–17)
BACTERIA: ABNORMAL
BARBITURATE SCREEN URINE: NEGATIVE
BENZODIAZEPINE SCREEN, URINE: NEGATIVE
BILIRUBIN URINE: NEGATIVE
BUN BLDV-MCNC: 9 MG/DL (ref 6–20)
BUN/CREAT BLD: 15 (ref 9–20)
BUPRENORPHINE URINE: NEGATIVE
CALCIUM SERPL-MCNC: 9.8 MG/DL (ref 8.6–10.4)
CANNABINOID SCREEN URINE: NEGATIVE
CASTS UA: ABNORMAL /LPF
CHLORIDE BLD-SCNC: 100 MMOL/L (ref 98–107)
CO2: 24 MMOL/L (ref 20–31)
COCAINE METABOLITE, URINE: NEGATIVE
COLOR: YELLOW
COMMENT UA: ABNORMAL
CREAT SERPL-MCNC: 0.59 MG/DL (ref 0.5–0.9)
CRYSTALS, UA: ABNORMAL /HPF
EPITHELIAL CELLS UA: ABNORMAL /HPF (ref 0–25)
ETHANOL PERCENT: <0.01 %
ETHANOL: <10 MG/DL
GFR AFRICAN AMERICAN: >60 ML/MIN
GFR NON-AFRICAN AMERICAN: >60 ML/MIN
GFR SERPL CREATININE-BSD FRML MDRD: NORMAL ML/MIN/{1.73_M2}
GFR SERPL CREATININE-BSD FRML MDRD: NORMAL ML/MIN/{1.73_M2}
GLUCOSE BLD-MCNC: 79 MG/DL (ref 70–99)
GLUCOSE URINE: NEGATIVE
HCG(URINE) PREGNANCY TEST: NEGATIVE
HCT VFR BLD CALC: 44.7 % (ref 36.3–47.1)
HEMOGLOBIN: 14.8 G/DL (ref 11.9–15.1)
KETONES, URINE: NEGATIVE
LEUKOCYTE ESTERASE, URINE: ABNORMAL
MCH RBC QN AUTO: 28.6 PG (ref 25.2–33.5)
MCHC RBC AUTO-ENTMCNC: 33.1 G/DL (ref 28.4–34.8)
MCV RBC AUTO: 86.3 FL (ref 82.6–102.9)
MDMA URINE: NORMAL
METHADONE SCREEN, URINE: NEGATIVE
METHAMPHETAMINE, URINE: NEGATIVE
MUCUS: ABNORMAL
NITRITE, URINE: NEGATIVE
NRBC AUTOMATED: 0 PER 100 WBC
OPIATES, URINE: NEGATIVE
OTHER OBSERVATIONS UA: ABNORMAL
OXYCODONE SCREEN URINE: NEGATIVE
PDW BLD-RTO: 12.8 % (ref 11.8–14.4)
PH UA: 5.5 (ref 5–9)
PHENCYCLIDINE, URINE: NEGATIVE
PLATELET # BLD: 432 K/UL (ref 138–453)
PMV BLD AUTO: 9.6 FL (ref 8.1–13.5)
POTASSIUM SERPL-SCNC: 3.8 MMOL/L (ref 3.7–5.3)
PROPOXYPHENE, URINE: NEGATIVE
PROTEIN UA: NEGATIVE
RBC # BLD: 5.18 M/UL (ref 3.95–5.11)
RBC UA: ABNORMAL /HPF (ref 0–2)
RENAL EPITHELIAL, UA: ABNORMAL /HPF
SALICYLATE LEVEL: <1 MG/DL (ref 3–10)
SODIUM BLD-SCNC: 139 MMOL/L (ref 135–144)
SPECIFIC GRAVITY UA: 1.02 (ref 1.01–1.02)
TEST INFORMATION: NORMAL
TRICHOMONAS: ABNORMAL
TRICYCLIC ANTIDEPRESSANTS, UR: NEGATIVE
TURBIDITY: ABNORMAL
URINE HGB: ABNORMAL
UROBILINOGEN, URINE: NORMAL
WBC # BLD: 11.9 K/UL (ref 3.5–11.3)
WBC UA: ABNORMAL /HPF (ref 0–5)
YEAST: ABNORMAL

## 2019-09-13 PROCEDURE — 6370000000 HC RX 637 (ALT 250 FOR IP): Performed by: EMERGENCY MEDICINE

## 2019-09-13 PROCEDURE — 99285 EMERGENCY DEPT VISIT HI MDM: CPT

## 2019-09-13 PROCEDURE — 80306 DRUG TEST PRSMV INSTRMNT: CPT

## 2019-09-13 PROCEDURE — 81025 URINE PREGNANCY TEST: CPT

## 2019-09-13 PROCEDURE — 80307 DRUG TEST PRSMV CHEM ANLYZR: CPT

## 2019-09-13 PROCEDURE — 85027 COMPLETE CBC AUTOMATED: CPT

## 2019-09-13 PROCEDURE — G0480 DRUG TEST DEF 1-7 CLASSES: HCPCS

## 2019-09-13 PROCEDURE — 81001 URINALYSIS AUTO W/SCOPE: CPT

## 2019-09-13 PROCEDURE — 36415 COLL VENOUS BLD VENIPUNCTURE: CPT

## 2019-09-13 PROCEDURE — 80048 BASIC METABOLIC PNL TOTAL CA: CPT

## 2019-09-13 PROCEDURE — 87086 URINE CULTURE/COLONY COUNT: CPT

## 2019-09-13 RX ORDER — HYDROXYZINE 50 MG/1
50 TABLET, FILM COATED ORAL ONCE
Status: COMPLETED | OUTPATIENT
Start: 2019-09-13 | End: 2019-09-13

## 2019-09-13 RX ADMIN — HYDROXYZINE HYDROCHLORIDE 50 MG: 50 TABLET, FILM COATED ORAL at 21:47

## 2019-09-13 ASSESSMENT — ENCOUNTER SYMPTOMS
RHINORRHEA: 0
CHEST TIGHTNESS: 0
VOMITING: 0
BACK PAIN: 0
WHEEZING: 0
SORE THROAT: 0
BLOOD IN STOOL: 0
SHORTNESS OF BREATH: 0
ABDOMINAL PAIN: 0
NAUSEA: 0
CONSTIPATION: 0
EYE DISCHARGE: 0
COUGH: 0
DIARRHEA: 0
EYE REDNESS: 0

## 2019-09-13 ASSESSMENT — PATIENT HEALTH QUESTIONNAIRE - PHQ9: SUM OF ALL RESPONSES TO PHQ QUESTIONS 1-9: 14

## 2019-09-13 NOTE — ED PROVIDER NOTES
[facility]  eMERGENCY dEPARTMENT eNCOUnter      Pt Name: Sammi Rodriguez  MRN: [id number]  [place] 1990  Date of evaluation: 9/13/2019  Provider: Melania Simeon McCaulley Antonio     Chief Complaint   Patient presents with    Suicidal     Pt brought in by [facility], states she has been suicidal x 1 week         HISTORY OF PRESENT ILLNESS   (Location/Symptom, Timing/Onset, Context/Setting,Quality, Duration, Modifying Factors, Severity)  Note limiting factors. Sammi Rodriguez is a32 y.o. female who presents to the emergency department with complaints of suicidal ideation. She reports that she is been increasing and suicidal thoughts for the last week. Patient reports that she has a history of suicide attempt thoughts in the past.  She denies any chest pain shortness of breath denies abdominal pain nausea vomiting diarrhea. She denies any constipation. She states that she just has increased thoughts. She is here for medical screening exam.  She has no other complaints at this time. HPI    Nursing Notes werereviewed. REVIEW OF SYSTEMS    (2-9 systems for level 4, 10 or more for level 5)     Review of Systems   Constitutional: Negative for chills, diaphoresis and fever. HENT: Negative for congestion, ear pain, rhinorrhea and sore throat. Eyes: Negative for discharge, redness and visual disturbance. Respiratory: Negative for cough, chest tightness, shortness of breath and wheezing. Cardiovascular: Negative for chest pain and palpitations. Gastrointestinal: Negative for abdominal pain, blood in stool, constipation, diarrhea, nausea and vomiting. Endocrine: Negative for polydipsia, polyphagia and polyuria. Genitourinary: Negative for decreased urine volume, difficulty urinating, dysuria, frequency and hematuria. Musculoskeletal: Negative for arthralgias, back pain and myalgias. Skin: Negative for pallor and rash.    Allergic/Immunologic: Negative for status: Not on file    Intimate partner violence:     Fear of current or ex partner: Not on file     Emotionally abused: Not on file     Physically abused: Not on file     Forced sexual activity: Not on file   Other Topics Concern    Not on file   Social History Narrative    Not on file       SCREENINGS      @IUKO([id number])@      PHYSICAL EXAM    (up to 7 for level 4, 8 or more for level 5)     ED Triage Vitals   BP Temp Temp Source Pulse Resp SpO2 Height Weight   09/13/19 1843 09/13/19 1841 09/13/19 1841 09/13/19 1841 09/13/19 1841 09/13/19 1841 -- 09/13/19 1841   (!) 128/91 97.8 °F (36.6 °C) Temporal 100 18 95 %  230 lb (104.3 kg)       Physical Exam   Constitutional: She is oriented to person, place, and time. She appears well-developed and well-nourished. No distress. HENT:   Head: Normocephalic and atraumatic. Right Ear: External ear normal.   Left Ear: External ear normal.   Mouth/Throat: Oropharynx is clear and moist. No oropharyngeal exudate. Eyes: Pupils are equal, round, and reactive to light. Conjunctivae and EOM are normal. Right eye exhibits no discharge. Left eye exhibits no discharge. No scleral icterus. Neck: Normal range of motion. Neck supple. No tracheal deviation present. Cardiovascular: Normal rate, regular rhythm and intact distal pulses. Exam reveals no gallop and no friction rub. No murmur heard. Pulmonary/Chest: Effort normal and breath sounds normal. No stridor. No respiratory distress. She has no wheezes. She has no rales. Abdominal: Soft. Bowel sounds are normal. She exhibits no distension and no mass. There is no tenderness. There is no rebound and no guarding. Musculoskeletal: Normal range of motion. She exhibits no edema, tenderness or deformity. Neurological: She is alert and oriented to person, place, and time. She has normal reflexes. She displays normal reflexes. No cranial nerve deficit. She exhibits normal muscle tone. Skin: Skin is warm and dry.  No rash noted. She is not diaphoretic. No erythema. Psychiatric: She has a normal mood and affect. Her behavior is normal. She expresses suicidal ideation. She expresses suicidal plans. Nursing note and vitals reviewed. DIAGNOSTIC RESULTS     EKG: All EKG's are interpreted by the Emergency Department Physician who either signs orCo-signs this chart in the absence of a cardiologist.      RADIOLOGY:   Non-plainfilm images such as CT, Ultrasound and MRI are read by the radiologist. Plain radiographic images are visualized and preliminarily interpreted by the emergency physician with the below findings:      Interpretationper the Radiologist below, if available at the time of this note:    No orders to display         ED BEDSIDE ULTRASOUND:   Performed by ED Physician - none    LABS:  Labs Reviewed   CBC - Abnormal; Notable for the following components:       Result Value    WBC 11.9 (*)     RBC 5.18 (*)     All other components within normal limits   URINE RT REFLEX TO CULTURE - Abnormal; Notable for the following components:    Turbidity UA SLIGHTLY CLOUDY (*)     Urine Hgb TRACE (*)     Leukocyte Esterase, Urine TRACE (*)     All other components within normal limits   SALICYLATE LEVEL - Abnormal; Notable for the following components:    Salicylate Lvl <1 (*)     All other components within normal limits   ACETAMINOPHEN LEVEL - Abnormal; Notable for the following components:    Acetaminophen Level <5 (*)     All other components within normal limits   MICROSCOPIC URINALYSIS - Abnormal; Notable for the following components:    Bacteria, UA 1+ (*)     All other components within normal limits   URINE CULTURE   BASIC METABOLIC PANEL   PREGNANCY, URINE   URINE DRUG SCREEN   ETHANOL       All other labs were within normal range or not returned as of this dictation.     EMERGENCY DEPARTMENT COURSE and DIFFERENTIAL DIAGNOSIS/MDM:   Vitals:    Vitals:    09/13/19 1841 09/13/19 1843   BP:  (!) 128/91   Pulse: 100    Resp: 18

## 2019-09-13 NOTE — ED NOTES
Pt arrived with [facility] staff who is sitting with pt during the stay here     Shyla Gong RN  09/13/19 6583

## 2019-09-14 VITALS
WEIGHT: 230 LBS | HEART RATE: 87 BPM | BODY MASS INDEX: 40.74 KG/M2 | DIASTOLIC BLOOD PRESSURE: 84 MMHG | TEMPERATURE: 97.8 F | SYSTOLIC BLOOD PRESSURE: 117 MMHG | OXYGEN SATURATION: 97 % | RESPIRATION RATE: 16 BRPM

## 2019-09-15 LAB
CULTURE: NORMAL
Lab: NORMAL
SPECIMEN DESCRIPTION: NORMAL

## 2019-09-18 ENCOUNTER — TELEPHONE (OUTPATIENT)
Dept: FAMILY MEDICINE CLINIC | Age: 29
End: 2019-09-18

## 2019-09-19 DIAGNOSIS — J30.2 OTHER SEASONAL ALLERGIC RHINITIS: ICD-10-CM

## 2019-09-20 RX ORDER — MONTELUKAST SODIUM 10 MG/1
TABLET ORAL
Qty: 90 TABLET | Refills: 1 | Status: SHIPPED | OUTPATIENT
Start: 2019-09-20 | End: 2019-09-27 | Stop reason: SDUPTHER

## 2019-09-27 ENCOUNTER — OFFICE VISIT (OUTPATIENT)
Dept: PRIMARY CARE CLINIC | Age: 29
End: 2019-09-27
Payer: COMMERCIAL

## 2019-09-27 VITALS
BODY MASS INDEX: 40.73 KG/M2 | OXYGEN SATURATION: 99 % | RESPIRATION RATE: 20 BRPM | SYSTOLIC BLOOD PRESSURE: 113 MMHG | WEIGHT: 229.9 LBS | HEART RATE: 91 BPM | TEMPERATURE: 98.3 F | DIASTOLIC BLOOD PRESSURE: 68 MMHG | HEIGHT: 63 IN

## 2019-09-27 DIAGNOSIS — J30.2 OTHER SEASONAL ALLERGIC RHINITIS: ICD-10-CM

## 2019-09-27 DIAGNOSIS — F31.4 SEVERE BIPOLAR I DISORDER, CURRENT OR MOST RECENT EPISODE DEPRESSED (HCC): ICD-10-CM

## 2019-09-27 DIAGNOSIS — R12 HEARTBURN: ICD-10-CM

## 2019-09-27 DIAGNOSIS — Z76.89 ENCOUNTER TO ESTABLISH CARE: ICD-10-CM

## 2019-09-27 DIAGNOSIS — J45.990 EXERCISE-INDUCED ASTHMA: ICD-10-CM

## 2019-09-27 DIAGNOSIS — R00.0 TACHYCARDIA: ICD-10-CM

## 2019-09-27 DIAGNOSIS — E66.01 CLASS 3 SEVERE OBESITY DUE TO EXCESS CALORIES WITH BODY MASS INDEX (BMI) OF 40.0 TO 44.9 IN ADULT, UNSPECIFIED WHETHER SERIOUS COMORBIDITY PRESENT (HCC): Primary | ICD-10-CM

## 2019-09-27 PROCEDURE — 99214 OFFICE O/P EST MOD 30 MIN: CPT | Performed by: NURSE PRACTITIONER

## 2019-09-27 PROCEDURE — G8417 CALC BMI ABV UP PARAM F/U: HCPCS | Performed by: NURSE PRACTITIONER

## 2019-09-27 PROCEDURE — G8427 DOCREV CUR MEDS BY ELIG CLIN: HCPCS | Performed by: NURSE PRACTITIONER

## 2019-09-27 PROCEDURE — 1036F TOBACCO NON-USER: CPT | Performed by: NURSE PRACTITIONER

## 2019-09-27 RX ORDER — OMEPRAZOLE 20 MG/1
20 CAPSULE, DELAYED RELEASE ORAL DAILY
Qty: 30 CAPSULE | Refills: 1 | Status: SHIPPED | OUTPATIENT
Start: 2019-09-27 | End: 2019-12-28 | Stop reason: ALTCHOICE

## 2019-09-27 RX ORDER — MONTELUKAST SODIUM 10 MG/1
10 TABLET ORAL NIGHTLY
Qty: 90 TABLET | Refills: 1 | Status: ON HOLD | OUTPATIENT
Start: 2019-09-27 | End: 2019-12-31 | Stop reason: HOSPADM

## 2019-09-27 ASSESSMENT — PATIENT HEALTH QUESTIONNAIRE - PHQ9
SUM OF ALL RESPONSES TO PHQ9 QUESTIONS 1 & 2: 0
1. LITTLE INTEREST OR PLEASURE IN DOING THINGS: 0
2. FEELING DOWN, DEPRESSED OR HOPELESS: 0
SUM OF ALL RESPONSES TO PHQ QUESTIONS 1-9: 0
SUM OF ALL RESPONSES TO PHQ QUESTIONS 1-9: 0

## 2019-10-01 ASSESSMENT — ENCOUNTER SYMPTOMS
EYE DISCHARGE: 0
TROUBLE SWALLOWING: 0
COUGH: 0
EYE ITCHING: 0
WHEEZING: 0
SORE THROAT: 0
NAUSEA: 0
DIARRHEA: 0
CONSTIPATION: 0
VOMITING: 0
CHEST TIGHTNESS: 0
SHORTNESS OF BREATH: 0
ABDOMINAL PAIN: 0
RHINORRHEA: 0

## 2019-11-12 ENCOUNTER — OFFICE VISIT (OUTPATIENT)
Dept: PRIMARY CARE CLINIC | Age: 29
End: 2019-11-12
Payer: MEDICARE

## 2019-11-12 VITALS
BODY MASS INDEX: 40.94 KG/M2 | WEIGHT: 231.1 LBS | TEMPERATURE: 97.7 F | DIASTOLIC BLOOD PRESSURE: 81 MMHG | HEART RATE: 112 BPM | RESPIRATION RATE: 20 BRPM | OXYGEN SATURATION: 98 % | SYSTOLIC BLOOD PRESSURE: 102 MMHG

## 2019-11-12 DIAGNOSIS — R13.10 DYSPHAGIA, UNSPECIFIED TYPE: Primary | ICD-10-CM

## 2019-11-12 DIAGNOSIS — Z23 NEED FOR INFLUENZA VACCINATION: ICD-10-CM

## 2019-11-12 PROCEDURE — G8482 FLU IMMUNIZE ORDER/ADMIN: HCPCS | Performed by: NURSE PRACTITIONER

## 2019-11-12 PROCEDURE — 1036F TOBACCO NON-USER: CPT | Performed by: NURSE PRACTITIONER

## 2019-11-12 PROCEDURE — G0008 ADMIN INFLUENZA VIRUS VAC: HCPCS | Performed by: NURSE PRACTITIONER

## 2019-11-12 PROCEDURE — 99214 OFFICE O/P EST MOD 30 MIN: CPT | Performed by: NURSE PRACTITIONER

## 2019-11-12 PROCEDURE — 90686 IIV4 VACC NO PRSV 0.5 ML IM: CPT | Performed by: NURSE PRACTITIONER

## 2019-11-12 PROCEDURE — G8428 CUR MEDS NOT DOCUMENT: HCPCS | Performed by: NURSE PRACTITIONER

## 2019-11-12 PROCEDURE — G8417 CALC BMI ABV UP PARAM F/U: HCPCS | Performed by: NURSE PRACTITIONER

## 2019-11-12 ASSESSMENT — ENCOUNTER SYMPTOMS
VOMITING: 0
DIARRHEA: 0
COUGH: 0
ABDOMINAL PAIN: 0
NAUSEA: 0
TROUBLE SWALLOWING: 1
SHORTNESS OF BREATH: 0
EYE ITCHING: 0
RHINORRHEA: 0
EYE DISCHARGE: 0
CONSTIPATION: 0
SORE THROAT: 0
WHEEZING: 0
CHEST TIGHTNESS: 0

## 2019-11-20 ENCOUNTER — HOSPITAL ENCOUNTER (OUTPATIENT)
Dept: SPEECH THERAPY | Age: 29
Setting detail: THERAPIES SERIES
Discharge: HOME | End: 2019-11-20
Payer: MEDICARE

## 2019-11-20 PROCEDURE — 92610 EVALUATE SWALLOWING FUNCTION: CPT

## 2019-11-20 ASSESSMENT — PAIN SCALES - GENERAL: PAINLEVEL_OUTOF10: 0

## 2019-11-20 ASSESSMENT — PAIN - FUNCTIONAL ASSESSMENT: PAIN_FUNCTIONAL_ASSESSMENT: 0-10

## 2019-12-11 ENCOUNTER — TELEPHONE (OUTPATIENT)
Dept: PRIMARY CARE CLINIC | Age: 29
End: 2019-12-11

## 2019-12-11 ENCOUNTER — HOSPITAL ENCOUNTER (OUTPATIENT)
Dept: GENERAL RADIOLOGY | Age: 29
Discharge: HOME | End: 2019-12-13
Payer: MEDICARE

## 2019-12-11 DIAGNOSIS — R13.10 DYSPHAGIA, UNSPECIFIED TYPE: ICD-10-CM

## 2019-12-11 DIAGNOSIS — R13.10 DYSPHAGIA, UNSPECIFIED TYPE: Primary | ICD-10-CM

## 2019-12-11 PROCEDURE — A4641 RADIOPHARM DX AGENT NOC: HCPCS | Performed by: NURSE PRACTITIONER

## 2019-12-11 PROCEDURE — 74220 X-RAY XM ESOPHAGUS 1CNTRST: CPT

## 2019-12-11 PROCEDURE — 6360000004 HC RX CONTRAST MEDICATION: Performed by: NURSE PRACTITIONER

## 2019-12-11 PROCEDURE — 92611 MOTION FLUOROSCOPY/SWALLOW: CPT

## 2019-12-11 PROCEDURE — 74230 X-RAY XM SWLNG FUNCJ C+: CPT

## 2019-12-11 RX ADMIN — BARIUM SULFATE 155 ML: 0.6 SUSPENSION ORAL at 09:24

## 2019-12-11 RX ADMIN — BARIUM SULFATE 250 ML: 0.6 SUSPENSION ORAL at 09:23

## 2019-12-12 ENCOUNTER — TELEPHONE (OUTPATIENT)
Dept: PRIMARY CARE CLINIC | Age: 29
End: 2019-12-12

## 2019-12-12 DIAGNOSIS — R13.10 DYSPHAGIA, UNSPECIFIED TYPE: Primary | ICD-10-CM

## 2019-12-28 ENCOUNTER — HOSPITAL ENCOUNTER (EMERGENCY)
Age: 29
Discharge: ANOTHER HOSPITAL | End: 2019-12-28
Attending: EMERGENCY MEDICINE
Payer: MEDICARE

## 2019-12-28 VITALS
DIASTOLIC BLOOD PRESSURE: 92 MMHG | OXYGEN SATURATION: 97 % | HEART RATE: 97 BPM | WEIGHT: 230 LBS | RESPIRATION RATE: 18 BRPM | TEMPERATURE: 98.4 F | SYSTOLIC BLOOD PRESSURE: 125 MMHG | BODY MASS INDEX: 40.74 KG/M2

## 2019-12-28 DIAGNOSIS — F32.A DEPRESSION WITH SUICIDAL IDEATION: Primary | ICD-10-CM

## 2019-12-28 DIAGNOSIS — R45.851 DEPRESSION WITH SUICIDAL IDEATION: Primary | ICD-10-CM

## 2019-12-28 LAB
-: ABNORMAL
ACETAMINOPHEN LEVEL: <5 UG/ML (ref 10–30)
AMORPHOUS: ABNORMAL
AMPHETAMINE SCREEN URINE: NEGATIVE
ANION GAP SERPL CALCULATED.3IONS-SCNC: 11 MMOL/L (ref 9–17)
BACTERIA: ABNORMAL
BARBITURATE SCREEN URINE: NEGATIVE
BENZODIAZEPINE SCREEN, URINE: NEGATIVE
BILIRUBIN URINE: NEGATIVE
BUN BLDV-MCNC: 12 MG/DL (ref 6–20)
BUN/CREAT BLD: 20 (ref 9–20)
BUPRENORPHINE URINE: NEGATIVE
CALCIUM SERPL-MCNC: 9.6 MG/DL (ref 8.6–10.4)
CANNABINOID SCREEN URINE: NEGATIVE
CASTS UA: ABNORMAL /LPF
CHLORIDE BLD-SCNC: 102 MMOL/L (ref 98–107)
CO2: 24 MMOL/L (ref 20–31)
COCAINE METABOLITE, URINE: NEGATIVE
COLOR: YELLOW
COMMENT UA: NORMAL
CREAT SERPL-MCNC: 0.6 MG/DL (ref 0.5–0.9)
CRYSTALS, UA: ABNORMAL /HPF
EPITHELIAL CELLS UA: ABNORMAL /HPF (ref 0–25)
ETHANOL PERCENT: <0.01 %
ETHANOL: <10 MG/DL
GFR AFRICAN AMERICAN: >60 ML/MIN
GFR NON-AFRICAN AMERICAN: >60 ML/MIN
GFR SERPL CREATININE-BSD FRML MDRD: NORMAL ML/MIN/{1.73_M2}
GFR SERPL CREATININE-BSD FRML MDRD: NORMAL ML/MIN/{1.73_M2}
GLUCOSE BLD-MCNC: 93 MG/DL (ref 70–99)
GLUCOSE URINE: NEGATIVE
HCG(URINE) PREGNANCY TEST: NEGATIVE
HCT VFR BLD CALC: 42.1 % (ref 36.3–47.1)
HEMOGLOBIN: 13.9 G/DL (ref 11.9–15.1)
KETONES, URINE: NEGATIVE
LEUKOCYTE ESTERASE, URINE: NEGATIVE
MCH RBC QN AUTO: 28.6 PG (ref 25.2–33.5)
MCHC RBC AUTO-ENTMCNC: 33 G/DL (ref 28.4–34.8)
MCV RBC AUTO: 86.6 FL (ref 82.6–102.9)
MDMA URINE: NORMAL
METHADONE SCREEN, URINE: NEGATIVE
METHAMPHETAMINE, URINE: NEGATIVE
MUCUS: ABNORMAL
NITRITE, URINE: NEGATIVE
NRBC AUTOMATED: 0 PER 100 WBC
OPIATES, URINE: NEGATIVE
OTHER OBSERVATIONS UA: ABNORMAL
OXYCODONE SCREEN URINE: NEGATIVE
PDW BLD-RTO: 12.9 % (ref 11.8–14.4)
PH UA: 8 (ref 5–9)
PHENCYCLIDINE, URINE: NEGATIVE
PLATELET # BLD: 408 K/UL (ref 138–453)
PMV BLD AUTO: 9.4 FL (ref 8.1–13.5)
POTASSIUM SERPL-SCNC: 4.1 MMOL/L (ref 3.7–5.3)
PROPOXYPHENE, URINE: NEGATIVE
PROTEIN UA: NEGATIVE
RBC # BLD: 4.86 M/UL (ref 3.95–5.11)
RBC UA: ABNORMAL /HPF (ref 0–2)
RENAL EPITHELIAL, UA: ABNORMAL /HPF
SALICYLATE LEVEL: <1 MG/DL (ref 3–10)
SODIUM BLD-SCNC: 137 MMOL/L (ref 135–144)
SPECIFIC GRAVITY UA: 1.01 (ref 1.01–1.02)
TEST INFORMATION: NORMAL
TRICHOMONAS: ABNORMAL
TRICYCLIC ANTIDEPRESSANTS, UR: NEGATIVE
TURBIDITY: CLEAR
URINE HGB: NEGATIVE
UROBILINOGEN, URINE: NORMAL
WBC # BLD: 11 K/UL (ref 3.5–11.3)
WBC UA: ABNORMAL /HPF (ref 0–5)
YEAST: ABNORMAL

## 2019-12-28 PROCEDURE — 81025 URINE PREGNANCY TEST: CPT

## 2019-12-28 PROCEDURE — 80307 DRUG TEST PRSMV CHEM ANLYZR: CPT

## 2019-12-28 PROCEDURE — 85027 COMPLETE CBC AUTOMATED: CPT

## 2019-12-28 PROCEDURE — G0480 DRUG TEST DEF 1-7 CLASSES: HCPCS

## 2019-12-28 PROCEDURE — 80306 DRUG TEST PRSMV INSTRMNT: CPT

## 2019-12-28 PROCEDURE — 99285 EMERGENCY DEPT VISIT HI MDM: CPT

## 2019-12-28 PROCEDURE — 80048 BASIC METABOLIC PNL TOTAL CA: CPT

## 2019-12-28 PROCEDURE — 81001 URINALYSIS AUTO W/SCOPE: CPT

## 2019-12-28 RX ORDER — BENZTROPINE MESYLATE 1 MG/ML
2 INJECTION INTRAMUSCULAR; INTRAVENOUS 2 TIMES DAILY PRN
Status: CANCELLED | OUTPATIENT
Start: 2019-12-28

## 2019-12-28 RX ORDER — NICOTINE 21 MG/24HR
1 PATCH, TRANSDERMAL 24 HOURS TRANSDERMAL DAILY
Status: CANCELLED | OUTPATIENT
Start: 2019-12-29

## 2019-12-28 RX ORDER — TRAZODONE HYDROCHLORIDE 50 MG/1
50 TABLET ORAL NIGHTLY PRN
Status: CANCELLED | OUTPATIENT
Start: 2019-12-29

## 2019-12-28 RX ORDER — ACETAMINOPHEN 325 MG/1
650 TABLET ORAL EVERY 4 HOURS PRN
Status: CANCELLED | OUTPATIENT
Start: 2019-12-28

## 2019-12-28 RX ORDER — HYDROXYZINE HYDROCHLORIDE 25 MG/1
25 TABLET, FILM COATED ORAL 3 TIMES DAILY PRN
Status: CANCELLED | OUTPATIENT
Start: 2019-12-28

## 2019-12-28 RX ORDER — MAGNESIUM HYDROXIDE/ALUMINUM HYDROXICE/SIMETHICONE 120; 1200; 1200 MG/30ML; MG/30ML; MG/30ML
30 SUSPENSION ORAL EVERY 6 HOURS PRN
Status: CANCELLED | OUTPATIENT
Start: 2019-12-28

## 2019-12-28 ASSESSMENT — ENCOUNTER SYMPTOMS
VOMITING: 0
BACK PAIN: 0
BLOOD IN STOOL: 0
EYE DISCHARGE: 0
SORE THROAT: 0
EYE REDNESS: 0
NAUSEA: 0
CONSTIPATION: 0
SHORTNESS OF BREATH: 0
ABDOMINAL PAIN: 0
CHEST TIGHTNESS: 0
DIARRHEA: 0
WHEEZING: 0
COUGH: 0
RHINORRHEA: 0

## 2019-12-28 ASSESSMENT — PATIENT HEALTH QUESTIONNAIRE - PHQ9: SUM OF ALL RESPONSES TO PHQ QUESTIONS 1-9: 19

## 2019-12-29 ENCOUNTER — HOSPITAL ENCOUNTER (INPATIENT)
Age: 29
LOS: 3 days | Discharge: HOME | DRG: 885 | End: 2020-01-01
Attending: PSYCHIATRY & NEUROLOGY | Admitting: PSYCHIATRY & NEUROLOGY
Payer: MEDICARE

## 2019-12-29 PROCEDURE — 6370000000 HC RX 637 (ALT 250 FOR IP): Performed by: PHYSICIAN ASSISTANT

## 2019-12-29 PROCEDURE — 6370000000 HC RX 637 (ALT 250 FOR IP): Performed by: NURSE PRACTITIONER

## 2019-12-29 PROCEDURE — 1240000000 HC EMOTIONAL WELLNESS R&B

## 2019-12-29 PROCEDURE — 90792 PSYCH DIAG EVAL W/MED SRVCS: CPT | Performed by: NURSE PRACTITIONER

## 2019-12-29 RX ORDER — TRAZODONE HYDROCHLORIDE 50 MG/1
50 TABLET ORAL NIGHTLY PRN
Status: DISCONTINUED | OUTPATIENT
Start: 2019-12-29 | End: 2019-12-30

## 2019-12-29 RX ORDER — MAGNESIUM HYDROXIDE/ALUMINUM HYDROXICE/SIMETHICONE 120; 1200; 1200 MG/30ML; MG/30ML; MG/30ML
30 SUSPENSION ORAL EVERY 6 HOURS PRN
Status: DISCONTINUED | OUTPATIENT
Start: 2019-12-29 | End: 2020-01-01 | Stop reason: HOSPADM

## 2019-12-29 RX ORDER — ACETAMINOPHEN 325 MG/1
650 TABLET ORAL EVERY 4 HOURS PRN
Status: DISCONTINUED | OUTPATIENT
Start: 2019-12-29 | End: 2020-01-01 | Stop reason: HOSPADM

## 2019-12-29 RX ORDER — AMLODIPINE BESYLATE 5 MG/1
5 TABLET ORAL DAILY
Status: DISCONTINUED | OUTPATIENT
Start: 2019-12-29 | End: 2020-01-01 | Stop reason: HOSPADM

## 2019-12-29 RX ORDER — BENZTROPINE MESYLATE 1 MG/ML
2 INJECTION INTRAMUSCULAR; INTRAVENOUS 2 TIMES DAILY PRN
Status: DISCONTINUED | OUTPATIENT
Start: 2019-12-29 | End: 2019-12-31

## 2019-12-29 RX ORDER — CLONIDINE HYDROCHLORIDE 0.1 MG/1
0.1 TABLET ORAL 2 TIMES DAILY PRN
Status: DISCONTINUED | OUTPATIENT
Start: 2019-12-29 | End: 2020-01-01 | Stop reason: HOSPADM

## 2019-12-29 RX ORDER — NAPROXEN 500 MG/1
500 TABLET ORAL 2 TIMES DAILY PRN
Status: DISCONTINUED | OUTPATIENT
Start: 2019-12-29 | End: 2020-01-01 | Stop reason: HOSPADM

## 2019-12-29 RX ORDER — NICOTINE 21 MG/24HR
1 PATCH, TRANSDERMAL 24 HOURS TRANSDERMAL DAILY
Status: DISCONTINUED | OUTPATIENT
Start: 2019-12-29 | End: 2019-12-29

## 2019-12-29 RX ORDER — HYDROXYZINE HYDROCHLORIDE 25 MG/1
25 TABLET, FILM COATED ORAL 3 TIMES DAILY PRN
Status: DISCONTINUED | OUTPATIENT
Start: 2019-12-29 | End: 2020-01-01 | Stop reason: HOSPADM

## 2019-12-29 RX ADMIN — TRAZODONE HYDROCHLORIDE 50 MG: 50 TABLET ORAL at 21:07

## 2019-12-29 RX ADMIN — NAPROXEN 500 MG: 500 TABLET ORAL at 11:26

## 2019-12-29 RX ADMIN — ACETAMINOPHEN 650 MG: 325 TABLET, FILM COATED ORAL at 02:27

## 2019-12-29 RX ADMIN — AMLODIPINE BESYLATE 5 MG: 5 TABLET ORAL at 15:19

## 2019-12-29 RX ADMIN — LURASIDONE HYDROCHLORIDE 40 MG: 40 TABLET, FILM COATED ORAL at 17:37

## 2019-12-29 ASSESSMENT — PAIN DESCRIPTION - PROGRESSION: CLINICAL_PROGRESSION: NOT CHANGED

## 2019-12-29 ASSESSMENT — SLEEP AND FATIGUE QUESTIONNAIRES
RESTFUL SLEEP: YES
DO YOU HAVE DIFFICULTY SLEEPING: NO
DO YOU USE A SLEEP AID: YES
DIFFICULTY STAYING ASLEEP: NO
DIFFICULTY STAYING ASLEEP: NO
SLEEP PATTERN: NORMAL
DIFFICULTY ARISING: NO
RESTFUL SLEEP: YES
SLEEP PATTERN: NORMAL
DIFFICULTY ARISING: NO
AVERAGE NUMBER OF SLEEP HOURS: 6
DIFFICULTY FALLING ASLEEP: NO
DO YOU HAVE DIFFICULTY SLEEPING: NO
DO YOU USE A SLEEP AID: NO
DIFFICULTY FALLING ASLEEP: NO
AVERAGE NUMBER OF SLEEP HOURS: 6

## 2019-12-29 ASSESSMENT — LIFESTYLE VARIABLES
HISTORY_ALCOHOL_USE: NO
HISTORY_ALCOHOL_USE: NO

## 2019-12-29 ASSESSMENT — PATIENT HEALTH QUESTIONNAIRE - PHQ9: SUM OF ALL RESPONSES TO PHQ QUESTIONS 1-9: 11

## 2019-12-29 ASSESSMENT — PAIN - FUNCTIONAL ASSESSMENT: PAIN_FUNCTIONAL_ASSESSMENT: ACTIVITIES ARE NOT PREVENTED

## 2019-12-29 ASSESSMENT — PAIN SCALES - GENERAL
PAINLEVEL_OUTOF10: 5
PAINLEVEL_OUTOF10: 0
PAINLEVEL_OUTOF10: 3
PAINLEVEL_OUTOF10: 5

## 2019-12-29 ASSESSMENT — PAIN DESCRIPTION - FREQUENCY: FREQUENCY: INTERMITTENT

## 2019-12-29 ASSESSMENT — PAIN DESCRIPTION - PAIN TYPE: TYPE: ACUTE PAIN

## 2019-12-29 ASSESSMENT — PAIN DESCRIPTION - DESCRIPTORS: DESCRIPTORS: HEADACHE

## 2019-12-29 ASSESSMENT — PAIN DESCRIPTION - LOCATION: LOCATION: HEAD

## 2019-12-29 NOTE — GROUP NOTE
Group Therapy Note    Date: 12/29/2019    Group Start Time: 0900  Group End Time: 1096  Group Topic: Community Meeting    [facility] [place], [address]        Group Therapy Note    Attendees: 10/19              Patient's Goal:  To increase social interaction and to explore and identify short term goals r/t wellness and recovery. RT discussed group schedule and unit structure/resources and encouraged pts to be engaged in their treatment. Pts were given opportunities to ask questions. Notes: Pt participated in group task and was able to identify short term goals r/t wellness and recovery. Pt is pleasant and supportive of peers        Status After Intervention:  Improved     Participation Level:  Active Listener and Interactive     Participation Quality: Appropriate, Attentive, Sharing         Speech:   Normal     Thought Process/Content: Logical,linear     Affective Functioning: Blunted, anxious    Mood: anxious, left group early        Level of consciousness:  Alert, and Attentive        Response to Learning: Able to verbalize current knowledge/experience, Able to verbalize/acknowledge new learning, and Progressing to goal        Endings: None Reported     Modes of Intervention: Education, Support, Socialization, Exploration, Clarifying and Problem-solving        Discipline Responsible: Psychoeducational Specialist        Signature:  Ab Mccormick

## 2019-12-29 NOTE — BH NOTE
BHI Biopsychosocial Assessment    Current Level of Psychosocial Functioning     Independent   Dependent  X  Minimal Assist     Comments:  Client reports a history a Schizophrenia    Psychosocial High Risk Factors (check all that apply)    Unable to obtain meds X  Chronic illness/pain    Substance abuse   Lack of Family Support   Financial stress   Isolation X  Inadequate Community Resources  Suicide attempt(s)  Not taking medications X  Victim of crime   Developmental Delay  Unable to manage personal needs    Age 72 or older   Homeless  No transportation   Readmission within 30 days  Unemployment  Traumatic Event    Psychiatric Advanced Directives:  Nothing reported    Family to Involve in Treatment: Client reports both father and boyfriend are supportive and will be involved in treatment    Sexual Orientation:  Client reports in current relationship    Patient Strengths: SSDI $1040 per month; food stamps; Medicare/Medicaid; safe housing; good relationship and support from boyfriend; some family support    Patient Barriers: Medications    Opiate Education Provided:  N/A    CMHC/mental health history: Jose in [facility] of Care   medication management, group/individual therapies, family meetings, psycho -education, treatment team meetings to assist with stabilization    Initial Discharge Plan:  Stabilize mood and medications; follow-up appointment at [facility]; return to home address on facesheet; complimentary transportation home if does not have a ride    Clinical Summary:  Writer meets with client and completed assessment. Ms. Tutu Hare is a 28-year  female who voluntarily went to [facility] ED with suicidal ideations with plan to cut wrists. Client also reports \"not feeling right and could tell the voices were beginning to start. \"  Client reports wanted to get help and did not want to go to [facility] psychiatric unit because \"they are not helpful. \"  [facility] [place] and saw Dr. Etta Dimas 2-weeks ago and reported to her about medications not working. Client reports some changes were made awhile ago and doctor told her she \"needed to wait it out for the medications to work. \"  Client states \"I told the doctor I could feel the voices coming back but she did not believe me. \"    During assessment client was alert, fully oriented, and cooperative. Client's appearance and hygiene were good. Client mood was depressed, affect was fairly bright, thought process was linear and well-organized. Client does confirm an increase in depression and suicidal thoughts over the past few days because \"the people at [facility] don't believe me. \"  Client reports intermittent command and auditory hallucinations but unable to provide specifics. Client currently appears absent of self-harm and able to contract for safety, confirming she will reach out to staff if suicidal thoughts begin to increase. Client denies all delusions. Client denies any legal issues. Client reports born in [facility] and raised [place]. Client reports raised by both parents and they are alive, both provide some support, and believes mother suffers from depression. Client reports in a 3-year relationship; no children; 1 sister who is , does not like her brother-in-law because Aryan Umanzor took advantage of me when I was really sick. \"  Client reports 1 friend, Maddie Nesbitt and helps her with her Autistic son. Client reports receives SSDI and food stamps and boyfriend works daily. Client reports she keeps herself busy during the day with new \"emotional support puppy,\" arts & crafts, watching original [employer] and spending time with her boyfriend. Katharine Phoenix will continue to provide therapeutic support, empathetic care and psycho education.   Writer encourages group, 1:1 therapy and treatment team participation to assist with stabilization of mood and

## 2019-12-29 NOTE — BH NOTE
agreeable with this. She does not feel that Vistaril helps with anxiety and she was taking 50 mg 4 times a day. It did nothing more than take the edge off. Will start Clonidine PRN for anxiety. Chart and medications reviewed. She did choke on a piece of chicken 5 years ago and has had difficulties swallowing since then. She will be having an EGD in the next week. Therapeutic support, empathetic care and psycho education provided greater than 20 minutes. At this time there is no safe alternative other than inpatient care. Past Psychiatric History   Patient reports current outpatient psychiatric linkage. She is currently linked with [facility]. Reported history of psychiatric inpatient hospitalizations. Reported history of suicide attempts. History of Substance Abuse     She does not smoke cigarettes. She does not use any substances. She has not drank alcohol in 3 years. Family History of psychiatric disorders    Family history: positive for bipolar and undiagnosed schizophrenia. Past psychiatric medications  Seroquel, Vraylar, Topamax, Lamictal, Latuda, Depakote, Abilify    Medical History   Allergies:  Doxycycline; Pollen extract; Seroquel [quetiapine fumarate]; Symbicort [budesonide-formoterol fumarate]; and Topamax [topiramate]   Past Medical History:   Diagnosis Date    Asthma     Back pain     Bipolar 1 disorder ([facility] [address]     Chicken pox     Dysphagia     H/O echocardiogram 05/18/2018    EF 60%    History of Holter monitoring 04/27/2018    Baseline ECG shows sinus rhythm. Holter showed sinus tachy 61% of the time. Rare premature ventricular complexes were recorded. No episodes of superventricuarl or ventriuclar tacycardial was seen. Pt reported episodes of SOB and speedy heart with monitroing showing sinus tachy.      Lordosis     Pneumonia     PTSD (post-traumatic stress disorder)     Tachycardia, unspecified     UTI (urinary tract infection)       Past Surgical History: UA NEGATIVE NEGATIVE    Urobilinogen, Urine Normal Normal    Nitrite, Urine NEGATIVE NEGATIVE    Leukocyte Esterase, Urine NEGATIVE NEGATIVE    Urinalysis Comments NOT REPORTED    Pregnancy, Urine    Collection Time: 12/28/19  8:57 PM   Result Value Ref Range    HCG(Urine) Pregnancy Test NEGATIVE NEGATIVE   Drug screen multi urine    Collection Time: 12/28/19  8:57 PM   Result Value Ref Range    Amphetamine Screen, Ur NEGATIVE NEGATIVE    Barbiturate Screen, Ur NEGATIVE NEGATIVE    Benzodiazepine Screen, Urine NEGATIVE NEGATIVE    Cocaine Metabolite, Urine NEGATIVE NEGATIVE    Methadone Screen, Urine NEGATIVE NEGATIVE    Opiates, Urine NEGATIVE NEGATIVE    Phencyclidine, Urine NEGATIVE NEGATIVE    Propoxyphene, Urine NEGATIVE NEGATIVE    Cannabinoid Scrn, Ur NEGATIVE NEGATIVE    Oxycodone Screen, Ur NEGATIVE NEGATIVE    Methamphetamine, Urine NEGATIVE NEGATIVE    Tricyclic Antidepressants, Urine NEGATIVE NEGATIVE    MDMA, Urine NOT REPORTED NEGATIVE    Buprenorphine Urine NEGATIVE NEGATIVE    Test Information NOT REPORTED    Microscopic Urinalysis    Collection Time: 12/28/19  8:57 PM   Result Value Ref Range    -          WBC, UA 0 TO 2 0 - 5 /HPF    RBC, UA None 0 - 2 /HPF    Casts UA NOT REPORTED /LPF    Crystals UA NOT REPORTED None /HPF    Epithelial Cells UA 0 TO 2 0 - 25 /HPF    Renal Epithelial, Urine NOT REPORTED 0 /HPF    Bacteria, UA TRACE (A) None    Mucus, UA NOT REPORTED None    Trichomonas, UA NOT REPORTED None    Amorphous, UA TRACE (A) None    Other Observations UA NOT REPORTED NOT REQ.     Yeast, UA NOT REPORTED None   CBC    Collection Time: 12/28/19  9:02 PM   Result Value Ref Range    WBC 11.0 3.5 - 11.3 k/uL    RBC 4.86 3.95 - 5.11 m/uL    Hemoglobin 13.9 11.9 - 15.1 g/dL    Hematocrit 42.1 36.3 - 47.1 %    MCV 86.6 82.6 - 102.9 fL    MCH 28.6 25.2 - 33.5 pg    MCHC 33.0 28.4 - 34.8 g/dL    RDW 12.9 11.8 - 14.4 %    Platelets 740 264 - 656 k/uL    MPV 9.4 8.1 - 13.5 fL    NRBC Automated 0.0 0.0 per 100 WBC   Basic Metabolic Panel    Collection Time: 12/28/19  9:02 PM   Result Value Ref Range    Glucose 93 70 - 99 mg/dL    BUN 12 6 - 20 mg/dL    CREATININE 0.60 0.50 - 0.90 mg/dL    Bun/Cre Ratio 20 9 - 20    Calcium 9.6 8.6 - 10.4 mg/dL    Sodium 137 135 - 144 mmol/L    Potassium 4.1 3.7 - 5.3 mmol/L    Chloride 102 98 - 107 mmol/L    CO2 24 20 - 31 mmol/L    Anion Gap 11 9 - 17 mmol/L    GFR Non-African American >60 >60 mL/min    GFR African American >60 >60 mL/min    GFR Comment          GFR Staging         Salicylate    Collection Time: 12/28/19  9:02 PM   Result Value Ref Range    Salicylate Lvl <1 (L) 3 - 10 mg/dL   Acetaminophen level    Collection Time: 12/28/19  9:02 PM   Result Value Ref Range    Acetaminophen Level <5 (L) 10 - 30 ug/mL   Ethanol    Collection Time: 12/28/19  9:02 PM   Result Value Ref Range    Ethanol <10 <10 mg/dL    Ethanol percent <0.010 <0.010 %         Diagnostic Impression  Active Problems:    Major depressive disorder, recurrent (HCC)  Resolved Problems:    * No resolved hospital problems. *          Medications   amLODIPine  5 mg Oral Daily    lurasidone  40 mg Oral Dinner     acetaminophen, aluminum & magnesium hydroxide-simethicone, benztropine mesylate, magnesium hydroxide, traZODone, hydrOXYzine, naproxen, sodium chloride    Treatment Plan:     Admit to inpatient psychiatric treatment   Supportive therapy with medication management. Reviewed risks and benefits as well as potential side effects with patient.  Therapeutic activities and groups   Follow up at [facility] after symptoms stabilized    Estimated length of stay: 5-7 days    [address], APRN [facility]  Psychiatric Advanced Practice Nurse  [employer] voice recognition software used in portions of this document.  Occasionally words are mis-transcribed

## 2019-12-29 NOTE — BH NOTE
Refused n/a. Patient verbalize understanding:  yes. Patient education on precautions: yes  Patient signed all admission paperwork and consents for treatment. Patient oriented to unit and unit handbook given for future reference. Armband administered. Q15 minute checks for safety initiated per unit policy. VS, PA, and MSE completed. Patient changed into hospital attire per unit policy,patient is pleasant and cooperative and accepting of admission process. Pinked from [facility] for Suicidal thoughts,linked at [place] 10,2019 had injection of Abilify, other oral meds has been off over a month. [facility] in [place] for meds need verified. Swallow issues past 5 year,soft diet,meds crushed ordered.                       Ervin Hernandez RN

## 2019-12-29 NOTE — H&P
hours. Lipids: No results for input(s): CHOL, HDL in the last 72 hours. Invalid input(s): LDLCALCU  U/A:  Lab Results   Component Value Date    NITRITE neg 02/19/2019    COLORU YELLOW 12/28/2019    WBCUA 0 TO 2 12/28/2019    RBCUA None 12/28/2019    MUCUS NOT REPORTED 12/28/2019    BACTERIA TRACE 12/28/2019    CLARITYU clear 02/19/2019    SPECGRAV 1.015 12/28/2019    LEUKOCYTESUR NEGATIVE 12/28/2019    BLOODU neg 02/19/2019    GLUCOSEU NEGATIVE 12/28/2019    AMORPHOUS TRACE 12/28/2019       PAST MEDICAL HISTORY     Past Medical History:   Diagnosis Date    Asthma     Back pain     Bipolar 1 disorder ([facility] [address]     Chicken pox     Dysphagia     H/O echocardiogram 05/18/2018    EF 60%    History of Holter monitoring 04/27/2018    Baseline ECG shows sinus rhythm. Holter showed sinus tachy 61% of the time. Rare premature ventricular complexes were recorded. No episodes of superventricuarl or ventriuclar tacycardial was seen. Pt reported episodes of SOB and speedy heart with monitroing showing sinus tachy.      Lordosis     Pneumonia     PTSD (post-traumatic stress disorder)     Tachycardia, unspecified     UTI (urinary tract infection)      Pt denies any history of Diabetes mellitus type 2, hypertension, stroke, heart disease, COPD, GERD, HLD, Cancer, Seizures,Thyroid disease, Kidney Disease, Hepatitis, TB.    SURGICAL HISTORY       Past Surgical History:   Procedure Laterality Date    NECK SURGERY Left lesion biopsy    UPPER GASTROINTESTINAL ENDOSCOPY      chicken stuck in throat    WISDOM TOOTH EXTRACTION         FAMILY HISTORY       Family History   Problem Relation Age of Onset    Asthma Mother     High Blood Pressure Father     Diabetes Father        SOCIAL HISTORY       Social History     Socioeconomic History    Marital status: Single     Spouse name: Not on file    Number of children: Not on file    Years of education: Not on file    Highest education level: Not on file   Occupational History  Not on file   Social Needs    Financial resource strain: Not on file    Food insecurity:     Worry: Not on file     Inability: Not on file    Transportation needs:     Medical: Not on file     Non-medical: Not on file   Tobacco Use    Smoking status: Never Smoker    Smokeless tobacco: Never Used   Substance and Sexual Activity    Alcohol use: No     Comment: Rare    Drug use: No    Sexual activity: Not on file   Lifestyle    Physical activity:     Days per week: Not on file     Minutes per session: Not on file    Stress: Not on file   Relationships    Social connections:     Talks on phone: Not on file     Gets together: Not on file     Attends Latter-day service: Not on file     Active member of club or organization: Not on file     Attends meetings of clubs or organizations: Not on file     Relationship status: Not on file    Intimate partner violence:     Fear of current or ex partner: Not on file     Emotionally abused: Not on file     Physically abused: Not on file     Forced sexual activity: Not on file   Other Topics Concern    Not on file   Social History Narrative    Not on file     REVIEW OF SYSTEMS      Allergies   Allergen Reactions    Doxycycline      Tongue itch, throat began to swell    Pollen Extract      Grass    Seroquel [Quetiapine Fumarate] Other (See Comments)     Paranoid made mood worse    Symbicort [Budesonide-Formoterol Fumarate] Other (See Comments)     Delusional, paranoid, hallucinations.  Topamax [Topiramate] Other (See Comments)     Numbness in legs       No current facility-administered medications on file prior to encounter.       Current Outpatient Medications on File Prior to Encounter   Medication Sig Dispense Refill    ARIPiprazole (ABILIFY) 9.75 MG/1.3ML injection Inject 9.75 mg into the muscle every 30 days      Cariprazine HCl (VRAYLAR) 6 MG CAPS Take by mouth daily      montelukast (SINGULAIR) 10 MG tablet Take 1 tablet by mouth nightly 90 tablet 1

## 2019-12-29 NOTE — GROUP NOTE
Group Therapy Note    Date: 12/29/2019    Group Start Time: 9948  Group End Time: 8084  Group Topic: Cognitive Skills    GO FIGUEROA    Danisha Leonel, CTRS        Group Therapy Note    Attendees: 7/19         Pt did not participate in Cognitive Skills Group at [phone] when encouraged by RT due to resting in room. Pt was offered talk time as an alternative to group but declined.        Discipline Responsible: Psychoeducational Specialist      Signature:  Teresa Scales

## 2019-12-29 NOTE — GROUP NOTE
Group Therapy Note    Date: 12/29/2019    Group Start Time: 1600  Group End Time: 3730  Group Topic: Healthy Living/Wellness    [facility]    Sabrina Velasquez RN        Group Therapy Note    Attendees: 14         Patient's Goal:  To set up long term goals for the future    Notes: Actively participated    Status After Intervention:  Improved    Participation Level:  Active Listener    Participation Quality: Appropriate and Attentive      Speech:  normal      Thought Process/Content: Logical      Affective Functioning: Congruent      Mood: depressed      Level of consciousness:  Alert and Oriented x4      Response to Learning: Capable of insight      Endings: None Reported    Modes of Intervention: Education      Discipline Responsible: Registered Nurse      Signature:  Sabrina Velasquez RN

## 2019-12-30 ENCOUNTER — TELEPHONE (OUTPATIENT)
Dept: PRIMARY CARE CLINIC | Age: 29
End: 2019-12-30

## 2019-12-30 LAB
CHOLESTEROL/HDL RATIO: 5.6
CHOLESTEROL: 214 MG/DL
ESTIMATED AVERAGE GLUCOSE: 108 MG/DL
HBA1C MFR BLD: 5.4 % (ref 4–6)
HDLC SERPL-MCNC: 38 MG/DL
LDL CHOLESTEROL: 133 MG/DL (ref 0–130)
THYROXINE, FREE: 1.11 NG/DL (ref 0.93–1.7)
TRIGL SERPL-MCNC: 213 MG/DL
TSH SERPL DL<=0.05 MIU/L-ACNC: 1.44 MIU/L (ref 0.3–5)
VLDLC SERPL CALC-MCNC: ABNORMAL MG/DL (ref 1–30)

## 2019-12-30 PROCEDURE — 84443 ASSAY THYROID STIM HORMONE: CPT

## 2019-12-30 PROCEDURE — 1240000000 HC EMOTIONAL WELLNESS R&B

## 2019-12-30 PROCEDURE — 83036 HEMOGLOBIN GLYCOSYLATED A1C: CPT

## 2019-12-30 PROCEDURE — 6370000000 HC RX 637 (ALT 250 FOR IP): Performed by: NURSE PRACTITIONER

## 2019-12-30 PROCEDURE — 36415 COLL VENOUS BLD VENIPUNCTURE: CPT

## 2019-12-30 PROCEDURE — 80061 LIPID PANEL: CPT

## 2019-12-30 PROCEDURE — 99232 SBSQ HOSP IP/OBS MODERATE 35: CPT | Performed by: NURSE PRACTITIONER

## 2019-12-30 PROCEDURE — 84439 ASSAY OF FREE THYROXINE: CPT

## 2019-12-30 RX ORDER — BENZTROPINE MESYLATE 1 MG/1
1 TABLET ORAL 2 TIMES DAILY PRN
Status: DISCONTINUED | OUTPATIENT
Start: 2019-12-30 | End: 2019-12-31

## 2019-12-30 RX ADMIN — AMLODIPINE BESYLATE 5 MG: 5 TABLET ORAL at 09:34

## 2019-12-30 RX ADMIN — CLONIDINE HYDROCHLORIDE 0.1 MG: 0.1 TABLET ORAL at 09:34

## 2019-12-30 RX ADMIN — LURASIDONE HYDROCHLORIDE 40 MG: 40 TABLET, FILM COATED ORAL at 16:55

## 2019-12-30 RX ADMIN — HYDROXYZINE HYDROCHLORIDE 25 MG: 25 TABLET, FILM COATED ORAL at 21:07

## 2019-12-30 RX ADMIN — HYDROXYZINE HYDROCHLORIDE 25 MG: 25 TABLET, FILM COATED ORAL at 13:18

## 2019-12-30 RX ADMIN — BENZTROPINE MESYLATE 1 MG: 1 TABLET ORAL at 13:18

## 2019-12-30 RX ADMIN — Medication 2 MG: at 21:07

## 2019-12-30 NOTE — PROGRESS NOTES
Department of Psychiatry  Attending Progress Note  Chief Complaint: Major depressive disorder, recurrent ([address]     SUBJECTIVE: This is a 46-year-old female being seen for follow-up today. The patient was admitted due to suicidal thoughts with a plan to cut her wrists. The patient was admitted on a pink slip though she has signed in on a voluntary basis from [facility] emergency department. Charting and documentation of been reviewed. The patient's nurse reports that she does have difficulty swallowing. Review of chart indicates that she has an EGD sometime in the next week. She reported to her nurse that she had some anxiety and depression today. Patient seen up in the milieu today. She states her mood is better because \"I have a new outlook after group this morning. \"  She reports that she feels her situation is not as bad as it could be, and is is not as bad as \"what others have it. I should really be more appreciative. \"  Patient does report she did not sleep well last evening. She took trazodone and had severe restlessness with it. She did request for her Cogentin to be changed to a tablet from injectable as needed. Currently the patient denies any suicidal thoughts. She expresses hopefulness. Rates depression a 4 out of 10 on numeric rating scale of 0-10 with 0 being none and 10 the worst.  She does report that her anxiety was high this morning, but has since come down since taking morning medications. She reports no hallucinations. No overt delusions are noted. Patient does report some restlessness secondary to medications. No additional side effects reported by patient. She is attending groups and participating appropriately. No needs expressed. No acute distress or discomfort.     OBJECTIVE    Physical  /71   Pulse 105   Temp 97.8 °F (36.6 °C) (Oral)   Resp 14   Ht 5' 3\" (1.6 m)   Wt 230 lb (104.3 kg)   BMI 40.74 kg/m²      Mental Status Evaluation:  Orientation: alertness: Range    Ethanol <10 <10 mg/dL    Ethanol percent <0.010 <0.010 %   Hemoglobin A1c    Collection Time: 12/30/19  6:38 AM   Result Value Ref Range    Hemoglobin A1C 5.4 4.0 - 6.0 %    Estimated Avg Glucose 108 mg/dL   Lipid panel - fasting    Collection Time: 12/30/19  6:38 AM   Result Value Ref Range    Cholesterol 214 (H) <200 mg/dL    HDL 38 (L) >40 mg/dL    LDL Cholesterol 133 (H) 0 - 130 mg/dL    Chol/HDL Ratio 5.6 (H) <5    Triglycerides 213 (H) <150 mg/dL    VLDL NOT REPORTED (H) 1 - 30 mg/dL   T4, free    Collection Time: 12/30/19  6:38 AM   Result Value Ref Range    Thyroxine, Free 1.11 0.93 - 1.70 ng/dL   TSH without Reflex    Collection Time: 12/30/19  6:38 AM   Result Value Ref Range    TSH 1.44 0.30 - 5.00 mIU/L       Medications  Current Facility-Administered Medications   Medication Dose Route Frequency Provider Last Rate Last Dose    acetaminophen (TYLENOL) tablet 650 mg  650 mg Oral Q4H PRN Nida Alexander, APRN - CNP   650 mg at 12/29/19 0227    aluminum & magnesium hydroxide-simethicone (MAALOX) 200-200-20 MG/5ML suspension 30 mL  30 mL Oral Q6H PRN Nida Alexander, APRN - CNP        benztropine mesylate (COGENTIN) injection 2 mg  2 mg Intramuscular BID PRN Nida Alexander, APRN - CNP        magnesium hydroxide (MILK OF MAGNESIA) 400 MG/5ML suspension 30 mL  30 mL Oral Daily PRN Nida Alexander, APRN - CNP        traZODone (DESYREL) tablet 50 mg  50 mg Oral Nightly PRN Nida Alexander, APRN - CNP   50 mg at 12/29/19 2107    hydrOXYzine (ATARAX) tablet 25 mg  25 mg Oral TID PRN Nida Alexander APRN - CNP        naproxen (NAPROSYN) tablet 500 mg  500 mg Oral BID PRN DEV Marcos   500 mg at 12/29/19 1126    sodium chloride (OCEAN, BABY AYR) 0.65 % nasal spray 1 spray  1 spray Each Nostril BID PRN DEV Marcos        amLODIPine (NORVASC) tablet 5 mg  5 mg Oral Daily OSKAR Moulton CNP   5 mg at 12/30/19 0934    lurasidone (LATUDA) tablet 40 mg  40 mg Oral Dinner Candy Long, APRN - CNP   40 mg at 12/29/19 1737    cloNIDine (CATAPRES) tablet 0.1 mg  0.1 mg Oral BID PRN Candy Long, APRN - CNP   0.1 mg at 12/30/19 0934    [START ON 2/10/2020] ARIPiprazole lauroxil (ARISTADA) injection 882 mg  882 mg Intramuscular Once Candy Long, APRN - CNP             amLODIPine  5 mg Oral Daily    lurasidone  40 mg Oral Dinner    [START ON 2/10/2020] ARIPiprazole lauroxil  882 mg Intramuscular Once       ASSESSMENT  Major depressive disorder, recurrent ([facility] [address]     Patient's Response to Treatment: positive    PLAN  · Continue inpatient psychiatric treatment  · Supportive therapy with medication management. Reviewed risks and benefits as well as potential side effects with patient. Change cogentin from IM to PO and continue with PRN administration as needed. Patient declined routine administration. Discontinue trazodone due to increased restlessness. Start patient on melatonin PRN for sleep patterns. · Therapeutic activities and groups  · Follow up at [facility] after symptoms stabilized       Estimated length of stay will be an additional 4-5 days. Electronically signed by OSKAR Alvarenga CNP on 12/30/2019 at 11:31 AM.    Dragon voice recognition software used in portions of this document.

## 2019-12-30 NOTE — PLAN OF CARE
Problem: Altered Mood, Depressive Behavior:  Goal: Able to verbalize acceptance of life and situations over which he or she has no control  Description  Able to verbalize acceptance of life and situations over which he or she has no control  Outcome: Ongoing  Note:   Patient denies any suicidal ideation even though she has had them recently. Patient denies any hallucinations at the moment as well. Patient is feeling both depressed and anxious. She states she gets anxious in groups of people which is one reason she is isolative to her room. Support and encouragement to socialize were given. Q 15 minute safety checks.
Problem: Altered Mood, Depressive Behavior:  Goal: Absence of self-harm  Description  Absence of self-harm  12/29/2019 1342 by Francisca Parker LPN  Outcome: Ongoing     Patient is alert and oriented. Appetite is fair. Patient reports having trouble staying asleep. Denies suicidal and homicidal ideations. Reports depression and anxiety. Patient is isolative to room. Free from self harm behaviors. 15 minute visual checks maintained for safety.
Problem: Altered Mood, Depressive Behavior:  Goal: Absence of self-harm  Description  Absence of self-harm  12/29/2019 2319 by Jonathan Deras LPN  Outcome: Ongoing  Note:   Patient admits to suicidal thoughts, with no plan. Denies homicidal ideations. Patient educated on seeking staff beginning to have thoughts of acting on suicide. Patient safety check every 15 minute. Problem: Pain:  Goal: Pain level will decrease  Description  Pain level will decrease  Outcome: Ongoing  Note:   Patient denies pain during this shift.
strategies for care    Method:group therapy, medication compliance, individualized assessments and care planning    Outcome: needs reinforcement    PATIENT GOALS: to be discussed with patient within 72 hours    PLAN/TREATMENT RECOMMENDATIONS:     continue group therapy , medications compliance, goal setting, individualized assessments and care, continue to monitor pt on unit      SHORT-TERM GOALS:   Time frame for Short-Term Goals: 5-7 days    LONG-TERM GOALS:  Time frame for Long-Term Goals: 6 months  Members Present in Team Meeting: See Signature Sheet    Yu Bingham
Reviewed goals and plan of care    Method:small group, individual verbal education    Outcome:verbalized by patient, but needs reinforcement to obtain goals    PATIENT GOALS:  Short term: med stabilization   Long term: stay on meds / go to outpatient apts     PLAN/TREATMENT RECOMMENDATIONS UPDATE: continue with group therapies, increased socialization, continue planning for after discharge goals, continue with medication compliance    SHORT-TERM GOALS UPDATE:   Time frame for Short-Term Goals: 5-7 days    LONG-TERM GOALS UPDATE:   Time frame for Long-Term Goals: 6 months  Members Present in Team Meeting: See Signature Sheet    MELISSA Hastings

## 2019-12-30 NOTE — CARE COORDINATION
Medical Coordination    ELIZABETH in chart for [facility], Dr. Mathew Meléndez. Pt requested assistance with re-scheduling EGD. Writer and pt contacted facility and pt is rescheduled for 1/14/20. Pt coordinated appointment date and time is TBD.  Noelle Sargent explained to pt she will receive phone call day prior to procedure providing time

## 2019-12-30 NOTE — GROUP NOTE
Group Therapy Note    Date: 12/30/2019    Group Start Time: 1100  Group End Time: 2049  Group Topic: Cognitive Skills    [facility] S[facility] Salas        Group Therapy Note    Attendees: 7         Patient's Goal:  To improve decision making skills/ improve concentration     Notes:   Pt was pleasant and participated well     Status After Intervention:  Improved    Participation Level:  Active Listener    Participation Quality: Appropriate      Speech:  normal      Thought Process/Content: Logical      Affective Functioning: Congruent      Mood: euthymic      Level of consciousness:  Alert and Oriented x4      Response to Learning: Able to verbalize current knowledge/experience and Progressing to goal      Endings: None Reported    Modes of Intervention: Education and Support      Discipline Responsible: Psychoeducational Specialist      Signature:  Kishor Kiran

## 2019-12-30 NOTE — GROUP NOTE
Group Therapy Note    Date: 12/30/2019    Group Start Time: 1000  Group End Time: 2981  Group Topic: Psychotherapy    GO BEAULIEU    TERRANCE Mcfarlane LSW        Group Therapy Note    Attendees: 6/12         Patient's Goal:  Interpersonal relationships and Communication     Notes:  Pt participated in group discussion of mental health symptoms and impact on relationships. Pts discussed building supports. Pt shared personal reflections with one another and contributed to conversation in meaningful ways/supportive/and identifying community supports     Status After Intervention:  Improved    Participation Level: Active Listener and Interactive    Participation Quality: Appropriate, Attentive, Sharing and Supportive      Speech:  normal      Thought Process/Content: Logical  Linear      Affective Functioning: Congruent      Mood: euthymic      Level of consciousness:  Alert, Oriented x4 and Attentive      Response to Learning: Able to verbalize current knowledge/experience, Able to verbalize/acknowledge new learning, Able to retain information, Capable of insight and Progressing to goal      Endings: None Reported    Modes of Intervention: Support      Discipline Responsible: /Counselor      Signature:   TERRANCE Mcfarlane LSW

## 2019-12-31 PROCEDURE — 6370000000 HC RX 637 (ALT 250 FOR IP): Performed by: NURSE PRACTITIONER

## 2019-12-31 PROCEDURE — 1240000000 HC EMOTIONAL WELLNESS R&B

## 2019-12-31 PROCEDURE — 6370000000 HC RX 637 (ALT 250 FOR IP): Performed by: PHYSICIAN ASSISTANT

## 2019-12-31 PROCEDURE — 99232 SBSQ HOSP IP/OBS MODERATE 35: CPT | Performed by: NURSE PRACTITIONER

## 2019-12-31 RX ORDER — BENZTROPINE MESYLATE 1 MG/1
1 TABLET ORAL 2 TIMES DAILY
Status: DISCONTINUED | OUTPATIENT
Start: 2019-12-31 | End: 2020-01-01 | Stop reason: HOSPADM

## 2019-12-31 RX ORDER — BENZTROPINE MESYLATE 1 MG/1
1 TABLET ORAL 2 TIMES DAILY
Qty: 28 TABLET | Refills: 0 | Status: SHIPPED | OUTPATIENT
Start: 2019-12-31 | End: 2020-06-08 | Stop reason: ALTCHOICE

## 2019-12-31 RX ORDER — AMLODIPINE BESYLATE 5 MG/1
5 TABLET ORAL DAILY
Qty: 14 TABLET | Refills: 0 | Status: SHIPPED | OUTPATIENT
Start: 2020-01-01 | End: 2020-06-08 | Stop reason: SDUPTHER

## 2019-12-31 RX ORDER — HYDROXYZINE HYDROCHLORIDE 25 MG/1
25 TABLET, FILM COATED ORAL 3 TIMES DAILY PRN
Qty: 28 TABLET | Refills: 0 | Status: SHIPPED | OUTPATIENT
Start: 2019-12-31 | End: 2020-01-14

## 2019-12-31 RX ORDER — CLONIDINE HYDROCHLORIDE 0.1 MG/1
0.1 TABLET ORAL 2 TIMES DAILY PRN
Qty: 28 TABLET | Refills: 0 | Status: SHIPPED | OUTPATIENT
Start: 2019-12-31 | End: 2020-06-08 | Stop reason: ALTCHOICE

## 2019-12-31 RX ADMIN — Medication 2 MG: at 21:25

## 2019-12-31 RX ADMIN — HYDROXYZINE HYDROCHLORIDE 25 MG: 25 TABLET, FILM COATED ORAL at 15:21

## 2019-12-31 RX ADMIN — AMLODIPINE BESYLATE 5 MG: 5 TABLET ORAL at 08:35

## 2019-12-31 RX ADMIN — BENZTROPINE MESYLATE 1 MG: 1 TABLET ORAL at 10:02

## 2019-12-31 RX ADMIN — HYDROXYZINE HYDROCHLORIDE 25 MG: 25 TABLET, FILM COATED ORAL at 08:34

## 2019-12-31 RX ADMIN — BENZTROPINE MESYLATE 1 MG: 1 TABLET ORAL at 21:25

## 2019-12-31 RX ADMIN — LURASIDONE HYDROCHLORIDE 40 MG: 40 TABLET, FILM COATED ORAL at 17:41

## 2019-12-31 RX ADMIN — SALINE NASAL SPRAY 1 SPRAY: 1.5 SOLUTION NASAL at 08:34

## 2019-12-31 NOTE — GROUP NOTE
Group Therapy Note    Date: 12/30/2019    Group Start Time: 2030  Group End Time: 2115  Group Topic: Wrap-Up    [facility]    Gabriele Contreras LPN            Attendees: 11/20           Patient's Goal:  Relaxation         Notes:  Pt actively participated in wrap up group    Status After Intervention:  Unchanged     Participation Level: active     Participation Quality:         Speech:  clear        Thought Process/Content: preoccupied        Affective Functioning: Congruent        Mood: good        Level of consciousness:  Alert and Preoccupied        Response to Learning: Resistant        Endings: None Reported     Modes of Intervention: Support     Signature:  Gabriele Contreras LPN

## 2019-12-31 NOTE — GROUP NOTE
Group Therapy Note    Date: 12/31/2019    Group Start Time: 1600  Group End Time: 5999  Group Topic: Healthy Living/Wellness    [facility]    Jeanelle Gitelman, LPN        Group Therapy Note    Attendees: 15/19         Patient's Goal:  Participation in group    Notes:  Patient participated in group by listening and sharing. Status After Intervention:  Unchanged    Participation Level:  Active Listener and Interactive    Participation Quality: Appropriate      Speech:  normal      Thought Process/Content: Logical      Affective Functioning: Congruent      Mood: depressed      Level of consciousness:  Alert and Oriented x4      Response to Learning: Able to retain information      Endings: None Reported    Modes of Intervention: Support      Discipline Responsible: Licensed Practical Nurse      Signature:  Jeanelle Gitelman, LPN

## 2019-12-31 NOTE — GROUP NOTE
Group Therapy Note    Date: 12/31/2019    Group Start Time: 1100  Group End Time: 3888  Group Topic: Cognitive Skills    GO Espinal, CTRS        Group Therapy Note    Attendees: 11         Patient's Goal:  To improve coping skills     Notes:   Pt was pleasant and participated well     Status After Intervention:  Improved    Participation Level:  Active Listener    Participation Quality: Appropriate and Attentive      Speech:  normal      Thought Process/Content: Logical      Affective Functioning: Congruent      Mood: euthymic      Level of consciousness:  Alert and Oriented x4      Response to Learning: Able to verbalize current knowledge/experience and Progressing to goal      Endings: None Reported    Modes of Intervention: Education, Support and Problem-solving      Discipline Responsible: Psychoeducational Specialist      Signature:  Candy Murphy

## 2019-12-31 NOTE — PROGRESS NOTES
Department of Psychiatry  Attending Progress Note  Chief Complaint: Major depressive disorder, recurrent ([address]     SUBJECTIVE: This is a 65-year-old female being seen for follow-up today. The patient was admitted due to suicidal thoughts with a plan to cut her wrists. The patient was admitted on a pink slip though she has signed in on a voluntary basis from [facility] emergency department. Charting and documentation of been reviewed. The patient's nurse reports that she does have difficulty swallowing. Review of chart indicates that she has an EGD sometime in the next week. She reported to her nurse that she had some anxiety and depression today. Patient seen about the milieu today. She is calm cooperative and pleasant. She states that she is \"doing good. I am feeling good. \"  The patient denies any feelings of dread. She denies any feelings of \"constantly feeling drawn down. \"  Today she rates her depression as 0-10 on numeric rating scale of 0-10 with 0 being none and 10 the worst.  Denies feeling hopeless or helpless. Denies feeling worthless. Denies any suicidal and homicidal ideation. The patient does report akathisia, but denies any other side effects of medications. Discussed discharge plan which includes her returning home to live with her boyfriend and following up with fire lines. Denies any additional needs at conclusion of assessment. No acute distress or discomfort is noted. OBJECTIVE    Physical  /60   Pulse 82   Temp 98.3 °F (36.8 °C) (Oral)   Resp 14   Ht 5' 3\" (1.6 m)   Wt 230 lb (104.3 kg)   BMI 40.74 kg/m²      Mental Status Evaluation:  Orientation: alertness: alert   Mood:. Euthymic      Affect:  normal      Appearance:  age appropriate and casually dressed   Activity:  Within Normal Limits   Gait/Posture: Normal   Speech:  normal pitch and normal volume   Thought Process:  goal directed and within normal limits   Thought Content:  Denies SI and HI.   Reports no TO 2 0 - 5 /HPF    RBC, UA None 0 - 2 /HPF    Casts UA NOT REPORTED /LPF    Crystals UA NOT REPORTED None /HPF    Epithelial Cells UA 0 TO 2 0 - 25 /HPF    Renal Epithelial, Urine NOT REPORTED 0 /HPF    Bacteria, UA TRACE (A) None    Mucus, UA NOT REPORTED None    Trichomonas, UA NOT REPORTED None    Amorphous, UA TRACE (A) None    Other Observations UA NOT REPORTED NOT REQ.     Yeast, UA NOT REPORTED None   CBC    Collection Time: 12/28/19  9:02 PM   Result Value Ref Range    WBC 11.0 3.5 - 11.3 k/uL    RBC 4.86 3.95 - 5.11 m/uL    Hemoglobin 13.9 11.9 - 15.1 g/dL    Hematocrit 42.1 36.3 - 47.1 %    MCV 86.6 82.6 - 102.9 fL    MCH 28.6 25.2 - 33.5 pg    MCHC 33.0 28.4 - 34.8 g/dL    RDW 12.9 11.8 - 14.4 %    Platelets 011 721 - 271 k/uL    MPV 9.4 8.1 - 13.5 fL    NRBC Automated 0.0 0.0 per 100 WBC   Basic Metabolic Panel    Collection Time: 12/28/19  9:02 PM   Result Value Ref Range    Glucose 93 70 - 99 mg/dL    BUN 12 6 - 20 mg/dL    CREATININE 0.60 0.50 - 0.90 mg/dL    Bun/Cre Ratio 20 9 - 20    Calcium 9.6 8.6 - 10.4 mg/dL    Sodium 137 135 - 144 mmol/L    Potassium 4.1 3.7 - 5.3 mmol/L    Chloride 102 98 - 107 mmol/L    CO2 24 20 - 31 mmol/L    Anion Gap 11 9 - 17 mmol/L    GFR Non-African American >60 >60 mL/min    GFR African American >60 >60 mL/min    GFR Comment          GFR Staging         Salicylate    Collection Time: 12/28/19  9:02 PM   Result Value Ref Range    Salicylate Lvl <1 (L) 3 - 10 mg/dL   Acetaminophen level    Collection Time: 12/28/19  9:02 PM   Result Value Ref Range    Acetaminophen Level <5 (L) 10 - 30 ug/mL   Ethanol    Collection Time: 12/28/19  9:02 PM   Result Value Ref Range    Ethanol <10 <10 mg/dL    Ethanol percent <0.010 <0.010 %   Hemoglobin A1c    Collection Time: 12/30/19  6:38 AM   Result Value Ref Range    Hemoglobin A1C 5.4 4.0 - 6.0 %    Estimated Avg Glucose 108 mg/dL   Lipid panel - fasting    Collection Time: 12/30/19  6:38 AM   Result Value Ref Range    Cholesterol 7107    [START ON 2/10/2020] ARIPiprazole lauroxil (ARISTADA) injection 882 mg  882 mg Intramuscular Once OSKAR Moss CNP             amLODIPine  5 mg Oral Daily    lurasidone  40 mg Oral Dinner    [START ON 2/10/2020] ARIPiprazole lauroxil  882 mg Intramuscular Once       ASSESSMENT  Major depressive disorder, recurrent ([address]     Patient's Response to Treatment: positive    PLAN  · Continue inpatient psychiatric treatment  · Supportive therapy with medication management. Reviewed risks and benefits as well as potential side effects with patient. Start patient on cogentin 1mg BID for akathisia. Start patient on melatonin PRN for sleep patterns. · Therapeutic activities and groups  · Follow up at [facility] [place] after symptoms stabilized       Estimated discharge date:  1/1/2020    Electronically signed by OSKAR Cuenca CNP on 12/31/2019 at 2:50 PM.    Dragon voice recognition software used in portions of this document.

## 2019-12-31 NOTE — GROUP NOTE
Group Therapy Note    Date: 12/31/2019    Group Start Time: 1430  Group End Time: 7671  Group Topic: Psychoeducation    [facility]    Fadumo Prescott    Patient's Goal:  To increase interpersonal interaction     Notes:      Status After Intervention:  Improved    Participation Level:  Active Listener and Interactive    Participation Quality: Appropriate, Attentive and Sharing      Speech:  normal      Thought Process/Content: Logical  Linear      Affective Functioning: Congruent      Mood: euthymic      Level of consciousness:  Alert, Oriented x4 and Attentive      Response to Learning: Able to verbalize current knowledge/experience, Able to verbalize/acknowledge new learning, Able to retain information and Progressing to goal      Endings: None Reported    Modes of Intervention: Education, Support, Socialization, Exploration, Clarifying, Problem-solving and Activity      Discipline Responsible: Psychoeducational Specialist      Signature:  Fadumo Prescott

## 2019-12-31 NOTE — GROUP NOTE
Group Therapy Note    Date: 12/30/2019    Group Start Time: 2030  Group End Time: 2115  Group Topic: Relaxation    [facility]    Henrique Patton LPN            Attendees: 11/20           Patient's Goal:  Relaxation         Notes:  Pt actively participated in wrap up and relaxation therapy     Status After Intervention:  Unchanged     Participation Level: active     Participation Quality:         Speech:  clear        Thought Process/Content: preoccupied        Affective Functioning: Congruent        Mood: good        Level of consciousness:  Alert and Preoccupied        Response to Learning: Resistant        Endings: None Reported     Modes of Intervention: Support          Signature:  Henrique Patton LPN

## 2020-01-01 VITALS
DIASTOLIC BLOOD PRESSURE: 70 MMHG | WEIGHT: 230 LBS | HEART RATE: 80 BPM | HEIGHT: 63 IN | TEMPERATURE: 97.3 F | RESPIRATION RATE: 14 BRPM | SYSTOLIC BLOOD PRESSURE: 109 MMHG | BODY MASS INDEX: 40.75 KG/M2

## 2020-01-01 PROCEDURE — 6370000000 HC RX 637 (ALT 250 FOR IP): Performed by: NURSE PRACTITIONER

## 2020-01-01 PROCEDURE — 99239 HOSP IP/OBS DSCHRG MGMT >30: CPT | Performed by: NURSE PRACTITIONER

## 2020-01-01 PROCEDURE — 5130000000 HC BRIDGE APPOINTMENT

## 2020-01-01 RX ADMIN — BENZTROPINE MESYLATE 1 MG: 1 TABLET ORAL at 08:08

## 2020-01-01 RX ADMIN — SALINE NASAL SPRAY 1 SPRAY: 1.5 SOLUTION NASAL at 08:08

## 2020-01-01 RX ADMIN — AMLODIPINE BESYLATE 5 MG: 5 TABLET ORAL at 08:08

## 2020-01-01 NOTE — DISCHARGE SUMMARY
DISCHARGE SUMMARY  Patient ID:  Cb Hutchins  [id number]  34 y.o.  1990    Admit date: (Not on file)    Discharge date and time: 1/1/2020  11:11 AM     Admitting Physician: Kayode Rivero MD     Discharge Physician:  OSKAR Cuenca CNP    Admission Diagnoses: Major depression, recurrent    Discharge Diagnoses:   Major depressive disorder, recurrent [facility])     Patient Active Problem List   Diagnosis Code    Severe bipolar I disorder, current or most recent episode depressed ([facility] F31.4    Class 3 severe obesity due to excess calories with body mass index (BMI) of 40.0 to 44.9 in adult ([facility] E66.01, Z68.41    Other seasonal allergic rhinitis J30.2    Suicidal ideation R45.851    Depression with suicidal ideation F32.9, R45.851    Exercise-induced asthma J45.990    Heartburn R12    Tachycardia R00.0    Dysphagia R13.10    Major depressive disorder, recurrent ([facility] F33.9    Schizoaffective disorder, bipolar type ([facility] F25.0        Admission Condition: poor    Discharged Condition: stable    Indication for Admission: threat to self    History of Present Illnes (present tense wording indicates findings from admission exam on (12/27/2019) and are not necessarily indicative of current findings): This is a 28-year-old female who was admitted for the purpose of safety and stabilization. The patient was admitted from [facility] emergency department where she reported with suicidal ideation to cut her wrists. Given the patient's elevated risk of harm to self, she remain hospitalized level care necessary for safety and stabilization was greater than that which could be provided on the outpatient basis. Hospital Course:   Upon admission, Cb Hutchins was provided a safe secure environment, introduced to unit milieu. Patient participated in groups and individual therapies. Meds were adjusted. After few days of hospital care, patient began to feel improvement.   Depression lifted, thoughts to harm self ceased. Sleep improved, appetite was good. On morning rounds 1/1/2020, patient endorsed feeling ready for discharge. Patient denies suicidal or homicidal ideations, denies hallucinations or delusions. Denies SE's from meds. It was decided that pt had achieved maximum benefit from hospital care and can be discharged     Consults: None    Significant Diagnostic Studies: Routine labs and diagnostics    Treatments: Psychotropic medications, therapy with group, milieu, and 1:1 with nurses, social workers, PADYLON/CNP, and Attending physician. Discharge Medications:  Patient's Medications   New Prescriptions    No medications on file   Previous Medications    AMLODIPINE (NORVASC) 5 MG TABLET    Take 1 tablet by mouth daily    ARIPIPRAZOLE LAUROXIL (ARISTADA) 882 MG/3.2ML PRSY INJECTION    Inject 3.2 mLs into the muscle once for 1 dose Next dose due on 2/10/2020    BENZTROPINE (COGENTIN) 1 MG TABLET    Take 1 tablet by mouth 2 times daily for 14 days    CLONIDINE (CATAPRES) 0.1 MG TABLET    Take 1 tablet by mouth 2 times daily as needed for Other (anxiety)    HYDROXYZINE (ATARAX) 25 MG TABLET    Take 1 tablet by mouth 3 times daily as needed for Anxiety (Does not have to be 8 hours apart as long as no more than three doses in a day)    LURASIDONE (LATUDA) 40 MG TABS TABLET    Take 1 tablet by mouth Daily with supper    MELATONIN ER 1 MG TBCR TABLET    Take 2 tablets by mouth nightly as needed (insomnia)    NAPROXEN (NAPROSYN) 500 MG TABLET    Take 1 tablet by mouth 2 times daily    SPRINTEC 28 0.25-35 MG-MCG PER TABLET    Take 1 tablet by mouth daily   Modified Medications    No medications on file   Discontinued Medications    No medications on file        Core Measures statement: This patient has orders for more than one antipsychotic medication for the following reason: A minimum of three failed attempts of monotherapy have proven inadequate.  Multiple antipsychotics are used as patient is

## 2020-01-01 NOTE — CARE COORDINATION
Bridge Appointment completed: Reviewed Discharge Instructions with patient. Patient verbalizes understanding and agreement with the discharge plan using the teachback method. Discharge Arrangements:  [facility] [address] [place], [address]  Phone: [phone]  Fax: [phone]   you have the following 3 appointments on Thursday, Jan. 2 at 7:45am nurse Odalys Zamora, [address] [facility] nurse practitioner Rachid Ramires  Petrona Hammond. Your next appointment is on Friday, Jan. 17 at 8am with a therapist.  Jerad Ledbetter Dr notified: PT is hert own guardian   Discharge destination/address:[address] [place] [address]  Transported by:  Pina Lowery will have a ride home.

## 2020-01-01 NOTE — PROGRESS NOTES
[facility]  Discharge Note    Pt discharged with followings belongings:   Dentures: None  Vision - Corrective Lenses: None  Hearing Aid: None  Jewelry: None  Body Piercings Removed: N/A  Clothing: Footwear, Pants, Shirt, Undergarments (Comment), Socks  Were All Patient Medications Collected?: Not Applicable  Other Valuables: Cell phone, Wallet, Money (Comment)($96.77)   Valuables sent home with pt. Valuables returned to patient. Patient education on aftercare instructions: yes  Information faxed to [facility] by writer Patient verbalize understanding of AVS:  yes.     Status EXAM upon discharge:  Status and Exam  Normal: Yes  Facial Expression: Brightened  Affect: Appropriate  Level of Consciousness: Alert  Mood:Normal: Yes  Mood: Anxious  Motor Activity:Normal: Yes  Interview Behavior: Cooperative  Preception: [place] to Person, Luvenia Idler to Time, [place] to Situation  Attention:Normal: Yes  Thought Processes: Circumstantial  Thought Content:Normal: Yes  Thought Content: Preoccupations  Hallucinations: None  Delusions: No  Memory:Normal: Yes  Insight and Judgment: Yes  Insight and Judgment: Poor Judgment, Poor Insight, Unmotivated  Present Suicidal Ideation: No  Present Homicidal Ideation: No      Metabolic Screening:    Lab Results   Component Value Date    LABA1C 5.4 12/30/2019       Lab Results   Component Value Date    CHOL 214 (H) 12/30/2019    CHOL 190 02/09/2019    CHOL 201 (H) 09/25/2018     Lab Results   Component Value Date    TRIG 213 (H) 12/30/2019    TRIG 230 (H) 02/09/2019    TRIG 133 09/25/2018     Lab Results   Component Value Date    HDL 38 (L) 12/30/2019    HDL 44 02/09/2019    HDL 46 09/25/2018     No components found for: LDLCAL  No results found for: Hansel Pacheco RN

## 2020-01-14 ENCOUNTER — HOSPITAL ENCOUNTER (OUTPATIENT)
Age: 30
Setting detail: OUTPATIENT SURGERY
Discharge: HOME | End: 2020-01-14
Attending: INTERNAL MEDICINE | Admitting: INTERNAL MEDICINE
Payer: MEDICARE

## 2020-01-14 ENCOUNTER — TELEPHONE (OUTPATIENT)
Dept: GASTROENTEROLOGY | Age: 30
End: 2020-01-14

## 2020-01-14 ENCOUNTER — ANESTHESIA EVENT (OUTPATIENT)
Dept: OPERATING ROOM | Age: 30
End: 2020-01-14
Payer: MEDICARE

## 2020-01-14 ENCOUNTER — ANESTHESIA (OUTPATIENT)
Dept: OPERATING ROOM | Age: 30
End: 2020-01-14
Payer: MEDICARE

## 2020-01-14 VITALS
WEIGHT: 230 LBS | HEART RATE: 93 BPM | OXYGEN SATURATION: 96 % | SYSTOLIC BLOOD PRESSURE: 103 MMHG | TEMPERATURE: 97.2 F | HEIGHT: 63 IN | BODY MASS INDEX: 40.75 KG/M2 | RESPIRATION RATE: 18 BRPM | DIASTOLIC BLOOD PRESSURE: 61 MMHG

## 2020-01-14 VITALS
OXYGEN SATURATION: 92 % | RESPIRATION RATE: 4 BRPM | DIASTOLIC BLOOD PRESSURE: 73 MMHG | SYSTOLIC BLOOD PRESSURE: 115 MMHG

## 2020-01-14 LAB — HCG(URINE) PREGNANCY TEST: NEGATIVE

## 2020-01-14 PROCEDURE — 2709999900 HC NON-CHARGEABLE SUPPLY: Performed by: INTERNAL MEDICINE

## 2020-01-14 PROCEDURE — 7100000011 HC PHASE II RECOVERY - ADDTL 15 MIN: Performed by: INTERNAL MEDICINE

## 2020-01-14 PROCEDURE — 3609012400 HC EGD TRANSORAL BIOPSY SINGLE/MULTIPLE: Performed by: INTERNAL MEDICINE

## 2020-01-14 PROCEDURE — 7100000010 HC PHASE II RECOVERY - FIRST 15 MIN: Performed by: INTERNAL MEDICINE

## 2020-01-14 PROCEDURE — 87077 CULTURE AEROBIC IDENTIFY: CPT

## 2020-01-14 PROCEDURE — 88305 TISSUE EXAM BY PATHOLOGIST: CPT

## 2020-01-14 PROCEDURE — 6360000002 HC RX W HCPCS: Performed by: NURSE ANESTHETIST, CERTIFIED REGISTERED

## 2020-01-14 PROCEDURE — 3700000000 HC ANESTHESIA ATTENDED CARE: Performed by: INTERNAL MEDICINE

## 2020-01-14 PROCEDURE — 81025 URINE PREGNANCY TEST: CPT

## 2020-01-14 PROCEDURE — 43239 EGD BIOPSY SINGLE/MULTIPLE: CPT | Performed by: INTERNAL MEDICINE

## 2020-01-14 PROCEDURE — 3700000001 HC ADD 15 MINUTES (ANESTHESIA): Performed by: INTERNAL MEDICINE

## 2020-01-14 PROCEDURE — 2500000003 HC RX 250 WO HCPCS: Performed by: NURSE ANESTHETIST, CERTIFIED REGISTERED

## 2020-01-14 PROCEDURE — 2580000003 HC RX 258: Performed by: INTERNAL MEDICINE

## 2020-01-14 RX ORDER — SUCRALFATE ORAL 1 G/10ML
1 SUSPENSION ORAL 4 TIMES DAILY
Qty: 1200 ML | Refills: 1 | Status: SHIPPED | OUTPATIENT
Start: 2020-01-14 | End: 2020-01-14

## 2020-01-14 RX ORDER — SODIUM CHLORIDE, SODIUM LACTATE, POTASSIUM CHLORIDE, CALCIUM CHLORIDE 600; 310; 30; 20 MG/100ML; MG/100ML; MG/100ML; MG/100ML
INJECTION, SOLUTION INTRAVENOUS CONTINUOUS
Status: DISCONTINUED | OUTPATIENT
Start: 2020-01-14 | End: 2020-01-14 | Stop reason: HOSPADM

## 2020-01-14 RX ORDER — PROPOFOL 10 MG/ML
INJECTION, EMULSION INTRAVENOUS PRN
Status: DISCONTINUED | OUTPATIENT
Start: 2020-01-14 | End: 2020-01-14 | Stop reason: SDUPTHER

## 2020-01-14 RX ORDER — LIDOCAINE HYDROCHLORIDE 20 MG/ML
INJECTION, SOLUTION EPIDURAL; INFILTRATION; INTRACAUDAL; PERINEURAL PRN
Status: DISCONTINUED | OUTPATIENT
Start: 2020-01-14 | End: 2020-01-14 | Stop reason: SDUPTHER

## 2020-01-14 RX ORDER — SUCRALFATE 1 G/1
TABLET ORAL
Qty: 28 TABLET | Refills: 1 | Status: SHIPPED | OUTPATIENT
Start: 2020-01-14 | End: 2020-02-20

## 2020-01-14 RX ADMIN — PROPOFOL 100 MG: 10 INJECTION, EMULSION INTRAVENOUS at 08:50

## 2020-01-14 RX ADMIN — PROPOFOL 50 MG: 10 INJECTION, EMULSION INTRAVENOUS at 08:55

## 2020-01-14 RX ADMIN — SODIUM CHLORIDE, POTASSIUM CHLORIDE, SODIUM LACTATE AND CALCIUM CHLORIDE: 600; 310; 30; 20 INJECTION, SOLUTION INTRAVENOUS at 08:26

## 2020-01-14 RX ADMIN — LIDOCAINE HYDROCHLORIDE 100 MG: 20 INJECTION, SOLUTION EPIDURAL; INFILTRATION; INTRACAUDAL at 08:44

## 2020-01-14 RX ADMIN — PROPOFOL 200 MG: 10 INJECTION, EMULSION INTRAVENOUS at 08:44

## 2020-01-14 RX ADMIN — PROPOFOL 50 MG: 10 INJECTION, EMULSION INTRAVENOUS at 08:58

## 2020-01-14 ASSESSMENT — PAIN DESCRIPTION - DESCRIPTORS: DESCRIPTORS: BURNING;SORE

## 2020-01-14 ASSESSMENT — PAIN DESCRIPTION - LOCATION: LOCATION: THROAT

## 2020-01-14 ASSESSMENT — LIFESTYLE VARIABLES: SMOKING_STATUS: 0

## 2020-01-14 ASSESSMENT — PAIN SCALES - GENERAL
PAINLEVEL_OUTOF10: 0
PAINLEVEL_OUTOF10: 0

## 2020-01-14 ASSESSMENT — PAIN SCALES - WONG BAKER: WONGBAKER_NUMERICALRESPONSE: 4

## 2020-01-14 ASSESSMENT — PAIN - FUNCTIONAL ASSESSMENT: PAIN_FUNCTIONAL_ASSESSMENT: 0-10

## 2020-01-14 ASSESSMENT — PAIN DESCRIPTION - PAIN TYPE: TYPE: ACUTE PAIN

## 2020-01-14 NOTE — ANESTHESIA POSTPROCEDURE EVALUATION
Department of Anesthesiology  Postprocedure Note    Patient: Mita Loving  MRN: [id number]  YOB: 1990  Date of evaluation: 1/14/2020  Time:  10:58 AM     Procedure Summary     Date:  01/14/20 Room / Location:  [address] [place] [address]    Anesthesia Start:  5075 Anesthesia Stop:  2788    Procedure:  EGD BIOPSY (N/A ) Diagnosis:  (DYSPHAGIA)    Surgeon:  Trinity Hernandez MD Responsible Provider:  OSKAR Ball CRNA    Anesthesia Type:  general, TIVA ASA Status:  2          Anesthesia Type: general, TIVA    Ceci Phase I: Ceci Score: 10    Ceci Phase II: Ceci Score: 10    Last vitals: Reviewed and per EMR flowsheets.        Anesthesia Post Evaluation    Patient location during evaluation: PACU  Patient participation: complete - patient participated  Level of consciousness: awake and alert  Airway patency: patent  Nausea & Vomiting: no nausea and no vomiting  Complications: no  Cardiovascular status: blood pressure returned to baseline and hemodynamically stable  Respiratory status: acceptable and room air  Hydration status: euvolemic

## 2020-01-14 NOTE — PROGRESS NOTES
Report given to [facility] RN.
transportation arrangements that do not require patient to operate motor Vehicle.      N/A

## 2020-01-14 NOTE — TELEPHONE ENCOUNTER
I spoke to Dr. Lita Rivera who said to send a prescription for Carafate liquid 1 gram to be taken four times daily. If she does not feel better or gets worse she needs to be evaluated at the ER. He feels it is irritation from the scope because of the inflammation in the esophagus. I called Rachael back and informed her of the prescription being sent to [facility] for the Carafate to be taken four times daily and if no improvement or becomes worse she needs to go to the ER for evaluation. Voices understanding.

## 2020-01-14 NOTE — OP NOTE
PROCEDURE NOTE    DATE OF PROCEDURE: 1/14/2020     ENDOSCOPIST: Patrica Kasper. Aníbal Briceño MD, Bladimir Stephens    ASSISTANT: None    PREOPERATIVE DIAGNOSIS: Dysphagia    POSTOPERATIVE DIAGNOSIS: -Eosinophilic esophagitis with stricture, bulbar duodenitis    OPERATION: EGD with biopsy, LAURA test    ANESTHESIA: MAC     ESTIMATED BLOOD LOSS:  Minimal    COMPLICATIONS: None. SPECIMENS: were obtained    HISTORY: The patient is a 34y.o. year old female with history of above preop diagnosis. Esophagogastroduodenoscopy with possible biopsy and dilation has been recommended. I explained the risk, benefits, expected outcome, and alternatives to the procedure. Risks include but are not limited to bleeding, infection, respiratory distress, hypotension, and perforation of the esophagus, stomach, or duodenum. Patient understands and is in agreement. PROCEDURE: The patient was given monitored anesthesia care. The patient was given oxygen by nasal cannula. The endoscope was inserted orally and advanced under direct vision through the esophagus, through the stomach, through the pylorus, and into the descending duodenum. Findings:  Duodenum:     Descending: normal    Bulb: abnormal: Multiple superficial erosions    Stomach:    Antrum: normal, biopsied for CLOtest    Body: normal    Fundus: normal    Esophagus: abnormal: Multiple rings with linear furrows, narrowing/stricture 24 cm from the incisors which could not be traversed with the adult scope in view of which the pediatric scope was utilized, biopsies were obtained    The scope was removed and the patient tolerated the procedure well.        Electronically signed by Sarah Beth Boss MD  on 1/14/2020 at 9:12 AM

## 2020-01-14 NOTE — TELEPHONE ENCOUNTER
Rachael called in and said that she had a scope completed by Dr. Vee Greenberg this morning, and she is having pain. She said the pain is in her chest, she rates it a 7-8 on the pain scale. When I asked her to describe it she said \"it is like it is on fire or like someone is carving me out in there. \"  She denies difficulty breathing, nausea or vomiting. She said it hurts to swallow liquids even-she had eaten some pudding. Please advise.

## 2020-01-14 NOTE — ANESTHESIA PRE PROCEDURE
Comment: Rare                                Counseling given: Not Answered      Vital Signs (Current):   Vitals:    01/14/20 0747   BP: 107/86   Pulse: 102   Resp: 18   Temp: 36.8 °C (98.2 °F)   TempSrc: Temporal   SpO2: 95%   Weight: 230 lb (104.3 kg)   Height: 5' 3\" (1.6 m)                                              BP Readings from Last 3 Encounters:   01/14/20 107/86   12/28/19 (!) 125/92   11/12/19 102/81       NPO Status: Time of last liquid consumption: 1900                        Time of last solid consumption: 1900                        Date of last liquid consumption: 01/13/20                        Date of last solid food consumption: 01/13/20    BMI:   Wt Readings from Last 3 Encounters:   01/14/20 230 lb (104.3 kg)   12/28/19 230 lb (104.3 kg)   11/12/19 231 lb 1.6 oz (104.8 kg)     Body mass index is 40.74 kg/m². CBC:   Lab Results   Component Value Date    WBC 11.0 12/28/2019    RBC 4.86 12/28/2019    HGB 13.9 12/28/2019    HCT 42.1 12/28/2019    MCV 86.6 12/28/2019    RDW 12.9 12/28/2019     12/28/2019       CMP:   Lab Results   Component Value Date     12/28/2019    K 4.1 12/28/2019     12/28/2019    CO2 24 12/28/2019    BUN 12 12/28/2019    CREATININE 0.60 12/28/2019    GFRAA >60 12/28/2019    LABGLOM >60 12/28/2019    GLUCOSE 93 12/28/2019    PROT 7.4 02/09/2019    CALCIUM 9.6 12/28/2019    BILITOT 0.19 02/09/2019    ALKPHOS 73 02/09/2019    AST 13 02/09/2019    ALT 15 02/09/2019       POC Tests: No results for input(s): POCGLU, POCNA, POCK, POCCL, POCBUN, POCHEMO, POCHCT in the last 72 hours.     Coags:   Lab Results   Component Value Date    PROTIME 10.4 09/04/2017    INR 1.0 09/04/2017       HCG (If Applicable):   Lab Results   Component Value Date    PREGTESTUR NEGATIVE 01/14/2020    HCGQUANT <1 08/07/2015        ABGs: No results found for: PHART, PO2ART, AJZ8URY, GCN5NIB, BEART, C6FJKWYM     Type & Screen (If Applicable):  No results found for: LABABO, [address]    Anesthesia Evaluation  Patient summary reviewed and Nursing notes reviewed no history of anesthetic complications:   Airway: Mallampati: II  TM distance: >3 FB   Neck ROM: full  Mouth opening: > = 3 FB Dental:          Pulmonary:normal exam    (+) asthma: exercise-induced asthma, seasonal asthma,     (-) not a current smoker                           Cardiovascular:  Exercise tolerance: good (>4 METS),   (+) dysrhythmias: ventricular tachycardia,       ECG reviewed      Echocardiogram reviewed                  Neuro/Psych:   (+) psychiatric history (bipolar): stable with treatmentdepression/anxiety             GI/Hepatic/Renal:   (+) GERD (denies today): no interval change, morbid obesity          Endo/Other: Negative Endo/Other ROS                    Abdominal:   (+) obese,         Vascular: negative vascular ROS. Anesthesia Plan      general and TIVA     ASA 2       Induction: intravenous. Anesthetic plan and risks discussed with patient.                       OSKAR Ace - CRNA   1/14/2020

## 2020-01-15 LAB
DIRECT EXAM: NEGATIVE
Lab: NORMAL
SPECIMEN DESCRIPTION: NORMAL

## 2020-01-16 ENCOUNTER — TELEPHONE (OUTPATIENT)
Dept: GASTROENTEROLOGY | Age: 30
End: 2020-01-16

## 2020-01-16 LAB — SURGICAL PATHOLOGY REPORT: NORMAL

## 2020-02-11 ENCOUNTER — PATIENT MESSAGE (OUTPATIENT)
Dept: PRIMARY CARE CLINIC | Age: 30
End: 2020-02-11

## 2020-02-20 ENCOUNTER — HOSPITAL ENCOUNTER (OUTPATIENT)
Age: 30
Setting detail: SPECIMEN
Discharge: HOME | End: 2020-02-20
Payer: MEDICARE

## 2020-02-20 ENCOUNTER — OFFICE VISIT (OUTPATIENT)
Dept: PRIMARY CARE CLINIC | Age: 30
End: 2020-02-20
Payer: MEDICARE

## 2020-02-20 VITALS
WEIGHT: 224 LBS | HEART RATE: 73 BPM | BODY MASS INDEX: 39.68 KG/M2 | TEMPERATURE: 99 F | SYSTOLIC BLOOD PRESSURE: 113 MMHG | DIASTOLIC BLOOD PRESSURE: 63 MMHG

## 2020-02-20 LAB
-: ABNORMAL
AMORPHOUS: ABNORMAL
BACTERIA: ABNORMAL
BILIRUBIN URINE: NEGATIVE
BILIRUBIN, POC: NEGATIVE
BLOOD URINE, POC: NORMAL
CASTS UA: ABNORMAL /LPF
CLARITY, POC: CLEAR
COLOR, POC: YELLOW
COLOR: YELLOW
COMMENT UA: ABNORMAL
CRYSTALS, UA: ABNORMAL /HPF
EPITHELIAL CELLS UA: ABNORMAL /HPF (ref 0–25)
GLUCOSE URINE, POC: NEGATIVE
GLUCOSE URINE: NEGATIVE
KETONES, POC: NEGATIVE
KETONES, URINE: NEGATIVE
LEUKOCYTE EST, POC: NORMAL
LEUKOCYTE ESTERASE, URINE: ABNORMAL
MUCUS: ABNORMAL
NITRITE, POC: NEGATIVE
NITRITE, URINE: NEGATIVE
OTHER OBSERVATIONS UA: ABNORMAL
PH UA: 6.5 (ref 5–9)
PH, POC: 7
PROTEIN UA: NEGATIVE
PROTEIN, POC: NEGATIVE
RBC UA: ABNORMAL /HPF (ref 0–2)
RENAL EPITHELIAL, UA: ABNORMAL /HPF
SPECIFIC GRAVITY UA: 1.01 (ref 1.01–1.02)
SPECIFIC GRAVITY, POC: 1.02
TRICHOMONAS: ABNORMAL
TURBIDITY: CLEAR
URINE HGB: NEGATIVE
UROBILINOGEN, POC: 0.2
UROBILINOGEN, URINE: NORMAL
WBC UA: ABNORMAL /HPF (ref 0–5)
YEAST: ABNORMAL

## 2020-02-20 PROCEDURE — G8417 CALC BMI ABV UP PARAM F/U: HCPCS | Performed by: NURSE PRACTITIONER

## 2020-02-20 PROCEDURE — 81001 URINALYSIS AUTO W/SCOPE: CPT

## 2020-02-20 PROCEDURE — 81002 URINALYSIS NONAUTO W/O SCOPE: CPT | Performed by: NURSE PRACTITIONER

## 2020-02-20 PROCEDURE — G8482 FLU IMMUNIZE ORDER/ADMIN: HCPCS | Performed by: NURSE PRACTITIONER

## 2020-02-20 PROCEDURE — 99214 OFFICE O/P EST MOD 30 MIN: CPT | Performed by: NURSE PRACTITIONER

## 2020-02-20 PROCEDURE — 87086 URINE CULTURE/COLONY COUNT: CPT

## 2020-02-20 PROCEDURE — G8427 DOCREV CUR MEDS BY ELIG CLIN: HCPCS | Performed by: NURSE PRACTITIONER

## 2020-02-20 PROCEDURE — 1036F TOBACCO NON-USER: CPT | Performed by: NURSE PRACTITIONER

## 2020-02-20 RX ORDER — ALBUTEROL SULFATE 90 UG/1
2 AEROSOL, METERED RESPIRATORY (INHALATION) 4 TIMES DAILY PRN
Qty: 3 INHALER | Refills: 1 | Status: SHIPPED | OUTPATIENT
Start: 2020-02-20 | End: 2020-06-08 | Stop reason: SDUPTHER

## 2020-02-20 RX ORDER — HYDROXYZINE PAMOATE 25 MG/1
25 CAPSULE ORAL PRN
COMMUNITY
End: 2020-06-08 | Stop reason: ALTCHOICE

## 2020-02-20 RX ORDER — NITROFURANTOIN 25; 75 MG/1; MG/1
100 CAPSULE ORAL 2 TIMES DAILY
Qty: 10 CAPSULE | Refills: 0 | Status: SHIPPED | OUTPATIENT
Start: 2020-02-20 | End: 2020-02-25

## 2020-02-20 RX ORDER — MONTELUKAST SODIUM 10 MG/1
10 TABLET ORAL DAILY
Qty: 90 TABLET | Refills: 0 | Status: SHIPPED | OUTPATIENT
Start: 2020-02-20 | End: 2021-02-03 | Stop reason: SDUPTHER

## 2020-02-20 RX ORDER — FLUTICASONE PROPIONATE 220 UG/1
AEROSOL, METERED RESPIRATORY (INHALATION)
COMMUNITY
Start: 2020-01-22 | End: 2020-05-27

## 2020-02-20 ASSESSMENT — ENCOUNTER SYMPTOMS
ABDOMINAL PAIN: 0
RHINORRHEA: 0
DIARRHEA: 0
EYE DISCHARGE: 0
COUGH: 0
EYE ITCHING: 0
WHEEZING: 0
CHEST TIGHTNESS: 0
SHORTNESS OF BREATH: 0
VOMITING: 0
NAUSEA: 0
SORE THROAT: 0
CONSTIPATION: 0

## 2020-02-20 NOTE — PROGRESS NOTES
sinus rhythm. Holter showed sinus tachy 61% of the time. Rare premature ventricular complexes were recorded. No episodes of superventricuarl or ventriuclar tacycardial was seen. Pt reported episodes of SOB and speedy heart with monitroing showing sinus tachy.  Lordosis     Pneumonia     PTSD (post-traumatic stress disorder)     Schizophrenic disorder (HCC)     Tachycardia, unspecified     UTI (urinary tract infection)       Reviewed all health maintenance requirements and ordered appropriate tests  Health Maintenance Due   Topic Date Due    Varicella vaccine (1 of 2 - 2-dose childhood series) 06/15/1993    Annual Wellness Visit (AWV)  11/12/2019       Past Surgical History:     Past Surgical History:   Procedure Laterality Date    NECK SURGERY Left lesion biopsy    UPPER GASTROINTESTINAL ENDOSCOPY      chicken stuck in throat    UPPER GASTROINTESTINAL ENDOSCOPY N/A 1/14/2020    -rosario(eosinophilic esophagitis,neg H-Pylori)   Donte Moyer WISDOM TOOTH EXTRACTION          Medications:       Prior to Admission medications    Medication Sig Start Date End Date Taking?  Authorizing Provider   ARIPiprazole lauroxil (ARISTADA) 882 MG/3.2ML PRSY injection Inject 882 mg into the muscle 2/10/20  Yes Historical Provider, MD   montelukast (SINGULAIR) 10 MG tablet Take 1 tablet by mouth daily 2/20/20  Yes OSKAR Brand CNP   albuterol sulfate  (90 Base) MCG/ACT inhaler Inhale 2 puffs into the lungs 4 times daily as needed for Wheezing 2/20/20  Yes OSKAR Brand CNP   nitrofurantoin, macrocrystal-monohydrate, (MACROBID) 100 MG capsule Take 1 capsule by mouth 2 times daily for 5 days 2/20/20 2/25/20 Yes OSKAR Brand CNP   cloNIDine (CATAPRES) 0.1 MG tablet Take 1 tablet by mouth 2 times daily as needed for Other (anxiety) 12/31/19  Yes OSKAR Cuenca CNP   benztropine (COGENTIN) 1 MG tablet Take 1 tablet by mouth 2 times daily for 14 days 12/31/19 2/20/20 Yes OSKAR Cuenca - montelukast (SINGULAIR) 10 MG tablet    POCT Urinalysis no Micro    albuterol sulfate  (90 Base) MCG/ACT inhaler   2. Acute cystitis with hematuria  nitrofurantoin, macrocrystal-monohydrate, (MACROBID) 100 MG capsule   3. Bilateral neuropathy of upper extremities  EMG   4. Eosinophilic esophagitis     5. Schizoaffective disorder, bipolar type (HCC)     6. Class 2 obesity without serious comorbidity with body mass index (BMI) of 39.0 to 39.9 in adult, unspecified obesity type       Bilateral neuropathy- Phalen's and Tinel's testing negative in the office. will obtain EMG. Exercise-induced asthma-stable. Continue Singulair 10 mg nightly and albuterol prior to exercise and as needed. Acute cystitis with hematuria-this has been an ongoing problem however she has never been diagnosed with a UTI. Based off symptoms and her UA with a trace amount of blood and leukocytes we will treat her for UTI with Macrobid. Will send urine culture out if negative will have her see urology. Eosinophilic esophagitis-she will continue to follow with [facility]. She is currently on Flovent capsules and is getting good relief with this. Schizoaffective disorder bipolar type-stable. Should continue to follow with psychiatry. 1.  Rachael received counseling on the following healthy behaviors: nutrition, exercise and medication adherence  2. Patient given educational materials - see patient instructions  3. Was a self-tracking handout given in paper form or via [employer]? No  If yes, see orders or list here. 4.  Discussed use, benefit, and side effects of prescribed medications. Barriers to medication compliance addressed. All patient questions answered. Pt voiced understanding. 5.  Reviewed prior labs and health maintenance  6. Continue current medications, diet and exercise.     Completed Refills   Requested Prescriptions     Signed Prescriptions Disp Refills    montelukast (SINGULAIR) 10 MG tablet 90

## 2020-02-20 NOTE — PATIENT INSTRUCTIONS
SURVEY:    You may be receiving a survey from [employer] regarding your visit today. Please complete the survey to enable us to provide the highest quality of care to you and your family. If you cannot score us a very good on any question, please call the office to discuss how we could of made your experience a very good one. Thank you. Patient Education        Asthma in Adults: Care Instructions  Your Care Instructions    During an asthma attack, your airways swell and narrow as a reaction to certain things (triggers). This makes it hard to breathe. You may be able to prevent asthma attacks if you avoid the things that set off your asthma symptoms. Keeping your asthma under control and treating symptoms before they get bad can help you avoid severe attacks. If you can control your asthma, you may be able to do all of your normal daily activities. You may also avoid asthma attacks and trips to the hospital.  Follow-up care is a key part of your treatment and safety. Be sure to make and go to all appointments, and call your doctor if you are having problems. It's also a good idea to know your test results and keep a list of the medicines you take. How can you care for yourself at home? · Follow your asthma action plan so you can manage your symptoms at home. An asthma action plan will help you prevent and control airway reactions and will tell you what to do during an asthma attack. If you do not have an asthma action plan, work with your doctor to build one. · Take your asthma medicine exactly as prescribed. Medicine plays an important role in controlling asthma. Talk to your doctor right away if you have any questions about what to take and how to take it. ? Use your quick-relief medicine when you have symptoms of an attack. Quick-relief medicine often is an albuterol inhaler. Some people need to use quick-relief medicine before they exercise. ?  Take your controller medicine every day, not just when you have symptoms. Controller medicine is usually an inhaled corticosteroid. The goal is to prevent problems before they occur. Do not use your controller medicine to try to treat an attack that has already started. It does not work fast enough to help. ? If your doctor prescribed corticosteroid pills to use during an attack, take them as directed. They may take hours to work, but they may shorten the attack and help you breathe better. ? Keep your quick-relief medicine with you at all times. · Talk to your doctor before using other medicines. Some medicines, such as aspirin, can cause asthma attacks in some people. · Check yourself for asthma symptoms to know which step to follow in your action plan. Watch for things like being short of breath, having chest tightness, coughing, and wheezing. Also notice if symptoms wake you up at night or if you get tired quickly when you exercise. · If you have a peak flow meter, use it to check how well you are breathing. This can help you predict when an asthma attack is going to occur. Then you can take medicine to prevent the asthma attack or make it less severe. · See your doctor regularly. These visits will help you learn more about asthma and what you can do to control it. Your doctor will monitor your treatment to make sure the medicine is helping you. · Keep track of your asthma attacks and your treatment. After you have had an attack, write down what triggered it, what helped end it, and any concerns you have about your asthma action plan. Take your diary when you see your doctor. You can then review your asthma action plan and decide if it is working. · Do not smoke or allow others to smoke around you. Avoid smoky places. Smoking makes asthma worse. If you need help quitting, talk to your doctor about stop-smoking programs and medicines. These can increase your chances of quitting for good.   · Learn what triggers an asthma attack for you, and avoid the triggers when you can. Common triggers include colds, smoke, air pollution, dust, pollen, mold, pets, cockroaches, stress, and cold air. · Avoid colds and the flu. Get a pneumococcal vaccine shot. If you have had one before, ask your doctor whether you need a second dose. Get a flu vaccine every fall. If you must be around people with colds or the flu, wash your hands often. When should you call for help? Call 911 anytime you think you may need emergency care. For example, call if:    · You have severe trouble breathing.    Call your doctor now or seek immediate medical care if:    · Your symptoms do not get better after you have followed your asthma action plan.     · You cough up yellow, dark brown, or bloody mucus (sputum).    Watch closely for changes in your health, and be sure to contact your doctor if:    · Your coughing and wheezing get worse.     · You need to use quick-relief medicine on more than 2 days a week (unless it is just for exercise).     · You need help figuring out what is triggering your asthma attacks. Where can you learn more? Go to https://[employer]. org and sign in to your [employer] account. Enter P597 in the [employer] box to learn more about \"Asthma in Adults: Care Instructions. \"     If you do not have an account, please click on the \"Sign Up Now\" link. Current as of: June 9, 2019  Content Version: 12.3  © 8671-3732 [employer]wise, Incorporated. Care instructions adapted under license by [facility] ([facility]). If you have questions about a medical condition or this instruction, always ask your healthcare professional. [address] any warranty or liability for your use of this information.

## 2020-02-22 LAB
CULTURE: NORMAL
Lab: NORMAL
SPECIMEN DESCRIPTION: NORMAL

## 2020-02-25 ENCOUNTER — TELEPHONE (OUTPATIENT)
Dept: PRIMARY CARE CLINIC | Age: 30
End: 2020-02-25

## 2020-02-25 ASSESSMENT — ENCOUNTER SYMPTOMS
TROUBLE SWALLOWING: 0
SINUS PAIN: 0
ABDOMINAL DISTENTION: 0
SINUS PRESSURE: 0

## 2020-02-25 NOTE — TELEPHONE ENCOUNTER
----- Message from OSKAR Harper CNP sent at 2/25/2020  9:05 AM EST -----  Please notify patient that their lab results are normal.  She can stop taking the antibiotic and I will place a referral for her to see urology. She has any questions please have her contact the office.

## 2020-03-03 ENCOUNTER — TELEPHONE (OUTPATIENT)
Dept: PRIMARY CARE CLINIC | Age: 30
End: 2020-03-03

## 2020-03-16 ENCOUNTER — TELEPHONE (OUTPATIENT)
Dept: PRIMARY CARE CLINIC | Age: 30
End: 2020-03-16

## 2020-03-20 ENCOUNTER — APPOINTMENT (OUTPATIENT)
Dept: GENERAL RADIOLOGY | Age: 30
End: 2020-03-20
Payer: MEDICARE

## 2020-03-20 ENCOUNTER — HOSPITAL ENCOUNTER (EMERGENCY)
Age: 30
Discharge: HOME | End: 2020-03-20
Payer: MEDICARE

## 2020-03-20 VITALS
TEMPERATURE: 97.2 F | DIASTOLIC BLOOD PRESSURE: 83 MMHG | OXYGEN SATURATION: 97 % | BODY MASS INDEX: 40.39 KG/M2 | HEART RATE: 91 BPM | RESPIRATION RATE: 16 BRPM | WEIGHT: 228 LBS | SYSTOLIC BLOOD PRESSURE: 128 MMHG

## 2020-03-20 PROCEDURE — 99283 EMERGENCY DEPT VISIT LOW MDM: CPT

## 2020-03-20 PROCEDURE — 73130 X-RAY EXAM OF HAND: CPT

## 2020-03-20 PROCEDURE — 6370000000 HC RX 637 (ALT 250 FOR IP): Performed by: PHYSICIAN ASSISTANT

## 2020-03-20 RX ORDER — OLANZAPINE 5 MG/1
5 TABLET ORAL DAILY
COMMUNITY
End: 2020-05-27

## 2020-03-20 RX ORDER — OLANZAPINE 10 MG/1
10 TABLET ORAL NIGHTLY
COMMUNITY
End: 2020-05-27

## 2020-03-20 RX ADMIN — IBUPROFEN 400 MG: 100 SUSPENSION ORAL at 17:02

## 2020-03-20 ASSESSMENT — PAIN DESCRIPTION - DESCRIPTORS: DESCRIPTORS: SHARP

## 2020-03-20 ASSESSMENT — PAIN DESCRIPTION - ONSET: ONSET: GRADUAL

## 2020-03-20 ASSESSMENT — PAIN DESCRIPTION - PROGRESSION: CLINICAL_PROGRESSION: GRADUALLY WORSENING

## 2020-03-20 ASSESSMENT — PAIN DESCRIPTION - PAIN TYPE: TYPE: ACUTE PAIN

## 2020-03-20 ASSESSMENT — PAIN SCALES - GENERAL: PAINLEVEL_OUTOF10: 8

## 2020-03-20 ASSESSMENT — PAIN DESCRIPTION - LOCATION: LOCATION: HAND

## 2020-03-20 ASSESSMENT — PAIN DESCRIPTION - FREQUENCY: FREQUENCY: INTERMITTENT

## 2020-03-20 ASSESSMENT — PAIN DESCRIPTION - ORIENTATION: ORIENTATION: LEFT

## 2020-03-20 NOTE — ED PROVIDER NOTES
ear is less than 3 seconds. Neurologic: Alert and oriented. Normal motor and sensory function  Skin: Warm, dry, free of rashes  DIAGNOSTIC RESULTS     EKG: (none if blank)  All EKG's areinterpreted by the Emergency Department Physician who either signs or Co-signs this chart in the absence of a cardiologist.        RADIOLOGY: (none if blank)   Interpretationper the Radiologist below, if available at the time of this note:    XR HAND LEFT (MIN 3 VIEWS)   Final Result   No acute osseous abnormality evident. Followup imaging recommended if pain persists or worsens. LABS:  Labs Reviewed - No data to display    All other labs were within normal range or not returned as of this dictation. EMERGENCY DEPARTMENT COURSE and Medical Decision Making:     MDM/   The patient was given the results. She is resting comfort. She was given Motrin. She is to follow with her PCP. Strict return precautions and follow up instructions were discussed with the patient with which the patient agrees    CONSULTS: (None if blank)  None    Procedures: (None if blank)       CLINICAL IMPRESSION      1.  Contusion of left hand, initial encounter          DISPOSITION/PLAN   DISPOSITION Decision To Discharge 03/20/2020 05:38:11 PM      PATIENT REFERRED TO:  Jacey Ramon, APRN - CNP  [address] [facility] [id number]  [phone]    In 1 week  As needed      DISCHARGE MEDICATIONS:  New Prescriptions    No medications on file              (Please note that portions of this note were completed with a voice recognition program.  Efforts weremade to edit the dictations but occasionally words are mis-transcribed.)      Rod Ivory PA-C(electronically signed)              Rod Ivory PA-C  03/20/20 9101
no dysuria, no frequency, and no hematuria.

## 2020-03-23 ENCOUNTER — TELEPHONE (OUTPATIENT)
Dept: PRIMARY CARE CLINIC | Age: 30
End: 2020-03-23

## 2020-03-23 NOTE — TELEPHONE ENCOUNTER
[facility] Initial Follow Up Call    Outreach made within 2 business days of discharge: Yes    Patient: Felecia Powers Patient : 1990   MRN: [id number]  Reason for Admission: There are no discharge diagnoses documented for the most recent discharge. Discharge Date: 3/20/20       Spoke with: Rachael    Discharge department/facility: [facility]    TCM Interactive Patient Contact:  Was patient able to fill all prescriptions: N/A  Was patient instructed to bring all medications to the follow-up visit: N/a  Is patient taking all medications as directed in the discharge summary?  N/a  Does patient understand their discharge instructions: Yes  Does patient have questions or concerns that need addressed prior to 7-14 day follow up office visit: no    Scheduled appointment with PCP within 7-14 days    Follow Up  Future Appointments   Date Time Provider Kristopher Bennett   2020  2:00 PM OSKAR Saenz - CNP [address] [facility]       [place], [place]

## 2020-03-24 ENCOUNTER — APPOINTMENT (OUTPATIENT)
Dept: GENERAL RADIOLOGY | Age: 30
End: 2020-03-24
Payer: MEDICARE

## 2020-03-24 ENCOUNTER — HOSPITAL ENCOUNTER (EMERGENCY)
Age: 30
Discharge: HOME | End: 2020-03-25
Attending: EMERGENCY MEDICINE
Payer: MEDICARE

## 2020-03-24 LAB
ABSOLUTE EOS #: 0.32 K/UL (ref 0–0.44)
ABSOLUTE IMMATURE GRANULOCYTE: 0.04 K/UL (ref 0–0.3)
ABSOLUTE LYMPH #: 2.93 K/UL (ref 1.1–3.7)
ABSOLUTE MONO #: 0.92 K/UL (ref 0.1–1.2)
ANION GAP SERPL CALCULATED.3IONS-SCNC: 17 MMOL/L (ref 9–17)
BASOPHILS # BLD: 0 % (ref 0–2)
BASOPHILS ABSOLUTE: 0.04 K/UL (ref 0–0.2)
BUN BLDV-MCNC: 9 MG/DL (ref 6–20)
BUN/CREAT BLD: 17 (ref 9–20)
CALCIUM SERPL-MCNC: 9.1 MG/DL (ref 8.6–10.4)
CHLORIDE BLD-SCNC: 103 MMOL/L (ref 98–107)
CO2: 19 MMOL/L (ref 20–31)
CREAT SERPL-MCNC: 0.54 MG/DL (ref 0.5–0.9)
D-DIMER QUANTITATIVE: 1.17 MG/L FEU (ref 0.19–0.5)
DIFFERENTIAL TYPE: ABNORMAL
DIRECT EXAM: NORMAL
EOSINOPHILS RELATIVE PERCENT: 3 % (ref 1–4)
GFR AFRICAN AMERICAN: >60 ML/MIN
GFR NON-AFRICAN AMERICAN: >60 ML/MIN
GFR SERPL CREATININE-BSD FRML MDRD: ABNORMAL ML/MIN/{1.73_M2}
GFR SERPL CREATININE-BSD FRML MDRD: ABNORMAL ML/MIN/{1.73_M2}
GLUCOSE BLD-MCNC: 148 MG/DL (ref 70–99)
HCT VFR BLD CALC: 42.5 % (ref 36.3–47.1)
HEMOGLOBIN: 14.2 G/DL (ref 11.9–15.1)
IMMATURE GRANULOCYTES: 0 %
LYMPHOCYTES # BLD: 26 % (ref 24–43)
Lab: NORMAL
MCH RBC QN AUTO: 29 PG (ref 25.2–33.5)
MCHC RBC AUTO-ENTMCNC: 33.4 G/DL (ref 28.4–34.8)
MCV RBC AUTO: 86.9 FL (ref 82.6–102.9)
MONOCYTES # BLD: 8 % (ref 3–12)
NRBC AUTOMATED: 0 PER 100 WBC
PDW BLD-RTO: 12.5 % (ref 11.8–14.4)
PLATELET # BLD: 368 K/UL (ref 138–453)
PLATELET ESTIMATE: ABNORMAL
PMV BLD AUTO: 9.6 FL (ref 8.1–13.5)
POTASSIUM SERPL-SCNC: 3.8 MMOL/L (ref 3.7–5.3)
RBC # BLD: 4.89 M/UL (ref 3.95–5.11)
RBC # BLD: ABNORMAL 10*6/UL
SEG NEUTROPHILS: 63 % (ref 36–65)
SEGMENTED NEUTROPHILS ABSOLUTE COUNT: 7.19 K/UL (ref 1.5–8.1)
SODIUM BLD-SCNC: 139 MMOL/L (ref 135–144)
SPECIMEN DESCRIPTION: NORMAL
WBC # BLD: 11.4 K/UL (ref 3.5–11.3)
WBC # BLD: ABNORMAL 10*3/UL

## 2020-03-24 PROCEDURE — 84484 ASSAY OF TROPONIN QUANT: CPT

## 2020-03-24 PROCEDURE — 84703 CHORIONIC GONADOTROPIN ASSAY: CPT

## 2020-03-24 PROCEDURE — 2580000003 HC RX 258: Performed by: EMERGENCY MEDICINE

## 2020-03-24 PROCEDURE — 85025 COMPLETE CBC W/AUTO DIFF WBC: CPT

## 2020-03-24 PROCEDURE — 93005 ELECTROCARDIOGRAM TRACING: CPT | Performed by: EMERGENCY MEDICINE

## 2020-03-24 PROCEDURE — 85379 FIBRIN DEGRADATION QUANT: CPT

## 2020-03-24 PROCEDURE — 6360000002 HC RX W HCPCS: Performed by: EMERGENCY MEDICINE

## 2020-03-24 PROCEDURE — 71046 X-RAY EXAM CHEST 2 VIEWS: CPT

## 2020-03-24 PROCEDURE — 96374 THER/PROPH/DIAG INJ IV PUSH: CPT

## 2020-03-24 PROCEDURE — 87880 STREP A ASSAY W/OPTIC: CPT

## 2020-03-24 PROCEDURE — 80048 BASIC METABOLIC PNL TOTAL CA: CPT

## 2020-03-24 PROCEDURE — 99284 EMERGENCY DEPT VISIT MOD MDM: CPT

## 2020-03-24 RX ORDER — 0.9 % SODIUM CHLORIDE 0.9 %
1000 INTRAVENOUS SOLUTION INTRAVENOUS ONCE
Status: COMPLETED | OUTPATIENT
Start: 2020-03-24 | End: 2020-03-25

## 2020-03-24 RX ORDER — KETOROLAC TROMETHAMINE 15 MG/ML
15 INJECTION, SOLUTION INTRAMUSCULAR; INTRAVENOUS ONCE
Status: COMPLETED | OUTPATIENT
Start: 2020-03-24 | End: 2020-03-24

## 2020-03-24 RX ORDER — HYDROCODONE BITARTRATE AND ACETAMINOPHEN 5; 325 MG/1; MG/1
2 TABLET ORAL ONCE
Status: DISCONTINUED | OUTPATIENT
Start: 2020-03-24 | End: 2020-03-24

## 2020-03-24 RX ADMIN — KETOROLAC TROMETHAMINE 15 MG: 15 INJECTION, SOLUTION INTRAMUSCULAR; INTRAVENOUS at 23:41

## 2020-03-24 RX ADMIN — SODIUM CHLORIDE 1000 ML: 9 INJECTION, SOLUTION INTRAVENOUS at 23:41

## 2020-03-24 ASSESSMENT — ENCOUNTER SYMPTOMS
NAUSEA: 1
COLOR CHANGE: 0
SHORTNESS OF BREATH: 0
DIARRHEA: 0
SORE THROAT: 1
BACK PAIN: 0
COUGH: 0
RHINORRHEA: 1
ABDOMINAL PAIN: 0
CHEST TIGHTNESS: 1
WHEEZING: 0
VOMITING: 0

## 2020-03-24 ASSESSMENT — PAIN DESCRIPTION - FREQUENCY: FREQUENCY: CONTINUOUS

## 2020-03-24 ASSESSMENT — PAIN SCALES - GENERAL
PAINLEVEL_OUTOF10: 8
PAINLEVEL_OUTOF10: 8

## 2020-03-24 ASSESSMENT — PAIN DESCRIPTION - DESCRIPTORS: DESCRIPTORS: CONSTANT;ACHING

## 2020-03-24 ASSESSMENT — PAIN DESCRIPTION - PAIN TYPE: TYPE: ACUTE PAIN

## 2020-03-24 ASSESSMENT — PAIN DESCRIPTION - LOCATION: LOCATION: CHEST

## 2020-03-24 ASSESSMENT — PAIN DESCRIPTION - ORIENTATION: ORIENTATION: LEFT;RIGHT;LOWER

## 2020-03-25 ENCOUNTER — APPOINTMENT (OUTPATIENT)
Dept: CT IMAGING | Age: 30
End: 2020-03-25
Payer: MEDICARE

## 2020-03-25 ENCOUNTER — HOSPITAL ENCOUNTER (OUTPATIENT)
Dept: VASCULAR LAB | Age: 30
Discharge: HOME | End: 2020-03-27
Payer: MEDICARE

## 2020-03-25 ENCOUNTER — CARE COORDINATION (OUTPATIENT)
Dept: CARE COORDINATION | Age: 30
End: 2020-03-25

## 2020-03-25 VITALS
SYSTOLIC BLOOD PRESSURE: 115 MMHG | TEMPERATURE: 98.6 F | HEART RATE: 91 BPM | RESPIRATION RATE: 20 BRPM | OXYGEN SATURATION: 98 % | DIASTOLIC BLOOD PRESSURE: 63 MMHG

## 2020-03-25 LAB
EKG ATRIAL RATE: 109 BPM
EKG P AXIS: 50 DEGREES
EKG P-R INTERVAL: 134 MS
EKG Q-T INTERVAL: 348 MS
EKG QRS DURATION: 74 MS
EKG QTC CALCULATION (BAZETT): 468 MS
EKG R AXIS: 87 DEGREES
EKG T AXIS: 34 DEGREES
EKG VENTRICULAR RATE: 109 BPM
HCG QUALITATIVE: NEGATIVE
TROPONIN INTERP: NORMAL
TROPONIN T: NORMAL NG/ML
TROPONIN, HIGH SENSITIVITY: <6 NG/L (ref 0–14)

## 2020-03-25 PROCEDURE — 93970 EXTREMITY STUDY: CPT

## 2020-03-25 PROCEDURE — 71260 CT THORAX DX C+: CPT

## 2020-03-25 PROCEDURE — 6360000004 HC RX CONTRAST MEDICATION: Performed by: EMERGENCY MEDICINE

## 2020-03-25 PROCEDURE — 93010 ELECTROCARDIOGRAM REPORT: CPT | Performed by: INTERNAL MEDICINE

## 2020-03-25 RX ORDER — ACETAMINOPHEN 325 MG/1
650 TABLET ORAL EVERY 6 HOURS PRN
Qty: 30 TABLET | Refills: 0 | Status: SHIPPED | OUTPATIENT
Start: 2020-03-25 | End: 2020-08-27

## 2020-03-25 RX ORDER — ALBUTEROL SULFATE 90 UG/1
1-2 AEROSOL, METERED RESPIRATORY (INHALATION) EVERY 4 HOURS PRN
Qty: 1 INHALER | Refills: 0 | Status: SHIPPED | OUTPATIENT
Start: 2020-03-25 | End: 2021-02-23 | Stop reason: SDUPTHER

## 2020-03-25 RX ADMIN — IOPAMIDOL 75 ML: 755 INJECTION, SOLUTION INTRAVENOUS at 01:06

## 2020-03-25 NOTE — ED PROVIDER NOTES
[facility] ED  Emergency Department Encounter  EmergencyMedicine Attending     Pt Name:Rachael Arroyo  MRN: [id number]  [place] 1990  Date of evaluation: 3/24/20  PCP:  Valeria Jules       Chief Complaint   Patient presents with    Pharyngitis     onset yesterday am    Headache     onset yesterday     Chest Pain     bilateral lower, onset yesterday afternoon        HISTORY OF PRESENT ILLNESS  (Location/Symptom, Timing/Onset, Context/Setting, Quality, Duration, Modifying Factors, Severity.)      Dolores Gonzales is a 34 y.o. female who presents with congestion rhinorrhea sore throat and a headache that started yesterday. However patient reports that today she started having chest pain bilaterally, this started yesterday afternoon. Mostly because of the chest pain that she is here in the emergency department. Patient says that she has a history of pleurisy and walking pneumonia and this feels similar. Pain is 8 out of 10, bilateral chest, no radiations, aching pain, worse with deep breaths. No hemoptysis, no asymmetrical leg swelling, no history of a PE or DVT, no recent immobilization or surgery however is on birth control. PAST MEDICAL / SURGICAL / SOCIAL / FAMILY HISTORY      has a past medical history of Asthma, Back pain, Chicken pox, Dysphagia, H/O echocardiogram, History of Holter monitoring, Lordosis, Pneumonia, PTSD (post-traumatic stress disorder), Schizophrenic disorder ([facility] [address] Tachycardia, unspecified, and UTI (urinary tract infection). has a past surgical history that includes [place] tooth extraction; Upper gastrointestinal endoscopy; Neck surgery (Left, lesion biopsy); and Upper gastrointestinal endoscopy (N/A, 1/14/2020).     Social History     Socioeconomic History    Marital status: Single     Spouse name: Not on file    Number of children: Not on file    Years of education: Not on file    Highest education level: Not on file   Occupational History    Not on file   Social Needs    Financial resource strain: Not on file    Food insecurity     Worry: Not on file     Inability: Not on file    Transportation needs     Medical: Not on file     Non-medical: Not on file   Tobacco Use    Smoking status: Never Smoker    Smokeless tobacco: Never Used   Substance and Sexual Activity    Alcohol use: No     Comment: Rare    Drug use: No    Sexual activity: Not on file   Lifestyle    Physical activity     Days per week: Not on file     Minutes per session: Not on file    Stress: Not on file   Relationships    Social connections     Talks on phone: Not on file     Gets together: Not on file     Attends Scientologist service: Not on file     Active member of club or organization: Not on file     Attends meetings of clubs or organizations: Not on file     Relationship status: Not on file    Intimate partner violence     Fear of current or ex partner: Not on file     Emotionally abused: Not on file     Physically abused: Not on file     Forced sexual activity: Not on file   Other Topics Concern    Not on file   Social History Narrative    Not on file       Family History   Problem Relation Age of Onset    Asthma Mother     High Blood Pressure Father     Diabetes Father        Allergies:  Doxycycline; Pollen extract; Seroquel [quetiapine fumarate]; Symbicort [budesonide-formoterol fumarate]; and Topamax [topiramate]    Home Medications:  Prior to Admission medications    Medication Sig Start Date End Date Taking? Authorizing Provider   albuterol sulfate HFA (PROVENTIL HFA) 108 (90 Base) MCG/ACT inhaler Inhale 1-2 puffs into the lungs every 4 hours as needed for Wheezing or Shortness of Breath (Space out to every 6 hours as symptoms improve) Space out to every 6 hours as symptoms improve.  3/25/20  Yes Deloras Schwab, MD   acetaminophen (TYLENOL) 325 MG tablet Take 2 tablets by mouth every 6 hours as needed for Pain 3/25/20  Yes Daphne BEAULIEU Musa Vega MD   OLANZapine (ZYPREXA) 5 MG tablet Take 5 mg by mouth daily   Yes Historical Provider, MD   OLANZapine (ZYPREXA) 10 MG tablet Take 10 mg by mouth nightly   Yes Historical Provider, MD   hydrOXYzine (VISTARIL) 25 MG capsule Take 25 mg by mouth   Yes Historical Provider, MD   FLOVENT  MCG/ACT inhaler 2 PUFFS BY MOUTH TWICE DAILY FOR 2 DAYS. SPRAY INTO MOUTH AND THEN SWALLOW. DO NOT INHALE WHEN THE MEDICATION IS BEING DELIVERED AND DO NOT 1/22/20  Yes Historical Provider, MD   ARIPiprazole lauroxil (ARISTADA) 882 MG/3.2ML PRSY injection Inject 882 mg into the muscle 2/10/20  Yes Historical Provider, MD   montelukast (SINGULAIR) 10 MG tablet Take 1 tablet by mouth daily 2/20/20  Yes OSKAR Brand CNP   albuterol sulfate  (90 Base) MCG/ACT inhaler Inhale 2 puffs into the lungs 4 times daily as needed for Wheezing 2/20/20  Yes OSKAR Brand CNP   cloNIDine (CATAPRES) 0.1 MG tablet Take 1 tablet by mouth 2 times daily as needed for Other (anxiety) 12/31/19  Yes OSKAR Cuenca CNP   amLODIPine (NORVASC) 5 MG tablet Take 1 tablet by mouth daily 1/1/20  Yes [facility] OSKAR Hillman CNP   SPRINTEC 28 0.25-35 MG-MCG per tablet Take 1 tablet by mouth daily 3/21/19  Yes Historical Provider, MD   naproxen (NAPROSYN) 500 MG tablet Take 1 tablet by mouth 2 times daily 9/4/17  Yes Mateus Lew MD   benztropine (COGENTIN) 1 MG tablet Take 1 tablet by mouth 2 times daily for 14 days 12/31/19 3/20/20  HungOSKAR Dent CNP       REVIEW OF SYSTEMS    (2-9 systems for level 4, 10 or more for level 5)      Review of Systems   Constitutional: Negative for chills and fever. HENT: Positive for congestion, rhinorrhea and sore throat. Respiratory: Positive for chest tightness. Negative for cough, shortness of breath and wheezing. Cardiovascular: Positive for chest pain. Negative for leg swelling. Gastrointestinal: Positive for nausea.  Negative for abdominal pain, diarrhea and vomiting. Genitourinary: Negative for dysuria. Musculoskeletal: Negative for back pain. Skin: Negative for color change. Neurological: Negative for weakness and headaches. Psychiatric/Behavioral: Negative for agitation. PHYSICAL EXAM   (up to 7 for level 4, 8 or more for level 5)      INITIAL VITALS:   /83   Pulse 107   Temp 98.6 °F (37 °C) (Tympanic)   Resp 20   LMP 03/01/2020   SpO2 98%     Physical Exam  Constitutional:       General: She is not in acute distress. Appearance: She is well-developed. HENT:      Head: Normocephalic and atraumatic. Nose: No congestion or rhinorrhea. Eyes:      General:         Right eye: No discharge. Left eye: No discharge. Conjunctiva/sclera: Conjunctivae normal.   Cardiovascular:      Rate and Rhythm: Regular rhythm. Tachycardia present. Heart sounds: Normal heart sounds. No murmur. No friction rub. No gallop. Pulmonary:      Effort: Pulmonary effort is normal. No respiratory distress. Breath sounds: Normal breath sounds. No wheezing or rales. Abdominal:      General: There is no distension. Palpations: Abdomen is soft. Tenderness: There is no abdominal tenderness. There is no guarding or rebound. Musculoskeletal:         General: No swelling or tenderness. Comments: No asymmetrical leg swelling, no clinical signs of a DVT or PE at this time. Skin:     General: Skin is warm. Findings: No erythema. Neurological:      Mental Status: She is alert.          DIFFERENTIAL  DIAGNOSIS     PLAN (LABS / IMAGING / EKG):  Orders Placed This Encounter   Procedures    Strep Screen Group A Throat    XR CHEST STANDARD (2 VW)    CT Chest Pulmonary Embolism W Contrast    VL DUP LOWER EXTREMITY VENOUS BILATERAL    CBC auto differential    Basic Metabolic Panel    Troponin    D-Dimer, Quantitative    HCG Qualitative, Serum    EKG 12 Lead       MEDICATIONS ORDERED:  Orders Placed This Encounter Medications    DISCONTD: HYDROcodone-acetaminophen (NORCO) 5-325 MG per tablet 2 tablet    ketorolac (TORADOL) injection 15 mg    0.9 % sodium chloride bolus    iopamidol (ISOVUE-370) 76 % injection 75 mL    albuterol sulfate HFA (PROVENTIL HFA) 108 (90 Base) MCG/ACT inhaler     Sig: Inhale 1-2 puffs into the lungs every 4 hours as needed for Wheezing or Shortness of Breath (Space out to every 6 hours as symptoms improve) Space out to every 6 hours as symptoms improve. Dispense:  1 Inhaler     Refill:  0    acetaminophen (TYLENOL) 325 MG tablet     Sig: Take 2 tablets by mouth every 6 hours as needed for Pain     Dispense:  30 tablet     Refill:  0       DDX: Viral syndrome versus pneumonia versus PE versus atypical ACS    DIAGNOSTIC RESULTS / EMERGENCY DEPARTMENT COURSE / MDM   :  Results for orders placed or performed during the hospital encounter of 03/24/20   Strep Screen Group A Throat   Result Value Ref Range    Specimen Description . THROAT     Special Requests NOT REPORTED     Direct Exam       Rapid Strep A negative. A negative Rapid Group A Strep Screen result does not rule out the possibility of Group A Streptococci in the specimen.  A Group A Strep DNA test is available upon request.   CBC auto differential   Result Value Ref Range    WBC 11.4 (H) 3.5 - 11.3 k/uL    RBC 4.89 3.95 - 5.11 m/uL    Hemoglobin 14.2 11.9 - 15.1 g/dL    Hematocrit 42.5 36.3 - 47.1 %    MCV 86.9 82.6 - 102.9 fL    MCH 29.0 25.2 - 33.5 pg    MCHC 33.4 28.4 - 34.8 g/dL    RDW 12.5 11.8 - 14.4 %    Platelets 804 626 - 174 k/uL    MPV 9.6 8.1 - 13.5 fL    NRBC Automated 0.0 0.0 per 100 WBC    Differential Type NOT REPORTED     Seg Neutrophils 63 36 - 65 %    Lymphocytes 26 24 - 43 %    Monocytes 8 3 - 12 %    Eosinophils % 3 1 - 4 %    Basophils 0 0 - 2 %    Immature Granulocytes 0 0 %    Segs Absolute 7.19 1.50 - 8.10 k/uL    Absolute Lymph # 2.93 1.10 - 3.70 k/uL    Absolute Mono # 0.92 0.10 - 1.20 k/uL    Absolute Eos # No evidence of pulmonary embolism or acute pulmonary abnormality. EKG    EKG Interpretation    Interpreted by me    Rhythm: normal sinus   Rate: 109  Axis: normal  Ectopy: none  Conduction: normal  ST Segments: no acute change  T Waves: no acute change  Q Waves: none    Clinical Impression: Sinus tachy    All EKG's are interpreted by the Emergency Department Physician who either signs or Co-signs this chart in the absence of a cardiologist.    EMERGENCY DEPARTMENT COURSE:    Patient d-dimer was elevated therefore a CT chest was done. Otherwise troponin was negative. Symptoms ongoing for more than 6 hours and yet the troponin is negative which is the high-sensitivity 1. Otherwise no significant cardiac risk factors either. Given the elevated d-dimer, patient will be sent home with a outpatient ultrasound of her bilateral lower extremities. Of note clinically there is no sign of a DVT at this time therefore we will hold off on anticoagulation especially since patient does not have any calf pain or tenderness. We will schedule her for a ultrasound for the morning as soon as possible. Otherwise follow-up with PCP, return to ER with worsening symptoms. CT chest did not show a pulmonary embolus or pneumonia. PROCEDURES:  None    CONSULTS:  None    CRITICAL CARE:  None    FINAL IMPRESSION      1. Viral syndrome    2.  Elevated d-dimer          DISPOSITION / PLAN     DISPOSITION Decision To Discharge 03/25/2020 01:39:19 AM      PATIENT REFERRED TO:  Kathie Melgar [facility], APRN - [facility]  [id number]  [facility]  [phone]    Call in 2 days        DISCHARGE MEDICATIONS:  New Prescriptions    ACETAMINOPHEN (TYLENOL) 325 MG TABLET    Take 2 tablets by mouth every 6 hours as needed for Pain    ALBUTEROL SULFATE HFA (PROVENTIL HFA) 108 (90 BASE) MCG/ACT INHALER    Inhale 1-2 puffs into the lungs every 4 hours as needed for Wheezing or Shortness of Breath (Space out to every 6 hours as symptoms improve) Space out to every 6 hours as symptoms improve.        Ellen Kingsley MD  Emergency Medicine Attending    (Please note that portions of thisnote were completed with a voice recognition program.  Efforts were made to edit the dictations but occasionally words are mis-transcribed.)       Ellen Kingsley MD  03/25/20 [id number]

## 2020-04-03 ENCOUNTER — HOSPITAL ENCOUNTER (OUTPATIENT)
Dept: SPEECH THERAPY | Age: 30
Setting detail: THERAPIES SERIES
Discharge: HOME | End: 2020-04-03

## 2020-04-03 NOTE — DISCHARGE SUMMARY
Phone: Felix    Fax: [phone]                       Outpatient Speech Therapy                                                                         Discharge    Date: 4/3/2020    Patient Name: Blair Jones         : 1990  (34 y.o.)    Gender: female [facility] #: [id number]    Diagnosis: Diagnosis: Dysphagia (R13.10)  PCP:OSKAR Dominguez CNP   Referring physician:     Onset Date:2019       Compliance with Therapy  [x]Good []Fair  []Poor  INSURANCE  Total # of Visits to Date: 1,               Short-term Goal(s):   Progress Last Certification Period Progress at discharge   Goal 1: Patient will participate in MBS procedure      MBS was [facility]. Patient recommended to have esophagram completed due to possible esophageal dysphagia. [x]Met  []Partially met  []Not met       Discharge Status  [x] Patient received maximum benefit. No further therapy indicated at this time. [] Patient demonstrated improvement from conditions with    /    goals met  [] Patient to continue exercises/home instructions independently. [] Therapy interrupted due to:  [] Patient has completed their prescribed number of treatment sessions.   [] Other:     Progress during therapy:  []  Patient demonstrated improved level of function  [] Patient declined in level of function secondary to:  [x] No Change    Additional Comments:    RECOMMENDATIONS:  _X_ Discharge from [facility]  __Contact ST to continue therapy    If you have any questions regarding this patients care please contact us at [phone]   Thank You for this referral.     Electronically signed by:   Wild Betancur            ERJ

## 2020-04-08 ENCOUNTER — CARE COORDINATION (OUTPATIENT)
Dept: CARE COORDINATION | Age: 30
End: 2020-04-08

## 2020-04-08 NOTE — CARE COORDINATION
COVID-19 Screening Follow-up Note    Patient contacted regarding COVID-19 risk and screening. Care Transition Nurse/ Ambulatory Care Manager contacted the patient by telephone to perform follow-up assessment. Verified name and  with patient as identifiers. Patient has following risk factors of: no known risk factors        Symptoms reviewed with patient who verbalized the following symptoms: no new/worsening symptoms       Due to no new or worsening symptoms encounter was not routed to provider for escalation. Education provided regarding infection prevention, and signs and symptoms of COVID-19 and when to seek medical attention with patient who verbalized understanding. Discussed exposure protocols and quarantine from [address] you at higher risk for severe illness  and given an opportunity for questions and concerns. The patient agrees to contact the COVID-19 hotline [phone] or PCP office for questions related to their healthcare. CTN/ACM provided contact information for future reference. From CDC: Are you at higher risk for severe illness?  Wash your hands often.  Avoid close contact (6 feet, which is about two arm lengths) with people who are sick.  Put distance between yourself and other people if COVID-19 is spreading in your community.  Clean and disinfect frequently touched surfaces.  Avoid all cruise travel and non-essential air travel.  Call your healthcare professional if you have concerns about COVID-19 and your underlying condition or if you are sick. For more information on steps you can take to protect yourself, see CDC's How to Protect Yourself        No further outreach scheduled with this CTN/ACM. Episode of Care resolved.

## 2020-05-07 ENCOUNTER — HOSPITAL ENCOUNTER (OUTPATIENT)
Age: 30
Setting detail: SPECIMEN
Discharge: HOME | End: 2020-05-07
Payer: MEDICARE

## 2020-05-07 ENCOUNTER — OFFICE VISIT (OUTPATIENT)
Dept: UROLOGY | Age: 30
End: 2020-05-07
Payer: MEDICARE

## 2020-05-07 VITALS
WEIGHT: 238 LBS | DIASTOLIC BLOOD PRESSURE: 70 MMHG | BODY MASS INDEX: 42.17 KG/M2 | HEIGHT: 63 IN | SYSTOLIC BLOOD PRESSURE: 104 MMHG

## 2020-05-07 LAB
-: ABNORMAL
AMORPHOUS: ABNORMAL
BACTERIA: ABNORMAL
BILIRUBIN URINE: NEGATIVE
CASTS UA: ABNORMAL /LPF
COLOR: YELLOW
COMMENT UA: ABNORMAL
CRYSTALS, UA: ABNORMAL /HPF
EPITHELIAL CELLS UA: ABNORMAL /HPF (ref 0–25)
GLUCOSE URINE: NEGATIVE
KETONES, URINE: NEGATIVE
LEUKOCYTE ESTERASE, URINE: ABNORMAL
MUCUS: ABNORMAL
NITRITE, URINE: NEGATIVE
OTHER OBSERVATIONS UA: ABNORMAL
PH UA: 7 (ref 5–9)
PROTEIN UA: NEGATIVE
RBC UA: ABNORMAL /HPF (ref 0–2)
RENAL EPITHELIAL, UA: ABNORMAL /HPF
SPECIFIC GRAVITY UA: 1.02 (ref 1.01–1.02)
TRICHOMONAS: ABNORMAL
TURBIDITY: CLEAR
URINE HGB: NEGATIVE
UROBILINOGEN, URINE: NORMAL
WBC UA: ABNORMAL /HPF (ref 0–5)
YEAST: ABNORMAL

## 2020-05-07 PROCEDURE — 51798 US URINE CAPACITY MEASURE: CPT | Performed by: UROLOGY

## 2020-05-07 PROCEDURE — G8417 CALC BMI ABV UP PARAM F/U: HCPCS | Performed by: UROLOGY

## 2020-05-07 PROCEDURE — 1036F TOBACCO NON-USER: CPT | Performed by: UROLOGY

## 2020-05-07 PROCEDURE — G8427 DOCREV CUR MEDS BY ELIG CLIN: HCPCS | Performed by: UROLOGY

## 2020-05-07 PROCEDURE — 99204 OFFICE O/P NEW MOD 45 MIN: CPT | Performed by: UROLOGY

## 2020-05-07 PROCEDURE — 81001 URINALYSIS AUTO W/SCOPE: CPT

## 2020-05-07 PROCEDURE — 87086 URINE CULTURE/COLONY COUNT: CPT

## 2020-05-07 RX ORDER — OXYBUTYNIN CHLORIDE 5 MG/1
5 TABLET ORAL 2 TIMES DAILY
Qty: 60 TABLET | Refills: 1 | Status: SHIPPED | OUTPATIENT
Start: 2020-05-07 | End: 2020-05-27

## 2020-05-07 RX ORDER — PHENAZOPYRIDINE HYDROCHLORIDE 100 MG/1
100 TABLET, FILM COATED ORAL 3 TIMES DAILY PRN
Qty: 20 TABLET | Refills: 0 | Status: SHIPPED | OUTPATIENT
Start: 2020-05-07 | End: 2020-05-27

## 2020-05-07 RX ORDER — GABAPENTIN 100 MG/1
CAPSULE ORAL
COMMUNITY
Start: 2020-04-29 | End: 2020-06-08 | Stop reason: ALTCHOICE

## 2020-05-07 RX ORDER — BUDESONIDE 0.5 MG/2ML
INHALANT ORAL
COMMUNITY
Start: 2020-04-21 | End: 2020-05-27

## 2020-05-07 ASSESSMENT — ENCOUNTER SYMPTOMS
WHEEZING: 0
NAUSEA: 0
VOMITING: 0
ABDOMINAL PAIN: 0
EYE REDNESS: 0
EYE PAIN: 0
BACK PAIN: 0
SHORTNESS OF BREATH: 0
COUGH: 0
COLOR CHANGE: 0

## 2020-05-08 LAB
CULTURE: NORMAL
Lab: NORMAL
SPECIMEN DESCRIPTION: NORMAL

## 2020-05-11 ENCOUNTER — TELEPHONE (OUTPATIENT)
Dept: UROLOGY | Age: 30
End: 2020-05-11

## 2020-05-27 ENCOUNTER — HOSPITAL ENCOUNTER (EMERGENCY)
Age: 30
Discharge: HOME | End: 2020-05-27
Payer: MEDICARE

## 2020-05-27 VITALS
BODY MASS INDEX: 42.16 KG/M2 | HEART RATE: 88 BPM | RESPIRATION RATE: 16 BRPM | TEMPERATURE: 98.1 F | WEIGHT: 238 LBS | SYSTOLIC BLOOD PRESSURE: 160 MMHG | DIASTOLIC BLOOD PRESSURE: 92 MMHG | OXYGEN SATURATION: 99 %

## 2020-05-27 PROCEDURE — 99282 EMERGENCY DEPT VISIT SF MDM: CPT

## 2020-05-27 RX ORDER — NAPROXEN 500 MG/1
500 TABLET ORAL 2 TIMES DAILY
Qty: 14 TABLET | Refills: 0 | Status: SHIPPED | OUTPATIENT
Start: 2020-05-27 | End: 2020-08-27 | Stop reason: SDUPTHER

## 2020-05-27 RX ORDER — AMLODIPINE BESYLATE 5 MG/1
5 TABLET ORAL DAILY
Qty: 30 TABLET | Refills: 0 | Status: SHIPPED | OUTPATIENT
Start: 2020-05-27 | End: 2020-06-08 | Stop reason: ALTCHOICE

## 2020-05-27 RX ORDER — IBUPROFEN 800 MG/1
800 TABLET ORAL EVERY 8 HOURS PRN
COMMUNITY
End: 2020-06-15 | Stop reason: ALTCHOICE

## 2020-05-27 NOTE — ED PROVIDER NOTES
RESULTS   none    EMERGENCY DEPARTMENT COURSE andMedical Decision Making:     Vitals:    Vitals:    05/27/20 1155   BP: (!) 160/92   Pulse: 88   Resp: 16   Temp: 98.1 °F (36.7 °C)   TempSrc: Tympanic   SpO2: 99%   Weight: 238 lb (108 kg)       MDM/     We will treat his paresthesias most likely ulnar nerve irritation, will place in cock-up splint and use NSAIDs. I will supply the patient with a 30-day refill of her blood pressure medication which we have her adequate time to get into see her family doctor for ongoing prescription. Strict return precautions and follow up instructions were discussed with the patient with which the patient agrees    ED Medications administered this visit:  Medications - No data to display    CONSULTS: (None if blank)  None    Procedures: (None if blank)       CLINICAL       1. Left hand paresthesia    2. Elevated blood pressure reading    3.  Hypertension, unspecified type          DISPOSITION/PLAN   DISPOSITION Decision To Discharge 05/27/2020 12:05:07 PM      PATIENT REFERRED TO:  OSKAR Walsh - [facility] [address]  [phone]    In 2 weeks  As scheduled, Recheck blood pressure      DISCHARGE MEDICATIONS:  New Prescriptions    AMLODIPINE (NORVASC) 5 MG TABLET    Take 1 tablet by mouth daily    NAPROXEN (NAPROSYN) 500 MG TABLET    Take 1 tablet by mouth 2 times daily              (Please note that portions of this note were completed with a voice recognition program.  Efforts were made to edit the dictations but occasionallywords are mis-transcribed.)      Alcides Umanzor II, PA-C (electronically signed)           Alcides Umanzor II, PA-C  05/27/20 5023

## 2020-05-28 ENCOUNTER — CARE COORDINATION (OUTPATIENT)
Dept: CARE COORDINATION | Age: 30
End: 2020-05-28

## 2020-05-28 NOTE — CARE COORDINATION
lengths) with people who are sick.  Put distance between yourself and other people if COVID-19 is spreading in your community.  Clean and disinfect frequently touched surfaces.  Avoid all cruise travel and non-essential air travel.  Call your healthcare professional if you have concerns about COVID-19 and your underlying condition or if you are sick. For more information on steps you can take to protect yourself, see CDC's How to [address] [place] [address] for follow-up call in 7-14 days based on severity of symptoms and risk factors.

## 2020-06-08 ENCOUNTER — OFFICE VISIT (OUTPATIENT)
Dept: PRIMARY CARE CLINIC | Age: 30
End: 2020-06-08
Payer: MEDICARE

## 2020-06-08 ENCOUNTER — HOSPITAL ENCOUNTER (OUTPATIENT)
Dept: GENERAL RADIOLOGY | Age: 30
Discharge: HOME | End: 2020-06-10
Payer: MEDICARE

## 2020-06-08 ENCOUNTER — TELEPHONE (OUTPATIENT)
Dept: PRIMARY CARE CLINIC | Age: 30
End: 2020-06-08

## 2020-06-08 ENCOUNTER — HOSPITAL ENCOUNTER (OUTPATIENT)
Age: 30
Discharge: HOME | End: 2020-06-10
Payer: MEDICARE

## 2020-06-08 VITALS
HEIGHT: 63 IN | WEIGHT: 235.6 LBS | TEMPERATURE: 98.2 F | HEART RATE: 87 BPM | RESPIRATION RATE: 16 BRPM | DIASTOLIC BLOOD PRESSURE: 72 MMHG | BODY MASS INDEX: 41.75 KG/M2 | SYSTOLIC BLOOD PRESSURE: 116 MMHG

## 2020-06-08 PROCEDURE — 99214 OFFICE O/P EST MOD 30 MIN: CPT | Performed by: NURSE PRACTITIONER

## 2020-06-08 PROCEDURE — G8427 DOCREV CUR MEDS BY ELIG CLIN: HCPCS | Performed by: NURSE PRACTITIONER

## 2020-06-08 PROCEDURE — G8417 CALC BMI ABV UP PARAM F/U: HCPCS | Performed by: NURSE PRACTITIONER

## 2020-06-08 PROCEDURE — 72040 X-RAY EXAM NECK SPINE 2-3 VW: CPT

## 2020-06-08 PROCEDURE — 1036F TOBACCO NON-USER: CPT | Performed by: NURSE PRACTITIONER

## 2020-06-08 RX ORDER — BUDESONIDE 0.5 MG/2ML
INHALANT ORAL
COMMUNITY
Start: 2020-06-03 | End: 2021-09-09

## 2020-06-08 RX ORDER — OXYBUTYNIN CHLORIDE 5 MG/1
TABLET ORAL
COMMUNITY
Start: 2020-05-07 | End: 2020-09-24

## 2020-06-08 RX ORDER — ARIPIPRAZOLE 20 MG/1
25 TABLET ORAL DAILY
COMMUNITY
End: 2021-09-09

## 2020-06-08 SDOH — ECONOMIC STABILITY: TRANSPORTATION INSECURITY
IN THE PAST 12 MONTHS, HAS THE LACK OF TRANSPORTATION KEPT YOU FROM MEDICAL APPOINTMENTS OR FROM GETTING MEDICATIONS?: NO

## 2020-06-08 SDOH — ECONOMIC STABILITY: FOOD INSECURITY: WITHIN THE PAST 12 MONTHS, YOU WORRIED THAT YOUR FOOD WOULD RUN OUT BEFORE YOU GOT MONEY TO BUY MORE.: NEVER TRUE

## 2020-06-08 SDOH — ECONOMIC STABILITY: TRANSPORTATION INSECURITY
IN THE PAST 12 MONTHS, HAS LACK OF TRANSPORTATION KEPT YOU FROM MEETINGS, WORK, OR FROM GETTING THINGS NEEDED FOR DAILY LIVING?: NO

## 2020-06-08 SDOH — ECONOMIC STABILITY: FOOD INSECURITY: WITHIN THE PAST 12 MONTHS, THE FOOD YOU BOUGHT JUST DIDN'T LAST AND YOU DIDN'T HAVE MONEY TO GET MORE.: NEVER TRUE

## 2020-06-08 SDOH — ECONOMIC STABILITY: INCOME INSECURITY: HOW HARD IS IT FOR YOU TO PAY FOR THE VERY BASICS LIKE FOOD, HOUSING, MEDICAL CARE, AND HEATING?: NOT HARD AT ALL

## 2020-06-08 ASSESSMENT — ENCOUNTER SYMPTOMS
SORE THROAT: 0
TROUBLE SWALLOWING: 0
SHORTNESS OF BREATH: 0
WHEEZING: 0
PHOTOPHOBIA: 0
CONSTIPATION: 0
ABDOMINAL PAIN: 0
DIARRHEA: 0
VOMITING: 0
COUGH: 0
RHINORRHEA: 0
NAUSEA: 0
VISUAL CHANGE: 0

## 2020-06-08 ASSESSMENT — PATIENT HEALTH QUESTIONNAIRE - PHQ9
SUM OF ALL RESPONSES TO PHQ QUESTIONS 1-9: 0
SUM OF ALL RESPONSES TO PHQ9 QUESTIONS 1 & 2: 0
SUM OF ALL RESPONSES TO PHQ QUESTIONS 1-9: 0
2. FEELING DOWN, DEPRESSED OR HOPELESS: 0
1. LITTLE INTEREST OR PLEASURE IN DOING THINGS: 0

## 2020-06-08 NOTE — PATIENT INSTRUCTIONS
movement in your face, arm, or leg, especially on only one side of your body. ? Sudden vision changes. ? Sudden trouble speaking. ? Sudden confusion or trouble understanding simple statements. ? Sudden problems with walking or balance. ? A sudden, severe headache that is different from past headaches. Watch closely for changes in your health, and be sure to contact your doctor if you have any problems, or if:  · You do not get better as expected. Where can you learn more? Go to [email or web address] org and sign in to your [employer] account. Enter U218 in the Break30 box to learn more about \"Numbness and Tingling: Care Instructions. \"     If you do not have an account, please click on the \"Sign Up Now\" link. Current as of: November 20, 2019               Content Version: 12.5  © 8733-7767 Healthwise, Incorporated. Care instructions adapted under license by [employer]. If you have questions about a medical condition or this instruction, always ask your healthcare professional. [address] any warranty or liability for your use of this information.

## 2020-06-08 NOTE — PROGRESS NOTES
recorded. No episodes of superventricuarl or ventriuclar tacycardial was seen. Pt reported episodes of SOB and speedy heart with monitroing showing sinus tachy.  Lordosis     Pneumonia     PTSD (post-traumatic stress disorder)     Schizophrenic disorder (HCC)     Tachycardia, unspecified     UTI (urinary tract infection)       Reviewed all health maintenance requirements and ordered appropriate tests  Health Maintenance Due   Topic Date Due    Varicella vaccine (1 of 2 - 2-dose childhood series) 06/15/1993    Annual Wellness Visit (AWV)  11/12/2019       Past Surgical History:     Past Surgical History:   Procedure Laterality Date    NECK SURGERY Left lesion biopsy    UPPER GASTROINTESTINAL ENDOSCOPY      chicken stuck in throat    UPPER GASTROINTESTINAL ENDOSCOPY N/A 1/14/2020    -bx(eosinophilic esophagitis,neg H-Pylori)   Namita Zaid WISDOM TOOTH EXTRACTION          Medications:       Prior to Admission medications    Medication Sig Start Date End Date Taking? Authorizing Provider   ARIPiprazole (ABILIFY) 20 MG tablet Take 20 mg by mouth daily   Yes Historical Provider, MD   budesonide (PULMICORT) 0.5 MG/2ML nebulizer suspension FOR EOSINOPHILIC ESOPHAGITIS. MIX 4 RESPULES WITH 5 PACKS OF SPLENDA AND SWALLOW DAILY. DO NOT EAT OR DRINK FOR 30 MINUTES AFTER 6/3/20  Yes Historical Provider, MD   oxybutynin (DITROPAN) 5 MG tablet Take by mouth 5/7/20  Yes Historical Provider, MD   ibuprofen (ADVIL;MOTRIN) 800 MG tablet Take 800 mg by mouth every 8 hours as needed for Pain   Yes Historical Provider, MD   naproxen (NAPROSYN) 500 MG tablet Take 1 tablet by mouth 2 times daily 5/27/20  Yes Mykel Kumar II, PA-C   albuterol sulfate HFA (PROVENTIL HFA) 108 (90 Base) MCG/ACT inhaler Inhale 1-2 puffs into the lungs every 4 hours as needed for Wheezing or Shortness of Breath (Space out to every 6 hours as symptoms improve) Space out to every 6 hours as symptoms improve.  3/25/20  Yes Carleen Yi MD not in acute distress. Appearance: Normal appearance. She is obese. She is not ill-appearing. HENT:      Mouth/Throat:      Mouth: Mucous membranes are moist.   Eyes:      General: No scleral icterus. Conjunctiva/sclera: Conjunctivae normal.   Neck:      Musculoskeletal: Normal range of motion and neck supple. No muscular tenderness. Cardiovascular:      Rate and Rhythm: Normal rate and regular rhythm. Heart sounds: No murmur. Pulmonary:      Effort: Pulmonary effort is normal.      Breath sounds: Normal breath sounds. No wheezing. Musculoskeletal:         General: No tenderness. Lymphadenopathy:      Cervical: No cervical adenopathy. Skin:     General: Skin is warm and dry. Findings: No rash. Neurological:      Mental Status: She is alert. Sensory: Sensory deficit (left ring and pinky finger) present. Psychiatric:         Mood and Affect: Mood normal.         Behavior: Behavior normal.         Data:     Lab Results   Component Value Date     03/24/2020    K 3.8 03/24/2020     03/24/2020    CO2 19 03/24/2020    BUN 9 03/24/2020    CREATININE 0.54 03/24/2020    GLUCOSE 148 03/24/2020    PROT 7.4 02/09/2019    LABALBU 4.0 02/09/2019    BILITOT 0.19 02/09/2019    ALKPHOS 73 02/09/2019    AST 13 02/09/2019    ALT 15 02/09/2019     Lab Results   Component Value Date    WBC 11.4 03/24/2020    RBC 4.89 03/24/2020    HGB 14.2 03/24/2020    HCT 42.5 03/24/2020    MCV 86.9 03/24/2020    MCH 29.0 03/24/2020    MCHC 33.4 03/24/2020    RDW 12.5 03/24/2020     03/24/2020    MPV 9.6 03/24/2020     Lab Results   Component Value Date    TSH 1.44 12/30/2019     Lab Results   Component Value Date    CHOL 214 12/30/2019    HDL 38 12/30/2019    LABA1C 5.4 12/30/2019       Assessment/Plan:      Diagnosis Orders   1. Cervical radiculopathy  EMG    XR CERVICAL SPINE (2-3 VIEWS)   2. Elevated blood pressure reading       Stop amlodipine and recheck pressure in 2 weeks.     EMG and

## 2020-06-09 ENCOUNTER — TELEPHONE (OUTPATIENT)
Dept: PRIMARY CARE CLINIC | Age: 30
End: 2020-06-09

## 2020-06-09 NOTE — TELEPHONE ENCOUNTER
----- Message from [address] [place] [address], OSKAR - CNP sent at 6/8/2020  5:33 PM EDT -----  Results are normal, please call patient and make them aware.

## 2020-06-10 ENCOUNTER — CARE COORDINATION (OUTPATIENT)
Dept: CARE COORDINATION | Age: 30
End: 2020-06-10

## 2020-06-10 ENCOUNTER — TELEPHONE (OUTPATIENT)
Dept: PRIMARY CARE CLINIC | Age: 30
End: 2020-06-10

## 2020-06-15 ENCOUNTER — NURSE ONLY (OUTPATIENT)
Dept: PRIMARY CARE CLINIC | Age: 30
End: 2020-06-15

## 2020-06-15 VITALS
HEIGHT: 63 IN | RESPIRATION RATE: 16 BRPM | HEART RATE: 111 BPM | TEMPERATURE: 98.2 F | SYSTOLIC BLOOD PRESSURE: 102 MMHG | BODY MASS INDEX: 41.76 KG/M2 | DIASTOLIC BLOOD PRESSURE: 78 MMHG | WEIGHT: 235.7 LBS

## 2020-06-15 RX ORDER — BENZTROPINE MESYLATE 1 MG/1
TABLET ORAL
COMMUNITY
Start: 2019-12-31 | End: 2020-08-21 | Stop reason: ALTCHOICE

## 2020-07-06 ENCOUNTER — PROCEDURE VISIT (OUTPATIENT)
Dept: UROLOGY | Age: 30
End: 2020-07-06
Payer: MEDICARE

## 2020-07-06 VITALS
BODY MASS INDEX: 42.35 KG/M2 | WEIGHT: 239 LBS | SYSTOLIC BLOOD PRESSURE: 110 MMHG | TEMPERATURE: 97.8 F | HEIGHT: 63 IN | DIASTOLIC BLOOD PRESSURE: 74 MMHG

## 2020-07-06 PROCEDURE — G8427 DOCREV CUR MEDS BY ELIG CLIN: HCPCS | Performed by: UROLOGY

## 2020-07-06 PROCEDURE — 99213 OFFICE O/P EST LOW 20 MIN: CPT | Performed by: UROLOGY

## 2020-07-06 PROCEDURE — G8417 CALC BMI ABV UP PARAM F/U: HCPCS | Performed by: UROLOGY

## 2020-07-06 PROCEDURE — 52000 CYSTOURETHROSCOPY: CPT | Performed by: UROLOGY

## 2020-07-06 PROCEDURE — 1036F TOBACCO NON-USER: CPT | Performed by: UROLOGY

## 2020-07-06 ASSESSMENT — ENCOUNTER SYMPTOMS
BACK PAIN: 0
EYE PAIN: 0
ABDOMINAL PAIN: 0
NAUSEA: 0
WHEEZING: 0
COUGH: 0
SHORTNESS OF BREATH: 0
COLOR CHANGE: 0
VOMITING: 0
EYE REDNESS: 0

## 2020-07-06 NOTE — PROGRESS NOTES
the office and had a postvoid residual of 38 mL. Currently  Patient is here today for lower tract visualization. Patient did have recent negative urine culture. We did start her on Ditropan twice a day as well as discussed with her decreasing her bladder irritants. Patient did use the Ditropan for approximately 1 month. At that point in time she did develop constipation. The ditropan was not working. She did stop the ditropan and the constipation did cease after a short course of miralax. Patient does state that her urinary issues are somewhat cyclical.  She also states that she has very painful menstrual cycles. Cystoscopy Procedure Note    Pre-operative Diagnosis: Dysuria, mixed incontinence    Post-operative Diagnosis: Same     Surgeon: Martina Aponte     Assistants: None    Anesthesia : Local    Procedure Details   The risks, benefits, complications, treatment options, and expected outcomes were discussed with the patient. The patient concurred with the proposed plan, giving informed consent. Cystoscopy was performed today under local anesthesia, using sterile technique. The patient was placed in the dorsal lithotomy position, prepped with CHG, and draped in the usual sterile fashion. A 14 [place]ian flexible cystoscope was used to systematically inspect both the urethra and bladder in their entirety. Findings:  Anterior urethra: normal without strictures  Hyperplasia: na  Bladder: Normal mucosa, without lesions. Ureteral orifice(s) was/were seen in the normal position and effluxing clear urine  Trabeculations No  Diverticulum No  Description: normal anatomy         Specimens: Cytology/urine culture No                 Complications:  None; patient tolerated the procedure well.            Disposition: home           Condition: stable          Past Medical History:   Diagnosis Date    Asthma     Back pain     Chicken pox     Dysphagia     H/O echocardiogram 05/18/2018    EF 60%    History of Holter monitoring 04/27/2018    Baseline ECG shows sinus rhythm. Holter showed sinus tachy 61% of the time. Rare premature ventricular complexes were recorded. No episodes of superventricuarl or ventriuclar tacycardial was seen. Pt reported episodes of SOB and speedy heart with monitroing showing sinus tachy.  Lordosis     Pneumonia     PTSD (post-traumatic stress disorder)     Schizophrenic disorder (HCC)     Tachycardia, unspecified     UTI (urinary tract infection)      Past Surgical History:   Procedure Laterality Date    NECK SURGERY Left lesion biopsy    UPPER GASTROINTESTINAL ENDOSCOPY      chicken stuck in throat    UPPER GASTROINTESTINAL ENDOSCOPY N/A 1/14/2020    -bx(eosinophilic esophagitis,neg H-Pylori)    WISDOM TOOTH EXTRACTION       Outpatient Encounter Medications as of 7/6/2020   Medication Sig Dispense Refill    benztropine (COGENTIN) 1 MG tablet Benztropine benztropine (COGENTIN) 1 MG tablet Take 1 tablet by mouth 2 times daily for 14 days 28 tablet 0 12/31/2019 Active 12-  [facility] 28 ([id number])      ARIPiprazole (ABILIFY) 20 MG tablet Take 20 mg by mouth daily      budesonide (PULMICORT) 0.5 MG/2ML nebulizer suspension FOR EOSINOPHILIC ESOPHAGITIS. MIX 4 RESPULES WITH 5 PACKS OF SPLENDA AND SWALLOW DAILY. DO NOT EAT OR DRINK FOR 30 MINUTES AFTER      oxybutynin (DITROPAN) 5 MG tablet Take by mouth      naproxen (NAPROSYN) 500 MG tablet Take 1 tablet by mouth 2 times daily (Patient not taking: Reported on 6/15/2020) 14 tablet 0    albuterol sulfate HFA (PROVENTIL HFA) 108 (90 Base) MCG/ACT inhaler Inhale 1-2 puffs into the lungs every 4 hours as needed for Wheezing or Shortness of Breath (Space out to every 6 hours as symptoms improve) Space out to every 6 hours as symptoms improve.  1 Inhaler 0    acetaminophen (TYLENOL) 325 MG tablet Take 2 tablets by mouth every 6 hours as needed for Pain 30 tablet 0    montelukast (SINGULAIR) 10 MG tablet Take 1 tablet by mouth daily 90 tablet 0     No facility-administered encounter medications on file as of 7/6/2020. Current Outpatient Medications on File Prior to Visit   Medication Sig Dispense Refill    benztropine (COGENTIN) 1 MG tablet Benztropine benztropine (COGENTIN) 1 MG tablet Take 1 tablet by mouth 2 times daily for 14 days 28 tablet 0 12/31/2019 Active 12-  [facility] 28 ([id number])     [facility] ARIPiprazole (ABILIFY) 20 MG tablet Take 20 mg by mouth daily      budesonide (PULMICORT) 0.5 MG/2ML nebulizer suspension FOR EOSINOPHILIC ESOPHAGITIS. MIX 4 RESPULES WITH 5 PACKS OF SPLENDA AND SWALLOW DAILY. DO NOT EAT OR DRINK FOR 30 MINUTES AFTER      oxybutynin (DITROPAN) 5 MG tablet Take by mouth      naproxen (NAPROSYN) 500 MG tablet Take 1 tablet by mouth 2 times daily (Patient not taking: Reported on 6/15/2020) 14 tablet 0    albuterol sulfate HFA (PROVENTIL HFA) 108 (90 Base) MCG/ACT inhaler Inhale 1-2 puffs into the lungs every 4 hours as needed for Wheezing or Shortness of Breath (Space out to every 6 hours as symptoms improve) Space out to every 6 hours as symptoms improve. 1 Inhaler 0    acetaminophen (TYLENOL) 325 MG tablet Take 2 tablets by mouth every 6 hours as needed for Pain 30 tablet 0    montelukast (SINGULAIR) 10 MG tablet Take 1 tablet by mouth daily 90 tablet 0     No current facility-administered medications on file prior to visit. Doxycycline; Pollen extract; Seroquel [quetiapine fumarate]; Symbicort [budesonide-formoterol fumarate]; and Topamax [topiramate]  Family History   Problem Relation Age of Onset    Asthma Mother     Cancer Mother     High Blood Pressure Father     Diabetes Father      Social History     Tobacco Use   Smoking Status Never Smoker   Smokeless Tobacco Never Used       Social History     Substance and Sexual Activity   Alcohol Use No    Comment: Rare       Review of Systems   Constitutional: Negative for appetite change, chills and fever.    Eyes: Negative for pain, redness and visual disturbance. Respiratory: Negative for cough, shortness of breath and wheezing. Cardiovascular: Negative for chest pain and leg swelling. Gastrointestinal: Negative for abdominal pain, nausea and vomiting. Genitourinary: Positive for frequency and urgency. Negative for difficulty urinating, dysuria, flank pain, hematuria, pelvic pain, vaginal bleeding and vaginal discharge. Musculoskeletal: Negative for back pain, joint swelling and myalgias. Skin: Negative for color change, rash and wound. Neurological: Negative for dizziness, tremors and numbness. Hematological: Negative for adenopathy. Does not bruise/bleed easily. There were no vitals taken for this visit. PHYSICAL EXAM:  Constitutional: Patient resting comfortably, in no acute distress. Neuro: Alert and oriented to person place and time. Cranial nerves grossly intact. Psych: Mood and affect normal.  Skin: Warm, dry  HEENT: normocephalic, atraumatic  Lymphatics: No palpable lymphadenopathy  Lungs: Respiratory effort normal, unlabored  Cardiovascular:  Normal peripheral pulses  Abdomen: Soft, non-tender, non-distended with no organomegaly or palpable masses. : No CVA tenderness. Bladder non-tender and not distended. Pelvic: No cystocele, no rectocele, no reproducible stress urinary incontinence. No levator ani tenderness bilateral.    Lab Results   Component Value Date    BUN 9 03/24/2020     Lab Results   Component Value Date    CREATININE 0.54 03/24/2020       ASSESSMENT:  This is a 31 y.o. female with the following diagnoses:   Diagnosis Orders   1. Dysuria  MA CYSTOURETHROSCOPY   2. Mixed incontinence urge and stress  MA CYSTOURETHROSCOPY   3. Urinary frequency  MA CYSTOURETHROSCOPY         PLAN:  Patient will discontinue Azo as well as Ditropan. She will follow-up with gynecology to rule out endometriosis as a cause for her issues. She will follow-up with us in 3 months.

## 2020-07-06 NOTE — PATIENT INSTRUCTIONS
SURVEY:    You may be receiving a survey from [employer] regarding your visit today. Please complete the survey to enable us to provide the highest quality of care to you and your family. If you cannot score us a very good on any question, please call the office to discuss how we could have made your experience a very good one. Thank you.

## 2020-07-08 ENCOUNTER — OFFICE VISIT (OUTPATIENT)
Dept: OBGYN | Age: 30
End: 2020-07-08
Payer: MEDICARE

## 2020-07-08 ENCOUNTER — HOSPITAL ENCOUNTER (OUTPATIENT)
Age: 30
Setting detail: SPECIMEN
Discharge: HOME | End: 2020-07-08
Payer: MEDICARE

## 2020-07-08 VITALS
HEIGHT: 63 IN | BODY MASS INDEX: 41.46 KG/M2 | SYSTOLIC BLOOD PRESSURE: 130 MMHG | WEIGHT: 234 LBS | DIASTOLIC BLOOD PRESSURE: 85 MMHG

## 2020-07-08 PROCEDURE — G0145 SCR C/V CYTO,THINLAYER,RESCR: HCPCS

## 2020-07-08 PROCEDURE — 99385 PREV VISIT NEW AGE 18-39: CPT | Performed by: OBSTETRICS & GYNECOLOGY

## 2020-07-08 NOTE — PROGRESS NOTES
YEARLY PHYSICAL    Date of service: 2020    Rebeka Farrar  Is a 31 y.o.  single female    PT's PCP is: OSKAR Vargas - [facility]     : 1990                                             Subjective:       Patient's last menstrual period was 2020 (approximate). Are your menses regular: yes    OB History   No obstetric history on file. Social History     Tobacco Use   Smoking Status Never Smoker   Smokeless Tobacco Never Used        Social History     Substance and Sexual Activity   Alcohol Use No    Comment: Rare       Family History   Problem Relation Age of Onset    Asthma Mother     Cancer Mother     High Blood Pressure Father     Diabetes Father        Any family history of breast or ovarian cancer: No    Any family history of blood clots: \ yes patient had a blood clot in her shoulder      Allergies: Doxycycline; Pollen extract; Seroquel [quetiapine fumarate]; Symbicort [budesonide-formoterol fumarate]; and Topamax [topiramate]      Current Outpatient Medications:     benztropine (COGENTIN) 1 MG tablet, Benztropine benztropine (COGENTIN) 1 MG tablet Take 1 tablet by mouth 2 times daily for 14 days 28 tablet 0 2019 Active 2019  [facility] 28 ([id number]), Disp: , Rfl:     ARIPiprazole (ABILIFY) 20 MG tablet, Take 25 mg by mouth daily , Disp: , Rfl:     budesonide (PULMICORT) 0.5 MG/2ML nebulizer suspension, FOR EOSINOPHILIC ESOPHAGITIS. MIX 4 RESPULES WITH 5 PACKS OF SPLENDA AND SWALLOW DAILY.  DO NOT EAT OR DRINK FOR 30 MINUTES AFTER, Disp: , Rfl:     oxybutynin (DITROPAN) 5 MG tablet, Take by mouth, Disp: , Rfl:     naproxen (NAPROSYN) 500 MG tablet, Take 1 tablet by mouth 2 times daily, Disp: 14 tablet, Rfl: 0    albuterol sulfate HFA (PROVENTIL HFA) 108 (90 Base) MCG/ACT inhaler, Inhale 1-2 puffs into the lungs every 4 hours as needed for Wheezing or Shortness of Breath (Space out to every 6 hours as symptoms improve) Space out to every 6 hours as symptoms improve., Disp: 1 Inhaler, Rfl: 0    acetaminophen (TYLENOL) 325 MG tablet, Take 2 tablets by mouth every 6 hours as needed for Pain, Disp: 30 tablet, Rfl: 0    montelukast (SINGULAIR) 10 MG tablet, Take 1 tablet by mouth daily, Disp: 90 tablet, Rfl: 0    Social History     Substance and Sexual Activity   Sexual Activity Not on file       Any bleeding or pain with intercourse: No    Last Yearly:  07/2020    Last pap: 07/2020    Last HPV: 07/2020    Last Mammogram: Never    Last Preston Meyer Never    Last colorectal screen- type: Never  date  NA    Do you do self breast exams: Yes    Past Medical History:   Diagnosis Date    Asthma     Back pain     Chicken pox     Dysphagia     H/O echocardiogram 05/18/2018    EF 60%    History of Holter monitoring 04/27/2018    Baseline ECG shows sinus rhythm. Holter showed sinus tachy 61% of the time. Rare premature ventricular complexes were recorded. No episodes of superventricuarl or ventriuclar tacycardial was seen. Pt reported episodes of SOB and speedy heart with monitroing showing sinus tachy.  Lordosis     Pneumonia     PTSD (post-traumatic stress disorder)     Schizophrenic disorder (HCC)     Tachycardia, unspecified     UTI (urinary tract infection)        Past Surgical History:   Procedure Laterality Date    NECK SURGERY Left lesion biopsy    UPPER GASTROINTESTINAL ENDOSCOPY      chicken stuck in throat    UPPER GASTROINTESTINAL ENDOSCOPY N/A 1/14/2020    -bx(eosinophilic esophagitis,neg H-Pylori)   [place] WISDOM TOOTH EXTRACTION         Family History   Problem Relation Age of Onset    Asthma Mother     Cancer Mother     High Blood Pressure Father     Diabetes Father        Chief Complaint   Patient presents with    New Patient     pap. pt also want to discuss getting pregnant.            PE:  Vital Signs  Blood pressure 130/85, height 5' 3\" (1.6 m), weight 234 lb (106.1 kg), last menstrual period 06/29/2020, not currently breastfeeding. Labs:    No results found for this visit on 07/08/20. NURSE: BRANDAN    HPI: Patient is here for a new GYN visit. She is going to attempt conception. I did question her about her history of blood clot in the shoulder and she was never treated with an anticoagulant. Review of Systems      Objective  Lymphatic:   no lymphadenopathy  Heent:   negative   Cor: regular rate and rhythm, no murmurs              Pul:clear to auscultation bilaterally- no wheezes, rales or rhonchi, normal air movement, no respiratory distress      GI: Abdomen soft, non-tender. BS normal. No masses,  No organomegaly           Extremities: normal strength, tone, and muscle mass   Breasts: Breast:normal appearance, no masses or tenderness, Inspection negative, No nipple retraction or dimpling, No nipple discharge or bleeding, No axillary or supraclavicular adenopathy, Normal to palpation without dominant masses   Pelvic Exam: GENITAL/URINARY:  External Genitalia:  General appearance; normal, Hair distribution; normal, Lesions absent  Vagina:  General appearance normal, Estrogen effect normal, Discharge absent, Lesions absent, Pelvic support normal  Cervix:  General appearance normal, Lesions absent, Discharge absent, Tenderness absent, Enlargement absent, Nodularity absent  Uterus:  Size normal, Contour normal, Position normal, Masses absent, Consistency; normal, Support normal, Tenderness absent  Adenexa: Masses absent, Tenderness absent, Enlargement absent, Nodularity absent                                    Vaginal discharge: no vaginal discharge    Assessment/plan: I did recommend the use of vitamins with folic acid.   Also observed to see if she can readily conceive

## 2020-07-14 LAB — CYTOLOGY REPORT: NORMAL

## 2020-07-21 ENCOUNTER — TELEPHONE (OUTPATIENT)
Dept: PRIMARY CARE CLINIC | Age: 30
End: 2020-07-21

## 2020-08-03 ENCOUNTER — TELEPHONE (OUTPATIENT)
Dept: PRIMARY CARE CLINIC | Age: 30
End: 2020-08-03

## 2020-08-11 ENCOUNTER — TELEPHONE (OUTPATIENT)
Dept: PRIMARY CARE CLINIC | Age: 30
End: 2020-08-11

## 2020-08-11 NOTE — TELEPHONE ENCOUNTER
Attempted to contact the patient to schedule an appointment for a medicare physical.  Mailbox was full and not able to LM

## 2020-08-12 ENCOUNTER — TELEPHONE (OUTPATIENT)
Dept: PRIMARY CARE CLINIC | Age: 30
End: 2020-08-12

## 2020-08-21 ENCOUNTER — OFFICE VISIT (OUTPATIENT)
Dept: PRIMARY CARE CLINIC | Age: 30
End: 2020-08-21
Payer: MEDICARE

## 2020-08-21 VITALS
SYSTOLIC BLOOD PRESSURE: 110 MMHG | WEIGHT: 237 LBS | DIASTOLIC BLOOD PRESSURE: 79 MMHG | RESPIRATION RATE: 20 BRPM | BODY MASS INDEX: 41.98 KG/M2 | HEART RATE: 82 BPM | TEMPERATURE: 98.2 F

## 2020-08-21 LAB
CONTROL: PRESENT
PREGNANCY TEST URINE, POC: NORMAL

## 2020-08-21 PROCEDURE — 81025 URINE PREGNANCY TEST: CPT | Performed by: NURSE PRACTITIONER

## 2020-08-21 PROCEDURE — 1036F TOBACCO NON-USER: CPT | Performed by: NURSE PRACTITIONER

## 2020-08-21 PROCEDURE — G8417 CALC BMI ABV UP PARAM F/U: HCPCS | Performed by: NURSE PRACTITIONER

## 2020-08-21 PROCEDURE — 99213 OFFICE O/P EST LOW 20 MIN: CPT | Performed by: NURSE PRACTITIONER

## 2020-08-21 PROCEDURE — G8427 DOCREV CUR MEDS BY ELIG CLIN: HCPCS | Performed by: NURSE PRACTITIONER

## 2020-08-21 RX ORDER — ACETAMINOPHEN, ASPIRIN AND CAFFEINE 250; 250; 65 MG/1; MG/1; MG/1
1 TABLET, FILM COATED ORAL EVERY 6 HOURS PRN
Qty: 20 TABLET | Refills: 0 | Status: SHIPPED | OUTPATIENT
Start: 2020-08-21 | End: 2021-01-26

## 2020-08-21 RX ORDER — MECLIZINE HYDROCHLORIDE 25 MG/1
25 TABLET ORAL 3 TIMES DAILY PRN
Qty: 15 TABLET | Refills: 0 | Status: SHIPPED | OUTPATIENT
Start: 2020-08-21 | End: 2020-08-31

## 2020-08-21 ASSESSMENT — ENCOUNTER SYMPTOMS
VOMITING: 1
SINUS PRESSURE: 0
PHOTOPHOBIA: 0
NAUSEA: 1
EYE PAIN: 0
ABDOMINAL PAIN: 0
SORE THROAT: 0
COUGH: 0
EYE WATERING: 0
EYE REDNESS: 0
TROUBLE SWALLOWING: 0
SWOLLEN GLANDS: 0

## 2020-08-21 NOTE — PROGRESS NOTES
[address] [facility]  [address] [facility] [address] [facility]  Dept: [phone]  Dept Fax: [phone]    Stalin Mohamud is a 31 y.o. female who presentsto the [facility] today for her medical conditions/complaints as noted below. Stalin Mohamud is c/o of Headache and Dizziness      HPI:     Claudia King is here today for a walk in visit. She reports over the last 4 days she has had a mild headache on the left side of her head along with some intermittent nausea and dizziness. She does have a history of migraine headaches but has not had one in some time. Her dizziness is exacerbated by quick movement and she also felt it was worse when she was in the car. She feels like \"everything is moving around me\" and states \"the world is spinning\". She feels like when she lays down the dizziness resolves. She reports that she is currently trying to get pregnant and would like to have a urine pregnancy done to make sure this is not the cause of her symptoms. See below for further detail    Migraine    This is a new problem. The current episode started in the past 7 days (x 4 days). The problem has been unchanged. The pain is located in the left unilateral region. The pain quality is similar to prior headaches. The quality of the pain is described as sharp. The pain is mild. Associated symptoms include dizziness, nausea and vomiting. Pertinent negatives include no abdominal pain, anorexia, coughing, ear pain, eye pain, eye redness, eye watering, fever, numbness, phonophobia, photophobia, seizures, sinus pressure, sore throat, swollen glands, tingling, tinnitus or weakness. The symptoms are aggravated by bright light and noise. She has tried nothing for the symptoms. Her past medical history is significant for migraine headaches. There is no history of hypertension, pseudotumor cerebri, sinus disease or TMJ.  (Has hx of migraines.)   Dizziness   This is a new problem. The current episode started in the past 7 days. The problem occurs intermittently. The problem has been waxing and waning (Dizziness can last for several minutes to hours. ). Associated symptoms include fatigue, headaches (left frontal headache), nausea, vertigo and vomiting. Pertinent negatives include no abdominal pain, anorexia, arthralgias, chills, congestion, coughing, diaphoresis, fever, myalgias, numbness, sore throat, swollen glands or weakness. Exacerbated by: quick movement. She has tried lying down for the symptoms. The treatment provided significant relief. Past Medical History:   Diagnosis Date    Asthma     Back pain     Chicken pox     Dysphagia     H/O echocardiogram 05/18/2018    EF 60%    History of Holter monitoring 04/27/2018    Baseline ECG shows sinus rhythm. Holter showed sinus tachy 61% of the time. Rare premature ventricular complexes were recorded. No episodes of superventricuarl or ventriuclar tacycardial was seen. Pt reported episodes of SOB and speedy heart with monitroing showing sinus tachy.  Lordosis     Pneumonia     PTSD (post-traumatic stress disorder)     Schizophrenic disorder (HCC)     Tachycardia, unspecified     UTI (urinary tract infection)         Current Outpatient Medications   Medication Sig Dispense Refill    meclizine (ANTIVERT) 25 MG tablet Take 1 tablet by mouth 3 times daily as needed for Dizziness 15 tablet 0    aspirin-acetaminophen-caffeine (EXCEDRIN MIGRAINE) 250-250-65 MG per tablet Take 1 tablet by mouth every 6 hours as needed for Headaches 20 tablet 0    ARIPiprazole (ABILIFY) 20 MG tablet Take 25 mg by mouth daily       budesonide (PULMICORT) 0.5 MG/2ML nebulizer suspension FOR EOSINOPHILIC ESOPHAGITIS. MIX 4 RESPULES WITH 5 PACKS OF SPLENDA AND SWALLOW DAILY.  DO NOT EAT OR DRINK FOR 30 MINUTES AFTER      acetaminophen (TYLENOL) 325 MG tablet Take 2 tablets by mouth every 6 hours as needed for Pain 30 tablet 0    oxybutynin appearance. She is obese. She is not ill-appearing, toxic-appearing or diaphoretic. HENT:      Head: Normocephalic and atraumatic. Right Ear: There is no impacted cerumen. Tympanic membrane is not erythematous or bulging. Left Ear: There is no impacted cerumen. Tympanic membrane is not erythematous or bulging. Nose: No congestion or rhinorrhea. Mouth/Throat:      Mouth: Mucous membranes are moist.      Pharynx: No oropharyngeal exudate or posterior oropharyngeal erythema. Eyes:      General:         Right eye: No discharge. Left eye: No discharge. Extraocular Movements: Extraocular movements intact. Right eye: No nystagmus. Left eye: No nystagmus. Conjunctiva/sclera: Conjunctivae normal.      Pupils: Pupils are equal, round, and reactive to light. Neck:      Musculoskeletal: Normal range of motion and neck supple. Cardiovascular:      Rate and Rhythm: Normal rate and regular rhythm. Heart sounds: Normal heart sounds, S1 normal and S2 normal. No murmur. Pulmonary:      Effort: Pulmonary effort is normal.      Breath sounds: Normal breath sounds. No wheezing, rhonchi or rales. Lymphadenopathy:      Cervical: No cervical adenopathy. Skin:     General: Skin is warm. Neurological:      General: No focal deficit present. Mental Status: She is alert and oriented to person, place, and time. Cranial Nerves: Cranial nerves are intact. No cranial nerve deficit or facial asymmetry. Motor: Motor function is intact. No weakness, tremor, abnormal muscle tone or seizure activity. Gait: Gait is intact.  Gait normal.      Comments: Negative Renae-Hallpike   Psychiatric:         Mood and Affect: Mood normal.         Behavior: Behavior normal.       /79 (Site: Left Upper Arm, Position: Sitting, Cuff Size: Large Adult)   Pulse 82   Temp 98.2 °F (36.8 °C) (Temporal)   Resp 20   Wt 237 lb (107.5 kg)   BMI 41.98 kg/m²     Assessment:

## 2020-08-21 NOTE — PATIENT INSTRUCTIONS
SURVEY:    You may be receiving a survey from [employer] regarding your visit today. Please complete the survey to enable us to provide the highest quality of care to you and your family. If you cannot score us a very good on any question, please call the office to discuss how we could have made your experience a very good one. Thank you. Patient Education        Vertigo: Care Instructions  Your Care Instructions     Vertigo is the feeling that you or your surroundings are moving when there is no actual movement. It is often described as a feeling of spinning, whirling, falling, or tilting. Vertigo may make you vomit or feel nauseated. You may have trouble standing or walking and may lose your balance. Vertigo is often related to an inner ear problem, but it can have other more serious causes. If vertigo continues, you may need more tests to find its cause. Follow-up care is a key part of your treatment and safety. Be sure to make and go to all appointments, and call your doctor if you are having problems. It's also a good idea to know your test results and keep a list of the medicines you take. How can you care for yourself at home? · Do not lie flat on your back. Prop yourself up slightly. This may reduce the spinning feeling. Keep your eyes open. · Move slowly so that you do not fall. · If your doctor recommends medicine, take it exactly as directed. · Do not drive while you are having vertigo. Certain exercises, called Arias-Daroff exercises, can help decrease vertigo. To do Arias-Daroff exercises:  · Sit on the edge of a bed or sofa and quickly lie down on the side that causes the worst vertigo. Lie on your side with your ear down. · Stay in this position for at least 30 seconds or until the vertigo goes away. · Sit up. If this causes vertigo, wait for it to stop. · Repeat the procedure on the other side. · Repeat this 10 times.  Do these exercises 2 times a day until the vertigo is

## 2020-08-27 ENCOUNTER — OFFICE VISIT (OUTPATIENT)
Dept: PRIMARY CARE CLINIC | Age: 30
End: 2020-08-27
Payer: MEDICARE

## 2020-08-27 VITALS
RESPIRATION RATE: 18 BRPM | HEART RATE: 83 BPM | DIASTOLIC BLOOD PRESSURE: 79 MMHG | HEIGHT: 63 IN | TEMPERATURE: 98.4 F | BODY MASS INDEX: 42.7 KG/M2 | WEIGHT: 241 LBS | OXYGEN SATURATION: 98 % | SYSTOLIC BLOOD PRESSURE: 110 MMHG

## 2020-08-27 PROCEDURE — G8427 DOCREV CUR MEDS BY ELIG CLIN: HCPCS | Performed by: NURSE PRACTITIONER

## 2020-08-27 PROCEDURE — 99213 OFFICE O/P EST LOW 20 MIN: CPT | Performed by: NURSE PRACTITIONER

## 2020-08-27 PROCEDURE — 1036F TOBACCO NON-USER: CPT | Performed by: NURSE PRACTITIONER

## 2020-08-27 PROCEDURE — G8417 CALC BMI ABV UP PARAM F/U: HCPCS | Performed by: NURSE PRACTITIONER

## 2020-08-27 PROCEDURE — 96372 THER/PROPH/DIAG INJ SC/IM: CPT | Performed by: NURSE PRACTITIONER

## 2020-08-27 RX ORDER — KETOROLAC TROMETHAMINE 30 MG/ML
60 INJECTION, SOLUTION INTRAMUSCULAR; INTRAVENOUS ONCE
Status: COMPLETED | OUTPATIENT
Start: 2020-08-27 | End: 2020-08-27

## 2020-08-27 RX ORDER — TRIHEXYPHENIDYL HYDROCHLORIDE 5 MG/1
5 TABLET ORAL 3 TIMES DAILY
Status: ON HOLD | COMMUNITY
Start: 2020-08-24 | End: 2022-06-07

## 2020-08-27 RX ORDER — CYCLOBENZAPRINE HCL 5 MG
5 TABLET ORAL 3 TIMES DAILY PRN
Qty: 30 TABLET | Refills: 0 | Status: SHIPPED | OUTPATIENT
Start: 2020-08-27 | End: 2020-09-06

## 2020-08-27 RX ORDER — NAPROXEN 500 MG/1
500 TABLET ORAL 2 TIMES DAILY
Qty: 20 TABLET | Refills: 0 | Status: SHIPPED | OUTPATIENT
Start: 2020-08-27 | End: 2021-01-26

## 2020-08-27 RX ADMIN — KETOROLAC TROMETHAMINE 60 MG: 30 INJECTION, SOLUTION INTRAMUSCULAR; INTRAVENOUS at 13:23

## 2020-08-27 ASSESSMENT — ENCOUNTER SYMPTOMS
BACK PAIN: 1
NAUSEA: 0
COUGH: 0
BOWEL INCONTINENCE: 0
VOMITING: 0
SHORTNESS OF BREATH: 0
CONSTIPATION: 0
WHEEZING: 0

## 2020-08-27 NOTE — PROGRESS NOTES
After obtaining consent, and per orders of Law Hanson CNP, injection of Toradol 60 mg given in Left deltoid by Prince Steen. Patient instructed to remain in clinic for 20 minutes afterwards, and to report any adverse reaction to me immediately.

## 2020-08-27 NOTE — PATIENT INSTRUCTIONS

## 2020-08-27 NOTE — PROGRESS NOTES
[address] [facility] WALK-IN CARE  [address] [facility] [address] [facility]  Dept: [phone]  Dept Fax: [phone]    David Patel is a 31 y.o. female who presentsto the [facility] Care today for her medical conditions/complaints as noted below. David Patel is c/o of Back Pain (down right leg from mopping the floor 3 days ago. Having spasms.)      HPI:     Chanelle Petty is here today for walk in visit for lower back pain. She reports 3 days ago she was mopping the floor and this is when she developed some back stiffness and pain. She has been taking naproxen with some relief of the pain however her back feels really tight and she is having spasms. She has had something like this multiple times in the past.  She had an x-ray of her lower spine in 2019 which was stable. She denies any loss of bowel or bladder function. See below for further details. Back Pain   This is a new problem. The current episode started in the past 7 days (x 3 days). The problem is unchanged. The pain is present in the lumbar spine. The pain radiates to the right knee. The pain is at a severity of 8/10. The symptoms are aggravated by twisting. Associated symptoms include tingling (down right leg). Pertinent negatives include no bladder incontinence, bowel incontinence, dysuria or fever. Risk factors include obesity. She has tried NSAIDs for the symptoms. The treatment provided moderate relief. Past Medical History:   Diagnosis Date    Asthma     Back pain     Chicken pox     Dysphagia     H/O echocardiogram 05/18/2018    EF 60%    History of Holter monitoring 04/27/2018    Baseline ECG shows sinus rhythm. Holter showed sinus tachy 61% of the time. Rare premature ventricular complexes were recorded. No episodes of superventricuarl or ventriuclar tacycardial was seen. Pt reported episodes of SOB and speedy heart with monitroing showing sinus tachy.      Lordosis     Pneumonia  PTSD (post-traumatic stress disorder)     Schizophrenic disorder (HCC)     Tachycardia, unspecified     UTI (urinary tract infection)         Current Outpatient Medications   Medication Sig Dispense Refill    cyclobenzaprine (FLEXERIL) 5 MG tablet Take 1 tablet by mouth 3 times daily as needed for Muscle spasms 30 tablet 0    naproxen (NAPROSYN) 500 MG tablet Take 1 tablet by mouth 2 times daily 20 tablet 0    meclizine (ANTIVERT) 25 MG tablet Take 1 tablet by mouth 3 times daily as needed for Dizziness 15 tablet 0    aspirin-acetaminophen-caffeine (EXCEDRIN MIGRAINE) 250-250-65 MG per tablet Take 1 tablet by mouth every 6 hours as needed for Headaches 20 tablet 0    ARIPiprazole (ABILIFY) 20 MG tablet Take 25 mg by mouth daily       budesonide (PULMICORT) 0.5 MG/2ML nebulizer suspension FOR EOSINOPHILIC ESOPHAGITIS. MIX 4 RESPULES WITH 5 PACKS OF SPLENDA AND SWALLOW DAILY. DO NOT EAT OR DRINK FOR 30 MINUTES AFTER      albuterol sulfate HFA (PROVENTIL HFA) 108 (90 Base) MCG/ACT inhaler Inhale 1-2 puffs into the lungs every 4 hours as needed for Wheezing or Shortness of Breath (Space out to every 6 hours as symptoms improve) Space out to every 6 hours as symptoms improve. 1 Inhaler 0    montelukast (SINGULAIR) 10 MG tablet Take 1 tablet by mouth daily 90 tablet 0    trihexyphenidyl (ARTANE) 5 MG tablet Take 5 mg by mouth 2 times daily      oxybutynin (DITROPAN) 5 MG tablet Take by mouth       No current facility-administered medications for this visit. Allergies   Allergen Reactions    Doxycycline      Tongue itch, throat began to swell    Pollen Extract Itching     Grass    Seroquel [Quetiapine Fumarate] Other (See Comments)     Paranoid made mood worse    Symbicort [Budesonide-Formoterol Fumarate] Other (See Comments)     Delusional, paranoid, hallucinations.     Topamax [Topiramate] Other (See Comments)     Numbness in legs       Subjective:      Review of Systems   Constitutional: Negative for activity change, fatigue and fever. Respiratory: Negative for cough, shortness of breath and wheezing. Gastrointestinal: Negative for bowel incontinence, constipation, nausea and vomiting. Genitourinary: Negative for bladder incontinence and dysuria. Musculoskeletal: Positive for back pain and myalgias. Neurological: Positive for tingling (down right leg). Objective:     Physical Exam  Vitals signs and nursing note reviewed. Constitutional:       General: She is not in acute distress. Appearance: Normal appearance. She is obese. She is not toxic-appearing or diaphoretic. HENT:      Head: Normocephalic and atraumatic. Neck:      Musculoskeletal: Normal range of motion and neck supple. Cardiovascular:      Rate and Rhythm: Normal rate and regular rhythm. Heart sounds: No murmur. Pulmonary:      Effort: Pulmonary effort is normal.      Breath sounds: Normal breath sounds. No wheezing, rhonchi or rales. Musculoskeletal:         General: Tenderness present. Lumbar back: She exhibits decreased range of motion, tenderness and pain. She exhibits no bony tenderness, no swelling, no edema, no deformity, no laceration and no spasm. Back:       Right lower leg: No edema. Left lower leg: No edema. Comments: Positive straight leg raise right lower extremity. Skin:     Findings: No erythema. Neurological:      General: No focal deficit present. Mental Status: She is alert and oriented to person, place, and time. Psychiatric:         Mood and Affect: Mood normal.         Behavior: Behavior normal.       /79 (Site: Right Upper Arm, Position: Sitting, Cuff Size: Large Adult)   Pulse 83   Temp 98.4 °F (36.9 °C)   Resp 18   Ht 5' 2.99\" (1.6 m)   Wt 241 lb (109.3 kg)   SpO2 98%   BMI 42.70 kg/m²     Assessment:      Diagnosis Orders   1.  Back pain with right-sided sciatica  cyclobenzaprine (FLEXERIL) 5 MG tablet    ketorolac (TORADOL) injection 60 mg    naproxen (NAPROSYN) 500 MG tablet     Will give her a Toradol injection in the office today. Advised her not to start naproxen until this evening. Also treated with muscle relaxer. I advised her to contact us if no improvement and we will start physical therapy. She verbalized understanding. Plan:       Discussed exam, POCT findings, plan of care (including prescriptive and supportive as listed below) and follow-up atlength with patient. Reviewed all prescribed and recommended medications, administration and side effects. Encouraged to return to [address] [place] [address] for noimprovement and or worsening of symptoms. To ER or call 911 if any difficulty breathing, shortness of breath, inability to swallow, hives or temp greater than 103 degrees. Questions answered. They verbalized understandingand agreement. Return if symptoms worsen or fail to improve. Orders Placed This Encounter   Medications    cyclobenzaprine (FLEXERIL) 5 MG tablet     Sig: Take 1 tablet by mouth 3 times daily as needed for Muscle spasms     Dispense:  30 tablet     Refill:  0    ketorolac (TORADOL) injection 60 mg    naproxen (NAPROSYN) 500 MG tablet     Sig: Take 1 tablet by mouth 2 times daily     Dispense:  20 tablet     Refill:  0          All patient questions answered. Pt voiced understanding.       Electronically signed by OSKAR Lara CNP on 8/27/2020 at 1:50 PM

## 2020-09-14 ASSESSMENT — PATIENT HEALTH QUESTIONNAIRE - PHQ9
1. LITTLE INTEREST OR PLEASURE IN DOING THINGS: 1
SUM OF ALL RESPONSES TO PHQ QUESTIONS 1-9: 2
SUM OF ALL RESPONSES TO PHQ9 QUESTIONS 1 & 2: 2
2. FEELING DOWN, DEPRESSED OR HOPELESS: 1
SUM OF ALL RESPONSES TO PHQ QUESTIONS 1-9: 2

## 2020-09-14 ASSESSMENT — LIFESTYLE VARIABLES
AUDIT TOTAL SCORE: INCOMPLETE
HOW OFTEN DO YOU HAVE A DRINK CONTAINING ALCOHOL: NEVER
AUDIT-C TOTAL SCORE: INCOMPLETE
HOW OFTEN DO YOU HAVE A DRINK CONTAINING ALCOHOL: 0

## 2020-09-24 ENCOUNTER — OFFICE VISIT (OUTPATIENT)
Dept: PRIMARY CARE CLINIC | Age: 30
End: 2020-09-24
Payer: MEDICARE

## 2020-09-24 VITALS
HEART RATE: 88 BPM | SYSTOLIC BLOOD PRESSURE: 114 MMHG | TEMPERATURE: 98.6 F | BODY MASS INDEX: 42.33 KG/M2 | HEIGHT: 63 IN | DIASTOLIC BLOOD PRESSURE: 70 MMHG | RESPIRATION RATE: 16 BRPM | WEIGHT: 238.9 LBS

## 2020-09-24 PROCEDURE — G0438 PPPS, INITIAL VISIT: HCPCS | Performed by: NURSE PRACTITIONER

## 2020-09-24 ASSESSMENT — ENCOUNTER SYMPTOMS
CONSTIPATION: 0
VOMITING: 0
NAUSEA: 0
SHORTNESS OF BREATH: 0
ABDOMINAL PAIN: 0
COUGH: 0
SORE THROAT: 0
WHEEZING: 0
RHINORRHEA: 0
DIARRHEA: 0

## 2020-09-24 NOTE — PATIENT INSTRUCTIONS
Personalized Preventive Plan for Chay Hernández - 9/24/2020  Medicare offers a range of preventive health benefits. Some of the tests and screenings are paid in full while other may be subject to a deductible, co-insurance, and/or copay. Some of these benefits include a comprehensive review of your medical history including lifestyle, illnesses that may run in your family, and various assessments and screenings as appropriate. After reviewing your medical record and screening and assessments performed today your provider may have ordered immunizations, labs, imaging, and/or referrals for you. A list of these orders (if applicable) as well as your Preventive Care list are included within your After Visit Summary for your review. Other Preventive Recommendations:    · A preventive eye exam performed by an eye specialist is recommended every 1-2 years to screen for glaucoma; cataracts, macular degeneration, and other eye disorders. · A preventive dental visit is recommended every 6 months. · Try to get at least 150 minutes of exercise per week or 10,000 steps per day on a pedometer . · Order or download the FREE \"Exercise & Physical Activity: Your Everyday Guide\" from [employer]. Call 7-677.498.2615 or search [employer] online. · You need 5957-3553 mg of calcium and 0853-5827 IU of vitamin D per day. It is possible to meet your calcium requirement with diet alone, but a vitamin D supplement is usually necessary to meet this goal.  · When exposed to the sun, use a sunscreen that protects against both UVA and UVB radiation with an SPF of 30 or greater. Reapply every 2 to 3 hours or after sweating, drying off with a towel, or swimming. · Always wear a seat belt when traveling in a car. Always wear a helmet when riding a bicycle or motorcycle.

## 2020-09-24 NOTE — PROGRESS NOTES
Medicare Annual Wellness Visit  Name: Amena Smith Date: 2020   MRN: [id number] Sex: Female   Age: 31 y.o. Ethnicity: Non-/Non    : 1990 Race: Jaky Loving is here for Medicare AWV (\"I havebeen having tachycardia again. \" Mai Patel had me on medication for it in the past.\" )    Screenings for behavioral, psychosocial and functional/safety risks, and cognitive dysfunction are all negative except as indicated below. These results, as well as other patient data from the [address] form, are documented in Flowsheets linked to this Encounter. Palpitations    This is a chronic problem. The current episode started more than 1 year ago. The problem occurs intermittently. The problem has been gradually worsening. Nothing aggravates the symptoms. Associated symptoms include anxiety. Pertinent negatives include no chest fullness, chest pain, coughing, diaphoresis, dizziness, fever, irregular heartbeat, malaise/fatigue, nausea, near-syncope, numbness, shortness of breath, syncope, vomiting or weakness. Treatments tried: PATIENT UNABLE TO RECALL. The treatment provided no relief. Risk factors include obesity, sedentary lifestyle and stress. Her past medical history is significant for anxiety. There is no history of anemia, drug use, heart disease, hyperthyroidism or a valve disorder. Review of Systems   Constitutional: Negative for chills, diaphoresis, fatigue, fever and malaise/fatigue. HENT: Negative for congestion, rhinorrhea and sore throat. Eyes: Negative for visual disturbance. Respiratory: Negative for cough, shortness of breath and wheezing. Cardiovascular: Positive for palpitations. Negative for chest pain, syncope and near-syncope. Gastrointestinal: Negative for abdominal pain, constipation, diarrhea, nausea and vomiting. Genitourinary: Negative for difficulty urinating and dysuria.    Musculoskeletal: Negative for gait 04/27/2018    Baseline ECG shows sinus rhythm. Holter showed sinus tachy 61% of the time. Rare premature ventricular complexes were recorded. No episodes of superventricuarl or ventriuclar tacycardial was seen. Pt reported episodes of SOB and speedy heart with monitroing showing sinus tachy.  Lordosis     Pneumonia     PTSD (post-traumatic stress disorder)     Schizophrenic disorder (HCC)     Tachycardia, unspecified     UTI (urinary tract infection)        Past Surgical History:   Procedure Laterality Date    NECK SURGERY Left lesion biopsy    UPPER GASTROINTESTINAL ENDOSCOPY      chicken stuck in throat    UPPER GASTROINTESTINAL ENDOSCOPY N/A 1/14/2020    -bx(eosinophilic esophagitis,neg H-Pylori)   Viv Tang WISDOM TOOTH EXTRACTION           Family History   Problem Relation Age of Onset    Asthma Mother     Cancer Mother     High Blood Pressure Father     Diabetes Father        CareTeam (Including outside providers/suppliers regularly involved in providing care):   Patient Care Team:  OSKAR Mccrary CNP as PCP - General (Family Nurse Practitioner)  [address] [place] [address], APRN - CNP as PCP - [facility] Empaneled Provider    Wt Readings from Last 3 Encounters:   09/24/20 238 lb 14.4 oz (108.4 kg)   08/27/20 241 lb (109.3 kg)   08/21/20 237 lb (107.5 kg)     Vitals:    09/24/20 1108   BP: 114/70   Site: Left Upper Arm   Position: Sitting   Cuff Size: Large Adult   Pulse: 88   Resp: 16   Temp: 98.6 °F (37 °C)   TempSrc: Temporal   Weight: 238 lb 14.4 oz (108.4 kg)   Height: 5' 3\" (1.6 m)     Body mass index is 42.32 kg/m². Based upon direct observation of the patient, evaluation of cognition reveals recent and remote memory intact.     General Appearance: alert and oriented to person, place and time, well-developed and well-nourished, in no acute distress  Skin: warm and dry, no rash or erythema  Head: normocephalic and atraumatic  Pulmonary/Chest: clear to auscultation bilaterally- no wheezes, rales or rhonchi, normal air movement, no respiratory distress  Cardiovascular: normal rate, normal S1 and S2, no gallops, intact distal pulses and no carotid bruits  Abdomen: soft, non-tender, non-distended, normal bowel sounds, no masses or organomegaly  Extremities: no cyanosis, no clubbing and no edema    Patient's complete Health Risk Assessment and screening values have been reviewed and are found in Flowsheets. The following problems were reviewed today and where indicated follow up appointments were made and/or referrals ordered. Positive Risk Factor Screenings with Interventions:     General Health:  General  In general, how would you say your health is?: Good  In the past 7 days, have you experienced any of the following? New or Increased Pain, New or Increased Fatigue, Loneliness, Social Isolation, Stress or Anger?: (!) New or Increased Fatigue  Do you get the social and emotional support that you need?: Yes  Do you have a Living Will?: (!) No  General Health Risk Interventions:  · Fatigue: regular exercise recommended- 3-5 times per week, 30-45 minutes per session    Health Habits/Nutrition:  Health Habits/Nutrition  Do you exercise for at least 20 minutes 2-3 times per week?: (!) No  Have you lost any weight without trying in the past 3 months?: No  Do you eat fewer than 2 meals per day?: No  Have you seen a dentist within the past year?: Yes  Body mass index is 42.32 kg/m².   Health Habits/Nutrition Interventions:  · Inadequate physical activity:  educational materials provided to promote increased physical activity    Personalized Preventive Plan   Current Health Maintenance Status  Immunization History   Administered Date(s) Administered    DTP 1990, 1992, 03/19/1993, 01/25/1994    DTaP vaccine 07/31/1997    HPV (Human Papilloma Virus)Vaccine 08/05/2009, 11/16/2009, 05/17/2010    Hepatitis A Vaccine 08/05/2009, 05/17/2010    Hepatitis B Ped/Adol (Engerix-B, Recombivax HB) 07/31/1997, 08/28/1997, 02/12/1998    Hib vaccine 1990, 1992, 03/19/1993, 01/25/1994    Influenza 09/01/2013    Influenza A (L5G0-53) Vaccine PF IM 11/04/2009    Influenza Virus Vaccine 10/28/2010, 09/01/2013    Influenza, Rodrigo Posada, IM, (6 mo and older Fluzone, Flulaval, Fluarix and 3 yrs and older Afluria) 09/01/2013    Influenza, Quadv, IM, PF (6 mo and older Fluzone, Flulaval, Fluarix, and 3 yrs and older Afluria) 01/04/2017, 11/06/2017, 10/18/2018, 11/12/2019    MMR 01/25/1994, 06/17/2002    Meningococcal MCV4P (Menactra) 08/05/2009    Pneumococcal Polysaccharide ([id number]) 01/01/2009, 11/16/2009, 06/25/2014    Polio OPV 1992, 1992, 01/25/1994, 07/31/1997    Tdap (Boostrix, Adacel) 08/05/2009, 06/25/2014        Health Maintenance   Topic Date Due    Annual Wellness Visit (AWV)  11/12/2019    Flu vaccine (1) 09/01/2020    Varicella vaccine (1 of 2 - 2-dose childhood series) 09/24/2021 (Originally 6/15/1993)    Cervical cancer screen  07/08/2023    DTaP/Tdap/Td vaccine (8 - Td) 06/25/2024    Hepatitis A vaccine  Completed    Hepatitis B vaccine  Completed    Hib vaccine  Completed    Meningococcal (ACWY) vaccine  Completed    Pneumococcal 0-64 years Vaccine  Completed    HIV screen  Completed     Recommendations for Preventive Services Due: see orders and patient instructions/AVS.  . Recommended screening schedule for the next 5-10 years is provided to the patient in written form: see Patient Mozell Seip was seen today for medicare awv.     Diagnoses and all orders for this visit:    Routine general medical examination at a health care facility    Palpitations  -     Holter Monitor 48 Hour; Future

## 2020-09-28 ENCOUNTER — HOSPITAL ENCOUNTER (OUTPATIENT)
Dept: NON INVASIVE DIAGNOSTICS | Age: 30
Discharge: HOME | End: 2020-09-28
Payer: MEDICARE

## 2020-09-28 PROCEDURE — 93226 XTRNL ECG REC<48 HR SCAN A/R: CPT

## 2020-09-28 PROCEDURE — 93225 XTRNL ECG REC<48 HRS REC: CPT

## 2020-10-01 LAB
ACQUISITION DURATION: NORMAL S
AVERAGE HEART RATE: 90 BPM
EKG DIAGNOSIS: NORMAL
HOLTER MAX HEART RATE: 160 BPM
HOOKUP DATE: NORMAL
HOOKUP TIME: NORMAL
MAX HEART RATE TIME/DATE: NORMAL
MIN HEART RATE TIME/DATE: NORMAL
MIN HEART RATE: 50 BPM
NUMBER OF QRS COMPLEXES: NORMAL
NUMBER OF SUPRAVENTRICULAR BEATS IN RUNS: 0
NUMBER OF SUPRAVENTRICULAR COUPLETS: 0
NUMBER OF SUPRAVENTRICULAR ECTOPICS: 2
NUMBER OF SUPRAVENTRICULAR ISOLATED BEATS: 2
NUMBER OF SUPRAVENTRICULAR RUNS: 0
NUMBER OF VENTRICULAR BEATS IN RUNS: 0
NUMBER OF VENTRICULAR BIGEMINAL CYCLES: 0
NUMBER OF VENTRICULAR COUPLETS: 0
NUMBER OF VENTRICULAR ECTOPICS: 0
NUMBER OF VENTRICULAR ISOLATED BEATS: 0
NUMBER OF VENTRICULAR RUNS: 0

## 2020-10-02 ENCOUNTER — TELEPHONE (OUTPATIENT)
Dept: PRIMARY CARE CLINIC | Age: 30
End: 2020-10-02

## 2020-10-02 NOTE — TELEPHONE ENCOUNTER
----- Message from OSKAR Bravo CNP sent at 10/2/2020  7:36 AM EDT -----  Results are normal, please call patient and make them aware.

## 2021-01-26 ENCOUNTER — APPOINTMENT (OUTPATIENT)
Dept: ULTRASOUND IMAGING | Age: 31
End: 2021-01-26
Payer: MEDICARE

## 2021-01-26 ENCOUNTER — HOSPITAL ENCOUNTER (EMERGENCY)
Age: 31
Discharge: HOME | End: 2021-01-26
Payer: MEDICARE

## 2021-01-26 ENCOUNTER — TELEPHONE (OUTPATIENT)
Dept: PRIMARY CARE CLINIC | Age: 31
End: 2021-01-26

## 2021-01-26 ENCOUNTER — APPOINTMENT (OUTPATIENT)
Dept: CT IMAGING | Age: 31
End: 2021-01-26
Payer: MEDICARE

## 2021-01-26 VITALS
HEART RATE: 89 BPM | WEIGHT: 235 LBS | DIASTOLIC BLOOD PRESSURE: 74 MMHG | BODY MASS INDEX: 41.64 KG/M2 | SYSTOLIC BLOOD PRESSURE: 144 MMHG | TEMPERATURE: 97.5 F | RESPIRATION RATE: 16 BRPM | HEIGHT: 63 IN | OXYGEN SATURATION: 99 %

## 2021-01-26 DIAGNOSIS — R10.11 RIGHT UPPER QUADRANT ABDOMINAL PAIN: Primary | ICD-10-CM

## 2021-01-26 LAB
-: ABNORMAL
ABSOLUTE EOS #: 0.51 K/UL (ref 0–0.44)
ABSOLUTE IMMATURE GRANULOCYTE: 0.06 K/UL (ref 0–0.3)
ABSOLUTE LYMPH #: 2.89 K/UL (ref 1.1–3.7)
ABSOLUTE MONO #: 0.8 K/UL (ref 0.1–1.2)
ALBUMIN SERPL-MCNC: 4.6 G/DL (ref 3.5–5.2)
ALBUMIN/GLOBULIN RATIO: 1.4 (ref 1–2.5)
ALP BLD-CCNC: 70 U/L (ref 35–104)
ALT SERPL-CCNC: 20 U/L (ref 5–33)
AMORPHOUS: ABNORMAL
AMYLASE: 77 U/L (ref 28–100)
ANION GAP SERPL CALCULATED.3IONS-SCNC: 11 MMOL/L (ref 9–17)
AST SERPL-CCNC: 16 U/L
BACTERIA: ABNORMAL
BASOPHILS # BLD: 1 % (ref 0–2)
BASOPHILS ABSOLUTE: 0.07 K/UL (ref 0–0.2)
BILIRUB SERPL-MCNC: 0.22 MG/DL (ref 0.3–1.2)
BILIRUBIN URINE: NEGATIVE
BUN BLDV-MCNC: 18 MG/DL (ref 6–20)
BUN/CREAT BLD: 28 (ref 9–20)
CALCIUM SERPL-MCNC: 9.6 MG/DL (ref 8.6–10.4)
CASTS UA: ABNORMAL /LPF
CHLORIDE BLD-SCNC: 102 MMOL/L (ref 98–107)
CO2: 23 MMOL/L (ref 20–31)
COLOR: YELLOW
COMMENT UA: ABNORMAL
CREAT SERPL-MCNC: 0.65 MG/DL (ref 0.5–0.9)
CRYSTALS, UA: ABNORMAL /HPF
DIFFERENTIAL TYPE: ABNORMAL
EOSINOPHILS RELATIVE PERCENT: 5 % (ref 1–4)
EPITHELIAL CELLS UA: ABNORMAL /HPF (ref 0–25)
GFR AFRICAN AMERICAN: >60 ML/MIN
GFR NON-AFRICAN AMERICAN: >60 ML/MIN
GFR SERPL CREATININE-BSD FRML MDRD: ABNORMAL ML/MIN/{1.73_M2}
GFR SERPL CREATININE-BSD FRML MDRD: ABNORMAL ML/MIN/{1.73_M2}
GLUCOSE BLD-MCNC: 88 MG/DL (ref 70–99)
GLUCOSE URINE: NEGATIVE
HCG(URINE) PREGNANCY TEST: NEGATIVE
HCT VFR BLD CALC: 43.1 % (ref 36.3–47.1)
HEMOGLOBIN: 14.3 G/DL (ref 11.9–15.1)
IMMATURE GRANULOCYTES: 1 %
KETONES, URINE: NEGATIVE
LACTIC ACID, WHOLE BLOOD: NORMAL MMOL/L (ref 0.7–2.1)
LACTIC ACID: 0.7 MMOL/L (ref 0.5–2.2)
LEUKOCYTE ESTERASE, URINE: NEGATIVE
LIPASE: 31 U/L (ref 13–60)
LYMPHOCYTES # BLD: 26 % (ref 24–43)
MCH RBC QN AUTO: 28.9 PG (ref 25.2–33.5)
MCHC RBC AUTO-ENTMCNC: 33.2 G/DL (ref 28.4–34.8)
MCV RBC AUTO: 87.2 FL (ref 82.6–102.9)
MONOCYTES # BLD: 7 % (ref 3–12)
MUCUS: ABNORMAL
NITRITE, URINE: NEGATIVE
NRBC AUTOMATED: 0 PER 100 WBC
OTHER OBSERVATIONS UA: ABNORMAL
PDW BLD-RTO: 12.6 % (ref 11.8–14.4)
PH UA: 6 (ref 5–9)
PLATELET # BLD: 353 K/UL (ref 138–453)
PLATELET ESTIMATE: ABNORMAL
PMV BLD AUTO: 9.6 FL (ref 8.1–13.5)
POTASSIUM SERPL-SCNC: 4 MMOL/L (ref 3.7–5.3)
PROTEIN UA: NEGATIVE
RBC # BLD: 4.94 M/UL (ref 3.95–5.11)
RBC # BLD: ABNORMAL 10*6/UL
RBC UA: ABNORMAL /HPF (ref 0–2)
RENAL EPITHELIAL, UA: ABNORMAL /HPF
SEG NEUTROPHILS: 60 % (ref 36–65)
SEGMENTED NEUTROPHILS ABSOLUTE COUNT: 6.61 K/UL (ref 1.5–8.1)
SODIUM BLD-SCNC: 136 MMOL/L (ref 135–144)
SPECIFIC GRAVITY UA: 1.02 (ref 1.01–1.02)
TOTAL PROTEIN: 7.8 G/DL (ref 6.4–8.3)
TRICHOMONAS: ABNORMAL
TURBIDITY: CLEAR
URINE HGB: NEGATIVE
UROBILINOGEN, URINE: NORMAL
WBC # BLD: 10.9 K/UL (ref 3.5–11.3)
WBC # BLD: ABNORMAL 10*3/UL
WBC UA: ABNORMAL /HPF (ref 0–5)
YEAST: ABNORMAL

## 2021-01-26 PROCEDURE — 87086 URINE CULTURE/COLONY COUNT: CPT

## 2021-01-26 PROCEDURE — 76705 ECHO EXAM OF ABDOMEN: CPT

## 2021-01-26 PROCEDURE — 36415 COLL VENOUS BLD VENIPUNCTURE: CPT

## 2021-01-26 PROCEDURE — 82150 ASSAY OF AMYLASE: CPT

## 2021-01-26 PROCEDURE — 83690 ASSAY OF LIPASE: CPT

## 2021-01-26 PROCEDURE — 83605 ASSAY OF LACTIC ACID: CPT

## 2021-01-26 PROCEDURE — 80053 COMPREHEN METABOLIC PANEL: CPT

## 2021-01-26 PROCEDURE — 99284 EMERGENCY DEPT VISIT MOD MDM: CPT

## 2021-01-26 PROCEDURE — 81025 URINE PREGNANCY TEST: CPT

## 2021-01-26 PROCEDURE — 81001 URINALYSIS AUTO W/SCOPE: CPT

## 2021-01-26 PROCEDURE — 74177 CT ABD & PELVIS W/CONTRAST: CPT

## 2021-01-26 PROCEDURE — 85025 COMPLETE CBC W/AUTO DIFF WBC: CPT

## 2021-01-26 PROCEDURE — 6360000004 HC RX CONTRAST MEDICATION: Performed by: NURSE PRACTITIONER

## 2021-01-26 RX ORDER — HYDROXYZINE HYDROCHLORIDE 25 MG/1
25 TABLET, FILM COATED ORAL 3 TIMES DAILY PRN
COMMUNITY
End: 2021-09-09

## 2021-01-26 RX ORDER — TRAZODONE HYDROCHLORIDE 150 MG/1
150 TABLET ORAL NIGHTLY
COMMUNITY
End: 2021-01-28

## 2021-01-26 RX ADMIN — IOPAMIDOL 75 ML: 755 INJECTION, SOLUTION INTRAVENOUS at 18:45

## 2021-01-26 ASSESSMENT — PAIN SCALES - GENERAL: PAINLEVEL_OUTOF10: 4

## 2021-01-26 NOTE — ED PROVIDER NOTES
05/18/2018    EF 60%    History of Holter monitoring 04/27/2018    Baseline ECG shows sinus rhythm. Holter showed sinus tachy 61% of the time. Rare premature ventricular complexes were recorded. No episodes of superventricuarl or ventriuclar tacycardial was seen. Pt reported episodes of SOB and speedy heart with monitroing showing sinus tachy.  Lordosis     Pneumonia     PTSD (post-traumatic stress disorder)     Schizophrenic disorder (HCC)     Tachycardia, unspecified     UTI (urinary tract infection)          SURGICAL HISTORY       Past Surgical History:   Procedure Laterality Date    NECK SURGERY Left lesion biopsy    UPPER GASTROINTESTINAL ENDOSCOPY      chicken stuck in throat    UPPER GASTROINTESTINAL ENDOSCOPY N/A 1/14/2020    -bx(eosinophilic esophagitis,neg H-Pylori)    WISDOM TOOTH EXTRACTION           CURRENT MEDICATIONS       Current Discharge Medication List      CONTINUE these medications which have NOT CHANGED    Details   traZODone (DESYREL) 150 MG tablet Take 150 mg by mouth nightly      hydrOXYzine (ATARAX) 25 MG tablet Take 25 mg by mouth 3 times daily as needed for Itching      trihexyphenidyl (ARTANE) 5 MG tablet Take 5 mg by mouth 2 times daily      ARIPiprazole (ABILIFY) 20 MG tablet Take 25 mg by mouth daily       budesonide (PULMICORT) 0.5 MG/2ML nebulizer suspension FOR EOSINOPHILIC ESOPHAGITIS. MIX 4 RESPULES WITH 5 PACKS OF SPLENDA AND SWALLOW DAILY. DO NOT EAT OR DRINK FOR 30 MINUTES AFTER      albuterol sulfate HFA (PROVENTIL HFA) 108 (90 Base) MCG/ACT inhaler Inhale 1-2 puffs into the lungs every 4 hours as needed for Wheezing or Shortness of Breath (Space out to every 6 hours as symptoms improve) Space out to every 6 hours as symptoms improve.   Qty: 1 Inhaler, Refills: 0      montelukast (SINGULAIR) 10 MG tablet Take 1 tablet by mouth daily  Qty: 90 tablet, Refills: 0    Associated Diagnoses: Exercise-induced asthma             ALLERGIES     Doxycycline, Pollen extract, Seroquel [quetiapine fumarate], Symbicort [budesonide-formoterol fumarate], and Topamax [topiramate]    FAMILY HISTORY       Family History   Problem Relation Age of Onset    Asthma Mother     Cancer Mother     High Blood Pressure Father     Diabetes Father           SOCIAL HISTORY       Social History     Socioeconomic History    Marital status: Single     Spouse name: None    Number of children: 0    Years of education: 25    Highest education level:  Bachelor's degree (e.g., BA, AB, BS)   Occupational History    Occupation: Disability   Social Needs    Financial resource strain: Not hard at all   [employer] insecurity     Worry: Never true     Inability: Never true   [employer] needs     Medical: No     Non-medical: No   Tobacco Use    Smoking status: Never Smoker    Smokeless tobacco: Never Used   Substance and Sexual Activity    Alcohol use: No     Comment: Rare    Drug use: No    Sexual activity: None   Lifestyle    Physical activity     Days per week: None     Minutes per session: None    Stress: None   Relationships    Social connections     Talks on phone: None     Gets together: None     Attends Roman Catholic service: None     Active member of club or organization: None     Attends meetings of clubs or organizations: None     Relationship status: None    Intimate partner violence     Fear of current or ex partner: None     Emotionally abused: None     Physically abused: None     Forced sexual activity: None   Other Topics Concern    None   Social History Narrative    None       SCREENINGS    [place] Coma Scale  Eye Opening: Spontaneous  Best Verbal Response: Oriented  Best Motor Response: Obeys commands  Kemah Coma Scale Score: 15           PHYSICAL EXAM    (up to 7 for level 4, 8 or more for level 5)     ED Triage Vitals [01/26/21 1723]   BP Temp Temp Source Pulse Resp SpO2 Height Weight   (!) 144/74 97.5 °F (36.4 °C) Temporal 89 16 99 % 5' 3\" (1.6 m) 235 lb (106.6 kg) Constitutional: Oriented and well-developed, well-nourished, and in no distress. Non-toxic appearance. Head: Normocephalic and atraumatic. Eyes: Extra ocular muscles intact. Pupils are equal, round, and reactive to light. No discharge. No scleral icterus. Neck: Normal range of motion and phonation normal. Neck supple. No JVD present. No spinous process tenderness and no muscular tenderness present. No cervical adenopathy. Cardiovascular: Regular rate and rhythm, normal heart sounds and intact distal pulses. No murmur heard. Pulmonary/Chest: Effort normal and breath sounds normal. No accessory muscle usage or stridor. Not tachypneic. No respiratory distress. No tenderness and no retraction. Abdominal: Soft and non-distended. Bowel sounds are normal. No masses or organomegaly. Tenderness in the right upper quadrant of the abdomen. Musculoskeletal: Normal range of motion. No edema and no tenderness. Neurological: Alert and oriented. Skin: Skin is warm and dry. No rash noted. No cyanosis or erythema. No pallor. Nails show no clubbing. DIAGNOSTIC RESULTS     EKG: All EKG's are interpreted by the Emergency Department Physician who either signs or Co-signs this chart in the absence of a cardiologist.    RADIOLOGY:   Non-plain film images such as CT, Ultrasound and MRI are read by the radiologist. Plain radiographic images are visualized and preliminarily interpreted by the emergency physician with the below findings:    Interpretation per the Radiologist below, if available at the time of this note:    CT ABDOMEN PELVIS W IV CONTRAST Additional Contrast? None   Final Result   1. No acute intra-abdominal abnormality. 2. Hepatic steatosis. 3. New uterine lesion, possibly a fibroid. Ultrasound follow-up is   recommended for further characterization. 4. Mild urinary bladder wall thickening, likely due to underdistention. Correlation for any signs or symptoms of cystitis is recommended. US GALLBLADDER RUQ   Final Result   Limited evaluation of the gallbladder due to incomplete distention. No discrete gallstones. Increased echoes within the gallbladder may reflect   gallbladder sludge. No definite gallbladder wall thickening when allowing   for incomplete distention.            ED BEDSIDE ULTRASOUND:   Performed by ED Physician - none    LABS:  Results for orders placed or performed during the hospital encounter of 01/26/21   Lipase   Result Value Ref Range    Lipase 31 13 - 60 U/L   CBC Auto Differential   Result Value Ref Range    WBC 10.9 3.5 - 11.3 k/uL    RBC 4.94 3.95 - 5.11 m/uL    Hemoglobin 14.3 11.9 - 15.1 g/dL    Hematocrit 43.1 36.3 - 47.1 %    MCV 87.2 82.6 - 102.9 fL    MCH 28.9 25.2 - 33.5 pg    MCHC 33.2 28.4 - 34.8 g/dL    RDW 12.6 11.8 - 14.4 %    Platelets 818 826 - 833 k/uL    MPV 9.6 8.1 - 13.5 fL    NRBC Automated 0.0 0.0 per 100 WBC    Differential Type NOT REPORTED     Seg Neutrophils 60 36 - 65 %    Lymphocytes 26 24 - 43 %    Monocytes 7 3 - 12 %    Eosinophils % 5 (H) 1 - 4 %    Basophils 1 0 - 2 %    Immature Granulocytes 1 (H) 0 %    Segs Absolute 6.61 1.50 - 8.10 k/uL    Absolute Lymph # 2.89 1.10 - 3.70 k/uL    Absolute Mono # 0.80 0.10 - 1.20 k/uL    Absolute Eos # 0.51 (H) 0.00 - 0.44 k/uL    Basophils Absolute 0.07 0.00 - 0.20 k/uL    Absolute Immature Granulocyte 0.06 0.00 - 0.30 k/uL    WBC Morphology NOT REPORTED     RBC Morphology NOT REPORTED     Platelet Estimate NOT REPORTED    Comprehensive Metabolic Panel   Result Value Ref Range    Glucose 88 70 - 99 mg/dL    BUN 18 6 - 20 mg/dL    CREATININE 0.65 0.50 - 0.90 mg/dL    Bun/Cre Ratio 28 (H) 9 - 20    Calcium 9.6 8.6 - 10.4 mg/dL    Sodium 136 135 - 144 mmol/L    Potassium 4.0 3.7 - 5.3 mmol/L    Chloride 102 98 - 107 mmol/L    CO2 23 20 - 31 mmol/L    Anion Gap 11 9 - 17 mmol/L    Alkaline Phosphatase 70 35 - 104 U/L    ALT 20 5 - 33 U/L    AST 16 <32 U/L    Total Bilirubin 0.22 (L) 0.3 - 1.2 mg/dL Total Protein 7.8 6.4 - 8.3 g/dL    Albumin 4.6 3.5 - 5.2 g/dL    Albumin/Globulin Ratio 1.4 1.0 - 2.5    GFR Non-African American >60 >60 mL/min    GFR African American >60 >60 mL/min    GFR Comment          GFR Staging         Lactic Acid, Plasma   Result Value Ref Range    Lactic Acid 0.7 0.5 - 2.2 mmol/L    Lactic Acid, Whole Blood NOT REPORTED 0.7 - 2.1 mmol/L   Pregnancy, Urine   Result Value Ref Range    HCG(Urine) Pregnancy Test NEGATIVE NEGATIVE   Urinalysis with Microscopic   Result Value Ref Range    Color, UA YELLOW YELLOW    Turbidity UA CLEAR CLEAR    Glucose, Ur NEGATIVE NEGATIVE    Bilirubin Urine NEGATIVE NEGATIVE    Ketones, Urine NEGATIVE NEGATIVE    Specific Gravity, UA 1.025 (H) 1.010 - 1.020    Urine Hgb NEGATIVE NEGATIVE    pH, UA 6.0 5.0 - 9.0    Protein, UA NEGATIVE NEGATIVE    Urobilinogen, Urine Normal Normal    Nitrite, Urine NEGATIVE NEGATIVE    Leukocyte Esterase, Urine NEGATIVE NEGATIVE    Urinalysis Comments NOT REPORTED     -          WBC, UA 0 TO 2 0 - 5 /HPF    RBC, UA None 0 - 2 /HPF    Casts UA NOT REPORTED /LPF    Crystals, UA NOT REPORTED None /HPF    Epithelial Cells UA 0 TO 2 0 - 25 /HPF    Renal Epithelial, UA NOT REPORTED 0 /HPF    Bacteria, UA NOT REPORTED None    Mucus, UA NOT REPORTED None    Trichomonas, UA NOT REPORTED None    Amorphous, UA NOT REPORTED None    Other Observations UA NOT REPORTED NOT REQ. Yeast, UA NOT REPORTED None     Orders Placed This Encounter   Procedures    Culture, Urine    CT ABDOMEN PELVIS W IV CONTRAST Additional Contrast? None    US GALLBLADDER RUQ    Lipase    CBC Auto Differential    Comprehensive Metabolic Panel    Lactic Acid, Plasma    Pregnancy, Urine    Urinalysis with Microscopic    Amylase    Insert peripheral IV       All other labs were within normal range or not returned as of this dictation.     EMERGENCY DEPARTMENT COURSE and DIFFERENTIAL DIAGNOSIS/MDM:   Vitals:    Vitals:    01/26/21 1723   BP: (!) 144/74

## 2021-01-26 NOTE — TELEPHONE ENCOUNTER
Received phone message from patient requesting us to call her. Attempted to call patient, no answer and mailbox is full.

## 2021-01-27 ENCOUNTER — CARE COORDINATION (OUTPATIENT)
Dept: CARE COORDINATION | Age: 31
End: 2021-01-27

## 2021-01-27 LAB
CULTURE: NORMAL
Lab: NORMAL
SPECIMEN DESCRIPTION: NORMAL

## 2021-01-27 NOTE — CARE COORDINATION
Patient contacted regarding recent discharge and COVID-19 risk. Discussed COVID-19 related testing which was not done at this time. Test results were not done. Patient informed of results, if available? N/A     Care Transition Nurse/ Ambulatory Care Manager contacted the patient by telephone to perform post discharge assessment. Verified name and  with patient as identifiers. Patient has following risk factors of: asthma. CTN/ACM reviewed discharge instructions, medical action plan and red flags related to discharge diagnosis. Reviewed and educated them on any new and changed medications related to discharge diagnosis. Advised obtaining a 90-day supply of all daily and as-needed medications. Education provided regarding infection prevention, and signs and symptoms of COVID-19 and when to seek medical attention with patient who verbalized understanding. Discussed exposure protocols and quarantine from [address] you at higher risk for severe illness  and given an opportunity for questions and concerns. The patient agrees to contact the COVID-19 hotline [phone] or PCP office for questions related to their healthcare. CTN/ACM provided contact information for future reference. From CDC: Are you at higher risk for severe illness?  Wash your hands often.  Avoid close contact (6 feet, which is about two arm lengths) with people who are sick.  Put distance between yourself and other people if COVID-19 is spreading in your community.  Clean and disinfect frequently touched surfaces.  Avoid all cruise travel and non-essential air travel.  Call your healthcare professional if you have concerns about COVID-19 and your underlying condition or if you are sick. Spoke with Codi Velazquez today who voices no new or worsening symptoms. Reports she still is having abdominal pain, no worse than yesterday.  ACM offered to assist in making follow up appointment with PCP- patient states she can make an appointment, just has not done it yet today. Denies any questions/concerns at time of call. My Chart active. Appreciative of call. No further reach out planned at this time.

## 2021-01-28 ENCOUNTER — OFFICE VISIT (OUTPATIENT)
Dept: PRIMARY CARE CLINIC | Age: 31
End: 2021-01-28
Payer: MEDICARE

## 2021-01-28 VITALS
OXYGEN SATURATION: 99 % | RESPIRATION RATE: 18 BRPM | HEART RATE: 79 BPM | TEMPERATURE: 97.8 F | HEIGHT: 63 IN | DIASTOLIC BLOOD PRESSURE: 78 MMHG | WEIGHT: 237.6 LBS | SYSTOLIC BLOOD PRESSURE: 124 MMHG | BODY MASS INDEX: 42.1 KG/M2

## 2021-01-28 DIAGNOSIS — G89.29 CHRONIC RUQ PAIN: Primary | ICD-10-CM

## 2021-01-28 DIAGNOSIS — N85.9 LESION OF UTERUS: ICD-10-CM

## 2021-01-28 DIAGNOSIS — R10.11 CHRONIC RUQ PAIN: Primary | ICD-10-CM

## 2021-01-28 DIAGNOSIS — K82.8 GALLBLADDER SLUDGE: ICD-10-CM

## 2021-01-28 DIAGNOSIS — F25.0 SCHIZOAFFECTIVE DISORDER, BIPOLAR TYPE (HCC): ICD-10-CM

## 2021-01-28 PROCEDURE — G8484 FLU IMMUNIZE NO ADMIN: HCPCS | Performed by: NURSE PRACTITIONER

## 2021-01-28 PROCEDURE — 1036F TOBACCO NON-USER: CPT | Performed by: NURSE PRACTITIONER

## 2021-01-28 PROCEDURE — G8417 CALC BMI ABV UP PARAM F/U: HCPCS | Performed by: NURSE PRACTITIONER

## 2021-01-28 PROCEDURE — G8427 DOCREV CUR MEDS BY ELIG CLIN: HCPCS | Performed by: NURSE PRACTITIONER

## 2021-01-28 PROCEDURE — 99214 OFFICE O/P EST MOD 30 MIN: CPT | Performed by: NURSE PRACTITIONER

## 2021-01-28 RX ORDER — HYOSCYAMINE SULFATE 0.12 MG/1
1 TABLET SUBLINGUAL EVERY 4 HOURS PRN
Qty: 60 EACH | Refills: 0 | Status: SHIPPED | OUTPATIENT
Start: 2021-01-28 | End: 2021-09-09

## 2021-01-28 RX ORDER — TRAZODONE HYDROCHLORIDE 100 MG/1
TABLET ORAL
COMMUNITY
Start: 2020-12-09 | End: 2021-09-09

## 2021-01-28 RX ORDER — ARIPIPRAZOLE 5 MG/1
TABLET ORAL
COMMUNITY
Start: 2020-11-04 | End: 2021-02-24

## 2021-01-28 RX ORDER — TRAZODONE HYDROCHLORIDE 50 MG/1
TABLET ORAL
COMMUNITY
Start: 2020-11-04 | End: 2021-09-09

## 2021-01-28 ASSESSMENT — ENCOUNTER SYMPTOMS
SHORTNESS OF BREATH: 0
CONSTIPATION: 0
VOMITING: 0
RHINORRHEA: 0
FLATUS: 0
DIARRHEA: 0
BELCHING: 1
WHEEZING: 0
NAUSEA: 1
SORE THROAT: 0
ABDOMINAL PAIN: 1
COUGH: 0
HEMATOCHEZIA: 0

## 2021-01-28 NOTE — PATIENT INSTRUCTIONS
SURVEY:    You may be receiving a survey from [employer] regarding your visit today. Please complete the survey to enable us to provide the highest quality of care to you and your family. If you cannot score us a very good on any question, please call the office to discuss how we could have made your experience a very good one. Thank you.

## 2021-01-28 NOTE — PROGRESS NOTES
Name: Anton Lantigua  : 1990         Chief Complaint:     Chief Complaint   Patient presents with    ED Follow-up     States \"pain is getting worse and not any better. \"       History of Present Illness:      Anton Lantigua is a 31 y.o.  female who presents with ED Follow-up (States \"pain is getting worse and not any better. \")      Maverick Burkett is here today for an ER follow-up visit and routine office visit. Lesion of uterus-incidental finding on CT of the abdomen and pelvis examination that was done in the emergency department. She has follow-up with her GYN early next week. She is having no vaginal bleeding. Schizoaffective disorder, bipolar type-stable, patient is compliant with her medication follows with psychiatry on a routine basis. Abdominal Pain  This is a recurrent problem. The current episode started more than 1 month ago. The onset quality is gradual. The problem occurs intermittently. The problem has been waxing and waning. The pain is located in the RLQ. The pain is at a severity of 5/10. The pain is moderate. The quality of the pain is aching and colicky. The abdominal pain does not radiate. Associated symptoms include belching and nausea. Pertinent negatives include no anorexia, arthralgias, constipation, diarrhea, dysuria, fever, flatus, frequency, headaches, hematochezia, hematuria, melena, myalgias, vomiting or weight loss. The pain is aggravated by eating. The pain is relieved by nothing. She has tried nothing for the symptoms. The treatment provided no relief. Prior diagnostic workup includes CT scan. There is no history of GERD. Past Medical History:     Past Medical History:   Diagnosis Date    Asthma     Back pain     Chicken pox     Dysphagia     H/O echocardiogram 2018    EF 60%    History of Holter monitoring 2018    Baseline ECG shows sinus rhythm. Holter showed sinus tachy 61% of the time.  Rare premature ventricular complexes were recorded. No episodes of superventricuarl or ventriuclar tacycardial was seen. Pt reported episodes of SOB and speedy heart with monitroing showing sinus tachy.  Lordosis     Pneumonia     PTSD (post-traumatic stress disorder)     Schizophrenic disorder (HCC)     Tachycardia, unspecified     UTI (urinary tract infection)       Reviewed all health maintenance requirements and ordered appropriate tests  There are no preventive care reminders to display for this patient. Past Surgical History:     Past Surgical History:   Procedure Laterality Date    NECK SURGERY Left lesion biopsy    UPPER GASTROINTESTINAL ENDOSCOPY      chicken stuck in throat    UPPER GASTROINTESTINAL ENDOSCOPY N/A 1/14/2020    -bx(eosinophilic esophagitis,neg H-Pylori)   [facility] WISDOM TOOTH EXTRACTION          Medications:       Prior to Admission medications    Medication Sig Start Date End Date Taking? Authorizing Provider   ARIPiprazole (ABILIFY) 5 MG tablet TAKE 1 TABLET BY MOUTH ONCE DAILY 11/4/20  Yes Historical Provider, MD   traZODone (DESYREL) 100 MG tablet TAKE 1 TABLET BY MOUTH ONCE DAILY AT BEDTIME 12/9/20  Yes Historical Provider, MD   traZODone (DESYREL) 50 MG tablet TAKE 1 TABLET BY MOUTH ONCE DAILY AT BEDTIME 11/4/20  Yes Historical Provider, MD   Hyoscyamine Sulfate SL (LEVSIN/SL) 0.125 MG SUBL Place 1 tablet under the tongue every 4 hours as needed (spasm/pain) 1/28/21  Yes OSKAR Dean - CNP   hydrOXYzine (ATARAX) 25 MG tablet Take 25 mg by mouth 3 times daily as needed for Itching   Yes Historical Provider, MD   trihexyphenidyl (ARTANE) 5 MG tablet Take 5 mg by mouth 2 times daily 8/24/20  Yes Historical Provider, MD   ARIPiprazole (ABILIFY) 20 MG tablet Take 25 mg by mouth daily    Yes Historical Provider, MD   budesonide (PULMICORT) 0.5 MG/2ML nebulizer suspension FOR EOSINOPHILIC ESOPHAGITIS. MIX 4 RESPULES WITH 5 PACKS OF SPLENDA AND SWALLOW DAILY.  DO NOT EAT OR DRINK FOR 30 MINUTES AFTER 6/3/20  Yes Historical Provider, MD   albuterol sulfate HFA (PROVENTIL HFA) 108 (90 Base) MCG/ACT inhaler Inhale 1-2 puffs into the lungs every 4 hours as needed for Wheezing or Shortness of Breath (Space out to every 6 hours as symptoms improve) Space out to every 6 hours as symptoms improve. 3/25/20  Yes Reyna Petty MD   montelukast (SINGULAIR) 10 MG tablet Take 1 tablet by mouth daily 2/20/20  Yes Law Hanson, APRN - CNP   Triheptanoin 100 % LIQD Take by mouth 11/3/20   Historical Provider, MD        Allergies:       Doxycycline, Pollen extract, Seroquel [quetiapine fumarate], Symbicort [budesonide-formoterol fumarate], and Topamax [topiramate]    Social History:     Tobacco:    reports that she has never smoked. She has never used smokeless tobacco.  Alcohol:      reports no history of alcohol use. Drug Use:  reports no history of drug use. Family History:     Family History   Problem Relation Age of Onset    Asthma Mother     Cancer Mother     High Blood Pressure Father     Diabetes Father        Review of Systems:     Positive and Negative as described in HPI    Review of Systems   Constitutional: Negative for chills, fatigue, fever and weight loss. HENT: Negative for congestion, rhinorrhea and sore throat. Eyes: Negative for visual disturbance. Respiratory: Negative for cough, shortness of breath and wheezing. Cardiovascular: Negative for chest pain and palpitations. Gastrointestinal: Positive for abdominal pain and nausea. Negative for anorexia, constipation, diarrhea, flatus, hematochezia, melena and vomiting. Genitourinary: Negative for difficulty urinating, dysuria, frequency and hematuria. Musculoskeletal: Negative for arthralgias, gait problem, myalgias, neck pain and neck stiffness. Skin: Negative for rash. Neurological: Negative for dizziness, syncope, light-headedness and headaches.        Physical Exam:   Vitals:  /78 (Site: Left Upper Arm, Position: Sitting, Cuff Size: Large Adult)   Pulse 79   Temp 97.8 °F (36.6 °C) (Temporal)   Resp 18   Ht 5' 3\" (1.6 m)   Wt 237 lb 9.6 oz (107.8 kg)   LMP 01/12/2021   SpO2 99%   BMI 42.09 kg/m²     Physical Exam  Vitals signs and nursing note reviewed. Constitutional:       General: She is not in acute distress. Appearance: Normal appearance. She is obese. She is not ill-appearing. HENT:      Mouth/Throat:      Mouth: Mucous membranes are moist.   Eyes:      General: No scleral icterus. Conjunctiva/sclera: Conjunctivae normal.   Neck:      Musculoskeletal: Normal range of motion and neck supple. Cardiovascular:      Rate and Rhythm: Normal rate and regular rhythm. Heart sounds: No murmur. Pulmonary:      Effort: Pulmonary effort is normal.      Breath sounds: Normal breath sounds. No wheezing. Abdominal:      General: Bowel sounds are normal. There is no distension. Palpations: Abdomen is soft. There is no mass. Tenderness: There is abdominal tenderness. There is no right CVA tenderness, left CVA tenderness, guarding or rebound. Hernia: No hernia is present. Comments: Morbidly obese abdomen   Musculoskeletal:      Right lower leg: No edema. Left lower leg: No edema. Lymphadenopathy:      Cervical: No cervical adenopathy. Skin:     General: Skin is warm and dry. Findings: No rash. Neurological:      Mental Status: She is alert and oriented to person, place, and time.    Psychiatric:         Mood and Affect: Mood normal.         Behavior: Behavior normal.         Data:     Lab Results   Component Value Date     01/26/2021    K 4.0 01/26/2021     01/26/2021    CO2 23 01/26/2021    BUN 18 01/26/2021    CREATININE 0.65 01/26/2021    GLUCOSE 88 01/26/2021    PROT 7.8 01/26/2021    LABALBU 4.6 01/26/2021    BILITOT 0.22 01/26/2021    ALKPHOS 70 01/26/2021    AST 16 01/26/2021    ALT 20 01/26/2021     Lab Results   Component Value Date    WBC 10.9 01/26/2021    RBC 4.94 01/26/2021    HGB 14.3 01/26/2021    HCT 43.1 01/26/2021    MCV 87.2 01/26/2021    MCH 28.9 01/26/2021    MCHC 33.2 01/26/2021    RDW 12.6 01/26/2021     01/26/2021    MPV 9.6 01/26/2021     Lab Results   Component Value Date    TSH 1.44 12/30/2019     Lab Results   Component Value Date    CHOL 214 12/30/2019    HDL 38 12/30/2019    LABA1C 5.4 12/30/2019       Assessment/Plan:      Diagnosis Orders   1. Chronic RUQ pain  NM HEPATOBILIARY SCAN W EJECTION FRACTION    Noelle Tee MD, General Surgery, [place]    Hyoscyamine Sulfate SL (LEVSIN/SL) 0.125 MG SUBL   2. Gallbladder sludge  NM HEPATOBILIARY [address] Tee MD, General Surgery, [place]   3. Lesion of uterus     4. Schizoaffective disorder, bipolar type ([facility] [address]       We will order HIDA scan for suspected dysfunction. Referral to Dr. Aparna Shell in for evaluation. Most likely patient would benefit from cholecystectomy if continued and chronic pain. Levsin as needed for abdominal discomfort. Continue follow-up with GYN as planned. 1.  Rachael received counseling on the following healthy behaviors: nutrition, exercise and medication adherence  2. Patient given educational materials - see patient instructions  3. Was a self-tracking handout given in paper form or via [employer]? No  If yes, see orders or list here. 4.  Discussed use, benefit, and side effects of prescribed medications. Barriers to medication compliance addressed. All patient questions answered. Pt voiced understanding. 5.  Reviewed prior labs and health maintenance  6. Continue current medications, diet and exercise. Completed Refills   Requested Prescriptions     Signed Prescriptions Disp Refills    Hyoscyamine Sulfate SL (LEVSIN/SL) 0.125 MG SUBL 60 each 0     Sig: Place 1 tablet under the tongue every 4 hours as needed (spasm/pain)         Return if symptoms worsen or fail to improve.

## 2021-02-01 ENCOUNTER — OFFICE VISIT (OUTPATIENT)
Dept: OBGYN | Age: 31
End: 2021-02-01
Payer: MEDICARE

## 2021-02-01 VITALS
DIASTOLIC BLOOD PRESSURE: 78 MMHG | WEIGHT: 240 LBS | BODY MASS INDEX: 42.52 KG/M2 | HEIGHT: 63 IN | SYSTOLIC BLOOD PRESSURE: 118 MMHG

## 2021-02-01 DIAGNOSIS — D25.9 UTERINE LEIOMYOMA, UNSPECIFIED LOCATION: Primary | ICD-10-CM

## 2021-02-01 PROCEDURE — G8427 DOCREV CUR MEDS BY ELIG CLIN: HCPCS | Performed by: OBSTETRICS & GYNECOLOGY

## 2021-02-01 PROCEDURE — G8417 CALC BMI ABV UP PARAM F/U: HCPCS | Performed by: OBSTETRICS & GYNECOLOGY

## 2021-02-01 PROCEDURE — 99213 OFFICE O/P EST LOW 20 MIN: CPT | Performed by: OBSTETRICS & GYNECOLOGY

## 2021-02-01 PROCEDURE — G8484 FLU IMMUNIZE NO ADMIN: HCPCS | Performed by: OBSTETRICS & GYNECOLOGY

## 2021-02-01 PROCEDURE — 1036F TOBACCO NON-USER: CPT | Performed by: OBSTETRICS & GYNECOLOGY

## 2021-02-01 NOTE — PROGRESS NOTES
Rfl: 0    montelukast (SINGULAIR) 10 MG tablet, Take 1 tablet by mouth daily, Disp: 90 tablet, Rfl: 0    Social History     Substance and Sexual Activity   Sexual Activity Not on file       Last Yearly:  7/8/20    Last pap: 7/8/20    [facility]   Patient presents with    Follow-up     pt presents for f/u from ER, CT showed possible fibroid. She is worried because she has a history of HPV          NURSE: lilliam    PE:  Vital Signs  Blood pressure 118/78, height 5' 3\" (1.6 m), weight 240 lb (108.9 kg), last menstrual period 01/12/2021, not currently breastfeeding. Labs:    No results found for this visit on 02/01/21. NURSE: babatunde    HPI: Patient is here as she was seen in the emergency room for right upper quadrant pain. A CT scan showed a new lesion in the uterus 3 to 4 cm in size that may be a fibroid. On questioning today, the patient's right upper quadrant pain is still present but less and she is having no pelvic pain. She has also been attempting to conceive without any success and her friend would like to have a semen analysis done    No  PT denies fever, chills, nausea and vomiting       Objective: CT scan showed 3 to 4 cm new lesion in the uterus. Rest of the work-up was essentially negative. Assessment and Plan: Patient is going to be evaluated by general surgery to determine if she is going to have a cholecystectomy. Will order a semen analysis for her significant other Nestor De Guzman. Will also obtain ultrasound of the pelvis to look at this lesion more carefully. Diagnosis Orders   1. Uterine leiomyoma, unspecified location               No follow-ups on file. FF: 20 minutes    There are no Patient Instructions on file for this visit.     Over 75%of time spent on counseling and care coordination on: see assessment and plan,  She was also counseled on her preventative health maintenance recommendations and follow-up.       Ana Paez 3:02 PM

## 2021-02-03 DIAGNOSIS — J45.990 EXERCISE-INDUCED ASTHMA: ICD-10-CM

## 2021-02-04 ENCOUNTER — TELEPHONE (OUTPATIENT)
Dept: PRIMARY CARE CLINIC | Age: 31
End: 2021-02-04

## 2021-02-04 ENCOUNTER — HOSPITAL ENCOUNTER (OUTPATIENT)
Dept: NUCLEAR MEDICINE | Age: 31
Discharge: HOME | End: 2021-02-06
Payer: MEDICARE

## 2021-02-04 VITALS — BODY MASS INDEX: 42.51 KG/M2 | WEIGHT: 240 LBS

## 2021-02-04 DIAGNOSIS — G89.29 CHRONIC RUQ PAIN: ICD-10-CM

## 2021-02-04 DIAGNOSIS — K82.8 GALLBLADDER SLUDGE: ICD-10-CM

## 2021-02-04 DIAGNOSIS — R10.11 CHRONIC RUQ PAIN: ICD-10-CM

## 2021-02-04 PROCEDURE — 6360000002 HC RX W HCPCS: Performed by: NURSE PRACTITIONER

## 2021-02-04 PROCEDURE — 3430000000 HC RX DIAGNOSTIC RADIOPHARMACEUTICAL: Performed by: NURSE PRACTITIONER

## 2021-02-04 PROCEDURE — 78227 HEPATOBIL SYST IMAGE W/DRUG: CPT

## 2021-02-04 PROCEDURE — 2580000003 HC RX 258: Performed by: NURSE PRACTITIONER

## 2021-02-04 PROCEDURE — A9537 TC99M MEBROFENIN: HCPCS | Performed by: NURSE PRACTITIONER

## 2021-02-04 RX ORDER — MONTELUKAST SODIUM 10 MG/1
10 TABLET ORAL DAILY
Qty: 90 TABLET | Refills: 1 | Status: SHIPPED | OUTPATIENT
Start: 2021-02-04 | End: 2021-09-09

## 2021-02-04 RX ADMIN — SODIUM CHLORIDE 2.18 MCG: 9 INJECTION, SOLUTION INTRAVENOUS at 11:32

## 2021-02-04 RX ADMIN — Medication 5 MILLICURIE: at 10:20

## 2021-02-04 NOTE — TELEPHONE ENCOUNTER
Pending medication.      Health Maintenance   Topic Date Due    Varicella vaccine (1 of 2 - 2-dose childhood series) 09/24/2021 (Originally 6/15/1991)    Flu vaccine (1) 01/28/2022 (Originally 9/1/2020)    Annual Wellness Visit (AWV)  09/25/2021    Cervical cancer screen  07/08/2023    DTaP/Tdap/Td vaccine (8 - Td) 06/25/2024    Hepatitis A vaccine  Completed    Hepatitis B vaccine  Completed    Hib vaccine  Completed    Meningococcal (ACWY) vaccine  Completed    Pneumococcal 0-64 years Vaccine  Completed    Hepatitis C screen  Completed    HIV screen  Completed             (applicable per patient's age: Cancer Screenings, Depression Screening, Fall Risk Screening, Immunizations)    Hemoglobin A1C (%)   Date Value   12/30/2019 5.4   02/19/2019 5.7     LDL Cholesterol (mg/dL)   Date Value   12/30/2019 133 (H)     AST (U/L)   Date Value   01/26/2021 16     ALT (U/L)   Date Value   01/26/2021 20     BUN (mg/dL)   Date Value   01/26/2021 18      (goal A1C is < 7)   (goal LDL is <100) need 30-50% reduction from baseline     BP Readings from Last 3 Encounters:   02/01/21 118/78   01/28/21 124/78   01/26/21 (!) 144/74    (goal /80)      All Future Testing planned in [facility]:  Lab Frequency Next Occurrence   NM HEPATOBILIARY SCAN W EJECTION FRACTION Once 01/28/2021       Next Visit Date:  Future Appointments   Date Time Provider Kristopher Childsisti   2/4/2021 10:15 AM [facility] NUCLEAR ROOM [facility] NUC MED [facility] Rad   2/23/2021  2:45 PM Sierra Damico MD Tiff surg [facility]   2/24/2021  1:00 PM [facility] TIFF OB/GYN ULTRASOUND TIFF OB/GYN [facility]   2/24/2021  1:30 PM Génesis Fermin MD TIFF OB/GYN [facility]   9/30/2021 11:00 AM Rj De Paz APRN - CNP [facility]            Patient Active Problem List:     Severe bipolar I disorder, current or most recent episode depressed ([address]     Other seasonal allergic rhinitis     Suicidal ideation     Depression with suicidal ideation     Exercise-induced asthma     Heartburn Dysphagia     Major depressive disorder, recurrent (HCC)     Schizoaffective disorder, bipolar type ([facility]     Class 2 obesity without serious comorbidity with body mass index (BMI) of 39.0 to 39.9 in adult     Eosinophilic esophagitis

## 2021-02-04 NOTE — TELEPHONE ENCOUNTER
----- Message from [address] [place] [address], OSKAR - CNP sent at 2/4/2021  1:34 PM EST -----  Results are normal, please call patient and make them aware.  Continue follow up with OB/GYN

## 2021-02-23 RX ORDER — ALBUTEROL SULFATE 90 UG/1
1-2 AEROSOL, METERED RESPIRATORY (INHALATION) EVERY 4 HOURS PRN
Qty: 1 INHALER | Refills: 0 | Status: SHIPPED | OUTPATIENT
Start: 2021-02-23 | End: 2021-09-09

## 2021-02-23 NOTE — TELEPHONE ENCOUNTER
Patient uses inhaler as needed for slight asthma symptoms.                   Health Maintenance   Topic Date Due    Varicella vaccine (1 of 2 - 2-dose childhood series) 09/24/2021 (Originally 6/15/1991)    Flu vaccine (1) 01/28/2022 (Originally 9/1/2020)    Annual Wellness Visit (AWV)  09/25/2021    Cervical cancer screen  07/08/2023    DTaP/Tdap/Td vaccine (8 - Td) 06/25/2024    Hepatitis A vaccine  Completed    Hepatitis B vaccine  Completed    Hib vaccine  Completed    Meningococcal (ACWY) vaccine  Completed    Pneumococcal 0-64 years Vaccine  Completed    Hepatitis C screen  Completed    HIV screen  Completed             (applicable per patient's age: Cancer Screenings, Depression Screening, Fall Risk Screening, Immunizations)    Hemoglobin A1C (%)   Date Value   12/30/2019 5.4   02/19/2019 5.7     LDL Cholesterol (mg/dL)   Date Value   12/30/2019 133 (H)     AST (U/L)   Date Value   01/26/2021 16     ALT (U/L)   Date Value   01/26/2021 20     BUN (mg/dL)   Date Value   01/26/2021 18      (goal A1C is < 7)   (goal LDL is <100) need 30-50% reduction from baseline     BP Readings from Last 3 Encounters:   02/01/21 118/78   01/28/21 124/78   01/26/21 (!) 144/74    (goal /80)      All Future Testing planned in [facility]:  Lab Frequency Next Occurrence   US Non OB Transvaginal Once 02/18/2021       Next Visit Date:  Future Appointments   Date Time Provider Kristopher Bennett   2/24/2021  1:00 PM [facility] TIFF OB/GYN ULTRASOUND TIFF OB/GYN [facility]   2/24/2021  1:30 PM Cris Dubose MD TIFF OB/GYN [facility]   9/30/2021 11:00 AM OSKAR Grijalva - CNP [facility]            Patient Active Problem List:     Severe bipolar I disorder, current or most recent episode depressed ([address]     Other seasonal allergic rhinitis     Suicidal ideation     Depression with suicidal ideation     Exercise-induced asthma     Heartburn     Dysphagia     Major depressive disorder, recurrent ([address]     Schizoaffective disorder, bipolar type ([facility]     Class 2 obesity without serious comorbidity with body mass index (BMI) of 39.0 to 39.9 in adult     Eosinophilic esophagitis

## 2021-02-24 ENCOUNTER — OFFICE VISIT (OUTPATIENT)
Dept: OBGYN | Age: 31
End: 2021-02-24
Payer: MEDICARE

## 2021-02-24 VITALS — BODY MASS INDEX: 41.98 KG/M2 | DIASTOLIC BLOOD PRESSURE: 62 MMHG | SYSTOLIC BLOOD PRESSURE: 112 MMHG | WEIGHT: 237 LBS

## 2021-02-24 DIAGNOSIS — D25.1 INTRAMURAL LEIOMYOMA OF UTERUS: Primary | ICD-10-CM

## 2021-02-24 PROCEDURE — 1036F TOBACCO NON-USER: CPT | Performed by: OBSTETRICS & GYNECOLOGY

## 2021-02-24 PROCEDURE — 99213 OFFICE O/P EST LOW 20 MIN: CPT | Performed by: OBSTETRICS & GYNECOLOGY

## 2021-02-24 PROCEDURE — G8427 DOCREV CUR MEDS BY ELIG CLIN: HCPCS | Performed by: OBSTETRICS & GYNECOLOGY

## 2021-02-24 PROCEDURE — G8417 CALC BMI ABV UP PARAM F/U: HCPCS | Performed by: OBSTETRICS & GYNECOLOGY

## 2021-02-24 PROCEDURE — G8484 FLU IMMUNIZE NO ADMIN: HCPCS | Performed by: OBSTETRICS & GYNECOLOGY

## 2021-04-01 ENCOUNTER — OFFICE VISIT (OUTPATIENT)
Dept: PRIMARY CARE CLINIC | Age: 31
End: 2021-04-01
Payer: MEDICARE

## 2021-04-01 VITALS
RESPIRATION RATE: 18 BRPM | HEART RATE: 112 BPM | BODY MASS INDEX: 40.75 KG/M2 | TEMPERATURE: 98.2 F | HEIGHT: 63 IN | WEIGHT: 230 LBS | DIASTOLIC BLOOD PRESSURE: 73 MMHG | OXYGEN SATURATION: 98 % | SYSTOLIC BLOOD PRESSURE: 128 MMHG

## 2021-04-01 DIAGNOSIS — J30.9 ACUTE ALLERGIC RHINITIS: Primary | ICD-10-CM

## 2021-04-01 PROCEDURE — 1036F TOBACCO NON-USER: CPT | Performed by: NURSE PRACTITIONER

## 2021-04-01 PROCEDURE — G8427 DOCREV CUR MEDS BY ELIG CLIN: HCPCS | Performed by: NURSE PRACTITIONER

## 2021-04-01 PROCEDURE — 99213 OFFICE O/P EST LOW 20 MIN: CPT | Performed by: NURSE PRACTITIONER

## 2021-04-01 PROCEDURE — G8417 CALC BMI ABV UP PARAM F/U: HCPCS | Performed by: NURSE PRACTITIONER

## 2021-04-01 RX ORDER — FLUTICASONE PROPIONATE 50 MCG
1 SPRAY, SUSPENSION (ML) NASAL DAILY
Qty: 1 BOTTLE | Refills: 0 | Status: SHIPPED | OUTPATIENT
Start: 2021-04-01 | End: 2021-09-09

## 2021-04-01 RX ORDER — CETIRIZINE HYDROCHLORIDE 1 MG/ML
10 SOLUTION ORAL DAILY
Qty: 300 ML | Refills: 0 | Status: SHIPPED | OUTPATIENT
Start: 2021-04-01 | End: 2021-09-09

## 2021-04-01 ASSESSMENT — ENCOUNTER SYMPTOMS
DIARRHEA: 0
WHEEZING: 0
RHINORRHEA: 1
COUGH: 1
SORE THROAT: 0
SINUS PAIN: 0
SINUS PRESSURE: 1
NAUSEA: 0
VOMITING: 0
SHORTNESS OF BREATH: 0

## 2021-04-01 NOTE — PATIENT INSTRUCTIONS
SURVEY:    You may be receiving a survey from [employer] regarding your visit today. Please complete the survey to enable us to provide the highest quality of care to you and your family. If you cannot score us a very good on any question, please call the office to discuss how we could have made your experience a very good one. Thank you.

## 2021-04-01 NOTE — PROGRESS NOTES
[address] [facility] WALK-IN CARE  [address] [facility] [address] [facility]  Dept: [phone]  Dept Fax: [phone]    Mirlande Elizalde is a 31 y.o. female who [facility] the [facility] today for her medical conditions/complaints as noted below. Mirlande Elizalde is c/o of Congestion (x 2 days. c/o cough, drainage, congestion)      HPI:     Stef David is here today for a walk in visit. URI   This is a new problem. Episode onset: x 3 days. The problem has been unchanged. There has been no fever. Associated symptoms include congestion, coughing, rhinorrhea and sneezing. Pertinent negatives include no diarrhea, dysuria, headaches, nausea, plugged ear sensation, sinus pain, sore throat, vomiting or wheezing. She has tried antihistamine for the symptoms. The treatment provided mild relief. Past Medical History:   Diagnosis Date    Asthma     Back pain     Chicken pox     Dysphagia     H/O echocardiogram 05/18/2018    EF 60%    History of Holter monitoring 04/27/2018    Baseline ECG shows sinus rhythm. Holter showed sinus tachy 61% of the time. Rare premature ventricular complexes were recorded. No episodes of superventricuarl or ventriuclar tacycardial was seen. Pt reported episodes of SOB and speedy heart with monitroing showing sinus tachy.      Lordosis     Pneumonia     PTSD (post-traumatic stress disorder)     Schizophrenic disorder (HCC)     Tachycardia, unspecified     UTI (urinary tract infection)         Current Outpatient Medications   Medication Sig Dispense Refill    fluticasone (FLONASE) 50 MCG/ACT nasal spray 1 spray by Each Nostril route daily 1 Bottle 0    cetirizine (ZYRTEC) 1 MG/ML SOLN syrup Take 10 mLs by mouth daily 300 mL 0    albuterol sulfate HFA (PROVENTIL HFA) 108 (90 Base) MCG/ACT inhaler Inhale 1-2 puffs into the lungs every 4 hours as needed for Wheezing or Shortness of Breath (Space out to every 6 hours as symptoms improve) Space out to every 6 hours as symptoms improve. 1 Inhaler 0    montelukast (SINGULAIR) 10 MG tablet Take 1 tablet by mouth daily 90 tablet 1    trihexyphenidyl (ARTANE) 5 MG tablet Take 5 mg by mouth 2 times daily      budesonide (PULMICORT) 0.5 MG/2ML nebulizer suspension FOR EOSINOPHILIC ESOPHAGITIS. MIX 4 RESPULES WITH 5 PACKS OF SPLENDA AND SWALLOW DAILY. DO NOT EAT OR DRINK FOR 30 MINUTES AFTER      Triheptanoin 100 % LIQD Take by mouth      traZODone (DESYREL) 100 MG tablet TAKE 1 TABLET BY MOUTH ONCE DAILY AT BEDTIME      traZODone (DESYREL) 50 MG tablet TAKE 1 TABLET BY MOUTH ONCE DAILY AT BEDTIME      Hyoscyamine Sulfate SL (LEVSIN/SL) 0.125 MG SUBL Place 1 tablet under the tongue every 4 hours as needed (spasm/pain) (Patient not taking: Reported on 4/1/2021) 60 each 0    hydrOXYzine (ATARAX) 25 MG tablet Take 25 mg by mouth 3 times daily as needed for Itching      ARIPiprazole (ABILIFY) 20 MG tablet Take 25 mg by mouth daily        No current facility-administered medications for this visit. Allergies   Allergen Reactions    Adhesive Tape Hives     Blisters and hives    Doxycycline      Tongue itch, throat began to swell    Pollen Extract Itching     Grass    Seroquel [Quetiapine Fumarate] Other (See Comments)     Paranoid made mood worse    Symbicort [Budesonide-Formoterol Fumarate] Other (See Comments)     Delusional, paranoid, hallucinations.  Topamax [Topiramate] Other (See Comments)     Numbness in legs       Subjective:      Review of Systems   Constitutional: Negative for activity change, appetite change and fever. HENT: Positive for congestion, postnasal drip, rhinorrhea, sinus pressure and sneezing. Negative for sinus pain and sore throat. Scratchy throat   Respiratory: Positive for cough. Negative for shortness of breath and wheezing. Gastrointestinal: Negative for diarrhea, nausea and vomiting. Genitourinary: Negative for difficulty urinating and dysuria. Allergic/Immunologic: Positive for environmental allergies. Neurological: Negative for dizziness and headaches. Objective:     Physical Exam  Vitals signs and nursing note reviewed. Constitutional:       General: She is not in acute distress. Appearance: Normal appearance. She is not ill-appearing, toxic-appearing or diaphoretic. HENT:      Head: Normocephalic and atraumatic. Right Ear: There is no impacted cerumen. Tympanic membrane is not erythematous or bulging. Left Ear: There is no impacted cerumen. Tympanic membrane is not erythematous or bulging. Nose: Mucosal edema, congestion and rhinorrhea (Clear) present. Right Turbinates: Pale. Not swollen. Left Turbinates: Not swollen or pale. Right Sinus: No maxillary sinus tenderness. Left Sinus: No maxillary sinus tenderness. Mouth/Throat:      Pharynx: No oropharyngeal exudate. Eyes:      General:         Right eye: No discharge. Left eye: No discharge. Conjunctiva/sclera: Conjunctivae normal.   Neck:      Musculoskeletal: Normal range of motion and neck supple. Cardiovascular:      Rate and Rhythm: Regular rhythm. Tachycardia present. Heart sounds: No murmur. Pulmonary:      Effort: Pulmonary effort is normal. No respiratory distress. Breath sounds: Normal breath sounds. No wheezing, rhonchi or rales. Lymphadenopathy:      Cervical: No cervical adenopathy. Neurological:      General: No focal deficit present. Mental Status: She is alert and oriented to person, place, and time. /73 (Site: Left Upper Arm, Position: Sitting, Cuff Size: Large Adult)   Pulse 112   Temp 98.2 °F (36.8 °C) (Temporal)   Resp 18   Ht 5' 3\" (1.6 m)   Wt 230 lb (104.3 kg)   LMP 03/02/2021 (Approximate)   SpO2 98%   BMI 40.74 kg/m²     Assessment:      Diagnosis Orders   1.  Acute allergic rhinitis  fluticasone (FLONASE) 50 MCG/ACT nasal spray    cetirizine (ZYRTEC) 1 MG/ML SOLN syrup       Plan:     · Start Zyrtec and Flonase. She reports a hard time with pills will send in liquid. · Avoid allergens if possible. · Practice meticulous handwashing   · Encouraged to increase fluids and rest  · Follow up with PCP as needed if symptoms worsen or do not improve.      Return if symptoms worsen or fail to improve. Orders Placed This Encounter   Medications    fluticasone (FLONASE) 50 MCG/ACT nasal spray     Si spray by Each Nostril route daily     Dispense:  1 Bottle     Refill:  0    cetirizine (ZYRTEC) 1 MG/ML SOLN syrup     Sig: Take 10 mLs by mouth daily     Dispense:  300 mL     Refill:  0          All patient questions answered. Pt voiced understanding.       Electronically signed by OSKAR Pandya CNP on 2021 at 10:05 AM

## 2021-07-30 ENCOUNTER — OFFICE VISIT (OUTPATIENT)
Dept: OBGYN | Age: 31
End: 2021-07-30
Payer: MEDICARE

## 2021-07-30 VITALS
DIASTOLIC BLOOD PRESSURE: 70 MMHG | HEIGHT: 63 IN | SYSTOLIC BLOOD PRESSURE: 110 MMHG | WEIGHT: 230 LBS | BODY MASS INDEX: 40.75 KG/M2

## 2021-07-30 DIAGNOSIS — D25.1 INTRAMURAL LEIOMYOMA OF UTERUS: Primary | ICD-10-CM

## 2021-07-30 PROCEDURE — 1036F TOBACCO NON-USER: CPT | Performed by: OBSTETRICS & GYNECOLOGY

## 2021-07-30 PROCEDURE — G8427 DOCREV CUR MEDS BY ELIG CLIN: HCPCS | Performed by: OBSTETRICS & GYNECOLOGY

## 2021-07-30 PROCEDURE — G8417 CALC BMI ABV UP PARAM F/U: HCPCS | Performed by: OBSTETRICS & GYNECOLOGY

## 2021-07-30 PROCEDURE — 99213 OFFICE O/P EST LOW 20 MIN: CPT | Performed by: OBSTETRICS & GYNECOLOGY

## 2021-07-30 RX ORDER — ARIPIPRAZOLE LAUROXIL 1064 MG/3.9ML
INJECTION, SUSPENSION, EXTENDED RELEASE INTRAMUSCULAR
COMMUNITY
Start: 2021-07-29 | End: 2021-12-14

## 2021-07-30 NOTE — PROGRESS NOTES
PROBLEM VISIT     Date of service: 2021    Rebecca Benjamin  Is a 36 y.o. single female    PT's PCP is: OSKAR Dixon CNP     : 1990                                             Subjective:       Patient's last menstrual period was 2021 (exact date). OB History   No obstetric history on file. Social History     Tobacco Use   Smoking Status Never Smoker   Smokeless Tobacco Never Used        Social History     Substance and Sexual Activity   Alcohol Use No    Comment: Rare       Social History     Substance and Sexual Activity   Sexual Activity Not on file       Allergies: Adhesive tape, Doxycycline, Pollen extract, Seroquel [quetiapine fumarate], Symbicort [budesonide-formoterol fumarate], Topamax [topiramate], and Gabapentin    Chief Complaint   Patient presents with   Neeru Me Results     pt presents for in house us results       Last Yearly:  20    Last pap: 20    Last HPV: never      NURSE: lilliam    PE:  Vital Signs  Blood pressure 110/70, height 5' 3\" (1.6 m), weight 230 lb (104.3 kg), last menstrual period 2021, not currently breastfeeding. Labs:    No results found for this visit on 21. NURSE: babatunde    HPI: Patient is here for ultrasound and follow-up visit. She has a known fibroid uterus. Her last. She states was extremely heavy and extremely uncomfortable. She still describes bleeding very heavily and having such severe cramps she could not move for a few days. She had been wanting to attempt to conceive. But at this time wishes to have the fibroid removed and is willing to wait up to a year to allow the uterus to heal    No  PT denies fever, chills, nausea and vomiting       Objective: Patient did have an ultrasound that revealed a 5 cm fibroid. This is on the lower posterior portion of the uterus. Assessment and Plan: With the location of this fibroid I did recommend a laparotomy with myomectomy.  I did review surgical risk

## 2021-08-04 DIAGNOSIS — Z01.818 PRE-OP TESTING: Primary | ICD-10-CM

## 2021-08-19 ENCOUNTER — TELEPHONE (OUTPATIENT)
Dept: OBGYN | Age: 31
End: 2021-08-19

## 2021-08-19 NOTE — TELEPHONE ENCOUNTER
Patient called to cancel her surgery for 2 reasons. 1 - she is moving and 2 - her insurance [employer] [facility] [employer]) is asking that she get a second opinion before they approve the procedure. She is going to see Dr. Teri Garcia in [facility] and will call us to reschedule if it is approved.

## 2021-09-09 ENCOUNTER — OFFICE VISIT (OUTPATIENT)
Dept: FAMILY MEDICINE CLINIC | Age: 31
End: 2021-09-09
Payer: MEDICARE

## 2021-09-09 VITALS
BODY MASS INDEX: 39.68 KG/M2 | RESPIRATION RATE: 16 BRPM | HEART RATE: 72 BPM | SYSTOLIC BLOOD PRESSURE: 106 MMHG | WEIGHT: 224 LBS | OXYGEN SATURATION: 98 % | DIASTOLIC BLOOD PRESSURE: 70 MMHG | TEMPERATURE: 97.9 F

## 2021-09-09 DIAGNOSIS — K20.0 EOSINOPHILIC ESOPHAGITIS: ICD-10-CM

## 2021-09-09 DIAGNOSIS — J45.990 EXERCISE-INDUCED ASTHMA: ICD-10-CM

## 2021-09-09 DIAGNOSIS — F33.2 SEVERE EPISODE OF RECURRENT MAJOR DEPRESSIVE DISORDER, WITHOUT PSYCHOTIC FEATURES (HCC): Primary | ICD-10-CM

## 2021-09-09 DIAGNOSIS — F31.4 SEVERE BIPOLAR I DISORDER, CURRENT OR MOST RECENT EPISODE DEPRESSED (HCC): ICD-10-CM

## 2021-09-09 DIAGNOSIS — R13.10 DYSPHAGIA, UNSPECIFIED TYPE: ICD-10-CM

## 2021-09-09 DIAGNOSIS — Z13.31 POSITIVE DEPRESSION SCREENING: ICD-10-CM

## 2021-09-09 DIAGNOSIS — Z23 FLU VACCINE NEED: ICD-10-CM

## 2021-09-09 DIAGNOSIS — Z76.89 ENCOUNTER TO ESTABLISH CARE: ICD-10-CM

## 2021-09-09 DIAGNOSIS — F25.0 SCHIZOAFFECTIVE DISORDER, BIPOLAR TYPE (HCC): ICD-10-CM

## 2021-09-09 PROCEDURE — G8417 CALC BMI ABV UP PARAM F/U: HCPCS | Performed by: NURSE PRACTITIONER

## 2021-09-09 PROCEDURE — G0008 ADMIN INFLUENZA VIRUS VAC: HCPCS | Performed by: NURSE PRACTITIONER

## 2021-09-09 PROCEDURE — G8431 POS CLIN DEPRES SCRN F/U DOC: HCPCS | Performed by: NURSE PRACTITIONER

## 2021-09-09 PROCEDURE — 90674 CCIIV4 VAC NO PRSV 0.5 ML IM: CPT | Performed by: NURSE PRACTITIONER

## 2021-09-09 PROCEDURE — 99214 OFFICE O/P EST MOD 30 MIN: CPT | Performed by: NURSE PRACTITIONER

## 2021-09-09 PROCEDURE — G8427 DOCREV CUR MEDS BY ELIG CLIN: HCPCS | Performed by: NURSE PRACTITIONER

## 2021-09-09 PROCEDURE — 1036F TOBACCO NON-USER: CPT | Performed by: NURSE PRACTITIONER

## 2021-09-09 RX ORDER — ALBUTEROL SULFATE 90 UG/1
1-2 AEROSOL, METERED RESPIRATORY (INHALATION) EVERY 4 HOURS PRN
Qty: 1 EACH | Refills: 3 | Status: SHIPPED | OUTPATIENT
Start: 2021-09-09

## 2021-09-09 SDOH — ECONOMIC STABILITY: FOOD INSECURITY: WITHIN THE PAST 12 MONTHS, YOU WORRIED THAT YOUR FOOD WOULD RUN OUT BEFORE YOU GOT MONEY TO BUY MORE.: NEVER TRUE

## 2021-09-09 SDOH — ECONOMIC STABILITY: FOOD INSECURITY: WITHIN THE PAST 12 MONTHS, THE FOOD YOU BOUGHT JUST DIDN'T LAST AND YOU DIDN'T HAVE MONEY TO GET MORE.: NEVER TRUE

## 2021-09-09 ASSESSMENT — PATIENT HEALTH QUESTIONNAIRE - PHQ9
SUM OF ALL RESPONSES TO PHQ QUESTIONS 1-9: 12
1. LITTLE INTEREST OR PLEASURE IN DOING THINGS: 3
SUM OF ALL RESPONSES TO PHQ QUESTIONS 1-9: 12
10. IF YOU CHECKED OFF ANY PROBLEMS, HOW DIFFICULT HAVE THESE PROBLEMS MADE IT FOR YOU TO DO YOUR WORK, TAKE CARE OF THINGS AT HOME, OR GET ALONG WITH OTHER PEOPLE: 0
8. MOVING OR SPEAKING SO SLOWLY THAT OTHER PEOPLE COULD HAVE NOTICED. OR THE OPPOSITE, BEING SO FIGETY OR RESTLESS THAT YOU HAVE BEEN MOVING AROUND A LOT MORE THAN USUAL: 0
9. THOUGHTS THAT YOU WOULD BE BETTER OFF DEAD, OR OF HURTING YOURSELF: 0
SUM OF ALL RESPONSES TO PHQ9 QUESTIONS 1 & 2: 5
6. FEELING BAD ABOUT YOURSELF - OR THAT YOU ARE A FAILURE OR HAVE LET YOURSELF OR YOUR FAMILY DOWN: 1
2. FEELING DOWN, DEPRESSED OR HOPELESS: 2
4. FEELING TIRED OR HAVING LITTLE ENERGY: 1
3. TROUBLE FALLING OR STAYING ASLEEP: 0
5. POOR APPETITE OR OVEREATING: 3
7. TROUBLE CONCENTRATING ON THINGS, SUCH AS READING THE NEWSPAPER OR WATCHING TELEVISION: 2
SUM OF ALL RESPONSES TO PHQ QUESTIONS 1-9: 12

## 2021-09-09 ASSESSMENT — ENCOUNTER SYMPTOMS
DIARRHEA: 0
CHEST TIGHTNESS: 0
SHORTNESS OF BREATH: 0
COUGH: 0
RHINORRHEA: 0
BACK PAIN: 0
NAUSEA: 0
SORE THROAT: 0
CONSTIPATION: 0
ABDOMINAL PAIN: 0
COLOR CHANGE: 0
ABDOMINAL DISTENTION: 0

## 2021-09-09 ASSESSMENT — COLUMBIA-SUICIDE SEVERITY RATING SCALE - C-SSRS
2. HAVE YOU ACTUALLY HAD ANY THOUGHTS OF KILLING YOURSELF?: NO
1. WITHIN THE PAST MONTH, HAVE YOU WISHED YOU WERE DEAD OR WISHED YOU COULD GO TO SLEEP AND NOT WAKE UP?: NO
6. HAVE YOU EVER DONE ANYTHING, STARTED TO DO ANYTHING, OR PREPARED TO DO ANYTHING TO END YOUR LIFE?: NO

## 2021-09-09 ASSESSMENT — SOCIAL DETERMINANTS OF HEALTH (SDOH): HOW HARD IS IT FOR YOU TO PAY FOR THE VERY BASICS LIKE FOOD, HOUSING, MEDICAL CARE, AND HEATING?: NOT HARD AT ALL

## 2021-09-09 NOTE — PROGRESS NOTES
Vaccine Information Sheet, \"Influenza - Inactivated\"  given to Ignacio Freitas, or parent/legal guardian of  Ignacio Freitas and verbalized understanding. Patient responses:    Have you ever had a reaction to a flu vaccine? Yes  Are you able to eat eggs without adverse effects? Yes  Do you have any current illness? No  Have you ever had Guillian [place] Syndrome? No    Flu vaccine given per order. Please see immunization tab.

## 2021-09-09 NOTE — PROGRESS NOTES
Sesar Cornell, APRN-CNP  [facility]  [id number] [address], [address]  [address] [address]  Dept: [phone]  Dept Fax: [phone]     Patient ID: Bill Malone is a 36 y.o. female. HPI    Bill Malone is a 36 y.o. female New patient who presents to the office today for a first visit and to establish a relationship with a new primary care provider. Previous PCP: Norma Laureano CNP last seen: 3-4 months ago  - just moved to the area [employer]) lives with Leigha Kaur (boyfriend) and is a huge support system     Today, the patient complains of headache that have been happening about a month ago and it is worse when she goes out in the sun.     - have schizoaffective disorder so unable to work and is on [employer]. She was seeing a psychiatrist at [email or web address]     - she was trying to get pregnant and found out that uterus is inverted and with the fibroid, she would need surgery for fibroids it was around 5cm and she was having really bad abdominal pain, she does have an appt to see an OB/GYN around here. - Eosinophilic esophagitis- has had EGD twice and was to go to [facility] and her insurance wont cover them. She is unable to swallow pills     - since starting the aristada injection she is extremely mohan and is thinking it is time to change.     - has bachelor degree in accounting and going to [employer] out of [employer] for her masters. Preventative care, female:  Last PAP: 1 year ago  Nicotine use: no  Alcohol use: occasionally   Drug use: no, she has medical marijuana card but has not tried or used it  Dental exam: [facility] for a tooth with hole in it becausee it broke after fixing a cavity. Eye exam: a year ago    Specialists:  Psychiatry  Ob/GYN   at [facility] in [facility]    Previous office notes, labs, imaging and hospital records were reviewed prior to and during encounter.     The patient's past medical, surgical, social, and family history as well as her current medications and allergies were reviewed as documented in today's encounter NICKY Garcia. Current Outpatient Medications on File Prior to Visit   Medication Sig Dispense Refill    ARISTADA 1064 MG/3.9ML PRSY injection       fluticasone (FLONASE) 50 MCG/ACT nasal spray 1 spray by Each Nostril route daily (Patient not taking: Reported on 7/30/2021) 1 Bottle 0    cetirizine (ZYRTEC) 1 MG/ML SOLN syrup Take 10 mLs by mouth daily (Patient not taking: Reported on 7/30/2021) 300 mL 0    albuterol sulfate HFA (PROVENTIL HFA) 108 (90 Base) MCG/ACT inhaler Inhale 1-2 puffs into the lungs every 4 hours as needed for Wheezing or Shortness of Breath (Space out to every 6 hours as symptoms improve) Space out to every 6 hours as symptoms improve. (Patient not taking: Reported on 7/30/2021) 1 Inhaler 0    montelukast (SINGULAIR) 10 MG tablet Take 1 tablet by mouth daily (Patient not taking: Reported on 7/30/2021) 90 tablet 1    Triheptanoin 100 % LIQD Take by mouth (Patient not taking: Reported on 7/30/2021)      traZODone (DESYREL) 100 MG tablet TAKE 1 TABLET BY MOUTH ONCE DAILY AT BEDTIME (Patient not taking: Reported on 7/30/2021)      traZODone (DESYREL) 50 MG tablet TAKE 1 TABLET BY MOUTH ONCE DAILY AT BEDTIME (Patient not taking: Reported on 7/30/2021)      Hyoscyamine Sulfate SL (LEVSIN/SL) 0.125 MG SUBL Place 1 tablet under the tongue every 4 hours as needed (spasm/pain) (Patient not taking: Reported on 4/1/2021) 60 each 0    hydrOXYzine (ATARAX) 25 MG tablet Take 25 mg by mouth 3 times daily as needed for Itching (Patient not taking: Reported on 7/30/2021)      trihexyphenidyl (ARTANE) 5 MG tablet Take 5 mg by mouth 2 times daily      ARIPiprazole (ABILIFY) 20 MG tablet Take 25 mg by mouth daily  (Patient not taking: Reported on 7/30/2021)      budesonide (PULMICORT) 0.5 MG/2ML nebulizer suspension FOR EOSINOPHILIC ESOPHAGITIS.  MIX 4 RESPULES WITH 5 PACKS OF SPLENDA AND SWALLOW DAILY. DO NOT EAT OR DRINK FOR 30 MINUTES AFTER (Patient not taking: Reported on 7/30/2021)       No current facility-administered medications on file prior to visit. Subjective:     Review of Systems   Constitutional: Negative for activity change, fatigue and fever. HENT: Negative for congestion, ear pain, rhinorrhea and sore throat. Respiratory: Negative for cough, chest tightness and shortness of breath. Cardiovascular: Negative for chest pain and palpitations. Gastrointestinal: Negative for abdominal distention, abdominal pain, constipation, diarrhea and nausea. Endocrine: Negative for polydipsia, polyphagia and polyuria. Genitourinary: Negative for difficulty urinating and dysuria. Musculoskeletal: Negative for arthralgias, back pain and myalgias. Skin: Negative for color change and rash. Neurological: Positive for dizziness (with headaches not all the time) and headaches (thinks due to eyes). Negative for weakness and light-headedness. Hematological: Negative for adenopathy. Psychiatric/Behavioral: Positive for dysphoric mood (stable). Negative for agitation, behavioral problems, sleep disturbance and suicidal ideas. The patient is nervous/anxious (stable). Vitals:    09/09/21 1306   BP: 106/70   Pulse: 72   Resp: 16   Temp: 97.9 °F (36.6 °C)   SpO2: 98%     Objective:     Physical Exam  Vitals reviewed. Constitutional:       General: She is not in acute distress. Appearance: Normal appearance. HENT:      Head: Normocephalic and atraumatic. Right Ear: External ear normal.      Left Ear: External ear normal.      Nose: Nose normal.      Mouth/Throat:      Mouth: Mucous membranes are moist.      Pharynx: No oropharyngeal exudate or posterior oropharyngeal erythema. Eyes:      Extraocular Movements: Extraocular movements intact.       Conjunctiva/sclera: Conjunctivae normal.      Pupils: Pupils are equal, round, and reactive to light. Cardiovascular:      Rate and Rhythm: Normal rate and regular rhythm. Pulses: Normal pulses. Heart sounds: Normal heart sounds. No murmur heard. Pulmonary:      Effort: Pulmonary effort is normal. No respiratory distress. Breath sounds: Normal breath sounds. No wheezing or rales. Abdominal:      General: Bowel sounds are normal. There is no distension. Palpations: Abdomen is soft. Tenderness: There is no abdominal tenderness. Musculoskeletal:         General: Normal range of motion. Cervical back: Normal range of motion. Right lower leg: No edema. Left lower leg: No edema. Lymphadenopathy:      Cervical: No cervical adenopathy. Skin:     General: Skin is warm and dry. Neurological:      General: No focal deficit present. Mental Status: She is alert and oriented to person, place, and time. Deep Tendon Reflexes: Reflexes normal.   Psychiatric:         Mood and Affect: Mood is anxious (stable) and depressed (stable). Speech: Speech normal.         Behavior: Behavior normal. Behavior is cooperative. Assessment:      Diagnosis Orders   1. Severe episode of recurrent major depressive disorder, without psychotic features [facility]   2. Flu vaccine need  INFLUENZA, MDCK QUADV, 2 YRS AND OLDER, IM, PF, PREFILL SYR OR SDV, 0.5ML (FLUCELVAX QUADV, PF)   3. Exercise-induced asthma  albuterol sulfate HFA (PROVENTIL HFA) 108 (90 Base) MCG/ACT inhaler   4. Eosinophilic esophagitis     5. Dysphagia, unspecified type     6. Severe bipolar I disorder, current or most recent episode depressed [facility]   7. Schizoaffective disorder, bipolar type [facility]   8. Encounter to establish care       Plan:     Flu vaccine need  - verbal consent given, pt received during office visit, pt tolerated well.    - INFLUENZA, MDCK QUADV, 2 YRS AND OLDER, IM, PF, PREFILL SYR OR SDV, 0.5ML (FLUCELVAX QUADV, PF)    Exercise-induced asthma  - Stable: Medication re-filled as needed, con't medications as prescribed, con't current tx plan  - Continue Albuterol, rescue inhaler, as previously prescribed. - Avoid triggers that may exacerbate symptoms. Eosinophilic esophagitis  - will need referral for GI dr in this area. She is going to check with insurance to see who is covered in the area and then let us know so proper referral can be placed. Dysphagia, unspecified type  - unable to swallow pills. - medication must be liquid, capsules that can be opened or pills that can be crushed. Taken with applesauce. Severe bipolar I disorder, current or most recent episode depressed ([address]  Severe episode of recurrent major depressive disorder, without psychotic features ([address]  Schizoaffective disorder, bipolar type ([address]  - Stable: Medication re-filled as needed, con't medications as prescribed, con't current tx plan  - continue aristada and artane as prescribed and discuss other treatment options. - will refer to [facility] for counseling. On the basis of positive PHQ-9 screening (PHQ-9 Total Score: 12), the following plan was implemented: additional evaluation and assessment performed, C-SSRS completed and referral to Behavioral health provided. Patient will follow-up in 2 week(s) with psychiatrist. appt scheduled for 9/21/2021.     - We did discuss the recommended preventative screening guidelines including routine dental and eye exams.      - Detailed education was provided on the patient's after visit summary.  - Will order above noted labs and discuss them at follow up visit. - Will cont to follow with  OB/GYN as instructed for routine PAP. - follow up in 1 month for AWV. - pt verbalized understanding plan of care. Medications, labs, diagnostic studies, consultations and follow-up as documented in this encounter.  Rest of systems unchanged, continue current treatments    On this date 9/9/2021 I have spent 45 minutes reviewing previous notes, test results and face to face with the patient discussing the diagnosis and importance of compliance with the treatment plan as well as documenting on the day of the visit. Ted Rios.  APRN-CNP

## 2021-09-09 NOTE — PATIENT INSTRUCTIONS
Health Maintenance Recommendations  Exercise   · I generally recommend that people of all ages try to get 150 minutes of physical activity per week and it doesnt matter how this totals up, in other words 30 minutes 5 days per week is as good as 50 minutes 3 days a week and so on. · The level of activity should be such that it is able to get your heart rate up to 100 or more, for example a brisk walk should achieve this rate. Dietary Recommendations  · In terms of diet, I generally recommend trying to eat a healthy well balanced diet full of fruits and vegetables. Avoid carbonated drinks and fruit juices and limit your alcohol use. · Avoid processed foods wherever possible (anything that comes in a can or a box) which can be achieved by sticking to the outside walls of the grocery store where generally you will find fresh fruits/vegetables, meats, dairy, and frozen foods. · Try to avoid starches in the diet where possible and minimize bread, rice, potatoes, and pasta in the diet. Specifically try to avoid gluten, which even in people that dont have a emily allergy, causes havoc in the small intestine and alters absorption of nutrients which can in turn lead to obesity. Sleep  · Try to achieve a regular sleep schedule, waking and laying down at the same time each night. Most people need 7 hours per night plus or minus 2 hours. · You will know that youre getting enough because you will wake feeling refreshed and not need to sleep in to catch up on weekends. Skin Care  · Make sure that you dont neglect your skin. · Play it safe in the sun. Use a sunblock on all of your exposed skin. · The sunblock should be broad spectrum and water resistant. · I do recommend an SPF 30 or higher sun screen any time that you plan to be in the sun for more than 20 minutes, even in the winter or on cloudy days (keep in mind that UV light penetrates clouds and can cause burns even on cloudy days).    · Apply 20 to 30 minutes before going out in the sun. Reapply sunblock every 2 hours and after swimming or sweating. Carisa Martínezkell can also damage your skin on cool, windy days. Clouds and fog do not filter UV light. Make sure you reapply sunblock every 2 hours. · Avoid the sun in the middle of the day, between 10 AM to 4 PM. Your unprotected skin can be damaged in 15 minutes with direct sun exposure. Personal Health  · If you smoke, STOP. There are many resources available to help you successfully quit. · If you are sexually active, always practice safe sex and wear a condom. · See a dentist every 6 months. · See an eye doctor regularly. · Always wear a seat belt while in car. · Get a flu vaccine annually. · Get a tetanus booster vaccine every 10 years. Psychosocial Health  · Finally, make sure that you always have something to look forward to whether this is a vacation, a special event, or just a weekend off work. Having something to look forward to helps to maintain positive focus and is good for mental health.

## 2021-09-21 ENCOUNTER — OFFICE VISIT (OUTPATIENT)
Dept: BEHAVIORAL/MENTAL HEALTH CLINIC | Age: 31
End: 2021-09-21
Payer: MEDICARE

## 2021-09-21 DIAGNOSIS — F43.23 ADJUSTMENT DISORDER WITH MIXED ANXIETY AND DEPRESSED MOOD: Primary | ICD-10-CM

## 2021-09-21 PROCEDURE — 90791 PSYCH DIAGNOSTIC EVALUATION: CPT | Performed by: PSYCHOLOGIST

## 2021-09-21 NOTE — PROGRESS NOTES
ADULT BEHAVIORAL HEALTH ASSESSMENT  MARCIO Lew M.A. Psychology Doctoral Trainee    Supervising Clinical Psychologists:  Chong Gupta, Ph.D. Darby Santos      Visit Date: 9/21/2021   Time of appointment:  11:00a to 11:55a   Time spent with Patient: 55 minutes. This is patient's first appointment. Reason for Consult:  Depression and Anxiety     Referring Provider/PCP:    No ref. provider found  OSKAR Donahue - CNP      Pt provided informed consent for the behavioral health program. Discussed with patient model of service to include the limits of confidentiality (i.e. abuse reporting, suicide intervention, etc.) and short-term intervention focused approach. Also discussed with patient that the service provider is a supervised clinician and in particular, is being supervised by Dr. Josefina Juárez and/or Dr. Charleen Griffiths. Pt indicated understanding. Pt also signed the consent form agreeing to be seen by a supervised clinician. PRESENTING PROBLEM AND HISTORY  Chandrika Pickett is a 36 y.o. female who presents for new evaluation and treatment of anxiety, depression. She has the following symptoms: depressed mood, anhedonia and [others not assessed, patient reported primary need was securing ongoing medication management since moving. She indicated increased depression and anxiety as a result of this lapse in medication. ]. Onset of symptoms was approximately a few weeks ago. Symptoms have been gradually worsening since that time. She reported some suicidal ideation concurrent with medication lapse, but denied intent or plan to act on these thoughts. Family history significant for [not assessed]. Risk factors: previous episode of depression. Previous treatment includes Aristada injection. She complains of the following medication side effects: eye pain/dysfunction for which she must take trihexyphenidyl. MENTAL STATUS EXAM  Mood was anxious with congruent affect.    Suicidal ideation was reported but passive and without intent. Per patient, this symptoms is directly related to medication lapse. Homicidal ideation was denied. Hygiene was good . Dress was appropriate. Behavior was Restless & fidgety with No observation or self-report of difficulties ambulating. Attitude was Cooperative, [place] and Friendly. Eye-contact was good. Speech: rate - WNL, rhythm -  WNL, volume - WNL  Verbalizations were goal directed and coherent. Thought processes were intact and goal-oriented without evidence of delusions, hallucinations, obsessions, or richard; with no cognitive distortions. Associations were characterized by intact cognitive processes. Pt was oriented to person, place, time, and general circumstances;  recent:  good. Insight and judgment were estimated to be good, AEB, a [not assessed] understanding of cyclical maladaptive patterns, and the ability to use insight to inform behavior change. CURRENT MEDICATIONS    Current Outpatient Medications:     albuterol sulfate HFA (PROVENTIL HFA) 108 (90 Base) MCG/ACT inhaler, Inhale 1-2 puffs into the lungs every 4 hours as needed for Wheezing or Shortness of Breath (Space out to every 6 hours as symptoms improve) Space out to every 6 hours as symptoms improve., Disp: 1 each, Rfl: 3    ARISTADA 1064 MG/3.9ML PRSY injection, , Disp: , Rfl:     trihexyphenidyl (ARTANE) 5 MG tablet, Take 5 mg by mouth daily , Disp: , Rfl:      FAMILY MEDICAL/MH HISTORY   Her family history includes Asthma in her mother; Cancer in her mother; Diabetes in her father; High Blood Pressure in her father. PATIENT MENTAL HEALTH HISTORY  Rachael reported a consistent history of medication, case management, and therapy for schizoaffective disorder and depression. PSYCHOSOCIAL HISTORY  Current living situation: Aleksandr Escobar lives with her boyfriend. They recently moved to [facility].     Work/Education: [not assessed]    Support system: Rachael indicated her boyfriend as primary social support. Bahai/Spirituality: [not assessed]      DRUG AND ALCOHOL CURRENT USE/HISTORY  TOBACCO:  She reports that she has never smoked. She has never used smokeless tobacco.  ALCOHOL:  She reports no history of alcohol use. OTHER SUBSTANCES: She reports no history of drug use. Annika Kramer presented to the appointment today for evaluation and treatment of symptoms of anxiety and depression pursuant to moving and latency in medication management. She indicated her primary need was medication evaluation to maintain her regimen since moving to the area. Per Rachael, current difficulty managing her symptoms is due to the gap in medication management. She will not benefit from brief and solution-focused consultation at this time. [facility] recommended and patient agreed that referral to [facility] was best fit for her needs, and patient worked with [facility] to secure an appointment with an [facility] agency that takes her insurance. Patient got scheduled for an assessment at [facility] 10/04 and agreed to call Carlos Alberto Steen with any questions or concerns in the interim. Patient declined interim appt with [facility]. PHQ Scores 9/15/2021 9/9/2021 9/14/2020 6/8/2020 9/27/2019 8/31/2016   PHQ2 Score 4 5 2 0 0 0   PHQ9 Score 13 12 2 0 0 0     Interpretation of Total Score Depression Severity: 1-4 = Minimal depression, 5-9 = Mild depression, 10-14 = Moderate depression, 15-19 = Moderately severe depression, 20-27 = Severe depression    How often pt has had thoughts of death or hurting self (if PHQ positive for depression):       No flowsheet data found. Interpretation of JERONIMO-7 score: 5-9 = mild anxiety, 10-14 = moderate anxiety, 15+ = severe anxiety. Recommend referral to behavioral health for scores 10 or greater. DIAGNOSIS  Rachael was seen today for depression and anxiety.     Diagnoses and all orders for this visit:    Adjustment disorder with mixed anxiety and depressed mood          INTERVENTION  Established rapport, Conducted functional assessment, [place]-setting to identify pt's primary goals for [facility] visit / overall health, Supportive techniques, Problem-solving re: referral sources, medication management [case management], Collaborative treatment planning,Clarified role of [facility] in primary care,Recommended that pt establish with a mental health clinician with whom they can meet regularly for psychotherapy services and Reviewed options for identifying appropriate providers      MARIKA  Rachael will engage medication and case management services through [address] [place] [address] beginning with her 10/04 appointment. She will reach out to [facility] coordinator with any questions or to schedule a supportive appointment until then. INTERACTIVE COMPLEXITY  Is interactive complexity present?   No  Reason:  N/A  Additional Supporting Information:  N/A       Electronically signed by Belen Urrutia on 0/45/92 at 9:00 AM EDT

## 2021-10-07 ENCOUNTER — VIRTUAL VISIT (OUTPATIENT)
Dept: FAMILY MEDICINE CLINIC | Age: 31
End: 2021-10-07
Payer: MEDICARE

## 2021-10-07 DIAGNOSIS — J06.9 VIRAL URI: Primary | ICD-10-CM

## 2021-10-07 PROCEDURE — G8417 CALC BMI ABV UP PARAM F/U: HCPCS | Performed by: NURSE PRACTITIONER

## 2021-10-07 PROCEDURE — G8482 FLU IMMUNIZE ORDER/ADMIN: HCPCS | Performed by: NURSE PRACTITIONER

## 2021-10-07 PROCEDURE — G8427 DOCREV CUR MEDS BY ELIG CLIN: HCPCS | Performed by: NURSE PRACTITIONER

## 2021-10-07 PROCEDURE — 1036F TOBACCO NON-USER: CPT | Performed by: NURSE PRACTITIONER

## 2021-10-07 PROCEDURE — 99213 OFFICE O/P EST LOW 20 MIN: CPT | Performed by: NURSE PRACTITIONER

## 2021-10-07 ASSESSMENT — ENCOUNTER SYMPTOMS
BACK PAIN: 0
DIARRHEA: 1
CHEST TIGHTNESS: 0
SHORTNESS OF BREATH: 0
RHINORRHEA: 0
CONSTIPATION: 0
COLOR CHANGE: 0
ABDOMINAL DISTENTION: 0
COUGH: 1
SORE THROAT: 1
ABDOMINAL PAIN: 0
NAUSEA: 0

## 2021-10-07 NOTE — PATIENT INSTRUCTIONS
To help relieve your symptoms, I suggest the following over-the-counter treatments: For fevers or pain: acetaminophen (Tylenol) or ibuprofen (Advil, Motrin) or naproxen (Aleve)    For dry cough: medications containing dextromethorphan, such as Delsym, Robitussin DM or Mucinex DM and medicated throat lozenges    For congestion or sinus pressure: medications containing guaifenesin to help break up mucus, such as Mucinex or Robitussin, medications containing pseudoephedrine or phenylephrine, such as Sudafed, nasal steroid sprays, such as Flonase, Sensimist, Rhinocort or Nasonex and saline nasal sprays, neti pot or sinus rinse bottle    For runny nose, sneezing or watery/itchy eyes: less sedating antihistamines, such as loratidine (Claritin), fexofenadine (Allegra) or Cetirizine (Zyrtec)    For a combination of cold/flu symptoms: Allegra-D, Claritin-D or Zyrtec-D and multi-symptom cold/flu products, such as Dayquil, Nyquil, Theraflu and [place] Plus     If you have high blood pressure, you should avoid medications containing pseudoephedrine or phenylephrine, such as Sudafed. Running a humidifier in your bedroom may be helpful for many of your symptoms. If your cough is keeping you awake at night, you can try raising your head with an extra pillow. If the skin around your nose and lips becomes sore, you can put some petroleum jelly on the area.

## 2021-10-07 NOTE — PROGRESS NOTES
Aleisha Roth, APRN-CNP  [facility]  [id number], [address]  [address] [facility] [address] [address]  Dept: [phone]  Dept Fax: [phone]     Patient ID: Lakshmi Huerta is a 36 y.o. female Established patient. HPI    Virtual visit today for diarrhea that started Thursday and then started having nasal congestion, cough, postnasal drip and sore throat, she was worried and did go to like a "[employer] to get checked and received the results that were this morning and they are negative for covid. She will send the results through my chart. go over labs and/or diagnostic studies, and medication refills. Pt denies any fever or chills. Pt today denies any HA, chest pain, or SOB. Pt denies any N/V/D/C or abdominal pain. Otherwise pt doing well on current tx and no other concerns today. The patient's past medical, surgical, social, and family history as well as his current medications and allergies were reviewed as documented in today's encounter by NICKY Parra. Previous office notes, labs, imaging and hospital records were reviewed prior to and during encounter. Current Outpatient Medications on File Prior to Visit   Medication Sig Dispense Refill    albuterol sulfate HFA (PROVENTIL HFA) 108 (90 Base) MCG/ACT inhaler Inhale 1-2 puffs into the lungs every 4 hours as needed for Wheezing or Shortness of Breath (Space out to every 6 hours as symptoms improve) Space out to every 6 hours as symptoms improve. 1 each 3    ARISTADA 1064 MG/3.9ML PRSY injection       trihexyphenidyl (ARTANE) 5 MG tablet Take 5 mg by mouth daily        No current facility-administered medications on file prior to visit. Subjective:     Review of Systems   Constitutional: Positive for fever (M, T 100.1). Negative for activity change and fatigue. HENT: Positive for congestion, postnasal drip and sore throat. Negative for ear pain and rhinorrhea. Respiratory: Positive for cough. Negative for chest tightness and shortness of breath. Cardiovascular: Negative for chest pain and palpitations. Gastrointestinal: Positive for diarrhea. Negative for abdominal distention, abdominal pain, constipation and nausea. Endocrine: Negative for polydipsia, polyphagia and polyuria. Genitourinary: Negative for difficulty urinating and dysuria. Musculoskeletal: Negative for arthralgias, back pain and myalgias. Skin: Negative for color change and rash. Neurological: Negative for dizziness, weakness, light-headedness and headaches. Hematological: Negative for adenopathy. Psychiatric/Behavioral: Negative for agitation and behavioral problems. The patient is not nervous/anxious. Allergies   Allergen Reactions    Adhesive Tape Hives     Blisters and hives    Doxycycline      Tongue itch, throat began to swell    Pollen Extract Itching     Grass    Seroquel [Quetiapine Fumarate] Other (See Comments)     Paranoid made mood worse    Symbicort [Budesonide-Formoterol Fumarate] Other (See Comments)     Delusional, paranoid, hallucinations.  Topamax [Topiramate] Other (See Comments)     Numbness in legs    Gabapentin Anxiety   ,   Past Medical History:   Diagnosis Date    Asthma     Back pain     Chicken pox     Dysphagia     H/O echocardiogram 05/18/2018    EF 60%    History of Holter monitoring 04/27/2018    Baseline ECG shows sinus rhythm. Holter showed sinus tachy 61% of the time. Rare premature ventricular complexes were recorded. No episodes of superventricuarl or ventriuclar tacycardial was seen. Pt reported episodes of SOB and speedy heart with monitroing showing sinus tachy.      Lordosis     Pneumonia     PTSD (post-traumatic stress disorder)     Schizophrenic disorder (HCC)     Tachycardia, unspecified     UTI (urinary tract infection)    ,   Past Surgical History:   Procedure Laterality Date    NECK SURGERY Left lesion biopsy    UPPER GASTROINTESTINAL ENDOSCOPY      chicken stuck in throat    UPPER GASTROINTESTINAL ENDOSCOPY N/A 1/14/2020    -bx(eosinophilic esophagitis,neg H-Pylori)    WISDOM TOOTH EXTRACTION     ,   Social History     Tobacco Use    Smoking status: Never Smoker    Smokeless tobacco: Never Used   Vaping Use    Vaping Use: Never used   Substance Use Topics    Alcohol use: No     Comment: Rare    Drug use: No   ,   Family History   Problem Relation Age of Onset    Asthma Mother     Cancer Mother     High Blood Pressure Father     Diabetes Father    ,   Immunization History   Administered Date(s) Administered    COVID-19, Moderna, PF, 100mcg/0.5mL 04/09/2021, 05/07/2021    DTP 1992, 1992, 03/19/1993, 01/25/1994    DTaP vaccine 07/31/1997    HPV (Human Papilloma Virus)Vaccine 08/05/2009, 11/16/2009, 05/17/2010    Hepatitis A Vaccine 08/05/2009, 05/17/2010    Hepatitis B Ped/Adol (Engerix-B, Recombivax HB) 07/31/1997, 08/28/1997, 02/12/1998    Hib vaccine 1992, 1992, 03/19/1993, 01/25/1994    Influenza 09/01/2013    Influenza A (Q4P7-05) Vaccine PF IM 11/04/2009    Influenza Virus Vaccine 10/28/2010, 09/01/2013, 10/18/2018    Influenza, MDCK Quadv, IM, PF (Flucelvax 2 yrs and older) 09/09/2021    Influenza, Marianne Skill, IM, (6 mo and older Fluzone, Flulaval, Fluarix and 3 yrs and older Afluria) 09/01/2013    Influenza, Quadv, IM, PF (6 mo and older Fluzone, Flulaval, Fluarix, and 3 yrs and older Afluria) 01/04/2017, 11/06/2017, 10/18/2018, 11/12/2019    MMR 01/25/1994, 06/17/2002    Meningococcal MCV4P (Menactra) 08/05/2009    Pneumococcal Polysaccharide ([id number]) 01/01/2009, 11/16/2009, 06/25/2014    Polio OPV 1992, 1992, 01/25/1994, 07/31/1997    Tdap (Boostrix, Adacel) 08/05/2009, 06/25/2014   ,   Health Maintenance   Topic Date Due    Varicella vaccine (1 of 2 - 2-dose childhood series) Never done   [employer] Visit (AWV)  09/25/2021    Pap smear  07/08/2023    DTaP/Tdap/Td vaccine (8 - Td or Tdap) 06/25/2024    Pneumococcal 0-64 years Vaccine (2 of 2 - PPSV23) 06/15/2057    Hepatitis A vaccine  Completed    Hepatitis B vaccine  Completed    Hib vaccine  Completed    Meningococcal (ACWY) vaccine  Completed    Flu vaccine  Completed    COVID-19 Vaccine  Completed    Hepatitis C screen  Completed    HIV screen  Completed     Objective:     Physical Exam  PHYSICAL EXAMINATION:  [ INSTRUCTIONS:  \"[x]\" Indicates a positive item  \"[]\" Indicates a negative item  -- DELETE ALL ITEMS NOT EXAMINED]  Vital Signs: Not completed due to virtual visit. Constitutional: [x] Appears well-developed and well-nourished [x] No apparent distress                            [] Abnormal-   Mental status  [x] Alert and awake  [x] Oriented to person/place/time [x]Able to follow commands       Eyes:  EOM    [x]  Normal  [] Abnormal-  Sclera  [x]  Normal  [] Abnormal -         Discharge [x]  None visible  [] Abnormal -     HENT:   [x] Normocephalic, atraumatic. [] Abnormal   [x] Mouth/Throat: Mucous membranes are moist.      External Ears [x] Normal  [] Abnormal-      Neck: [x] No visualized mass      Pulmonary/Chest: [x] Respiratory effort normal.  [x] No visualized signs of difficulty breathing or respiratory distress        [] Abnormal-      Neurological:        [x] No Facial Asymmetry (Cranial nerve 7 motor function) (limited exam to video visit)                       [x] No gaze palsy        [] Abnormal-         Skin:                     [x] No significant exanthematous lesions or discoloration noted on facial skin         [] Abnormal-                                  Psychiatric:           [x] Normal Affect [x] No Hallucinations        [] Abnormal-      Other pertinent observable physical exam findings- Patient appears generally well, is speaking full sentences clearly without any observable SOB, no cough, no diaphoresis.     Assessment:      Diagnosis Orders 1. Viral URI       Plan:     - rest and increase fluids. To help relieve your symptoms, I suggest the following over-the-counter treatments: For fevers or pain: acetaminophen (Tylenol) or ibuprofen (Advil, Motrin) or naproxen (Aleve)    For dry cough: medications containing dextromethorphan, such as Delsym, Robitussin DM or Mucinex DM and medicated throat lozenges    For congestion or sinus pressure: medications containing guaifenesin to help break up mucus, such as Mucinex or Robitussin, medications containing pseudoephedrine or phenylephrine, such as Sudafed, nasal steroid sprays, such as Flonase, Sensimist, Rhinocort or Nasonex and saline nasal sprays, neti pot or sinus rinse bottle    For runny nose, sneezing or watery/itchy eyes: less sedating antihistamines, such as loratidine (Claritin), fexofenadine (Allegra) or Cetirizine (Zyrtec)    For a combination of cold/flu symptoms: Allegra-D, Claritin-D or Zyrtec-D and multi-symptom cold/flu products, such as Dayquil, Nyquil, Theraflu and [place] Plus     - call or return to office is symptoms worsen or do not improve in 7 days.       - Rest of systems unchanged, continue current treatments. - Medications, labs, diagnostic studies, consultations and follow-up as documented in this encounter. Rest of systems unchanged, continue current treatments    Naomi Souza is a 36 y.o. female being evaluated by a Virtual Visit (video visit) encounter to address concerns as mentioned above. A caregiver was present when appropriate. Due to this being a TeleHealth encounter (During [facility] public health emergency), evaluation of the following organ systems was limited: Vitals/Constitutional/EENT/Resp/CV/GI//MS/Neuro/Skin/Heme-Lymph-Imm.   Pursuant to the emergency declaration under the [facility], [address] [place] [address] authority and the [employer] Act, this Virtual Visit was conducted with patient's (and/or legal guardian's) consent, to reduce the patient's risk of exposure to COVID-19 and provide necessary medical care. The patient (and/or legal guardian) has also been advised to contact this office for worsening conditions or problems, and seek emergency medical treatment and/or call 911 if deemed necessary. Patient identification was verified at the start of the visit: Yes    Total time spent for this encounter: Not billed by time    - pt verbalized understanding plan of care. Services were provided through a video synchronous discussion virtually to substitute for in-person clinic visit. Patient and provider were located at their individual homes. On this date 10/7/2021 I have spent 20 minutes reviewing previous notes, test results and face to face with the patient discussing the diagnosis and importance of compliance with the treatment plan as well as documenting on the day of the visit. --OSKAR Nicholas CNP on 10/7/2021 at 12:52 PM    An electronic signature was used to authenticate this note.     OSKAR Nicholas-CNP

## 2021-10-26 ENCOUNTER — OFFICE VISIT (OUTPATIENT)
Dept: OBGYN CLINIC | Age: 31
End: 2021-10-26
Payer: MEDICARE

## 2021-10-26 VITALS
DIASTOLIC BLOOD PRESSURE: 72 MMHG | HEIGHT: 63 IN | BODY MASS INDEX: 38.09 KG/M2 | SYSTOLIC BLOOD PRESSURE: 122 MMHG | WEIGHT: 215 LBS

## 2021-10-26 DIAGNOSIS — D25.9 UTERINE LEIOMYOMA, UNSPECIFIED LOCATION: Primary | ICD-10-CM

## 2021-10-26 DIAGNOSIS — N94.6 DYSMENORRHEA: ICD-10-CM

## 2021-10-26 DIAGNOSIS — N92.0 MENORRHAGIA WITH REGULAR CYCLE: ICD-10-CM

## 2021-10-26 PROCEDURE — 1036F TOBACCO NON-USER: CPT | Performed by: OBSTETRICS & GYNECOLOGY

## 2021-10-26 PROCEDURE — G8482 FLU IMMUNIZE ORDER/ADMIN: HCPCS | Performed by: OBSTETRICS & GYNECOLOGY

## 2021-10-26 PROCEDURE — 99213 OFFICE O/P EST LOW 20 MIN: CPT | Performed by: OBSTETRICS & GYNECOLOGY

## 2021-10-26 PROCEDURE — G8417 CALC BMI ABV UP PARAM F/U: HCPCS | Performed by: OBSTETRICS & GYNECOLOGY

## 2021-10-26 PROCEDURE — G8427 DOCREV CUR MEDS BY ELIG CLIN: HCPCS | Performed by: OBSTETRICS & GYNECOLOGY

## 2021-10-26 NOTE — PROGRESS NOTES
Naya Menjivar  10/26/2021    YOB: 1990    The patient was seen today. She is here regarding pelvic pain, dysmenorrhea, infertility, uterine fibroid. Her bowels are regular and she is voiding without difficulty. HPI:  Naya Menjivar is a 36 y.o. female      History of SAB x2. She has had worsening pelvic pain, dysmenorrhea, and vaginal bleeding since . She has had ultrasound and CT scan showing large 5cm anterior uterine fibroid. She would like to maintain her fertility and is interested in uterine sparing surgery. She states that she would like to attempt pregnancy as soon as it is safe with her partner Eddie Chou has had semen analysis that is normal.    Sharp acute pelvic cramping prior to her menses. She states that it improves slightly during menses and then it worsens again after her bleeding. She states that her periods are getting heavier and she will fill tampon in 1 hour for the first 1-2 days. She will bleed for 3-4 days. Hal Hola on 2021 10:18 AM    UTERUS:anteverted, heterogeneous echo pattern   Fibroid visualized mid uterus brittanie- 4.7cm x 4.9cm x 5.0cm       ENDO:8mm in thickness       RT. OVARY:seen, wnl       LT. OVARY:seen, wnl      EXAMINATION:   CT OF THE ABDOMEN AND PELVIS WITH CONTRAST 2021 6:41 pm       TECHNIQUE:   CT of the abdomen and pelvis was performed with the administration of   intravenous contrast. Multiplanar reformatted images are provided for review. Dose modulation, iterative reconstruction, and/or weight based adjustment of   the mA/kV was utilized to reduce the radiation dose to as low as reasonably   achievable.       COMPARISON:   None.       HISTORY:   ORDERING SYSTEM PROVIDED HISTORY: abd pain/RUQ   TECHNOLOGIST PROVIDED HISTORY:   abd pain/RUQ           FINDINGS:   Lower Chest: No acute abnormality within the visualized lung bases.       Organs:  The liver is diffusely hypoattenuating.  No acute Procedure Laterality Date    NECK SURGERY Left lesion biopsy    UPPER GASTROINTESTINAL ENDOSCOPY      chicken stuck in throat    UPPER GASTROINTESTINAL ENDOSCOPY N/A 1/14/2020    -bx(eosinophilic esophagitis,neg H-Pylori)   [place]  WISDOM TOOTH EXTRACTION         Family History   Problem Relation Age of Onset    Asthma Mother     Cancer Mother     High Blood Pressure Father     Diabetes Father        Social History     Socioeconomic History    Marital status: Single     Spouse name: Not on file    Number of children: 0    Years of education: 25    Highest education level: Bachelor's degree (e.g., BA, AB, BS)   Occupational History    Occupation: Disability   Tobacco Use    Smoking status: Never Smoker    Smokeless tobacco: Never Used   Vaping Use    Vaping Use: Never used   Substance and Sexual Activity    Alcohol use: No     Comment: Rare    Drug use: No    Sexual activity: Yes     Partners: Male   Other Topics Concern    Not on file   Social History Narrative    Not on file     Social Determinants of Health     Financial Resource Strain: Low Risk     Difficulty of Paying Living Expenses: Not hard at all   Food Insecurity: No Food Insecurity    Worried About Running Out of Food in the Last Year: Never true    Jayme of Food in the Last Year: Never true   Transportation Needs:     Lack of Transportation (Medical):      Lack of Transportation (Non-Medical):    Physical Activity:     Days of Exercise per Week:     Minutes of Exercise per Session:    Stress:     Feeling of Stress :    Social Connections:     Frequency of Communication with Friends and Family:     Frequency of Social Gatherings with Friends and Family:     Attends [employer] Services:     Active Member of Clubs or Organizations:     Attends Club or Organization Meetings:     Marital Status:    Intimate Partner Violence:     Fear of Current or Ex-Partner:     Emotionally Abused:     Physically Abused:     Sexually Abused:          MEDICATIONS:  Current Outpatient Medications   Medication Sig Dispense Refill    albuterol sulfate HFA (PROVENTIL HFA) 108 (90 Base) MCG/ACT inhaler Inhale 1-2 puffs into the lungs every 4 hours as needed for Wheezing or Shortness of Breath (Space out to every 6 hours as symptoms improve) Space out to every 6 hours as symptoms improve. 1 each 3    ARISTADA 1064 MG/3.9ML PRSY injection       trihexyphenidyl (ARTANE) 5 MG tablet Take 5 mg by mouth daily        No current facility-administered medications for this visit. ALLERGIES:  Allergies as of 10/26/2021 - Fully Reviewed 10/26/2021   Allergen Reaction Noted    Adhesive tape Hives 02/24/2021    Doxycycline  06/13/2013    Pollen extract Itching 04/23/2014    Seroquel [quetiapine fumarate] Other (See Comments) 05/04/2017    Symbicort [budesonide-formoterol fumarate] Other (See Comments) 08/13/2014    Topamax [topiramate] Other (See Comments) 08/31/2016    Gabapentin Anxiety 07/30/2021         REVIEW OF SYSTEMS:       A minimum of an eleven point review of systems was completed. Review Of Systems (11 point):  Constitutional: No fever, chills or malaise; No weight change or fatigue  Head and Eyes: No vision, Headache, Dizziness or trauma in last 12 months  ENT ROS: No hearing, Tinnitis, sinus or taste problems  Hematological and Lymphatic ROS:No Lymphoma, Von Willebrand's, Hemophillia or Bleeding History  Psych ROS: No Depression, Homicidal thoughts,suicidal thoughts, or anxiety  Breast ROS: No prior breast abnormalities or lumps  Respiratory ROS: No SOB, Pneumoniae,Cough, or Pulmonary Embolism History  Cardiovascular ROS: No Chest Pain with Exertion, Palpitations, Syncope, Edema, Arrhythmia  Gastrointestinal ROS: No Indigestion, Heartburn, Nausea, vomiting, Diarrhea, Constipation,or Bowel Changes; No Bloody Stools or melena  Genito-Urinary ROS: No Dysuria, Hematuria or Nocturia.  No Urinary Incontinence or Vaginal Discharge +dysmenorrhea, +HMB, +uterine fibroid  Musculoskeletal ROS: No Arthralgia, Arthritis,Gout,Osteoporosis or Rheumatism  Neurological ROS: No CVA, Migraines, Epilepsy, Seizure Hx, or Limb Weakness  Dermatological ROS: No Rash, Itching, Hives, Mole Changes or Cancer    Blood pressure 122/72, height 5' 3\" (1.6 m), weight 215 lb (97.5 kg), last menstrual period 10/19/2021, not currently breastfeeding. Abdomen: Soft non-tender; good bowel sounds. No guarding, rebound or rigidity. No CVA tenderness bilaterally. Extremities: No calf tenderness, DTR 2/4, and No edema bilaterally    Pelvic: Exam deferred. Diagnostics:  No results found. Lab Results:  Results for orders placed or performed during the hospital encounter of 01/26/21   Culture, Urine    Specimen: Urine, clean catch   Result Value Ref Range    Specimen Description . CLEAN CATCH URINE     Special Requests NOT REPORTED     Culture NO SIGNIFICANT GROWTH    Lipase   Result Value Ref Range    Lipase 31 13 - 60 U/L   CBC Auto Differential   Result Value Ref Range    WBC 10.9 3.5 - 11.3 k/uL    RBC 4.94 3.95 - 5.11 m/uL    Hemoglobin 14.3 11.9 - 15.1 g/dL    Hematocrit 43.1 36.3 - 47.1 %    MCV 87.2 82.6 - 102.9 fL    MCH 28.9 25.2 - 33.5 pg    MCHC 33.2 28.4 - 34.8 g/dL    RDW 12.6 11.8 - 14.4 %    Platelets 568 583 - 653 k/uL    MPV 9.6 8.1 - 13.5 fL    NRBC Automated 0.0 0.0 per 100 WBC    Differential Type NOT REPORTED     Seg Neutrophils 60 36 - 65 %    Lymphocytes 26 24 - 43 %    Monocytes 7 3 - 12 %    Eosinophils % 5 (H) 1 - 4 %    Basophils 1 0 - 2 %    Immature Granulocytes 1 (H) 0 %    Segs Absolute 6.61 1.50 - 8.10 k/uL    Absolute Lymph # 2.89 1.10 - 3.70 k/uL    Absolute Mono # 0.80 0.10 - 1.20 k/uL    Absolute Eos # 0.51 (H) 0.00 - 0.44 k/uL    Basophils Absolute 0.07 0.00 - 0.20 k/uL    Absolute Immature Granulocyte 0.06 0.00 - 0.30 k/uL    WBC Morphology NOT REPORTED     RBC Morphology NOT REPORTED     Platelet Estimate NOT REPORTED Comprehensive Metabolic Panel   Result Value Ref Range    Glucose 88 70 - 99 mg/dL    BUN 18 6 - 20 mg/dL    CREATININE 0.65 0.50 - 0.90 mg/dL    Bun/Cre Ratio 28 (H) 9 - 20    Calcium 9.6 8.6 - 10.4 mg/dL    Sodium 136 135 - 144 mmol/L    Potassium 4.0 3.7 - 5.3 mmol/L    Chloride 102 98 - 107 mmol/L    CO2 23 20 - 31 mmol/L    Anion Gap 11 9 - 17 mmol/L    Alkaline Phosphatase 70 35 - 104 U/L    ALT 20 5 - 33 U/L    AST 16 <32 U/L    Total Bilirubin 0.22 (L) 0.3 - 1.2 mg/dL    Total Protein 7.8 6.4 - 8.3 g/dL    Albumin 4.6 3.5 - 5.2 g/dL    Albumin/Globulin Ratio 1.4 1.0 - 2.5    GFR Non-African American >60 >60 mL/min    GFR African American >60 >60 mL/min    GFR Comment          GFR Staging         Lactic Acid, Plasma   Result Value Ref Range    Lactic Acid 0.7 0.5 - 2.2 mmol/L    Lactic Acid, Whole Blood NOT REPORTED 0.7 - 2.1 mmol/L   Pregnancy, Urine   Result Value Ref Range    HCG(Urine) Pregnancy Test NEGATIVE NEGATIVE   Urinalysis with Microscopic   Result Value Ref Range    Color, UA YELLOW YELLOW    Turbidity UA CLEAR CLEAR    Glucose, Ur NEGATIVE NEGATIVE    Bilirubin Urine NEGATIVE NEGATIVE    Ketones, Urine NEGATIVE NEGATIVE    Specific Gravity, UA 1.025 (H) 1.010 - 1.020    Urine Hgb NEGATIVE NEGATIVE    pH, UA 6.0 5.0 - 9.0    Protein, UA NEGATIVE NEGATIVE    Urobilinogen, Urine Normal Normal    Nitrite, Urine NEGATIVE NEGATIVE    Leukocyte Esterase, Urine NEGATIVE NEGATIVE    Urinalysis Comments NOT REPORTED     -          WBC, UA 0 TO 2 0 - 5 /HPF    RBC, UA None 0 - 2 /HPF    Casts UA NOT REPORTED /LPF    Crystals, UA NOT REPORTED None /HPF    Epithelial Cells UA 0 TO 2 0 - 25 /HPF    Renal Epithelial, UA NOT REPORTED 0 /HPF    Bacteria, UA NOT REPORTED None    Mucus, UA NOT REPORTED None    Trichomonas, UA NOT REPORTED None    Amorphous, UA NOT REPORTED None    Other Observations UA NOT REPORTED NOT REQ.     Yeast, UA NOT REPORTED None   Amylase   Result Value Ref Range    Amylase 77 28 - 100 U/L         Assessment:   Diagnosis Orders   1. Uterine leiomyoma, unspecified location  MRI PELVIS W WO CONTRAST   2. Dysmenorrhea  MRI PELVIS W WO CONTRAST   3. Menorrhagia with regular cycle  MRI PELVIS W WO CONTRAST     Patient Active Problem List    Diagnosis Date Noted    Class 2 obesity without serious comorbidity with body mass index (BMI) of 39.0 to 39.9 in adult 10/02/9510    Eosinophilic esophagitis 84/98/5498    Schizoaffective disorder, bipolar type ([address]     Major depressive disorder, recurrent ([address] 12/28/2019    Dysphagia 12/13/2019    Exercise-induced asthma 10/02/2019    Heartburn 10/02/2019    Suicidal ideation 04/14/2018    Depression with suicidal ideation 04/14/2018    Other seasonal allergic rhinitis 11/06/2017    Severe bipolar I disorder, current or most recent episode depressed ([facility] [address] 03/14/2015     Updating Deprecated Diagnoses         PLAN:  Return in about 6 weeks (around 12/7/2021) for post op. Lscope, FOE, KARI, Acessa fibroid ablation, Hscope, D&C, Myosure  Needs preop MRI and labs  Counseled in detail on all options including R/B/A of each. Given desire for pregnancy recommend surgical management of large fibroid. Caution with pregnancy after Acessa as there is inadequate data to support its safety. However, discussed that this may be less damaging to uterus and patient would like to proceed with Jeevan Jack. Repeat Annual every 1 year  Cervical Cytology Evaluation begins at 24years old. If Negative Cytology, Follow-up screening per current guidelines. Counseled on preventative health maintenance follow-up.   Orders Placed This Encounter   Procedures    MRI PELVIS W WO CONTRAST     Standing Status:   Future     Standing Expiration Date:   10/26/2022     Order Specific Question:   Reason for exam:     Answer:   preoperative exam for Acessa procedure           The patient, Peyton Carey is a 36 y.o. female, was seen with a total time spent of 30 minutes for the visit on this date of service by the E/M provider. The time component had both face to face and non face to face time spent in determining the total time component. Counseling and education regarding her diagnosis listed below and her options regarding those diagnoses were also included in determining her time component. Diagnosis Orders   1. Uterine leiomyoma, unspecified location  MRI PELVIS W WO CONTRAST   2. Dysmenorrhea  MRI PELVIS W WO CONTRAST   3. Menorrhagia with regular cycle  MRI PELVIS W WO CONTRAST        The patient had her preventative health maintenance recommendations and follow-up reviewed with her at the completion of her visit.

## 2021-11-04 ENCOUNTER — PREP FOR PROCEDURE (OUTPATIENT)
Dept: OBGYN CLINIC | Age: 31
End: 2021-11-04
Payer: MEDICARE

## 2021-11-04 DIAGNOSIS — Z01.818 PRE-OP TESTING: Primary | ICD-10-CM

## 2021-11-04 PROCEDURE — 36415 COLL VENOUS BLD VENIPUNCTURE: CPT | Performed by: OBSTETRICS & GYNECOLOGY

## 2021-11-08 ENCOUNTER — HOSPITAL ENCOUNTER (OUTPATIENT)
Dept: MRI IMAGING | Age: 31
Discharge: HOME | End: 2021-11-10
Payer: MEDICARE

## 2021-11-08 DIAGNOSIS — N94.6 DYSMENORRHEA: ICD-10-CM

## 2021-11-08 DIAGNOSIS — D25.9 UTERINE LEIOMYOMA, UNSPECIFIED LOCATION: ICD-10-CM

## 2021-11-08 DIAGNOSIS — N92.0 MENORRHAGIA WITH REGULAR CYCLE: ICD-10-CM

## 2021-11-08 PROCEDURE — 72197 MRI PELVIS W/O & W/DYE: CPT

## 2021-11-08 PROCEDURE — 6360000004 HC RX CONTRAST MEDICATION: Performed by: OBSTETRICS & GYNECOLOGY

## 2021-11-08 PROCEDURE — A9579 GAD-BASE MR CONTRAST NOS,1ML: HCPCS | Performed by: OBSTETRICS & GYNECOLOGY

## 2021-11-08 PROCEDURE — 2580000003 HC RX 258: Performed by: OBSTETRICS & GYNECOLOGY

## 2021-11-08 RX ORDER — SODIUM CHLORIDE 0.9 % (FLUSH) 0.9 %
10 SYRINGE (ML) INJECTION PRN
Status: DISCONTINUED | OUTPATIENT
Start: 2021-11-08 | End: 2021-11-11 | Stop reason: HOSPADM

## 2021-11-08 RX ORDER — 0.9 % SODIUM CHLORIDE 0.9 %
80 INTRAVENOUS SOLUTION INTRAVENOUS ONCE
Status: COMPLETED | OUTPATIENT
Start: 2021-11-08 | End: 2021-11-08

## 2021-11-08 RX ADMIN — SODIUM CHLORIDE, PRESERVATIVE FREE 10 ML: 5 INJECTION INTRAVENOUS at 14:53

## 2021-11-08 RX ADMIN — SODIUM CHLORIDE 80 ML: 9 INJECTION, SOLUTION INTRAVENOUS at 14:53

## 2021-11-08 RX ADMIN — GADOTERIDOL 20 ML: 279.3 INJECTION, SOLUTION INTRAVENOUS at 14:53

## 2021-11-09 ENCOUNTER — TELEPHONE (OUTPATIENT)
Dept: FAMILY MEDICINE CLINIC | Age: 31
End: 2021-11-09

## 2021-12-14 ENCOUNTER — OFFICE VISIT (OUTPATIENT)
Dept: FAMILY MEDICINE CLINIC | Age: 31
End: 2021-12-14
Payer: MEDICARE

## 2021-12-14 VITALS
TEMPERATURE: 97.6 F | OXYGEN SATURATION: 99 % | DIASTOLIC BLOOD PRESSURE: 74 MMHG | RESPIRATION RATE: 16 BRPM | BODY MASS INDEX: 38.97 KG/M2 | WEIGHT: 220 LBS | SYSTOLIC BLOOD PRESSURE: 120 MMHG | HEART RATE: 89 BPM

## 2021-12-14 DIAGNOSIS — Z00.00 PREVENTATIVE HEALTH CARE: ICD-10-CM

## 2021-12-14 DIAGNOSIS — M75.42 IMPINGEMENT SYNDROME OF LEFT SHOULDER: Primary | ICD-10-CM

## 2021-12-14 PROBLEM — N39.0 URINARY TRACT INFECTION: Status: ACTIVE | Noted: 2021-12-14

## 2021-12-14 PROCEDURE — G8482 FLU IMMUNIZE ORDER/ADMIN: HCPCS | Performed by: NURSE PRACTITIONER

## 2021-12-14 PROCEDURE — G8417 CALC BMI ABV UP PARAM F/U: HCPCS | Performed by: NURSE PRACTITIONER

## 2021-12-14 PROCEDURE — G8427 DOCREV CUR MEDS BY ELIG CLIN: HCPCS | Performed by: NURSE PRACTITIONER

## 2021-12-14 PROCEDURE — 99213 OFFICE O/P EST LOW 20 MIN: CPT | Performed by: NURSE PRACTITIONER

## 2021-12-14 PROCEDURE — 1036F TOBACCO NON-USER: CPT | Performed by: NURSE PRACTITIONER

## 2021-12-14 RX ORDER — PREDNISONE 20 MG/1
TABLET ORAL
Qty: 17 TABLET | Refills: 0 | Status: SHIPPED | OUTPATIENT
Start: 2021-12-14 | End: 2021-12-21 | Stop reason: ALTCHOICE

## 2021-12-14 NOTE — PROGRESS NOTES
Julissa Joseph, APRN-CNP  [facility]  [id number], [address]  [address] [address]  Dept: [phone]  Dept Fax: [phone]     Patient ID: Caleb Lerner is a 36 y.o. female Established patient    HPI    Pt here today for an acute visit secondary to 3 days ago she got pain to her left shoulder and it is causing some numbness and tingling down her arm, she is having a lot of pain with it. She notice pain with some ROM. Pt denies any fever or chills. Pt today denies any HA, chest pain, or SOB. Pt denies any N/V/D/C or abdominal pain. Otherwise pt doing well on current tx and no other concerns today. The patient's past medical, surgical, social, and family history as well as his current medications and allergies were reviewed as documented in today's encounter. Previous office notes, labs, imaging and hospital records were reviewed prior to and during encounter. Current Outpatient Medications on File Prior to Visit   Medication Sig Dispense Refill    albuterol sulfate HFA (PROVENTIL HFA) 108 (90 Base) MCG/ACT inhaler Inhale 1-2 puffs into the lungs every 4 hours as needed for Wheezing or Shortness of Breath (Space out to every 6 hours as symptoms improve) Space out to every 6 hours as symptoms improve. 1 each 3    trihexyphenidyl (ARTANE) 5 MG tablet Take 5 mg by mouth daily        No current facility-administered medications on file prior to visit. Subjective:     Review of Systems   Musculoskeletal:        Shoulder pain     Objective:     Physical Exam  Musculoskeletal:      Left shoulder: Tenderness present. Decreased range of motion. Arms:       Comments: Neer +, Hawkin's +, Drop-arm -         Assessment:      Diagnosis Orders   1. Impingement syndrome of left shoulder  predniSONE (DELTASONE) 20 MG tablet   2.  Preventative health care  Lipid Panel    Hemoglobin A1C       Plan:     - start prednisone taper dose.   - shoulder stretches and exercises given. - heat and ice to neck and shoulder.  - ibuprofen as needed for pain. - will get labs and see pt back to discuss results and MRI results. - pt verbalized understanding plan of care. Medications, labs, diagnostic studies, consultations and follow-up as documented in this encounter. Rest of systems unchanged, continue current treatments    On this date 12/14/2021 I have spent 20 minutes reviewing previous notes, test results and face to face with the patient discussing the diagnosis and importance of compliance with the treatment plan as well as documenting on the day of the visit.     OSKAR Summers-CNP

## 2021-12-14 NOTE — PATIENT INSTRUCTIONS
Patient Education   Patient Education        Shoulder Stretches: Exercises  Introduction  Here are some examples of exercises for you to try. The exercises may be suggested for a condition or for rehabilitation. Start each exercise slowly. Ease off the exercises if you start to have pain. You will be told when to start these exercises and which ones will work best for you. How to do the exercises  Shoulder stretch    1.  a doorway and place one arm against the door frame. Your elbow should be a little higher than your shoulder. 2. Relax your shoulders as you lean forward, allowing your chest and shoulder muscles to stretch. You can also turn your body slightly away from your arm to stretch the muscles even more. 3. Hold for 15 to 30 seconds. 4. Repeat 2 to 4 times with each arm. Shoulder and chest stretch    1. Shoulder and chest stretch  2. While sitting, relax your upper body so you slump slightly in your chair. 3. As you breathe in, straighten your back and open your arms out to the sides. 4. Gently pull your shoulder blades back and downward. 5. Hold for 15 to 30 seconds as your breathe normally. 6. Repeat 2 to 4 times. Overhead stretch    1. Reach up over your head with both arms. 2. Hold for 15 to 30 seconds. 3. Repeat 2 to 4 times. Follow-up care is a key part of your treatment and safety. Be sure to make and go to all appointments, and call your doctor if you are having problems. It's also a good idea to know your test results and keep a list of the medicines you take. Where can you learn more? Go to [email or web address]://[email or web address] technologiesdm.Elephanti. org and sign in to your [employer] account. Enter S254 in the Kojami box to learn more about \"Shoulder Stretches: Exercises. \"     If you do not have an account, please click on the \"Sign Up Now\" link. Current as of: July 1, 2021               Content Version: 13.0  © 9370-3280 Healthwise, Incorporated.    Care instructions adapted under license by [facility] ([facility]). If you have questions about a medical condition or this instruction, always ask your healthcare professional. [place] [address] any warranty or liability for your use of this information.

## 2021-12-21 ENCOUNTER — TELEPHONE (OUTPATIENT)
Dept: FAMILY MEDICINE CLINIC | Age: 31
End: 2021-12-21

## 2021-12-21 ENCOUNTER — VIRTUAL VISIT (OUTPATIENT)
Dept: FAMILY MEDICINE CLINIC | Age: 31
End: 2021-12-21
Payer: MEDICARE

## 2021-12-21 DIAGNOSIS — R10.11 RIGHT UPPER QUADRANT PAIN: ICD-10-CM

## 2021-12-21 DIAGNOSIS — K82.8 SLUDGE IN GALLBLADDER: Primary | ICD-10-CM

## 2021-12-21 PROCEDURE — G8427 DOCREV CUR MEDS BY ELIG CLIN: HCPCS | Performed by: NURSE PRACTITIONER

## 2021-12-21 PROCEDURE — 99213 OFFICE O/P EST LOW 20 MIN: CPT | Performed by: NURSE PRACTITIONER

## 2021-12-21 RX ORDER — FAMOTIDINE 20 MG/1
20 TABLET, FILM COATED ORAL 2 TIMES DAILY
Qty: 60 TABLET | Refills: 3 | Status: SHIPPED | OUTPATIENT
Start: 2021-12-21 | End: 2022-02-03

## 2021-12-21 ASSESSMENT — ENCOUNTER SYMPTOMS
RHINORRHEA: 0
VOMITING: 0
SORE THROAT: 0
DIARRHEA: 0
COUGH: 0
BACK PAIN: 0
CONSTIPATION: 0
SHORTNESS OF BREATH: 0
NAUSEA: 0
ABDOMINAL DISTENTION: 0
ABDOMINAL PAIN: 1
CHEST TIGHTNESS: 0

## 2021-12-21 NOTE — TELEPHONE ENCOUNTER
----- Message from Zuleyka Horton sent at 12/21/2021  9:01 AM EST -----  Subject: Appointment Request    Reason for Call: Routine (Patient Request) No Script    QUESTIONS  Type of Appointment? Established Patient  Reason for appointment request? Available appointments did not meet   patient need  Additional Information for Provider? patient is calling in about extreme   right side pain , she is wanting to Schedule an e visit please call her   back thank   ---------------------------------------------------------------------------  --------------  CALL BACK INFO  What is the best way for the office to contact you? OK to leave message on   voicemail  Preferred Call Back Phone Number? [phone]  ---------------------------------------------------------------------------  --------------  SCRIPT ANSWERS  Relationship to Patient? Self  (Is the patient requesting to see the provider for a procedure?)? No  (Is the patient requesting to see the provider urgently  today or   tomorrow. )? No  Have you been diagnosed with, awaiting test results for, or told that you   are suspected of having COVID-19 (Coronavirus)? (If patient has tested   negative or was tested as a requirement for work, school, or travel and   not based on symptoms, answer no)? No  Within the past two weeks have you developed any of the following symptoms   (answer no if symptoms have been present longer than 2 weeks or began   more than 2 weeks ago)? Fever or Chills, Cough, Shortness of breath or   difficulty breathing, Loss of taste or smell, Sore throat, Nasal   congestion, Sneezing or runny nose, Fatigue or generalized body aches   (answer no if pain is specific to a body part e.g. back pain), Diarrhea,   Headache? No  Have you had close contact with someone with COVID-19 in the last 14 days? No  (Service Expert  click yes below to proceed with [employer] As Usual   Scheduling)?  Yes

## 2021-12-21 NOTE — PATIENT INSTRUCTIONS
[address], [address] , [place]  [facility] [place], [address] [facility]  [phone]

## 2021-12-21 NOTE — PROGRESS NOTES
Rachelle Rodriguez, APRN-CNP  [address] [facility]  [address] [address], [address]  [address] [address]  Dept: [phone]  Dept Fax: [phone]     PATIENT ID: Yadi Gonzalez is a 36 y.o. female. HPI:  Established patient presenting via virtual visit today for an acute visit secondary to right upper quadrant pain. She relates that she did have some testing ordered by her previous PCP and it was normal. She relates that since then, she is still getting the pain. She relates that it comes and goes and is worse with eating fried, fatty, greasy foods. She relates that the pain has become more frequent and lasting longer. Pt denies any fever or chills. Pt today denies any HA, chest pain, or SOB. Pt denies any N/V/D/C or abdominal pain. Otherwise pt doing well on current tx and voices no other concerns. My previous office notes, labs and diagnostic studies were reviewed prior to and during encounter. The patient's past medical, surgical, social, and family history as well as her current medications and allergies were reviewed as documented in today's encounter by NICKY Garcias. Current Outpatient Medications on File Prior to Visit   Medication Sig Dispense Refill    albuterol sulfate HFA (PROVENTIL HFA) 108 (90 Base) MCG/ACT inhaler Inhale 1-2 puffs into the lungs every 4 hours as needed for Wheezing or Shortness of Breath (Space out to every 6 hours as symptoms improve) Space out to every 6 hours as symptoms improve. 1 each 3    trihexyphenidyl (ARTANE) 5 MG tablet Take 5 mg by mouth daily        No current facility-administered medications on file prior to visit. SUBJECTIVE:     Review of Systems   Constitutional: Negative for activity change, fatigue and fever. HENT: Negative for congestion, ear pain, rhinorrhea and sore throat. Respiratory: Negative for cough, chest tightness and shortness of breath.     Cardiovascular: Negative for chest pain and palpitations. Gastrointestinal: Positive for abdominal pain (right upper quadrant pain worse when eating fried, fatty, greasy foods). Negative for abdominal distention, constipation, diarrhea, nausea and vomiting. Endocrine: Negative for polydipsia, polyphagia and polyuria. Genitourinary: Negative for difficulty urinating and dysuria. Musculoskeletal: Negative for arthralgias, back pain and myalgias. Skin: Negative for rash. Neurological: Negative for dizziness, weakness, light-headedness and headaches. Hematological: Negative for adenopathy. Psychiatric/Behavioral: Negative for agitation and behavioral problems. The patient is not nervous/anxious. OBJECTIVE:  No vitals were taken during this encounter, as this is a virtual visit. Physical Exam  Vitals reviewed: Vital signs unavailable, as this is a virtual visit. Constitutional:       General: She is not in acute distress. Appearance: Normal appearance. She is not ill-appearing or toxic-appearing. Pulmonary:      Effort: No tachypnea or accessory muscle usage. Comments: Patient able to talk in full sentences without difficulty   Neurological:      General: No focal deficit present. Mental Status: She is alert and oriented to person, place, and time. Psychiatric:         Mood and Affect: Mood normal.         Speech: Speech normal.         Behavior: Behavior normal. Behavior is cooperative. ASSESSMENT:   Diagnosis Orders   1. Sludge in gallbladder  [facility] - [place], DO [place], General Surgery, [place]   2. Right upper quadrant pain  [facility]) - [place], DO Jeff[facility], General Surgery, [place]     PLAN:  1. Sludge in gallbladder  2.  Right upper quadrant pain  - Gallbladder ultrasound noted from 1/2021  - It does show sludge  - HIDA noted and negative  - Order was placed for surgical consultation but patient relates that she was not notified of this  - Given increasing frequency and

## 2022-01-10 ENCOUNTER — HOSPITAL ENCOUNTER (OUTPATIENT)
Age: 32
Discharge: HOME | End: 2022-01-10
Payer: MEDICARE

## 2022-01-10 DIAGNOSIS — Z00.00 PREVENTATIVE HEALTH CARE: ICD-10-CM

## 2022-01-10 LAB
CHOLESTEROL/HDL RATIO: 4.1
CHOLESTEROL: 195 MG/DL
ESTIMATED AVERAGE GLUCOSE: 97 MG/DL
HBA1C MFR BLD: 5 % (ref 4–6)
HDLC SERPL-MCNC: 48 MG/DL
LDL CHOLESTEROL: 120 MG/DL (ref 0–130)
TRIGL SERPL-MCNC: 136 MG/DL
VLDLC SERPL CALC-MCNC: NORMAL MG/DL (ref 1–30)

## 2022-01-10 PROCEDURE — 83036 HEMOGLOBIN GLYCOSYLATED A1C: CPT

## 2022-01-10 PROCEDURE — 80061 LIPID PANEL: CPT

## 2022-01-10 PROCEDURE — 36415 COLL VENOUS BLD VENIPUNCTURE: CPT

## 2022-01-20 ENCOUNTER — HOSPITAL ENCOUNTER (EMERGENCY)
Age: 32
Discharge: HOME | End: 2022-01-20
Attending: EMERGENCY MEDICINE
Payer: MEDICARE

## 2022-01-20 VITALS
OXYGEN SATURATION: 96 % | WEIGHT: 213 LBS | HEIGHT: 63 IN | DIASTOLIC BLOOD PRESSURE: 68 MMHG | BODY MASS INDEX: 37.74 KG/M2 | TEMPERATURE: 98.5 F | HEART RATE: 68 BPM | RESPIRATION RATE: 16 BRPM | SYSTOLIC BLOOD PRESSURE: 110 MMHG

## 2022-01-20 DIAGNOSIS — R10.11 RIGHT UPPER QUADRANT ABDOMINAL PAIN: Primary | ICD-10-CM

## 2022-01-20 LAB
ABSOLUTE EOS #: 0.7 K/UL (ref 0–0.4)
ABSOLUTE IMMATURE GRANULOCYTE: ABNORMAL K/UL (ref 0–0.3)
ABSOLUTE LYMPH #: 2.4 K/UL (ref 1–4.8)
ABSOLUTE MONO #: 0.7 K/UL (ref 0.1–1.2)
ALBUMIN SERPL-MCNC: 4 G/DL (ref 3.5–5.2)
ALBUMIN/GLOBULIN RATIO: 1.3 (ref 1–2.5)
ALP BLD-CCNC: 73 U/L (ref 35–104)
ALT SERPL-CCNC: 18 U/L (ref 5–33)
ANION GAP SERPL CALCULATED.3IONS-SCNC: 12 MMOL/L (ref 9–17)
AST SERPL-CCNC: 13 U/L
BASOPHILS # BLD: 1 % (ref 0–2)
BASOPHILS ABSOLUTE: 0.1 K/UL (ref 0–0.2)
BILIRUB SERPL-MCNC: 0.27 MG/DL (ref 0.3–1.2)
BILIRUBIN DIRECT: <0.08 MG/DL
BILIRUBIN URINE: NEGATIVE
BILIRUBIN, INDIRECT: ABNORMAL MG/DL (ref 0–1)
BUN BLDV-MCNC: 8 MG/DL (ref 6–20)
BUN/CREAT BLD: ABNORMAL (ref 9–20)
CALCIUM SERPL-MCNC: 9 MG/DL (ref 8.6–10.4)
CHLORIDE BLD-SCNC: 103 MMOL/L (ref 98–107)
CO2: 21 MMOL/L (ref 20–31)
COLOR: YELLOW
COMMENT UA: NORMAL
CREAT SERPL-MCNC: 0.51 MG/DL (ref 0.5–0.9)
DIFFERENTIAL TYPE: ABNORMAL
EOSINOPHILS RELATIVE PERCENT: 7 % (ref 1–4)
GFR AFRICAN AMERICAN: >60 ML/MIN
GFR NON-AFRICAN AMERICAN: >60 ML/MIN
GFR SERPL CREATININE-BSD FRML MDRD: ABNORMAL ML/MIN/{1.73_M2}
GFR SERPL CREATININE-BSD FRML MDRD: ABNORMAL ML/MIN/{1.73_M2}
GLOBULIN: ABNORMAL G/DL (ref 1.5–3.8)
GLUCOSE BLD-MCNC: 102 MG/DL (ref 70–99)
GLUCOSE URINE: NEGATIVE
HCT VFR BLD CALC: 38.9 % (ref 36–46)
HEMOGLOBIN: 13.5 G/DL (ref 12–16)
IMMATURE GRANULOCYTES: ABNORMAL %
KETONES, URINE: NEGATIVE
LEUKOCYTE ESTERASE, URINE: NEGATIVE
LIPASE: 34 U/L (ref 13–60)
LYMPHOCYTES # BLD: 23 % (ref 24–44)
MCH RBC QN AUTO: 29.5 PG (ref 26–34)
MCHC RBC AUTO-ENTMCNC: 34.8 G/DL (ref 31–37)
MCV RBC AUTO: 84.9 FL (ref 80–100)
MONOCYTES # BLD: 7 % (ref 2–11)
NITRITE, URINE: NEGATIVE
NRBC AUTOMATED: ABNORMAL PER 100 WBC
PDW BLD-RTO: 13.4 % (ref 12.5–15.4)
PH UA: 6 (ref 5–8)
PLATELET # BLD: 364 K/UL (ref 140–450)
PLATELET ESTIMATE: ABNORMAL
PMV BLD AUTO: 7.5 FL (ref 6–12)
POTASSIUM SERPL-SCNC: 3.9 MMOL/L (ref 3.7–5.3)
PROTEIN UA: NEGATIVE
RBC # BLD: 4.58 M/UL (ref 4–5.2)
RBC # BLD: ABNORMAL 10*6/UL
SEG NEUTROPHILS: 62 % (ref 36–66)
SEGMENTED NEUTROPHILS ABSOLUTE COUNT: 6.4 K/UL (ref 1.8–7.7)
SODIUM BLD-SCNC: 136 MMOL/L (ref 135–144)
SPECIFIC GRAVITY UA: 1.01 (ref 1–1.03)
TOTAL PROTEIN: 7 G/DL (ref 6.4–8.3)
TURBIDITY: CLEAR
URINE HGB: NEGATIVE
UROBILINOGEN, URINE: NORMAL
WBC # BLD: 10.3 K/UL (ref 3.5–11)
WBC # BLD: ABNORMAL 10*3/UL

## 2022-01-20 PROCEDURE — 6360000002 HC RX W HCPCS: Performed by: EMERGENCY MEDICINE

## 2022-01-20 PROCEDURE — 83690 ASSAY OF LIPASE: CPT

## 2022-01-20 PROCEDURE — 96376 TX/PRO/DX INJ SAME DRUG ADON: CPT

## 2022-01-20 PROCEDURE — 85025 COMPLETE CBC W/AUTO DIFF WBC: CPT

## 2022-01-20 PROCEDURE — 80048 BASIC METABOLIC PNL TOTAL CA: CPT

## 2022-01-20 PROCEDURE — 2580000003 HC RX 258: Performed by: EMERGENCY MEDICINE

## 2022-01-20 PROCEDURE — 99285 EMERGENCY DEPT VISIT HI MDM: CPT

## 2022-01-20 PROCEDURE — 96375 TX/PRO/DX INJ NEW DRUG ADDON: CPT

## 2022-01-20 PROCEDURE — 36415 COLL VENOUS BLD VENIPUNCTURE: CPT

## 2022-01-20 PROCEDURE — 96374 THER/PROPH/DIAG INJ IV PUSH: CPT

## 2022-01-20 PROCEDURE — 80076 HEPATIC FUNCTION PANEL: CPT

## 2022-01-20 PROCEDURE — 81003 URINALYSIS AUTO W/O SCOPE: CPT

## 2022-01-20 RX ORDER — 0.9 % SODIUM CHLORIDE 0.9 %
1000 INTRAVENOUS SOLUTION INTRAVENOUS ONCE
Status: COMPLETED | OUTPATIENT
Start: 2022-01-20 | End: 2022-01-20

## 2022-01-20 RX ORDER — ONDANSETRON 2 MG/ML
4 INJECTION INTRAMUSCULAR; INTRAVENOUS ONCE
Status: COMPLETED | OUTPATIENT
Start: 2022-01-20 | End: 2022-01-20

## 2022-01-20 RX ORDER — ONDANSETRON 4 MG/1
4 TABLET, ORALLY DISINTEGRATING ORAL 3 TIMES DAILY PRN
Qty: 21 TABLET | Refills: 0 | Status: SHIPPED | OUTPATIENT
Start: 2022-01-20 | End: 2022-02-03 | Stop reason: SDUPTHER

## 2022-01-20 RX ADMIN — SODIUM CHLORIDE 1000 ML: 9 INJECTION, SOLUTION INTRAVENOUS at 07:45

## 2022-01-20 RX ADMIN — HYDROMORPHONE HYDROCHLORIDE 0.5 MG: 1 INJECTION, SOLUTION INTRAMUSCULAR; INTRAVENOUS; SUBCUTANEOUS at 07:46

## 2022-01-20 RX ADMIN — ONDANSETRON 4 MG: 2 INJECTION INTRAMUSCULAR; INTRAVENOUS at 07:46

## 2022-01-20 RX ADMIN — HYDROMORPHONE HYDROCHLORIDE 0.5 MG: 1 INJECTION, SOLUTION INTRAMUSCULAR; INTRAVENOUS; SUBCUTANEOUS at 10:16

## 2022-01-20 ASSESSMENT — PAIN SCALES - GENERAL
PAINLEVEL_OUTOF10: 6
PAINLEVEL_OUTOF10: 3
PAINLEVEL_OUTOF10: 1
PAINLEVEL_OUTOF10: 8

## 2022-01-20 ASSESSMENT — PAIN DESCRIPTION - ORIENTATION
ORIENTATION: RIGHT;UPPER
ORIENTATION: RIGHT;UPPER

## 2022-01-20 ASSESSMENT — PAIN DESCRIPTION - PAIN TYPE
TYPE: ACUTE PAIN
TYPE: ACUTE PAIN

## 2022-01-20 ASSESSMENT — ENCOUNTER SYMPTOMS
ABDOMINAL DISTENTION: 0
BLOOD IN STOOL: 0
CHEST TIGHTNESS: 0
SHORTNESS OF BREATH: 0

## 2022-01-20 ASSESSMENT — PAIN DESCRIPTION - PROGRESSION
CLINICAL_PROGRESSION: GRADUALLY IMPROVING
CLINICAL_PROGRESSION: GRADUALLY IMPROVING

## 2022-01-20 ASSESSMENT — PAIN DESCRIPTION - LOCATION
LOCATION: ABDOMEN

## 2022-01-20 ASSESSMENT — PAIN - FUNCTIONAL ASSESSMENT: PAIN_FUNCTIONAL_ASSESSMENT: ACTIVITIES ARE NOT PREVENTED

## 2022-01-20 ASSESSMENT — PAIN DESCRIPTION - FREQUENCY: FREQUENCY: INTERMITTENT

## 2022-01-20 NOTE — ED PROVIDER NOTES
[address] [facility] ED  EMERGENCY DEPARTMENT ENCOUNTER      Pt Name: Bladimir Jones  MRN: [id number]  [place] 1990  Date of evaluation: 1/20/2022  Provider: Lupe Brown MD    CHIEF COMPLAINT     Chief Complaint   Patient presents with    Abdominal Pain         HISTORY OF PRESENT ILLNESS   (Location/Symptom, Timing/Onset, Context/Setting,Quality, Duration, Modifying Factors, Severity)  Note limiting factors. Bladimir Jones is a 36 y.o. female who presents to the emergency department with a chief complaint of right upper quadrant abdominal pain that she has had through the night radiating through to the back, accompanied with 4-5 episodes of emesis, initially bilious. Patient has a history of sludge in the gallbladder and was scheduled for a recent surgical appointment to see about cholecystectomy but her car broke down and she was unable to keep that appointment. She plans to reschedule. She was worked up for abdominal pain in January of this year and had an incidental finding of a uterine fibroid. Her HIDA scan was normal but the ultrasound reported gallbladder sludge. There was no evidence of cholecystitis at the time. She also has a history of eosinophilic esophagitis resulting in dysphagia and states that her diet is somewhat limited to soft foods. She had applesauce yesterday and in the evening had mashed potatoes without butter. The history is provided by the patient and medical records. Nursing Notes werereviewed. REVIEW OF SYSTEMS    (2-9 systems for level 4, 10 or more for level 5)     Review of Systems   Constitutional: Negative for chills and fever. Respiratory: Negative for chest tightness and shortness of breath. Cardiovascular: Negative for chest pain. Gastrointestinal: Negative for abdominal distention and blood in stool. Genitourinary: Negative for difficulty urinating. All other systems reviewed and are negative.       Except as noted above the remainder of the review of systems was reviewed and negative. PAST MEDICAL HISTORY     Past Medical History:   Diagnosis Date    Asthma     Back pain     Chicken pox     Dysphagia     H/O echocardiogram 05/18/2018    EF 60%    History of Holter monitoring 04/27/2018    Baseline ECG shows sinus rhythm. Holter showed sinus tachy 61% of the time. Rare premature ventricular complexes were recorded. No episodes of superventricuarl or ventriuclar tacycardial was seen. Pt reported episodes of SOB and speedy heart with monitroing showing sinus tachy.      Lordosis     Pneumonia     PTSD (post-traumatic stress disorder)     Schizophrenic disorder (HCC)     Tachycardia, unspecified     UTI (urinary tract infection)          SURGICALHISTORY       Past Surgical History:   Procedure Laterality Date    NECK SURGERY Left lesion biopsy    UPPER GASTROINTESTINAL ENDOSCOPY      chicken stuck in throat    UPPER GASTROINTESTINAL ENDOSCOPY N/A 1/14/2020    -bx(eosinophilic esophagitis,neg H-Pylori)    WISDOM TOOTH EXTRACTION           CURRENT MEDICATIONS       Discharge Medication List as of 1/20/2022  2:20 PM      CONTINUE these medications which have NOT CHANGED    Details   lurasidone (LATUDA) 20 MG TABS tablet Take 20 mg by mouth dailyHistorical Med      famotidine (PEPCID) 20 MG tablet Take 1 tablet by mouth 2 times daily, Disp-60 tablet, R-3Normal      albuterol sulfate HFA (PROVENTIL HFA) 108 (90 Base) MCG/ACT inhaler Inhale 1-2 puffs into the lungs every 4 hours as needed for Wheezing or Shortness of Breath (Space out to every 6 hours as symptoms improve) Space out to every 6 hours as symptoms improve., Disp-1 each, R-3Normal      trihexyphenidyl (ARTANE) 5 MG tablet Take 5 mg by mouth daily Historical Med             ALLERGIES     Adhesive tape, Doxycycline, Pollen extract, Seroquel [quetiapine fumarate], Symbicort [budesonide-formoterol fumarate], Topamax [topiramate], and Gabapentin    FAMILY HISTORY       Family History   Problem Relation Age of Onset    Asthma Mother     Cancer Mother     High Blood Pressure Father     Diabetes Father           SOCIAL HISTORY       Social History     Socioeconomic History    Marital status: Single     Spouse name: None    Number of children: 0    Years of education: 25    Highest education level: Bachelor's degree (e.g., BA, AB, BS)   Occupational History    Occupation: Disability   Tobacco Use    Smoking status: Never Smoker    Smokeless tobacco: Never Used   Vaping Use    Vaping Use: Never used   Substance and Sexual Activity    Alcohol use: No     Comment: Rare    Drug use: No    Sexual activity: Yes     Partners: Male   Other Topics Concern    None   Social History Narrative    None     Social Determinants of Health     Financial Resource Strain: Low Risk     Difficulty of Paying Living Expenses: Not hard at all   Food Insecurity: No Food Insecurity    Worried About Running Out of Food in the Last Year: Never true    Jayme of Food in the Last Year: Never true   Transportation Needs:     Lack of Transportation (Medical): Not on file    Lack of Transportation (Non-Medical):  Not on file   Physical Activity:     Days of Exercise per Week: Not on file    Minutes of Exercise per Session: Not on file   Stress:     Feeling of Stress : Not on file   Social Connections:     Frequency of Communication with Friends and Family: Not on file    Frequency of Social Gatherings with Friends and Family: Not on file    Attends Hinduism Services: Not on file    Active Member of Clubs or Organizations: Not on file    Attends Club or Organization Meetings: Not on file    Marital Status: Not on file   Intimate Partner Violence:     Fear of Current or Ex-Partner: Not on file    Emotionally Abused: Not on file    Physically Abused: Not on file    Sexually Abused: Not on file   Housing Stability:     Unable to Pay for Housing in the Last Year: Not on file    Number of Places Lived in the Last Year: Not on file    Unstable Housing in the Last Year: Not on file       SCREENINGS    [place] Coma Scale  Eye Opening: Spontaneous  Best Verbal Response: Oriented  Best Motor Response: Obeys commands  [place] Coma Scale Score: 15        PHYSICAL EXAM    (up to 7 for level 4, 8 or more for level 5)     ED Triage Vitals [01/20/22 0706]   BP Temp Temp Source Pulse Resp SpO2 Height Weight   (!) 130/99 98.5 °F (36.9 °C) Oral 98 18 98 % 5' 3\" (1.6 m) 213 lb (96.6 kg)       Physical Exam  Vitals reviewed. Constitutional:       Appearance: She is obese. She is not ill-appearing. HENT:      Head: Normocephalic. Eyes:      General: No scleral icterus. Extraocular Movements: Extraocular movements intact. Cardiovascular:      Rate and Rhythm: Normal rate and regular rhythm. Heart sounds: Normal heart sounds. Pulmonary:      Effort: Pulmonary effort is normal. No respiratory distress. Breath sounds: Normal breath sounds. No rhonchi or rales. Abdominal:      General: Bowel sounds are decreased. There is no distension. Palpations: Abdomen is soft. There is no fluid wave, hepatomegaly or splenomegaly. Tenderness: There is abdominal tenderness in the right upper quadrant and epigastric area. There is guarding. There is no rebound. Negative signs include Mace's sign and McBurney's sign. Hernia: No hernia is present. Musculoskeletal:         General: Normal range of motion. Skin:     General: Skin is warm and dry. Coloration: Skin is not jaundiced or pale. Findings: No rash. Neurological:      General: No focal deficit present. Mental Status: She is alert and oriented to person, place, and time. Mental status is at baseline.    Psychiatric:         Mood and Affect: Mood normal.         DIAGNOSTIC RESULTS     EKG: All EKG's are interpreted by the Emergency Department Physician who either signs orCo-signs this chart in the absence of a cardiologist.    RADIOLOGY:     Interpretation per the Radiologist below, ifavailable at the time of this note:    No orders to display         ED BEDSIDE ULTRASOUND:   Performed by ED Physician - none    LABS:  Labs Reviewed   CBC WITH AUTO DIFFERENTIAL - Abnormal; Notable for the following components:       Result Value    Lymphocytes 23 (*)     Eosinophils % 7 (*)     Absolute Eos # 0.70 (*)     All other components within normal limits   BASIC METABOLIC PANEL W/ REFLEX TO MG FOR LOW K - Abnormal; Notable for the following components:    Glucose 102 (*)     All other components within normal limits   HEPATIC FUNCTION PANEL - Abnormal; Notable for the following components: Total Bilirubin 0.27 (*)     All other components within normal limits   LIPASE   URINE RT REFLEX TO CULTURE       All other labs were within normal range ornot returned as of this dictation. EMERGENCY DEPARTMENT COURSE and DIFFERENTIAL DIAGNOSIS/MDM:   Vitals:    Vitals:    01/20/22 0706 01/20/22 1017 01/20/22 1425   BP: (!) 130/99 106/69 110/68   Pulse: 98 74 68   Resp: 18 16 16   Temp: 98.5 °F (36.9 °C)     TempSrc: Oral     SpO2: 98% 98% 96%   Weight: 96.6 kg (213 lb)     Height: 5' 3\" (1.6 m)         ED Course as of 01/20/22 1503   Thu Jan 20, 2022   0858 Pain is resolved and only noticeable at a level of 3/10 when she moves. The white count is normal and liver functions also normal.  I will also check a urinalysis. [SH]      ED Course User Index  [SH] Marcellina Krabbe, MD       The white count is normal at 10.3 with a hemoglobin 13.5. Urinalysis is benign. BMP, liver functions and lipase are normal. Patient's pain is treated with IV Dilaudid with fairly good relief. She is discharged when stable and is placed on Zofran for nausea and vomiting. Patient plans to reschedule appointment with her physician and her surgeon now that her car has been repaired and follow-up with them.  She may return for worsening symptoms. MDM    CONSULTS:  None    PROCEDURES:  Unlessotherwise noted below, none     Procedures    FINAL IMPRESSION      1. Right upper quadrant abdominal pain          DISPOSITION/PLAN   DISPOSITION Decision To Discharge 01/20/2022 02:19:16 PM      PATIENT REFERRED TO:  OSKAR Cabello CNP  [address] [facility]  [phone]            DISCHARGE MEDICATIONS:         Problem List:  Patient Active Problem List   Diagnosis Code    Severe bipolar I disorder, current or most recent episode depressed ([facility] [address] F31.4    Other seasonal allergic rhinitis J30.2    Suicidal ideation R45.851    Depression with suicidal ideation F32. A, R45.851    Exercise-induced asthma J45.990    Heartburn R12    Dysphagia R13.10    Major depressive disorder, recurrent (HCC) F33.9    Schizoaffective disorder, bipolar type (HCC) F25.0    Class 2 obesity without serious comorbidity with body mass index (BMI) of 39.0 to 39.9 in adult E66.9, X57.92    Eosinophilic esophagitis X67.0    Shortness of breath R06.02    Acute bilateral low back pain without sciatica M54.50           Summation      Patient Course: Discharged    ED Medicationsadministered this visit:    Medications   0.9 % sodium chloride bolus (0 mLs IntraVENous Stopped 1/20/22 0835)   ondansetron (ZOFRAN) injection 4 mg (4 mg IntraVENous Given 1/20/22 0746)   HYDROmorphone (DILAUDID) injection 0.5 mg (0.5 mg IntraVENous Given 1/20/22 0746)   HYDROmorphone (DILAUDID) injection 0.5 mg (0.5 mg IntraVENous Given 1/20/22 1016)       New Prescriptions from this visit:    Discharge Medication List as of 1/20/2022  2:20 PM      START taking these medications    Details   ondansetron (ZOFRAN-ODT) 4 MG disintegrating tablet Take 1 tablet by mouth 3 times daily as needed for Nausea or Vomiting, Disp-21 tablet, R-0Normal             Follow-up:  OSKAR Cabello CNP  [address]  [address]  [facility] [address] [facility]              Final Impression:   1.  Right upper quadrant abdominal pain               (Please note that portions of this note were completed with a voice recognitionprogram.  Efforts were made to edit the dictations but occasionally words are mis-transcribed.)    Vickey Castillo MD (electronically signed)  Attending Emergency Physician            Vickey Castillo MD  01/20/22 2313

## 2022-01-20 NOTE — ED TRIAGE NOTES
Patient arrives with c/o right upper quadrant abdominal pain. States she had schedule to follow up with surgery for galllaldder, but had to reschedule appointment. At 0200 began to have vomiting and increased pain.

## 2022-01-28 ENCOUNTER — ANESTHESIA EVENT (OUTPATIENT)
Dept: OPERATING ROOM | Age: 32
End: 2022-01-28
Payer: MEDICARE

## 2022-01-28 NOTE — PROGRESS NOTES
Preoperative Instructions:    Stop eating solid foods at midnight the night prior to your surgery. Stop drinking clear liquids at midnight the night prior to your surgery. Arrive at the surgery center (3rd entrance) on _______1/31________ by ___1000am____________. Please stop any blood thinning medications as directed by your surgeon or prescribing physician. Failure to stop certain medications may interfere with your scheduled surgery. These may include: Aspirin, Coumadin, Plavix, NSAIDS (Motrin, Aleve, Advil, Mobic, Celebrex), Eliquis, Pradaxa, Xarelto, Fish oil, and herbal supplements. You may continue the rest of your medications through the night before surgery unless instructed otherwise. Day of surgery please take only the following medication(s) with a small sip of water:      Please use and bring inhalers the day of surgery. Reminders:  -If you are going home the day of your procedure, you will need a family member or friend to stay during the procedure and drive you home after your procedure. Your  must be 25years of age or older and able to sign off on your discharge instructions.    -If you are going home the same day of your surgery, someone must remain with you for the first 24 hours after your surgery if you receive sedation or anesthesia.      -Please do not wear any jewelery or body piercing the day of surgery

## 2022-01-31 ENCOUNTER — HOSPITAL ENCOUNTER (OUTPATIENT)
Age: 32
Setting detail: OUTPATIENT SURGERY
Discharge: HOME | End: 2022-01-31
Attending: SURGERY | Admitting: SURGERY
Payer: MEDICARE

## 2022-01-31 ENCOUNTER — ANESTHESIA (OUTPATIENT)
Dept: OPERATING ROOM | Age: 32
End: 2022-01-31
Payer: MEDICARE

## 2022-01-31 VITALS — OXYGEN SATURATION: 83 % | DIASTOLIC BLOOD PRESSURE: 67 MMHG | SYSTOLIC BLOOD PRESSURE: 106 MMHG | TEMPERATURE: 93.6 F

## 2022-01-31 VITALS
DIASTOLIC BLOOD PRESSURE: 80 MMHG | SYSTOLIC BLOOD PRESSURE: 119 MMHG | TEMPERATURE: 97.3 F | OXYGEN SATURATION: 91 % | RESPIRATION RATE: 21 BRPM | HEIGHT: 63 IN | BODY MASS INDEX: 38.8 KG/M2 | HEART RATE: 81 BPM | WEIGHT: 219 LBS

## 2022-01-31 DIAGNOSIS — K80.20 SYMPTOMATIC CHOLELITHIASIS: Primary | ICD-10-CM

## 2022-01-31 LAB — HCG, PREGNANCY URINE (POC): NEGATIVE

## 2022-01-31 PROCEDURE — 7100000010 HC PHASE II RECOVERY - FIRST 15 MIN: Performed by: SURGERY

## 2022-01-31 PROCEDURE — 3700000001 HC ADD 15 MINUTES (ANESTHESIA): Performed by: SURGERY

## 2022-01-31 PROCEDURE — 2580000003 HC RX 258: Performed by: ANESTHESIOLOGY

## 2022-01-31 PROCEDURE — 7100000001 HC PACU RECOVERY - ADDTL 15 MIN: Performed by: SURGERY

## 2022-01-31 PROCEDURE — 2500000003 HC RX 250 WO HCPCS

## 2022-01-31 PROCEDURE — 81025 URINE PREGNANCY TEST: CPT

## 2022-01-31 PROCEDURE — 7100000011 HC PHASE II RECOVERY - ADDTL 15 MIN: Performed by: SURGERY

## 2022-01-31 PROCEDURE — 6360000002 HC RX W HCPCS: Performed by: NURSE ANESTHETIST, CERTIFIED REGISTERED

## 2022-01-31 PROCEDURE — 64488 TAP BLOCK BI INJECTION: CPT | Performed by: ANESTHESIOLOGY

## 2022-01-31 PROCEDURE — 6360000002 HC RX W HCPCS: Performed by: SURGERY

## 2022-01-31 PROCEDURE — 2709999900 HC NON-CHARGEABLE SUPPLY: Performed by: SURGERY

## 2022-01-31 PROCEDURE — 3700000000 HC ANESTHESIA ATTENDED CARE: Performed by: SURGERY

## 2022-01-31 PROCEDURE — 6360000002 HC RX W HCPCS

## 2022-01-31 PROCEDURE — 6360000002 HC RX W HCPCS: Performed by: ANESTHESIOLOGY

## 2022-01-31 PROCEDURE — 88304 TISSUE EXAM BY PATHOLOGIST: CPT

## 2022-01-31 PROCEDURE — C9290 INJ, BUPIVACAINE LIPOSOME: HCPCS | Performed by: ANESTHESIOLOGY

## 2022-01-31 PROCEDURE — S2900 ROBOTIC SURGICAL SYSTEM: HCPCS | Performed by: SURGERY

## 2022-01-31 PROCEDURE — 2500000003 HC RX 250 WO HCPCS: Performed by: NURSE ANESTHETIST, CERTIFIED REGISTERED

## 2022-01-31 PROCEDURE — C1760 CLOSURE DEV, VASC: HCPCS | Performed by: SURGERY

## 2022-01-31 PROCEDURE — 3600000019 HC SURGERY ROBOT ADDTL 15MIN: Performed by: SURGERY

## 2022-01-31 PROCEDURE — 7100000000 HC PACU RECOVERY - FIRST 15 MIN: Performed by: SURGERY

## 2022-01-31 PROCEDURE — 3600000009 HC SURGERY ROBOT BASE: Performed by: SURGERY

## 2022-01-31 PROCEDURE — 2500000003 HC RX 250 WO HCPCS: Performed by: ANESTHESIOLOGY

## 2022-01-31 RX ORDER — MIDAZOLAM HYDROCHLORIDE 2 MG/2ML
2 INJECTION, SOLUTION INTRAMUSCULAR; INTRAVENOUS ONCE
Status: DISCONTINUED | OUTPATIENT
Start: 2022-01-31 | End: 2022-01-31 | Stop reason: HOSPADM

## 2022-01-31 RX ORDER — FENTANYL CITRATE 50 UG/ML
25 INJECTION, SOLUTION INTRAMUSCULAR; INTRAVENOUS ONCE
Status: DISCONTINUED | OUTPATIENT
Start: 2022-01-31 | End: 2022-01-31 | Stop reason: HOSPADM

## 2022-01-31 RX ORDER — GLYCOPYRROLATE 1 MG/5 ML
SYRINGE (ML) INTRAVENOUS PRN
Status: DISCONTINUED | OUTPATIENT
Start: 2022-01-31 | End: 2022-01-31 | Stop reason: SDUPTHER

## 2022-01-31 RX ORDER — SENNA PLUS 8.6 MG/1
1 TABLET ORAL 2 TIMES DAILY
Qty: 60 TABLET | Refills: 11 | Status: SHIPPED | OUTPATIENT
Start: 2022-01-31 | End: 2022-02-03 | Stop reason: ALTCHOICE

## 2022-01-31 RX ORDER — OXYCODONE HYDROCHLORIDE AND ACETAMINOPHEN 5; 325 MG/1; MG/1
1 TABLET ORAL PRN
Status: DISCONTINUED | OUTPATIENT
Start: 2022-01-31 | End: 2022-01-31 | Stop reason: HOSPADM

## 2022-01-31 RX ORDER — ONDANSETRON 2 MG/ML
4 INJECTION INTRAMUSCULAR; INTRAVENOUS
Status: DISCONTINUED | OUTPATIENT
Start: 2022-01-31 | End: 2022-01-31 | Stop reason: HOSPADM

## 2022-01-31 RX ORDER — SODIUM CHLORIDE 9 MG/ML
INJECTION, SOLUTION INTRAVENOUS CONTINUOUS
Status: DISCONTINUED | OUTPATIENT
Start: 2022-01-31 | End: 2022-01-31 | Stop reason: HOSPADM

## 2022-01-31 RX ORDER — 0.9 % SODIUM CHLORIDE 0.9 %
500 INTRAVENOUS SOLUTION INTRAVENOUS
Status: DISCONTINUED | OUTPATIENT
Start: 2022-01-31 | End: 2022-01-31 | Stop reason: HOSPADM

## 2022-01-31 RX ORDER — HYDRALAZINE HYDROCHLORIDE 20 MG/ML
10 INJECTION INTRAMUSCULAR; INTRAVENOUS EVERY 10 MIN PRN
Status: DISCONTINUED | OUTPATIENT
Start: 2022-01-31 | End: 2022-01-31 | Stop reason: HOSPADM

## 2022-01-31 RX ORDER — PROMETHAZINE HYDROCHLORIDE 25 MG/ML
6.25 INJECTION, SOLUTION INTRAMUSCULAR; INTRAVENOUS
Status: DISCONTINUED | OUTPATIENT
Start: 2022-01-31 | End: 2022-01-31 | Stop reason: HOSPADM

## 2022-01-31 RX ORDER — MIDAZOLAM HYDROCHLORIDE 1 MG/ML
INJECTION INTRAMUSCULAR; INTRAVENOUS
Status: COMPLETED
Start: 2022-01-31 | End: 2022-01-31

## 2022-01-31 RX ORDER — ROCURONIUM BROMIDE 10 MG/ML
INJECTION, SOLUTION INTRAVENOUS PRN
Status: DISCONTINUED | OUTPATIENT
Start: 2022-01-31 | End: 2022-01-31 | Stop reason: SDUPTHER

## 2022-01-31 RX ORDER — OXYCODONE HYDROCHLORIDE 5 MG/1
5 TABLET ORAL EVERY 6 HOURS PRN
Qty: 20 TABLET | Refills: 0 | Status: SHIPPED | OUTPATIENT
Start: 2022-01-31 | End: 2022-02-05

## 2022-01-31 RX ORDER — MIDAZOLAM HYDROCHLORIDE 1 MG/ML
INJECTION INTRAMUSCULAR; INTRAVENOUS PRN
Status: DISCONTINUED | OUTPATIENT
Start: 2022-01-31 | End: 2022-01-31 | Stop reason: SDUPTHER

## 2022-01-31 RX ORDER — LIDOCAINE HYDROCHLORIDE 10 MG/ML
INJECTION, SOLUTION EPIDURAL; INFILTRATION; INTRACAUDAL; PERINEURAL PRN
Status: DISCONTINUED | OUTPATIENT
Start: 2022-01-31 | End: 2022-01-31 | Stop reason: SDUPTHER

## 2022-01-31 RX ORDER — LABETALOL 20 MG/4 ML (5 MG/ML) INTRAVENOUS SYRINGE
10 EVERY 10 MIN PRN
Status: DISCONTINUED | OUTPATIENT
Start: 2022-01-31 | End: 2022-01-31 | Stop reason: HOSPADM

## 2022-01-31 RX ORDER — OXYCODONE HYDROCHLORIDE AND ACETAMINOPHEN 5; 325 MG/1; MG/1
2 TABLET ORAL PRN
Status: DISCONTINUED | OUTPATIENT
Start: 2022-01-31 | End: 2022-01-31 | Stop reason: HOSPADM

## 2022-01-31 RX ORDER — FENTANYL CITRATE 50 UG/ML
INJECTION, SOLUTION INTRAMUSCULAR; INTRAVENOUS PRN
Status: DISCONTINUED | OUTPATIENT
Start: 2022-01-31 | End: 2022-01-31 | Stop reason: SDUPTHER

## 2022-01-31 RX ORDER — SODIUM CHLORIDE, SODIUM LACTATE, POTASSIUM CHLORIDE, CALCIUM CHLORIDE 600; 310; 30; 20 MG/100ML; MG/100ML; MG/100ML; MG/100ML
INJECTION, SOLUTION INTRAVENOUS CONTINUOUS
Status: DISCONTINUED | OUTPATIENT
Start: 2022-01-31 | End: 2022-01-31 | Stop reason: HOSPADM

## 2022-01-31 RX ORDER — MORPHINE SULFATE 2 MG/ML
2 INJECTION, SOLUTION INTRAMUSCULAR; INTRAVENOUS EVERY 5 MIN PRN
Status: DISCONTINUED | OUTPATIENT
Start: 2022-01-31 | End: 2022-01-31 | Stop reason: HOSPADM

## 2022-01-31 RX ORDER — ONDANSETRON 2 MG/ML
INJECTION INTRAMUSCULAR; INTRAVENOUS PRN
Status: DISCONTINUED | OUTPATIENT
Start: 2022-01-31 | End: 2022-01-31 | Stop reason: SDUPTHER

## 2022-01-31 RX ORDER — SODIUM CHLORIDE 9 MG/ML
25 INJECTION, SOLUTION INTRAVENOUS PRN
Status: DISCONTINUED | OUTPATIENT
Start: 2022-01-31 | End: 2022-01-31 | Stop reason: HOSPADM

## 2022-01-31 RX ORDER — NEOSTIGMINE METHYLSULFATE 5 MG/5 ML
SYRINGE (ML) INTRAVENOUS PRN
Status: DISCONTINUED | OUTPATIENT
Start: 2022-01-31 | End: 2022-01-31 | Stop reason: SDUPTHER

## 2022-01-31 RX ORDER — DIPHENHYDRAMINE HYDROCHLORIDE 50 MG/ML
12.5 INJECTION INTRAMUSCULAR; INTRAVENOUS
Status: DISCONTINUED | OUTPATIENT
Start: 2022-01-31 | End: 2022-01-31 | Stop reason: HOSPADM

## 2022-01-31 RX ORDER — ONDANSETRON 4 MG/1
4 TABLET, FILM COATED ORAL 3 TIMES DAILY PRN
Qty: 15 TABLET | Refills: 0 | Status: ON HOLD | OUTPATIENT
Start: 2022-01-31 | End: 2022-06-07

## 2022-01-31 RX ORDER — METHOCARBAMOL 750 MG/1
750 TABLET, FILM COATED ORAL 4 TIMES DAILY
Qty: 20 TABLET | Refills: 0 | Status: SHIPPED | OUTPATIENT
Start: 2022-01-31 | End: 2022-02-05

## 2022-01-31 RX ORDER — BUPIVACAINE HYDROCHLORIDE 2.5 MG/ML
INJECTION, SOLUTION EPIDURAL; INFILTRATION; INTRACAUDAL
Status: COMPLETED
Start: 2022-01-31 | End: 2022-01-31

## 2022-01-31 RX ORDER — SODIUM CHLORIDE 0.9 % (FLUSH) 0.9 %
10 SYRINGE (ML) INJECTION EVERY 12 HOURS SCHEDULED
Status: DISCONTINUED | OUTPATIENT
Start: 2022-01-31 | End: 2022-01-31 | Stop reason: HOSPADM

## 2022-01-31 RX ORDER — FENTANYL CITRATE 50 UG/ML
INJECTION, SOLUTION INTRAMUSCULAR; INTRAVENOUS
Status: COMPLETED
Start: 2022-01-31 | End: 2022-01-31

## 2022-01-31 RX ORDER — KETOROLAC TROMETHAMINE 30 MG/ML
INJECTION, SOLUTION INTRAMUSCULAR; INTRAVENOUS PRN
Status: DISCONTINUED | OUTPATIENT
Start: 2022-01-31 | End: 2022-01-31 | Stop reason: SDUPTHER

## 2022-01-31 RX ORDER — PROPOFOL 10 MG/ML
INJECTION, EMULSION INTRAVENOUS PRN
Status: DISCONTINUED | OUTPATIENT
Start: 2022-01-31 | End: 2022-01-31 | Stop reason: SDUPTHER

## 2022-01-31 RX ORDER — BUPIVACAINE HYDROCHLORIDE 2.5 MG/ML
INJECTION, SOLUTION EPIDURAL; INFILTRATION; INTRACAUDAL
Status: COMPLETED | OUTPATIENT
Start: 2022-01-31 | End: 2022-01-31

## 2022-01-31 RX ORDER — FENTANYL CITRATE 50 UG/ML
100 INJECTION, SOLUTION INTRAMUSCULAR; INTRAVENOUS ONCE
Status: CANCELLED | OUTPATIENT
Start: 2022-01-31 | End: 2022-01-31

## 2022-01-31 RX ORDER — MIDAZOLAM HYDROCHLORIDE 2 MG/2ML
1 INJECTION, SOLUTION INTRAMUSCULAR; INTRAVENOUS ONCE
Status: COMPLETED | OUTPATIENT
Start: 2022-01-31 | End: 2022-01-31

## 2022-01-31 RX ORDER — MEPERIDINE HYDROCHLORIDE 50 MG/ML
12.5 INJECTION INTRAMUSCULAR; INTRAVENOUS; SUBCUTANEOUS EVERY 5 MIN PRN
Status: DISCONTINUED | OUTPATIENT
Start: 2022-01-31 | End: 2022-01-31 | Stop reason: HOSPADM

## 2022-01-31 RX ORDER — INDOCYANINE GREEN AND WATER 25 MG
KIT INJECTION
Status: COMPLETED
Start: 2022-01-31 | End: 2022-01-31

## 2022-01-31 RX ORDER — DEXAMETHASONE SODIUM PHOSPHATE 10 MG/ML
INJECTION INTRAMUSCULAR; INTRAVENOUS PRN
Status: DISCONTINUED | OUTPATIENT
Start: 2022-01-31 | End: 2022-01-31 | Stop reason: SDUPTHER

## 2022-01-31 RX ORDER — SODIUM CHLORIDE 0.9 % (FLUSH) 0.9 %
10 SYRINGE (ML) INJECTION PRN
Status: DISCONTINUED | OUTPATIENT
Start: 2022-01-31 | End: 2022-01-31 | Stop reason: HOSPADM

## 2022-01-31 RX ORDER — MIDAZOLAM HYDROCHLORIDE 2 MG/2ML
2 INJECTION, SOLUTION INTRAMUSCULAR; INTRAVENOUS ONCE
Status: CANCELLED | OUTPATIENT
Start: 2022-01-31 | End: 2022-01-31

## 2022-01-31 RX ORDER — INDOCYANINE GREEN AND WATER 25 MG
5 KIT INJECTION ONCE
Status: COMPLETED | OUTPATIENT
Start: 2022-01-31 | End: 2022-01-31

## 2022-01-31 RX ADMIN — FENTANYL CITRATE 50 MCG: 50 INJECTION, SOLUTION INTRAMUSCULAR; INTRAVENOUS at 13:50

## 2022-01-31 RX ADMIN — SUGAMMADEX 200 MG: 100 INJECTION, SOLUTION INTRAVENOUS at 13:42

## 2022-01-31 RX ADMIN — MIDAZOLAM HYDROCHLORIDE 2 MG: 1 INJECTION, SOLUTION INTRAMUSCULAR; INTRAVENOUS at 11:48

## 2022-01-31 RX ADMIN — MIDAZOLAM HYDROCHLORIDE 2 MG: 2 INJECTION, SOLUTION INTRAMUSCULAR; INTRAVENOUS at 11:30

## 2022-01-31 RX ADMIN — DEXAMETHASONE SODIUM PHOSPHATE 5 MG: 10 INJECTION INTRAMUSCULAR; INTRAVENOUS at 12:00

## 2022-01-31 RX ADMIN — FENTANYL CITRATE 50 MCG: 50 INJECTION, SOLUTION INTRAMUSCULAR; INTRAVENOUS at 13:00

## 2022-01-31 RX ADMIN — Medication 3 MG: at 13:20

## 2022-01-31 RX ADMIN — ONDANSETRON 4 MG: 2 INJECTION INTRAMUSCULAR; INTRAVENOUS at 13:20

## 2022-01-31 RX ADMIN — SODIUM CHLORIDE, POTASSIUM CHLORIDE, SODIUM LACTATE AND CALCIUM CHLORIDE: 600; 310; 30; 20 INJECTION, SOLUTION INTRAVENOUS at 10:18

## 2022-01-31 RX ADMIN — INDOCYANINE GREEN AND WATER 5 MG: KIT at 11:43

## 2022-01-31 RX ADMIN — SODIUM CHLORIDE, POTASSIUM CHLORIDE, SODIUM LACTATE AND CALCIUM CHLORIDE: 600; 310; 30; 20 INJECTION, SOLUTION INTRAVENOUS at 11:51

## 2022-01-31 RX ADMIN — HYDROMORPHONE HYDROCHLORIDE 0.5 MG: 1 INJECTION, SOLUTION INTRAMUSCULAR; INTRAVENOUS; SUBCUTANEOUS at 14:10

## 2022-01-31 RX ADMIN — BUPIVACAINE 20 ML: 13.3 INJECTION, SUSPENSION, LIPOSOMAL INFILTRATION at 11:37

## 2022-01-31 RX ADMIN — FENTANYL CITRATE 100 MCG: 50 INJECTION, SOLUTION INTRAMUSCULAR; INTRAVENOUS at 11:51

## 2022-01-31 RX ADMIN — PROPOFOL 150 MG: 10 INJECTION, EMULSION INTRAVENOUS at 11:51

## 2022-01-31 RX ADMIN — FENTANYL CITRATE 100 MCG: 50 INJECTION, SOLUTION INTRAMUSCULAR; INTRAVENOUS at 11:31

## 2022-01-31 RX ADMIN — Medication 0.5 MG: at 14:10

## 2022-01-31 RX ADMIN — ROCURONIUM BROMIDE 50 MG: 10 INJECTION INTRAVENOUS at 11:51

## 2022-01-31 RX ADMIN — FENTANYL CITRATE 50 MCG: 50 INJECTION, SOLUTION INTRAMUSCULAR; INTRAVENOUS at 12:22

## 2022-01-31 RX ADMIN — CEFAZOLIN SODIUM 2000 MG: 10 INJECTION, POWDER, FOR SOLUTION INTRAVENOUS at 12:03

## 2022-01-31 RX ADMIN — FENTANYL CITRATE 25 MCG: 50 INJECTION, SOLUTION INTRAMUSCULAR; INTRAVENOUS at 13:30

## 2022-01-31 RX ADMIN — KETOROLAC TROMETHAMINE 30 MG: 30 INJECTION, SOLUTION INTRAMUSCULAR at 13:31

## 2022-01-31 RX ADMIN — BUPIVACAINE HYDROCHLORIDE 60 ML: 2.5 INJECTION, SOLUTION EPIDURAL; INFILTRATION; INTRACAUDAL; PERINEURAL at 11:37

## 2022-01-31 RX ADMIN — LIDOCAINE HYDROCHLORIDE 20 MG: 10 INJECTION, SOLUTION EPIDURAL; INFILTRATION; INTRACAUDAL; PERINEURAL at 11:51

## 2022-01-31 RX ADMIN — Medication 0.4 MG: at 13:20

## 2022-01-31 RX ADMIN — FENTANYL CITRATE 25 MCG: 50 INJECTION, SOLUTION INTRAMUSCULAR; INTRAVENOUS at 13:28

## 2022-01-31 RX ADMIN — MIDAZOLAM 2 MG: 1 INJECTION INTRAMUSCULAR; INTRAVENOUS at 11:30

## 2022-01-31 ASSESSMENT — PULMONARY FUNCTION TESTS
PIF_VALUE: 32
PIF_VALUE: 27
PIF_VALUE: 3
PIF_VALUE: 1
PIF_VALUE: 30
PIF_VALUE: 13
PIF_VALUE: 25
PIF_VALUE: 25
PIF_VALUE: 4
PIF_VALUE: 32
PIF_VALUE: 30
PIF_VALUE: 29
PIF_VALUE: 33
PIF_VALUE: 31
PIF_VALUE: 0
PIF_VALUE: 1
PIF_VALUE: 30
PIF_VALUE: 35
PIF_VALUE: 29
PIF_VALUE: 31
PIF_VALUE: 35
PIF_VALUE: 30
PIF_VALUE: 4
PIF_VALUE: 3
PIF_VALUE: 34
PIF_VALUE: 32
PIF_VALUE: 17
PIF_VALUE: 37
PIF_VALUE: 31
PIF_VALUE: 30
PIF_VALUE: 29
PIF_VALUE: 25
PIF_VALUE: 5
PIF_VALUE: 31
PIF_VALUE: 29
PIF_VALUE: 27
PIF_VALUE: 41
PIF_VALUE: 25
PIF_VALUE: 33
PIF_VALUE: 3
PIF_VALUE: 33
PIF_VALUE: 7
PIF_VALUE: 32
PIF_VALUE: 32
PIF_VALUE: 31
PIF_VALUE: 30
PIF_VALUE: 31
PIF_VALUE: 29
PIF_VALUE: 34
PIF_VALUE: 33
PIF_VALUE: 25
PIF_VALUE: 10
PIF_VALUE: 25
PIF_VALUE: 3
PIF_VALUE: 32
PIF_VALUE: 30
PIF_VALUE: 30
PIF_VALUE: 31
PIF_VALUE: 29
PIF_VALUE: 31
PIF_VALUE: 15
PIF_VALUE: 37
PIF_VALUE: 32
PIF_VALUE: 30
PIF_VALUE: 30
PIF_VALUE: 34
PIF_VALUE: 30
PIF_VALUE: 25
PIF_VALUE: 30
PIF_VALUE: 36
PIF_VALUE: 18
PIF_VALUE: 36
PIF_VALUE: 30
PIF_VALUE: 25
PIF_VALUE: 32
PIF_VALUE: 25
PIF_VALUE: 2
PIF_VALUE: 13
PIF_VALUE: 35
PIF_VALUE: 8
PIF_VALUE: 30
PIF_VALUE: 3
PIF_VALUE: 24
PIF_VALUE: 25
PIF_VALUE: 31
PIF_VALUE: 36
PIF_VALUE: 31
PIF_VALUE: 30
PIF_VALUE: 25
PIF_VALUE: 1
PIF_VALUE: 29
PIF_VALUE: 1
PIF_VALUE: 35
PIF_VALUE: 30
PIF_VALUE: 31
PIF_VALUE: 30
PIF_VALUE: 25
PIF_VALUE: 30
PIF_VALUE: 31
PIF_VALUE: 25
PIF_VALUE: 30
PIF_VALUE: 32
PIF_VALUE: 25
PIF_VALUE: 30
PIF_VALUE: 28
PIF_VALUE: 33
PIF_VALUE: 4
PIF_VALUE: 1
PIF_VALUE: 1
PIF_VALUE: 4
PIF_VALUE: 35
PIF_VALUE: 30
PIF_VALUE: 30
PIF_VALUE: 25
PIF_VALUE: 25
PIF_VALUE: 3
PIF_VALUE: 29
PIF_VALUE: 25

## 2022-01-31 ASSESSMENT — PAIN SCALES - GENERAL
PAINLEVEL_OUTOF10: 3
PAINLEVEL_OUTOF10: 4
PAINLEVEL_OUTOF10: 3
PAINLEVEL_OUTOF10: 2
PAINLEVEL_OUTOF10: 4
PAINLEVEL_OUTOF10: 7
PAINLEVEL_OUTOF10: 0
PAINLEVEL_OUTOF10: 3
PAINLEVEL_OUTOF10: 7

## 2022-01-31 ASSESSMENT — PAIN DESCRIPTION - DESCRIPTORS: DESCRIPTORS: ACHING

## 2022-01-31 ASSESSMENT — PAIN - FUNCTIONAL ASSESSMENT
PAIN_FUNCTIONAL_ASSESSMENT: 0-10
PAIN_FUNCTIONAL_ASSESSMENT: ACTIVITIES ARE NOT PREVENTED

## 2022-01-31 NOTE — H&P
Update History & Physical    The patient's History and Physical of January 27, 2022 was reviewed with the patient and I examined the patient. There was no change. The surgical site was confirmed by the patient and me. Plan: The risks, benefits, expected outcome, and alternative to the recommended procedure have been discussed with the patient. Patient understands and wants to proceed with the procedure. Electronically signed by Amie Bruno DO on 1/31/2022 at 10:39 AM                                                             Chief Complaint   Patient presents with    Abdominal Pain         HxCC:  35 y/o female presents for evaluation of abdominal pain. Patient states pain has been present for at least a year. Describes epigastric pressure and bloating in addition to sharp right upper quadrant pain. Symptoms with associated nausea. Pain worse with eating fatty foods. Pain radiates to mid back and right shoulder. Patient with irregular bowel movements since symptoms started. Patient with hiatal hernia and EOE. Symptoms are different than hx of esophagitis and reflux.   Denies fevers, chills, or sweats.           Vitals:     01/27/22 0935   Pulse: 74   Resp: 18   SpO2: 98%             Patient Active Problem List   Diagnosis    Severe bipolar I disorder, current or most recent episode depressed ([address]    Other seasonal allergic rhinitis    Suicidal ideation    Depression with suicidal ideation    Exercise-induced asthma    Heartburn    Dysphagia    Major depressive disorder, recurrent ([address]    Schizoaffective disorder, bipolar type ([address]    Class 2 obesity without serious comorbidity with body mass index (BMI) of 39.0 to 39.9 in adult    Eosinophilic esophagitis    Shortness of breath    Acute bilateral low back pain without sciatica         Current Facility-Administered Medications          Current Outpatient Medications   Medication Sig Dispense Refill    lurasidone (LATUDA) 20 MG TABS tablet Take 20 mg by mouth daily        ondansetron (ZOFRAN-ODT) 4 MG disintegrating tablet Take 1 tablet by mouth 3 times daily as needed for Nausea or Vomiting 21 tablet 0    famotidine (PEPCID) 20 MG tablet Take 1 tablet by mouth 2 times daily 60 tablet 3    albuterol sulfate HFA (PROVENTIL HFA) 108 (90 Base) MCG/ACT inhaler Inhale 1-2 puffs into the lungs every 4 hours as needed for Wheezing or Shortness of Breath (Space out to every 6 hours as symptoms improve) Space out to every 6 hours as symptoms improve. 1 each 3    trihexyphenidyl (ARTANE) 5 MG tablet Take 5 mg by mouth daily           No current facility-administered medications for this visit.                  Allergies   Allergen Reactions    Adhesive Tape Hives       Blisters and hives    Doxycycline         Tongue itch, throat began to swell    Pollen Extract Itching       Grass    Seroquel [Quetiapine Fumarate] Other (See Comments)       Paranoid made mood worse    Symbicort [Budesonide-Formoterol Fumarate] Other (See Comments)       Delusional, paranoid, hallucinations.  Topamax [Topiramate] Other (See Comments)       Numbness in legs    Gabapentin Anxiety         Past Medical History        Past Medical History:   Diagnosis Date    Asthma      Back pain      Chicken pox      Dysphagia      H/O echocardiogram 05/18/2018     EF 60%    History of Holter monitoring 04/27/2018     Baseline ECG shows sinus rhythm. Holter showed sinus tachy 61% of the time. Rare premature ventricular complexes were recorded. No episodes of superventricuarl or ventriuclar tacycardial was seen. Pt reported episodes of SOB and speedy heart with monitroing showing sinus tachy.      Lordosis      Pneumonia      PTSD (post-traumatic stress disorder)      Schizophrenic disorder (HCC)      Tachycardia, unspecified      UTI (urinary tract infection)              Past Surgical History         Past Surgical History:   Procedure Laterality Date    NECK SURGERY Left lesion biopsy    UPPER GASTROINTESTINAL ENDOSCOPY         chicken stuck in throat    UPPER GASTROINTESTINAL ENDOSCOPY N/A 1/14/2020     -bx(eosinophilic esophagitis,neg H-Pylori)    WISDOM TOOTH EXTRACTION                Family History         Family History   Problem Relation Age of Onset    Asthma Mother      Cancer Mother      High Blood Pressure Father      Diabetes Father              Social History               Socioeconomic History    Marital status: Single       Spouse name: Not on file    Number of children: 0    Years of education: 25    Highest education level: Bachelor's degree (e.g., BA, AB, BS)   Occupational History    Occupation: Disability   Tobacco Use    Smoking status: Never Smoker    Smokeless tobacco: Never Used   Vaping Use    Vaping Use: Never used   Substance and Sexual Activity    Alcohol use: No       Comment: Rare    Drug use: No    Sexual activity: Yes       Partners: Male   Other Topics Concern    Not on file   Social History Narrative    Not on file      Social Determinants of Health          Financial Resource Strain: Low Risk     Difficulty of Paying Living Expenses: Not hard at all   Food Insecurity: No Food Insecurity    Worried About Running Out of Food in the Last Year: Never true    [address] in the Last Year: Never true   Transportation Needs:     Lack of Transportation (Medical): Not on file    Lack of Transportation (Non-Medical):  Not on file   Physical Activity:     Days of Exercise per Week: Not on file    Minutes of Exercise per Session: Not on file   Stress:     Feeling of Stress : Not on file   Social Connections:     Frequency of Communication with Friends and Family: Not on file    Frequency of Social Gatherings with Friends and Family: Not on file    Attends Mandaen Services: Not on file    Active Member of Clubs or Organizations: Not on file    Attends Club or Organization Meetings: Not on file   Vicente Fajardo Marital Status: Not on file   Intimate Partner Violence:     Fear of Current or Ex-Partner: Not on file    Emotionally Abused: Not on file    Physically Abused: Not on file    Sexually Abused: Not on file   Housing Stability:     Unable to Pay for Housing in the Last Year: Not on file    Number of [place] in the Last Year: Not on file    Unstable Housing in the Last Year: Not on file            Review of Systems:  14 systems were reviewed. Positives noted above. Remainder are negative per CMS guidelines.       Exam  General: AAOx3, NAD  Head: atraumatic normocephalic  Neck: trachea midline. No masses or lymphadenopathy  Heart: RRR with no murmurs  Lungs: BCTA  Abdomen: soft and nondistended. Mild right upper quadrant tenderness but no guarding, no rebound, and no rigidity. Ext: motor 5/5 all extremities with no gross deformities     Impression:    Diagnosis Orders   1. Calculus of gallbladder without cholecystitis without obstruction            Plan:  Patient with biliary colic secondary to gallbladder sludge. Recommend cholecystectomy. Patient informed of the risks of surgery which include but not limited to bleeding, infection, bile leak, common bile duct injury, need for additional procedures, need for open operation, chronic pain, and risks of anesthesia.   Patient understood risks and signed informed consent for laparoscopic robotic assisted cholecystectomy under general anesthesia.       Electronically signed by Lesley Akins IV, DO on 1/27/22 at 9:41 AM EST

## 2022-01-31 NOTE — ANESTHESIA POSTPROCEDURE EVALUATION
Department of Anesthesiology  Postprocedure Note    Patient: Carlos Hoang  MRN: [id number]  YOB: 1990  Date of evaluation: 1/31/2022  Time:  1:54 PM     Procedure Summary     Date: 01/31/22 Room / Location: [facility] [address] [facility] / [address] [facility]    Anesthesia Start: 9435 Anesthesia Stop: 4327    Procedure: CHOLECYSTECTOMY LAPAROSCOPIC ROBOTIC WITH FIREFLY (N/A Abdomen) Diagnosis: (CALCULUS OF GALLBLADDER)    Surgeons: Laura Akins IV, DO Responsible Provider: Jessica Portillo MD    Anesthesia Type: general, regional ASA Status: 2          Anesthesia Type: general, regional    Ceci Phase I: Ceci Score: 10    Ceci Phase II:      Last vitals: Reviewed and per EMR flowsheets.        Anesthesia Post Evaluation    Patient location during evaluation: PACU  Patient participation: complete - patient participated  Level of consciousness: awake and alert  Airway patency: patent  Nausea & Vomiting: no nausea and no vomiting  Complications: no  Cardiovascular status: hemodynamically stable  Respiratory status: oral airway, face mask and spontaneous ventilation  Hydration status: euvolemic  Multimodal analgesia pain management approach

## 2022-01-31 NOTE — ANESTHESIA PRE PROCEDURE
Department of Anesthesiology  Preprocedure Note       Name:  Hiram Gutierrez   Age:  36 y.o.  :  1990                                          MRN:  [id number]         Date:  2022      Surgeon: Shira Landers):  Radha Akins IV, DO    Procedure: Procedure(s):  CHOLECYSTECTOMY LAPAROSCOPIC ROBOTIC WITH FIREFLY    Medications prior to admission:   Prior to Admission medications    Medication Sig Start Date End Date Taking? Authorizing Provider   oxyCODONE (ROXICODONE) 5 MG immediate release tablet Take 1 tablet by mouth every 6 hours as needed for Pain for up to 5 days. Intended supply: 5 days. Take lowest dose possible to manage pain 22 Yes Rollo Roughen, DO   methocarbamol (ROBAXIN-750) 750 MG tablet Take 1 tablet by mouth 4 times daily for 5 days 22 Yes Rollo Roughen, DO   senna (SENOKOT) 8.6 MG tablet Take 1 tablet by mouth 2 times daily 22 Yes Rollo Roughen, DO   ondansetron (ZOFRAN) 4 MG tablet Take 1 tablet by mouth 3 times daily as needed for Nausea or Vomiting 22  Yes Rollo Roughen, DO   lurasidone (LATUDA) 20 MG TABS tablet Take 20 mg by mouth daily 22  Yes Historical Provider, MD   ondansetron (ZOFRAN-ODT) 4 MG disintegrating tablet Take 1 tablet by mouth 3 times daily as needed for Nausea or Vomiting 22  Yes Anton Hendrickson MD   famotidine (PEPCID) 20 MG tablet Take 1 tablet by mouth 2 times daily 21  Yes OSKAR Casanova NP   trihexyphenidyl (ARTANE) 5 MG tablet Take 5 mg by mouth daily  20  Yes Historical Provider, MD   albuterol sulfate HFA (PROVENTIL HFA) 108 (90 Base) MCG/ACT inhaler Inhale 1-2 puffs into the lungs every 4 hours as needed for Wheezing or Shortness of Breath (Space out to every 6 hours as symptoms improve) Space out to every 6 hours as symptoms improve.  21   OSKAR Cabello CNP       Current medications:    Current Facility-Administered Medications   Medication Dose Route Frequency Provider Last Rate Last Admin    0.9 % sodium chloride infusion   IntraVENous Continuous Blayne Leon MD        lactated ringers infusion   IntraVENous Continuous Blayne Leon  mL/hr at 01/31/22 1018 New Bag at 01/31/22 1018    sodium chloride flush 0.9 % injection 10 mL  10 mL IntraVENous 2 times per day Blayne Leon MD        sodium chloride flush 0.9 % injection 10 mL  10 mL IntraVENous PRN Blayne Leon MD        0.9 % sodium chloride infusion  25 mL IntraVENous PRN Blayne Leon MD        ceFAZolin (ANCEF) 2000 mg in dextrose 5 % 50 mL IVPB  2,000 mg IntraVENous Once [employer] IV, DO        indocyanine green (IC-GREEN) syringe 5 mg  5 mg IntraVENous Once [employer] IV, DO        indocyanine green (IC-GREEN) 25 MG syringe             midazolam (VERSED) 2 MG/2ML injection             ceFAZolin (ANCEF) IVPB             bupivacaine liposome (EXPAREL) 1.3 % injection             fentaNYL (SUBLIMAZE) 100 MCG/2ML injection             bupivacaine (PF) (MARCAINE) 0.25 % injection                Allergies: Allergies   Allergen Reactions    Adhesive Tape Hives     Blisters and hives    Doxycycline      Tongue itch, throat began to swell    Pollen Extract Itching     Grass    Seroquel [Quetiapine Fumarate] Other (See Comments)     Paranoid made mood worse    Symbicort [Budesonide-Formoterol Fumarate] Other (See Comments)     Delusional, paranoid, hallucinations.  Topamax [Topiramate] Other (See Comments)     Numbness in legs    Gabapentin Anxiety       Problem List:    Patient Active Problem List   Diagnosis Code    Severe bipolar I disorder, current or most recent episode depressed ([facility] F31.4    Other seasonal allergic rhinitis J30.2    Suicidal ideation R45.851    Depression with suicidal ideation F32. A, R45.851    Exercise-induced asthma J45.990    Heartburn R12    Dysphagia R13.10    Major depressive disorder, recurrent (HCC) F33.9    Schizoaffective disorder, bipolar type ([facility] F25.0    Class 2 obesity without serious comorbidity with body mass index (BMI) of 39.0 to 39.9 in adult E66.9, J00.94    Eosinophilic esophagitis B85.4    Shortness of breath R06.02    Acute bilateral low back pain without sciatica M54.50       Past Medical History:        Diagnosis Date    Asthma     Back pain     Chicken pox     Dysphagia     H/O echocardiogram 05/18/2018    EF 60%    History of Holter monitoring 04/27/2018    Baseline ECG shows sinus rhythm. Holter showed sinus tachy 61% of the time. Rare premature ventricular complexes were recorded. No episodes of superventricuarl or ventriuclar tacycardial was seen. Pt reported episodes of SOB and speedy heart with monitroing showing sinus tachy.      Lordosis     Pneumonia     PTSD (post-traumatic stress disorder)     Schizophrenic disorder (HCC)     Tachycardia, unspecified     UTI (urinary tract infection)        Past Surgical History:        Procedure Laterality Date    NECK SURGERY Left lesion biopsy    UPPER GASTROINTESTINAL ENDOSCOPY      chicken stuck in throat    UPPER GASTROINTESTINAL ENDOSCOPY N/A 1/14/2020    -bx(eosinophilic esophagitis,neg H-Pylori)   [place] WISDOM TOOTH EXTRACTION         Social History:    Social History     Tobacco Use    Smoking status: Never Smoker    Smokeless tobacco: Never Used   Substance Use Topics    Alcohol use: Not Currently     Comment: Rare                                Counseling given: Not Answered      Vital Signs (Current):   Vitals:    01/31/22 1012   BP: 107/72   Pulse: 86   Resp: 16   Temp: 97.7 °F (36.5 °C)   TempSrc: Infrared   SpO2: 97%   Weight: 219 lb (99.3 kg)   Height: 5' 3\" (1.6 m)                                              BP Readings from Last 3 Encounters:   01/31/22 107/72   01/20/22 110/68   12/14/21 120/74       NPO Status: Time of last liquid consumption: 1900                        Time of last solid consumption: 1900                        Date of last liquid consumption: 01/30/22                        Date of last solid food consumption: 01/30/22    BMI:   Wt Readings from Last 3 Encounters:   01/31/22 219 lb (99.3 kg)   01/27/22 213 lb (96.6 kg)   01/20/22 213 lb (96.6 kg)     Body mass index is 38.79 kg/m². CBC:   Lab Results   Component Value Date    WBC 10.3 01/20/2022    RBC 4.58 01/20/2022    HGB 13.5 01/20/2022    HCT 38.9 01/20/2022    MCV 84.9 01/20/2022    RDW 13.4 01/20/2022     01/20/2022       CMP:   Lab Results   Component Value Date     01/20/2022    K 3.9 01/20/2022     01/20/2022    CO2 21 01/20/2022    BUN 8 01/20/2022    CREATININE 0.51 01/20/2022    GFRAA >60 01/20/2022    LABGLOM >60 01/20/2022    GLUCOSE 102 01/20/2022    PROT 7.0 01/20/2022    CALCIUM 9.0 01/20/2022    BILITOT 0.27 01/20/2022    ALKPHOS 73 01/20/2022    AST 13 01/20/2022    ALT 18 01/20/2022       POC Tests: No results for input(s): POCGLU, POCNA, POCK, POCCL, POCBUN, POCHEMO, POCHCT in the last 72 hours.     Coags:   Lab Results   Component Value Date    PROTIME 10.4 09/04/2017    INR 1.0 09/04/2017       HCG (If Applicable):   Lab Results   Component Value Date    PREGTESTUR NEGATIVE 01/26/2021    HCG NEGATIVE 01/31/2022    HCGQUANT <1 08/07/2015        ABGs: No results found for: PHART, PO2ART, WIM9POM, FHH7NOL, BEART, P0IPKGCV     Type & Screen (If Applicable):  No results found for: LABABO, LABRH    Drug/Infectious Status (If Applicable):  Lab Results   Component Value Date    HEPCAB NONREACTIVE 05/25/2016       COVID-19 Screening (If Applicable): No results found for: COVID19        Anesthesia Evaluation  Patient summary reviewed and Nursing notes reviewed  Airway: Mallampati: II  TM distance: >3 FB   Neck ROM: full  Mouth opening: > = 3 FB Dental:      Comment: -MISSING SOME LOWER TEETH BILATERALLY    Pulmonary:normal exam    (+) asthma:                           ROS comment: -ASTHMA WELL CONTROLLED   Cardiovascular:Negative CV ROS Neuro/Psych:                ROS comment: -NUMBNESS TINGLING BILATERAL HANDS GI/Hepatic/Renal:   (+) GERD: poorly controlled, morbid obesity          Endo/Other: Negative Endo/Other ROS                     ROS comment: -NPO AFTER MIDNIGHT  -ALLERGIES - SEE Epic  -STEROID USE IN PAST YEAR FOR URI Abdominal:             Vascular: negative vascular ROS. Other Findings:             Anesthesia Plan      general and regional     ASA 2     (GETA, TAP BLOCK)  Induction: intravenous. MIPS: Postoperative opioids intended and Prophylactic antiemetics administered. Anesthetic plan and risks discussed with patient. Plan discussed with CRNA.     Attending anesthesiologist reviewed and agrees with Caryle July, MD   1/31/2022

## 2022-01-31 NOTE — ANESTHESIA PROCEDURE NOTES
Peripheral Block    Patient location during procedure: pre-op  Start time: 1/31/2022 11:31 AM  End time: 1/31/2022 11:37 AM  Staffing  Performed: anesthesiologist   Anesthesiologist: Eliu Em MD  Preanesthetic Checklist  Completed: patient identified, IV checked, site marked, risks and benefits discussed, surgical consent, monitors and equipment checked, pre-op evaluation, timeout performed, anesthesia consent given, oxygen available and patient being monitored  Peripheral Block  Patient position: supine  Prep: ChloraPrep  Patient monitoring: cardiac monitor, continuous pulse ox and frequent blood pressure checks  Block type: TAP  Laterality: bilateral  Injection technique: single-shot  Guidance: ultrasound guided  Provider prep: mask and sterile gloves  Needle  Needle type: combined needle/nerve stimulator   Needle gauge: 20 G  Needle length: 4 IN.   Needle localization: anatomical landmarks and ultrasound guidance  Assessment  Injection assessment: negative aspiration for heme, no paresthesia on injection and local visualized surrounding nerve on ultrasound  Paresthesia pain: none  Slow fractionated injection: yes  Hemodynamics: stable  Medications Administered  Bupivacaine liposome (EXPAREL) injection 1.3%, 20 mL  bupivacaine (MARCAINE) PF injection 0.25%, 60 mL  Reason for block: post-op pain management and at surgeon's request

## 2022-01-31 NOTE — OP NOTE
Operative Note      Patient: Carlos Hoang  YOB: 1990  MRN: [id number]    Date of Procedure: 1/31/2022    Pre-Op Diagnosis: CALCULUS OF GALLBLADDER    Post-Op Diagnosis:   1. Symptomatic cholelithiasis  2. Chronic cholecystitis       Procedure(s):  CHOLECYSTECTOMY LAPAROSCOPIC ROBOTIC WITH FIREFLY    Surgeon(s):  Yesy Lima IV DO    Assistant:   Resident: Andria Villalta DO    Anesthesia: General    Estimated Blood Loss (mL): Minimal    Complications: None    Specimens:   ID Type Source Tests Collected by Time Destination   A : GALLBLADDER AND CONTENTS Tissue Gallbladder SURGICAL PATHOLOGY Laura Kirk Tashi TRUJILLO, DO 1/31/2022 1304        Implants:  * No implants in log *      Drains: * No LDAs found *    Findings: distended gallbladder with thickened nonedematous wall    Indications for Surgery:  35 y/o female presents with symptoms of biliary colic from gallbladder sludge. Patient taken electively to surgery. Patient informed of the risks of surgery which include but not limited to bleeding, infection, bile leak, common bile duct injury, need for additional procedures, need for open operation, chronic pain, and risks of anesthesia. Patient understood risks and signed informed consent for laparoscopic robotic assisted cholecystectomy under general anesthesia. DESCRIPTION OF PROCEDURE:  The patient was brought to the operative suite and placed in the supine position. A time out was made to verify correct patient, site of procedure and procedure type. A dose of antibiotics were given according to SCIP protocol. EPC cuffs were placed for DVT prophylaxis. A Rudi Hugger was placed to keep the patient euthermic. General anesthesia was given and the patient was intubated with an ET tube. The patient's abdomen was prepped and draped in sterile fashion. Entrance was gained into the abdomen with a Veress needle placed at Graham's point in the left upper quadrant.   A saline drop test was used to confirm entrance into the abdomen. The abdomen was insufflated with carbon dioxide to a pressure of 15 mmHg. A 12 mm Optiview port was then placed into the left upper quadrant. A camera was then placed and the abdomen was inspected to identify any injuries during placement of the left upper quadrant port. No injuries were noted. An 8 mm camera port was placed supraumbilical.  A second 8mm port was placed 10 cm to the right lateral to the supraumbilical port. An assistant port was then placed 10 cm lateral to the right side instrument port. This was another 8 mm port. The patient was placed in reverse Trendelenburg position with the right side up. The [employer] machine was then docked and a camera placed without complication. A monopolar hook was then placed in the right arm and a fenestrated bipolar grasper was placed in the left arm. Filmy adhesions between the gallbladder and the omentum were lysed carefully with electrocautery. The fundus of the gallbladder was grasped and directed towards the patient's right shoulder. The gallbladder was distended with a nonedematous wall. The infundibulum of the gallbladder was grasped with a bipolar grasper. The gallbladder was positioned so that the cystic duct was at a right angle to the common bile duct in order to expose Calot's triangle. The peritoneum between the gallbladder and the liver was taken down by electrocautery to further mobilize the triangle. The fibrous tissue was hooked with meticulous electrocautery. Hook electrocautery was used to identify the cystic duct. The cystic duct was skeletonized with electrocautery. Dissection was continued to locate the cystic artery. The cystic artery was skeletonized with electrocautery. Anterior and posterior branches of the cystic artery were identified traveling into the gallbladder. Critical view was obtained in the anterior and posterior window.   All fat and fibrous tissue was dissected from the cystohepatic triangle with careful hook electrocautery. The cystic plate of the liver was skeletonized with hook electrocautery. Both the cystic duct and cystic artery branches were the only structures visualized entering into the gallbladder. Firefly was used to confirm identification of the cystic duct. The critical view of safety was confirmed by myself and the surgical resident. The cystic duct was ligated with double Hemoclips, and then divided with Endoshears. The cystic artery branches were ligated with a Hemoclip and divided with Endoshears. The gallbladder was dissected from the liver bed with electrocautery. The gallbladder was then placed into the Endopouch and delivered from the body at the left upper quadrant incision. The bed was then inspected. Hemostasis was achieved with electrocautery. The cystic duct and artery stumps were identified. All clips remained on both the cystic artery and the cystic duct stump. No sign of bile leak or bleeding from the duct or artery stump. The Pink Lulas was undocked from the patient and the abdomen was irrigated with normal saline until clear fluid was returned. The camera and the left upper quadrant port were removed from the abdomen. The left upper quadrant fascia was closed with an 0 Vicryl suture with a needle nose suture passer and a Manish-Minnie needle. Working ports were removed. All incisions were closed with simple subcuticular 4-0 Monocryl sutures. Incisions were cleaned and dressed with Dermabond. All sponge, needle and instrument counts were correct prior to closure and the patient tolerated the procedure well without any apparent complication. The patient was extubated and then taken to recovery in stable condition.     Electronically signed by Elizabet Akins IV, DO on 1/31/2022 at 1:23 PM

## 2022-02-02 LAB — SURGICAL PATHOLOGY REPORT: NORMAL

## 2022-02-03 ENCOUNTER — APPOINTMENT (OUTPATIENT)
Dept: CT IMAGING | Age: 32
End: 2022-02-03
Payer: MEDICARE

## 2022-02-03 ENCOUNTER — HOSPITAL ENCOUNTER (EMERGENCY)
Age: 32
Discharge: HOME | End: 2022-02-03
Attending: EMERGENCY MEDICINE
Payer: MEDICARE

## 2022-02-03 VITALS
HEART RATE: 88 BPM | BODY MASS INDEX: 37.92 KG/M2 | SYSTOLIC BLOOD PRESSURE: 110 MMHG | HEIGHT: 63 IN | OXYGEN SATURATION: 96 % | RESPIRATION RATE: 14 BRPM | DIASTOLIC BLOOD PRESSURE: 81 MMHG | TEMPERATURE: 97.6 F | WEIGHT: 214 LBS

## 2022-02-03 DIAGNOSIS — K59.00 CONSTIPATION, UNSPECIFIED CONSTIPATION TYPE: Primary | ICD-10-CM

## 2022-02-03 LAB
-: ABNORMAL
ABSOLUTE EOS #: 0.5 K/UL (ref 0–0.4)
ABSOLUTE IMMATURE GRANULOCYTE: ABNORMAL K/UL (ref 0–0.3)
ABSOLUTE LYMPH #: 2 K/UL (ref 1–4.8)
ABSOLUTE MONO #: 0.6 K/UL (ref 0.1–1.2)
ALBUMIN SERPL-MCNC: 4.2 G/DL (ref 3.5–5.2)
ALBUMIN/GLOBULIN RATIO: 1.3 (ref 1–2.5)
ALP BLD-CCNC: 82 U/L (ref 35–104)
ALT SERPL-CCNC: 56 U/L (ref 5–33)
AMORPHOUS: ABNORMAL
AMYLASE: 41 U/L (ref 28–100)
ANION GAP SERPL CALCULATED.3IONS-SCNC: 11 MMOL/L (ref 9–17)
AST SERPL-CCNC: 61 U/L
BACTERIA: ABNORMAL
BASOPHILS # BLD: 1 % (ref 0–2)
BASOPHILS ABSOLUTE: 0.1 K/UL (ref 0–0.2)
BILIRUB SERPL-MCNC: 0.41 MG/DL (ref 0.3–1.2)
BILIRUBIN URINE: NEGATIVE
BUN BLDV-MCNC: 7 MG/DL (ref 6–20)
BUN/CREAT BLD: ABNORMAL (ref 9–20)
CALCIUM SERPL-MCNC: 9.5 MG/DL (ref 8.6–10.4)
CASTS UA: ABNORMAL /LPF
CHLORIDE BLD-SCNC: 99 MMOL/L (ref 98–107)
CO2: 24 MMOL/L (ref 20–31)
COLOR: YELLOW
COMMENT UA: ABNORMAL
CREAT SERPL-MCNC: 0.5 MG/DL (ref 0.5–0.9)
CRYSTALS, UA: ABNORMAL /HPF
DIFFERENTIAL TYPE: ABNORMAL
EOSINOPHILS RELATIVE PERCENT: 6 % (ref 1–4)
EPITHELIAL CELLS UA: ABNORMAL /HPF (ref 0–5)
GFR AFRICAN AMERICAN: >60 ML/MIN
GFR NON-AFRICAN AMERICAN: >60 ML/MIN
GFR SERPL CREATININE-BSD FRML MDRD: ABNORMAL ML/MIN/{1.73_M2}
GFR SERPL CREATININE-BSD FRML MDRD: ABNORMAL ML/MIN/{1.73_M2}
GLUCOSE BLD-MCNC: 92 MG/DL (ref 70–99)
GLUCOSE URINE: NEGATIVE
HCT VFR BLD CALC: 41.2 % (ref 36–46)
HEMOGLOBIN: 14.2 G/DL (ref 12–16)
IMMATURE GRANULOCYTES: ABNORMAL %
KETONES, URINE: NEGATIVE
LEUKOCYTE ESTERASE, URINE: ABNORMAL
LIPASE: 19 U/L (ref 13–60)
LYMPHOCYTES # BLD: 25 % (ref 24–44)
MCH RBC QN AUTO: 29.5 PG (ref 26–34)
MCHC RBC AUTO-ENTMCNC: 34.4 G/DL (ref 31–37)
MCV RBC AUTO: 85.6 FL (ref 80–100)
MONOCYTES # BLD: 8 % (ref 2–11)
MUCUS: ABNORMAL
NITRITE, URINE: NEGATIVE
NRBC AUTOMATED: ABNORMAL PER 100 WBC
OTHER OBSERVATIONS UA: ABNORMAL
PDW BLD-RTO: 13.3 % (ref 12.5–15.4)
PH UA: 7 (ref 5–8)
PLATELET # BLD: 389 K/UL (ref 140–450)
PLATELET ESTIMATE: ABNORMAL
PMV BLD AUTO: 7.4 FL (ref 6–12)
POTASSIUM SERPL-SCNC: 4.6 MMOL/L (ref 3.7–5.3)
PROTEIN UA: NEGATIVE
RBC # BLD: 4.81 M/UL (ref 4–5.2)
RBC # BLD: ABNORMAL 10*6/UL
RBC UA: ABNORMAL /HPF (ref 0–2)
RENAL EPITHELIAL, UA: ABNORMAL /HPF
SEG NEUTROPHILS: 60 % (ref 36–66)
SEGMENTED NEUTROPHILS ABSOLUTE COUNT: 4.6 K/UL (ref 1.8–7.7)
SODIUM BLD-SCNC: 134 MMOL/L (ref 135–144)
SPECIFIC GRAVITY UA: 1.01 (ref 1–1.03)
TOTAL PROTEIN: 7.4 G/DL (ref 6.4–8.3)
TRICHOMONAS: ABNORMAL
TURBIDITY: CLEAR
URINE HGB: NEGATIVE
UROBILINOGEN, URINE: NORMAL
WBC # BLD: 7.8 K/UL (ref 3.5–11)
WBC # BLD: ABNORMAL 10*3/UL
WBC UA: ABNORMAL /HPF (ref 0–5)
YEAST: ABNORMAL

## 2022-02-03 PROCEDURE — 96374 THER/PROPH/DIAG INJ IV PUSH: CPT

## 2022-02-03 PROCEDURE — 74177 CT ABD & PELVIS W/CONTRAST: CPT

## 2022-02-03 PROCEDURE — 6360000004 HC RX CONTRAST MEDICATION: Performed by: EMERGENCY MEDICINE

## 2022-02-03 PROCEDURE — 80053 COMPREHEN METABOLIC PANEL: CPT

## 2022-02-03 PROCEDURE — 82150 ASSAY OF AMYLASE: CPT

## 2022-02-03 PROCEDURE — 2580000003 HC RX 258: Performed by: EMERGENCY MEDICINE

## 2022-02-03 PROCEDURE — 99285 EMERGENCY DEPT VISIT HI MDM: CPT

## 2022-02-03 PROCEDURE — 81001 URINALYSIS AUTO W/SCOPE: CPT

## 2022-02-03 PROCEDURE — 83690 ASSAY OF LIPASE: CPT

## 2022-02-03 PROCEDURE — 36415 COLL VENOUS BLD VENIPUNCTURE: CPT

## 2022-02-03 PROCEDURE — 85025 COMPLETE CBC W/AUTO DIFF WBC: CPT

## 2022-02-03 PROCEDURE — 6360000002 HC RX W HCPCS: Performed by: EMERGENCY MEDICINE

## 2022-02-03 RX ORDER — 0.9 % SODIUM CHLORIDE 0.9 %
1000 INTRAVENOUS SOLUTION INTRAVENOUS ONCE
Status: COMPLETED | OUTPATIENT
Start: 2022-02-03 | End: 2022-02-03

## 2022-02-03 RX ORDER — MORPHINE SULFATE 4 MG/ML
4 INJECTION, SOLUTION INTRAMUSCULAR; INTRAVENOUS ONCE
Status: COMPLETED | OUTPATIENT
Start: 2022-02-03 | End: 2022-02-03

## 2022-02-03 RX ORDER — SODIUM CHLORIDE 0.9 % (FLUSH) 0.9 %
10 SYRINGE (ML) INJECTION PRN
Status: DISCONTINUED | OUTPATIENT
Start: 2022-02-03 | End: 2022-02-03 | Stop reason: HOSPADM

## 2022-02-03 RX ORDER — 0.9 % SODIUM CHLORIDE 0.9 %
80 INTRAVENOUS SOLUTION INTRAVENOUS ONCE
Status: COMPLETED | OUTPATIENT
Start: 2022-02-03 | End: 2022-02-03

## 2022-02-03 RX ADMIN — SODIUM CHLORIDE, PRESERVATIVE FREE 10 ML: 5 INJECTION INTRAVENOUS at 12:46

## 2022-02-03 RX ADMIN — SODIUM CHLORIDE 80 ML: 9 INJECTION, SOLUTION INTRAVENOUS at 12:46

## 2022-02-03 RX ADMIN — IOPAMIDOL 75 ML: 755 INJECTION, SOLUTION INTRAVENOUS at 12:46

## 2022-02-03 RX ADMIN — SODIUM CHLORIDE 1000 ML: 9 INJECTION, SOLUTION INTRAVENOUS at 12:35

## 2022-02-03 RX ADMIN — MORPHINE SULFATE 4 MG: 4 INJECTION INTRAVENOUS at 12:39

## 2022-02-03 ASSESSMENT — PAIN SCALES - GENERAL
PAINLEVEL_OUTOF10: 8
PAINLEVEL_OUTOF10: 8
PAINLEVEL_OUTOF10: 3

## 2022-02-03 ASSESSMENT — PAIN DESCRIPTION - PROGRESSION: CLINICAL_PROGRESSION: GRADUALLY IMPROVING

## 2022-02-03 ASSESSMENT — PAIN DESCRIPTION - PAIN TYPE
TYPE: ACUTE PAIN
TYPE: SURGICAL PAIN

## 2022-02-03 ASSESSMENT — PAIN DESCRIPTION - ONSET: ONSET: PROGRESSIVE

## 2022-02-03 ASSESSMENT — PAIN DESCRIPTION - DESCRIPTORS: DESCRIPTORS: SHARP

## 2022-02-03 ASSESSMENT — PAIN DESCRIPTION - ORIENTATION
ORIENTATION: LEFT;UPPER
ORIENTATION: LEFT;UPPER

## 2022-02-03 ASSESSMENT — PAIN DESCRIPTION - FREQUENCY: FREQUENCY: CONTINUOUS

## 2022-02-03 ASSESSMENT — PAIN DESCRIPTION - LOCATION
LOCATION: ABDOMEN
LOCATION: ABDOMEN

## 2022-02-03 NOTE — LETTER
[id number] [facility]  [address] [facility] [address]  Phone: [phone]  Fax: [phone]               February 3, 2022    Patient: Faye Osorio   YOB: 1990   Date of Visit: 2/3/2022       To Whom It May Concern:    Ashlyn Boyd was seen and treated in our emergency department on 2/3/2022. She was accompanied by Silvia Garcia.       Sincerely,       Dong Castelan MD         Signature:__________________________________

## 2022-02-03 NOTE — ED PROVIDER NOTES
[address]      Pt Name: Carlos Hoang  MRN: [id number]  Jesusgfgeorgette 1990  Date of evaluation: 2/3/2022      CHIEF COMPLAINT       Chief Complaint   Patient presents with    Abdominal Pain         HISTORY OF PRESENT ILLNESS      The patient presents with left-sided abdominal pain. She is status post cholecystectomy performed on 31 January. She had not moved her bowels very well so she took some magnesium citrate this morning. Despite having a bowel movement, she still has pain. She has not had fever however. She is not nauseated. She denies dysuria. Pain is worse with movement and palpation. She contacted her surgeon who sent her here to be evaluated. REVIEW OF SYSTEMS       All systems reviewed and negative unless noted in HPI. The patient denies fever or constitutional symptoms. Denies vision change. Denies any sore throat or rhinorrhea. Denies any neck pain or stiffness. Denies chest pain or shortness of breath. Recent cholecystectomy. Left-sided abdominal pain. No vomiting. Denies any dysuria. Denies urinary frequency or hematuria. Denies musculoskeletal injury or pain. Denies any weakness, numbness or focal neurologic deficit. Denies any skin rash or edema. No recent psychiatric issues. No easy bruising or bleeding. Denies any polyuria, polydypsia or history of immunocompromise. PAST MEDICAL HISTORY    has a past medical history of Asthma, Back pain, Chicken pox, Dysphagia, H/O echocardiogram, History of Holter monitoring, Lordosis, Pneumonia, PTSD (post-traumatic stress disorder), Sch[facility]ic [facility] Tachycardia, unspecified, and UTI (urinary tract infection). SURGICAL HISTORY      has a past surgical history that includes [place] tooth extraction; Upper gastrointestinal endoscopy; Neck surgery (Left, lesion biopsy); Upper gastrointestinal endoscopy (N/A, 1/14/2020);  Cholecystectomy, mm.  Mucous membranes moist.    Neck is supple. Heart sounds regular rate and rhythm with no gallops, murmurs, or rubs. Lungs clear, no wheezes, rales or rhonchi. Abdomen: soft, with tenderness in the left upper quadrant. Patient has intact incision sites without dehiscence, consistent with recent laparoscopic cholecystectomy. No redness of the skin. Musculoskeletal exam shows no evidence of trauma. Normal distal pulses in all extremities. Skin: no rash or edema. Neurological exam reveals cranial nerves 2 through 12 grossly intact. Patient has equal  and normal deep tendon reflexes. Psychiatric: no hallucinations or suicidal ideation. Lymphatics.:  No lymphadenopathy. DIFFERENTIAL DIAGNOSIS/ MDM:     Constipation, infection, postoperative complication    DIAGNOSTIC RESULTS         RADIOLOGY:   I reviewed the radiologist interpretations:  CT ABDOMEN PELVIS W IV CONTRAST Additional Contrast? None   Final Result   1. No acute abdominal or pelvic findings. Status post cholecystectomy. 2.  Stranding within the subcutaneous fat of the right upper quadrant and   left mid abdomen likely represents sequela of prior cholecystectomy. 3.  Moderate stool within the sigmoid colon. Correlate with any evidence of   constipation. CT ABDOMEN PELVIS W IV CONTRAST Additional Contrast? None (Final result)  Result time 02/03/22 13:11:21  Final result by Cecille Stoner (02/03/22 13:11:21)                Impression:    1.  No acute abdominal or pelvic findings.  Status post cholecystectomy. 2.  Stranding within the subcutaneous fat of the right upper quadrant and   left mid abdomen likely represents sequela of prior cholecystectomy. 3.  Moderate stool within the sigmoid colon.  Correlate with any evidence of   constipation.              Narrative:    EXAMINATION:   CT OF THE ABDOMEN AND PELVIS WITH CONTRAST 2/3/2022 12:13 pm     TECHNIQUE:   CT of the abdomen and pelvis was performed with the administration of   intravenous contrast. Multiplanar reformatted images are provided for review. Dose modulation, iterative reconstruction, and/or weight based adjustment of   the mA/kV was utilized to reduce the radiation dose to as low as reasonably   achievable. COMPARISON:   CT abdomen/pelvis dated 26 January 2021     HISTORY:   ORDERING SYSTEM PROVIDED HISTORY: s/p cholecystectomy   TECHNOLOGIST PROVIDED HISTORY:   s/p cholecystectomy     Decision Support Exception - unselect if not a suspected or confirmed   emergency medical condition->Emergency Medical Condition (MA)   Reason for Exam: abd pain   Additional signs and symptoms: s/p GB surgery     FINDINGS:   Lower Chest: There is atelectasis in the left lung base. Organs: The liver is normal.  The gallbladder is surgically absent.  No   abnormality is seen within the gallbladder fossa.  The spleen is normal.  The   pancreas is normal.  The adrenal glands are normal.  The kidneys are normal.     GI/Bowel: The stomach and small bowel loops are within normal limits without   evidence of obstruction.  There is moderate stool within the sigmoid colon. No evidence of colon obstruction.  No diverticulosis.  The terminal ileum   appears normal.  The appendix is normal.     Pelvis: Similar appearance of a fibroid uterus.  The urinary bladder is   normal.  Adnexa appear within normal limits.  No free fluid. Peritoneum/Retroperitoneum: The aorta is normal caliber.  No lymphadenopathy. Bones/Soft Tissues:  There is stranding within the subcutaneous fat of the   right upper quadrant and left mid abdomen.  This likely represents sequela of   prior cholecystectomy.  No subcutaneous fluid collections.  No suspicious   osseous lesions                       LABS:  Results for orders placed or performed during the hospital encounter of 02/03/22   CBC Auto Differential   Result Value Ref Range    WBC 7.8 3.5 - 11.0 k/uL    RBC 4.81 4.0 - 5.2 m/uL    Hemoglobin 14.2 12.0 - 16.0 g/dL    Hematocrit 41.2 36 - 46 %    MCV 85.6 80 - 100 fL    MCH 29.5 26 - 34 pg    MCHC 34.4 31 - 37 g/dL    RDW 13.3 12.5 - 15.4 %    Platelets 422 730 - 360 k/uL    MPV 7.4 6.0 - 12.0 fL    NRBC Automated NOT REPORTED per 100 WBC    Differential Type NOT REPORTED     Seg Neutrophils 60 36 - 66 %    Lymphocytes 25 24 - 44 %    Monocytes 8 2 - 11 %    Eosinophils % 6 (H) 1 - 4 %    Basophils 1 0 - 2 %    Immature Granulocytes NOT REPORTED 0 %    Segs Absolute 4.60 1.8 - 7.7 k/uL    Absolute Lymph # 2.00 1.0 - 4.8 k/uL    Absolute Mono # 0.60 0.1 - 1.2 k/uL    Absolute Eos # 0.50 (H) 0.0 - 0.4 k/uL    Basophils Absolute 0.10 0.0 - 0.2 k/uL    Absolute Immature Granulocyte NOT REPORTED 0.00 - 0.30 k/uL    WBC Morphology NOT REPORTED     RBC Morphology NOT REPORTED     Platelet Estimate NOT REPORTED    Comprehensive Metabolic Panel   Result Value Ref Range    Glucose 92 70 - 99 mg/dL    BUN 7 6 - 20 mg/dL    CREATININE 0.50 0.50 - 0.90 mg/dL    Bun/Cre Ratio NOT REPORTED 9 - 20    Calcium 9.5 8.6 - 10.4 mg/dL    Sodium 134 (L) 135 - 144 mmol/L    Potassium 4.6 3.7 - 5.3 mmol/L    Chloride 99 98 - 107 mmol/L    CO2 24 20 - 31 mmol/L    Anion Gap 11 9 - 17 mmol/L    Alkaline Phosphatase 82 35 - 104 U/L    ALT 56 (H) 5 - 33 U/L    AST 61 (H) <32 U/L    Total Bilirubin 0.41 0.3 - 1.2 mg/dL    Total Protein 7.4 6.4 - 8.3 g/dL    Albumin 4.2 3.5 - 5.2 g/dL    Albumin/Globulin Ratio 1.3 1.0 - 2.5    GFR Non-African American >60 >60 mL/min    GFR African American >60 >60 mL/min    GFR Comment          GFR Staging NOT REPORTED    Lipase   Result Value Ref Range    Lipase 19 13 - 60 U/L   Amylase   Result Value Ref Range    Amylase 41 28 - 100 U/L   Urinalysis Reflex to Culture    Specimen: Urine, clean catch   Result Value Ref Range    Color, UA Yellow Yellow    Turbidity UA Clear Clear    Glucose, Ur NEGATIVE NEGATIVE    Bilirubin Urine NEGATIVE NEGATIVE    Ketones, Urine NEGATIVE NEGATIVE    Specific Gravity, UA 1.006 1.005 - 1.030    Urine Hgb NEGATIVE NEGATIVE    pH, UA 7.0 5.0 - 8.0    Protein, UA NEGATIVE NEGATIVE    Urobilinogen, Urine Normal Normal    Nitrite, Urine NEGATIVE NEGATIVE    Leukocyte Esterase, Urine SMALL (A) NEGATIVE    Urinalysis Comments NOT REPORTED    Microscopic Urinalysis   Result Value Ref Range    -          WBC, UA 2 TO 5 0 - 5 /HPF    RBC, UA 0 TO 2 0 - 2 /HPF    Casts UA NOT REPORTED /LPF    Crystals, UA NOT REPORTED None /HPF    Epithelial Cells UA 10 TO 20 0 - 5 /HPF    Renal Epithelial, UA NOT REPORTED 0 /HPF    Bacteria, UA MODERATE (A) None    Mucus, UA NOT REPORTED None    Trichomonas, UA NOT REPORTED None    Amorphous, UA NOT REPORTED None    Other Observations UA (A) NOT REQ. Utilizing a urinalysis as the only screening method to exclude a potential uropathogen can be unreliable in many patient populations. Rapid screening tests are less sensitive than culture and if UTI is a clinical possibility, culture should be considered despite a negative urinalysis. Yeast, UA NOT REPORTED None         EMERGENCY DEPARTMENT COURSE:   Vitals:    Vitals:    02/03/22 1200   BP: 125/85   Pulse: 116   Resp: 16   Temp: 97.6 °F (36.4 °C)   TempSrc: Oral   SpO2: 96%   Weight: 97.1 kg (214 lb)   Height: 5' 3\" (1.6 m)     -------------------------  BP: 125/85, Temp: 97.6 °F (36.4 °C), Pulse: 116, Resp: 16      Re-evaluation Notes    The patient has no evidence of infection. She does have constipation. She would like to continue mag citrate at home. She will be following up with her surgeon as scheduled. She is discharged in good condition. CONSULTS:    [phone]  Dr. Swapna Ireland paged. [phone]  Discussed with Dr. Swapna Ireland. Can give enema or mag citrate. FINAL IMPRESSION      1.  Constipation, unspecified constipation type          DISPOSITION/PLAN   DISPOSITION        Condition on Disposition    good    PATIENT REFERRED TO:  [address] [facility] [address]  [phone]      as scheduled      DISCHARGE MEDICATIONS:  New Prescriptions    No medications on file       (Please note that portions of this note were completed with a voice recognition program.  Efforts were made to edit the dictations but occasionally words are mis-transcribed.)    Chang Bowles MD,, MD   Attending Emergency Physician         Christ Dwyer MD  02/03/22 6070

## 2022-02-10 ENCOUNTER — OFFICE VISIT (OUTPATIENT)
Dept: FAMILY MEDICINE CLINIC | Age: 32
End: 2022-02-10
Payer: MEDICARE

## 2022-02-10 VITALS
SYSTOLIC BLOOD PRESSURE: 124 MMHG | OXYGEN SATURATION: 97 % | HEART RATE: 88 BPM | WEIGHT: 217 LBS | RESPIRATION RATE: 16 BRPM | DIASTOLIC BLOOD PRESSURE: 86 MMHG | BODY MASS INDEX: 38.44 KG/M2 | TEMPERATURE: 97.6 F

## 2022-02-10 DIAGNOSIS — M25.512 ACUTE PAIN OF LEFT SHOULDER: Primary | ICD-10-CM

## 2022-02-10 PROBLEM — Z91.199 NONCOMPLIANCE WITH TREATMENT: Status: ACTIVE | Noted: 2022-02-10

## 2022-02-10 PROCEDURE — G8417 CALC BMI ABV UP PARAM F/U: HCPCS | Performed by: NURSE PRACTITIONER

## 2022-02-10 PROCEDURE — 99213 OFFICE O/P EST LOW 20 MIN: CPT | Performed by: NURSE PRACTITIONER

## 2022-02-10 PROCEDURE — G8427 DOCREV CUR MEDS BY ELIG CLIN: HCPCS | Performed by: NURSE PRACTITIONER

## 2022-02-10 PROCEDURE — 1036F TOBACCO NON-USER: CPT | Performed by: NURSE PRACTITIONER

## 2022-02-10 PROCEDURE — G8482 FLU IMMUNIZE ORDER/ADMIN: HCPCS | Performed by: NURSE PRACTITIONER

## 2022-02-10 NOTE — PATIENT INSTRUCTIONS
Patient Education     Shoulder Stretches: Exercises  Introduction  Here are some examples of exercises for you to try. The exercises may be suggested for a condition or for rehabilitation. Start each exercise slowly. Ease off the exercises if you start to have pain. You will be told when to start these exercises and which ones will work best for you. How to do the exercises  Shoulder stretch    1.  a doorway and place one arm against the door frame. Your elbow should be a little higher than your shoulder. 2. Relax your shoulders as you lean forward, allowing your chest and shoulder muscles to stretch. You can also turn your body slightly away from your arm to stretch the muscles even more. 3. Hold for 15 to 30 seconds. 4. Repeat 2 to 4 times with each arm. Shoulder and chest stretch    1. Shoulder and chest stretch  2. While sitting, relax your upper body so you slump slightly in your chair. 3. As you breathe in, straighten your back and open your arms out to the sides. 4. Gently pull your shoulder blades back and downward. 5. Hold for 15 to 30 seconds as your breathe normally. 6. Repeat 2 to 4 times. Overhead stretch    1. Reach up over your head with both arms. 2. Hold for 15 to 30 seconds. 3. Repeat 2 to 4 times. Follow-up care is a key part of your treatment and safety. Be sure to make and go to all appointments, and call your doctor if you are having problems. It's also a good idea to know your test results and keep a list of the medicines you take. Where can you learn more? Go to [email or web address].[employer]. org and sign in to your LogFire account. Enter S254 in the TwoChop box to learn more about \"Shoulder Stretches: Exercises. \"     If you do not have an account, please click on the \"Sign Up Now\" link. Current as of: July 1, 2021               Content Version: 13.1  © 3332-6985 Healthwise, Incorporated.    Care instructions adapted under license by [facility]. If you have questions about a medical condition or this instruction, always ask your healthcare professional. [place] [address] any warranty or liability for your use of this information.

## 2022-02-10 NOTE — PROGRESS NOTES
Lisa Nina, APRN-CNP  [facility]  [address] [address]  [address] [address]  Dept: [phone]  Dept Fax: [phone]     Patient ID: Nishant Farrar is a 36 y.o. female Established patient    HPI    Pt here today for an acute visit secondary to left shoulder pain and having some numbness and tingling to the thumb. This started on Sunday, she was sleeping on the couch due to recent surgery and it seems to be getting worse and she took naproxen and it wasn't working. She did take a muscle relaxer from her surgery and it did not help. She did use a heating pad that did seem to help. Pt denies any fever or chills. Pt today denies any HA, chest pain, or SOB. Pt denies any N/V/D/C or abdominal pain. Otherwise pt doing well on current tx and no other concerns today. The patient's past medical, surgical, social, and family history as well as his current medications and allergies were reviewed as documented in today's encounter NICKY Araujo. Previous office notes, labs, imaging and hospital records were reviewed prior to and during encounter. Current Outpatient Medications on File Prior to Visit   Medication Sig Dispense Refill    ondansetron (ZOFRAN) 4 MG tablet Take 1 tablet by mouth 3 times daily as needed for Nausea or Vomiting 15 tablet 0    lurasidone (LATUDA) 20 MG TABS tablet Take 20 mg by mouth daily      albuterol sulfate HFA (PROVENTIL HFA) 108 (90 Base) MCG/ACT inhaler Inhale 1-2 puffs into the lungs every 4 hours as needed for Wheezing or Shortness of Breath (Space out to every 6 hours as symptoms improve) Space out to every 6 hours as symptoms improve. 1 each 3    trihexyphenidyl (ARTANE) 5 MG tablet Take 5 mg by mouth daily       magnesium citrate solution Take 296 mLs by mouth once (Patient not taking: Reported on 2/10/2022)       No current facility-administered medications on file prior to visit. Subjective:     Review of Systems   Musculoskeletal: Positive for myalgias (left shoulder with some numbness to thumb). Vitals:    02/10/22 1009   BP: 124/86   Pulse: 88   Resp: 16   Temp: 97.6 °F (36.4 °C)   SpO2: 97%       Objective:     Physical Exam  Musculoskeletal:      Right shoulder: Normal. Normal range of motion. Left shoulder: Tenderness present. No swelling, laceration or bony tenderness. Normal range of motion. Normal strength. Arms:          Assessment:      Diagnosis Orders   1. Acute pain of left shoulder  External Referral To Massage Therapy       Plan:     - etiology in line with musculoskeletal, will start with stretches 3-4 times daily and massage therapy. Pt not interested in PT at this time, if needed would try and do PT in the evenings. She will be starting a new job and won't have time. - heating pad as needed. - tylenol or ibuprofen for pain. - call if pain worsens or does not improve. - pt verbalized understanding plan of care. Medications, labs, diagnostic studies, consultations and follow-up as documented in this encounter. Rest of systems unchanged, continue current treatments    On this date 2/10/2022 I have spent 20 minutes reviewing previous notes, test results and face to face with the patient discussing the diagnosis and importance of compliance with the treatment plan as well as documenting on the day of the visit.     OSKAR Wheat-CNP

## 2022-06-06 ENCOUNTER — HOSPITAL ENCOUNTER (INPATIENT)
Age: 32
LOS: 3 days | Discharge: HOME | DRG: 885 | End: 2022-06-09
Attending: EMERGENCY MEDICINE | Admitting: PSYCHIATRY & NEUROLOGY
Payer: MEDICARE

## 2022-06-06 DIAGNOSIS — F32.A DEPRESSION WITH SUICIDAL IDEATION: ICD-10-CM

## 2022-06-06 DIAGNOSIS — N30.00 ACUTE CYSTITIS WITHOUT HEMATURIA: ICD-10-CM

## 2022-06-06 DIAGNOSIS — R45.851 SUICIDAL IDEATION: Primary | ICD-10-CM

## 2022-06-06 DIAGNOSIS — R45.851 DEPRESSION WITH SUICIDAL IDEATION: ICD-10-CM

## 2022-06-06 LAB
ABSOLUTE EOS #: 0.8 K/UL (ref 0–0.4)
ABSOLUTE LYMPH #: 2.9 K/UL (ref 1–4.8)
ABSOLUTE MONO #: 1 K/UL (ref 0.1–1.3)
ACETAMINOPHEN LEVEL: <5 UG/ML (ref 10–30)
ALBUMIN SERPL-MCNC: 4.5 G/DL (ref 3.5–5.2)
ALP BLD-CCNC: 75 U/L (ref 35–104)
ALT SERPL-CCNC: 24 U/L (ref 5–33)
AMPHETAMINE SCREEN URINE: NEGATIVE
ANION GAP SERPL CALCULATED.3IONS-SCNC: 12 MMOL/L (ref 9–17)
AST SERPL-CCNC: 21 U/L
BACTERIA: ABNORMAL
BARBITURATE SCREEN URINE: NEGATIVE
BASOPHILS # BLD: 1 % (ref 0–2)
BASOPHILS ABSOLUTE: 0.1 K/UL (ref 0–0.2)
BENZODIAZEPINE SCREEN, URINE: NEGATIVE
BILIRUB SERPL-MCNC: 0.24 MG/DL (ref 0.3–1.2)
BILIRUBIN URINE: NEGATIVE
BUN BLDV-MCNC: 8 MG/DL (ref 6–20)
CALCIUM SERPL-MCNC: 9.5 MG/DL (ref 8.6–10.4)
CANNABINOID SCREEN URINE: POSITIVE
CASTS UA: ABNORMAL /LPF
CHLORIDE BLD-SCNC: 100 MMOL/L (ref 98–107)
CO2: 25 MMOL/L (ref 20–31)
COCAINE METABOLITE, URINE: NEGATIVE
COLOR: YELLOW
CREAT SERPL-MCNC: 0.64 MG/DL (ref 0.5–0.9)
EOSINOPHILS RELATIVE PERCENT: 6 % (ref 0–4)
EPITHELIAL CELLS UA: ABNORMAL /HPF
ETHANOL PERCENT: <0.01 %
ETHANOL: <10 MG/DL
GFR AFRICAN AMERICAN: >60 ML/MIN
GFR NON-AFRICAN AMERICAN: >60 ML/MIN
GFR SERPL CREATININE-BSD FRML MDRD: ABNORMAL ML/MIN/{1.73_M2}
GLUCOSE BLD-MCNC: 96 MG/DL (ref 70–99)
GLUCOSE URINE: NEGATIVE
HCG QUALITATIVE: NEGATIVE
HCT VFR BLD CALC: 40.8 % (ref 36–46)
HEMOGLOBIN: 14 G/DL (ref 12–16)
KETONES, URINE: NEGATIVE
LEUKOCYTE ESTERASE, URINE: ABNORMAL
LYMPHOCYTES # BLD: 23 % (ref 24–44)
MCH RBC QN AUTO: 29.1 PG (ref 26–34)
MCHC RBC AUTO-ENTMCNC: 34.3 G/DL (ref 31–37)
MCV RBC AUTO: 84.8 FL (ref 80–100)
METHADONE SCREEN, URINE: NEGATIVE
MONOCYTES # BLD: 8 % (ref 1–7)
NITRITE, URINE: NEGATIVE
OPIATES, URINE: NEGATIVE
OXYCODONE SCREEN URINE: NEGATIVE
PDW BLD-RTO: 13.4 % (ref 11.5–14.9)
PH UA: 6 (ref 5–8)
PHENCYCLIDINE, URINE: NEGATIVE
PLATELET # BLD: 420 K/UL (ref 150–450)
PMV BLD AUTO: 7.1 FL (ref 6–12)
POTASSIUM SERPL-SCNC: 3.8 MMOL/L (ref 3.7–5.3)
PROTEIN UA: NEGATIVE
RBC # BLD: 4.81 M/UL (ref 4–5.2)
RBC UA: ABNORMAL /HPF
SALICYLATE LEVEL: <1 MG/DL (ref 3–10)
SEG NEUTROPHILS: 62 % (ref 36–66)
SEGMENTED NEUTROPHILS ABSOLUTE COUNT: 7.9 K/UL (ref 1.3–9.1)
SODIUM BLD-SCNC: 137 MMOL/L (ref 135–144)
SPECIFIC GRAVITY UA: 1.01 (ref 1–1.03)
TEST INFORMATION: ABNORMAL
TOTAL PROTEIN: 7.5 G/DL (ref 6.4–8.3)
TURBIDITY: ABNORMAL
URINE HGB: NEGATIVE
UROBILINOGEN, URINE: NORMAL
WBC # BLD: 12.7 K/UL (ref 3.5–11)
WBC UA: ABNORMAL /HPF

## 2022-06-06 PROCEDURE — G0480 DRUG TEST DEF 1-7 CLASSES: HCPCS

## 2022-06-06 PROCEDURE — 81001 URINALYSIS AUTO W/SCOPE: CPT

## 2022-06-06 PROCEDURE — 36415 COLL VENOUS BLD VENIPUNCTURE: CPT

## 2022-06-06 PROCEDURE — 1240000000 HC EMOTIONAL WELLNESS R&B

## 2022-06-06 PROCEDURE — 80307 DRUG TEST PRSMV CHEM ANLYZR: CPT

## 2022-06-06 PROCEDURE — 85025 COMPLETE CBC W/AUTO DIFF WBC: CPT

## 2022-06-06 PROCEDURE — 80179 DRUG ASSAY SALICYLATE: CPT

## 2022-06-06 PROCEDURE — 87086 URINE CULTURE/COLONY COUNT: CPT

## 2022-06-06 PROCEDURE — 99285 EMERGENCY DEPT VISIT HI MDM: CPT

## 2022-06-06 PROCEDURE — 84703 CHORIONIC GONADOTROPIN ASSAY: CPT

## 2022-06-06 PROCEDURE — 80143 DRUG ASSAY ACETAMINOPHEN: CPT

## 2022-06-06 PROCEDURE — 80053 COMPREHEN METABOLIC PANEL: CPT

## 2022-06-06 RX ORDER — CEPHALEXIN 250 MG/1
500 CAPSULE ORAL ONCE
Status: DISCONTINUED | OUTPATIENT
Start: 2022-06-07 | End: 2022-06-09 | Stop reason: HOSPADM

## 2022-06-06 RX ORDER — PHENAZOPYRIDINE HYDROCHLORIDE 100 MG/1
100 TABLET, FILM COATED ORAL ONCE
Status: DISCONTINUED | OUTPATIENT
Start: 2022-06-07 | End: 2022-06-07 | Stop reason: RX

## 2022-06-06 ASSESSMENT — ENCOUNTER SYMPTOMS
COUGH: 0
ABDOMINAL PAIN: 0

## 2022-06-07 LAB
CULTURE: NORMAL
SPECIMEN DESCRIPTION: NORMAL

## 2022-06-07 PROCEDURE — 99223 1ST HOSP IP/OBS HIGH 75: CPT | Performed by: INTERNAL MEDICINE

## 2022-06-07 PROCEDURE — 6370000000 HC RX 637 (ALT 250 FOR IP): Performed by: PSYCHIATRY & NEUROLOGY

## 2022-06-07 PROCEDURE — 1240000000 HC EMOTIONAL WELLNESS R&B

## 2022-06-07 PROCEDURE — APPSS60 APP SPLIT SHARED TIME 46-60 MINUTES

## 2022-06-07 PROCEDURE — 99223 1ST HOSP IP/OBS HIGH 75: CPT | Performed by: PSYCHIATRY & NEUROLOGY

## 2022-06-07 PROCEDURE — 6370000000 HC RX 637 (ALT 250 FOR IP): Performed by: INTERNAL MEDICINE

## 2022-06-07 RX ORDER — MAGNESIUM HYDROXIDE/ALUMINUM HYDROXICE/SIMETHICONE 120; 1200; 1200 MG/30ML; MG/30ML; MG/30ML
30 SUSPENSION ORAL EVERY 6 HOURS PRN
Status: DISCONTINUED | OUTPATIENT
Start: 2022-06-07 | End: 2022-06-09 | Stop reason: HOSPADM

## 2022-06-07 RX ORDER — HYDROXYZINE 50 MG/1
50 TABLET, FILM COATED ORAL 3 TIMES DAILY PRN
Status: DISCONTINUED | OUTPATIENT
Start: 2022-06-07 | End: 2022-06-09 | Stop reason: HOSPADM

## 2022-06-07 RX ORDER — TRAZODONE HYDROCHLORIDE 50 MG/1
50 TABLET ORAL NIGHTLY PRN
Status: DISCONTINUED | OUTPATIENT
Start: 2022-06-07 | End: 2022-06-09 | Stop reason: HOSPADM

## 2022-06-07 RX ORDER — DIVALPROEX SODIUM 125 MG/1
500 CAPSULE, COATED PELLETS ORAL 2 TIMES DAILY
Status: ON HOLD | COMMUNITY
End: 2022-06-09 | Stop reason: HOSPADM

## 2022-06-07 RX ORDER — ACETAMINOPHEN 325 MG/1
650 TABLET ORAL EVERY 4 HOURS PRN
Status: DISCONTINUED | OUTPATIENT
Start: 2022-06-07 | End: 2022-06-09 | Stop reason: HOSPADM

## 2022-06-07 RX ORDER — RISPERIDONE 0.5 MG/1
0.5 TABLET, ORALLY DISINTEGRATING ORAL 2 TIMES DAILY
Status: DISCONTINUED | OUTPATIENT
Start: 2022-06-07 | End: 2022-06-09 | Stop reason: HOSPADM

## 2022-06-07 RX ORDER — IBUPROFEN 400 MG/1
400 TABLET ORAL EVERY 6 HOURS PRN
Status: DISCONTINUED | OUTPATIENT
Start: 2022-06-07 | End: 2022-06-08 | Stop reason: ALTCHOICE

## 2022-06-07 RX ORDER — CIPROFLOXACIN 250 MG/1
250 TABLET, FILM COATED ORAL EVERY 12 HOURS SCHEDULED
Status: DISCONTINUED | OUTPATIENT
Start: 2022-06-07 | End: 2022-06-09 | Stop reason: HOSPADM

## 2022-06-07 RX ORDER — POLYETHYLENE GLYCOL 3350 17 G/17G
17 POWDER, FOR SOLUTION ORAL DAILY PRN
Status: DISCONTINUED | OUTPATIENT
Start: 2022-06-07 | End: 2022-06-09 | Stop reason: HOSPADM

## 2022-06-07 RX ORDER — PHENAZOPYRIDINE HYDROCHLORIDE 100 MG/1
100 TABLET, FILM COATED ORAL ONCE
Status: DISCONTINUED | OUTPATIENT
Start: 2022-06-07 | End: 2022-06-09 | Stop reason: HOSPADM

## 2022-06-07 RX ADMIN — RISPERIDONE 0.5 MG: 0.5 TABLET, ORALLY DISINTEGRATING ORAL at 21:05

## 2022-06-07 RX ADMIN — CIPROFLOXACIN 250 MG: 250 TABLET, FILM COATED ORAL at 20:50

## 2022-06-07 RX ADMIN — HYDROXYZINE HYDROCHLORIDE 50 MG: 50 TABLET, FILM COATED ORAL at 08:00

## 2022-06-07 RX ADMIN — CIPROFLOXACIN 250 MG: 250 TABLET, FILM COATED ORAL at 12:22

## 2022-06-07 ASSESSMENT — PAIN SCALES - GENERAL
PAINLEVEL_OUTOF10: 0
PAINLEVEL_OUTOF10: 0

## 2022-06-07 ASSESSMENT — SLEEP AND FATIGUE QUESTIONNAIRES
SLEEP PATTERN: DISTURBED/INTERRUPTED SLEEP
AVERAGE NUMBER OF SLEEP HOURS: 8
DO YOU USE A SLEEP AID: YES
DO YOU HAVE DIFFICULTY SLEEPING: YES

## 2022-06-07 ASSESSMENT — LIFESTYLE VARIABLES
HOW MANY STANDARD DRINKS CONTAINING ALCOHOL DO YOU HAVE ON A TYPICAL DAY: 1 OR 2
HOW OFTEN DO YOU HAVE A DRINK CONTAINING ALCOHOL: NEVER

## 2022-06-07 ASSESSMENT — PATIENT HEALTH QUESTIONNAIRE - PHQ9: SUM OF ALL RESPONSES TO PHQ QUESTIONS 1-9: 8

## 2022-06-07 NOTE — ED PROVIDER NOTES
[address] [facility]      Pt Name: Antione Alfaro  MRN: [id number]  [place] 1990  Date of evaluation: 6/6/22      CHIEF COMPLAINT       Chief Complaint   Patient presents with    Mental Health Problem     needs medication adjustment, been off for 2 months    Suicidal     has the same plan and was working on the plan a 1.5 week ago     [facility]   HPI 36 y.o. female presents with c/o suicidal thoughts. The patient was brought to the emergency department by her sister. The patient reports feeling depressed and having thought of suicide for 10-14 days. The patient has been thinking of cutting herself. The patient does have a h/o of previous suicide attempt by cutting her wrist.  The patient reports that their symptoms were provoked by being out of her medications, having difficulty with follow up care for her depression. The patient does have a h/o of previous psychiatric admission about a year and a half ago according to her report. The patient denies drug use, denies attempts at self harm to this point. The patient denies medical complaints at this time. Urgency with some suprapubic discomfort for 3 weeks. REVIEW OF SYSTEMS     Review of Systems   Constitutional: Negative for fever. HENT: Negative for congestion. Eyes: Negative for visual disturbance. Respiratory: Negative for cough. Cardiovascular: Negative for chest pain. Gastrointestinal: Negative for abdominal pain. Genitourinary: Negative for difficulty urinating. Musculoskeletal: Negative for arthralgias. Skin: Negative for rash. Neurological: Negative for headaches. Psychiatric/Behavioral: Positive for decreased concentration, dysphoric mood, sleep disturbance and suicidal ideas.          PAST MEDICAL HISTORY     Past Medical History:   Diagnosis Date    Asthma     Back pain     Chicken pox     Dysphagia     H/O echocardiogram 05/18/2018    EF 60%    History of Holter monitoring 04/27/2018    Baseline ECG shows sinus rhythm. Holter showed sinus tachy 61% of the time. Rare premature ventricular complexes were recorded. No episodes of superventricuarl or ventriuclar tacycardial was seen. Pt reported episodes of SOB and speedy heart with monitroing showing sinus tachy.  Lordosis     Pneumonia     PTSD (post-traumatic stress disorder)     Schizophrenic disorder (HCC)     Tachycardia, unspecified     UTI (urinary tract infection)        SURGICAL HISTORY       Past Surgical History:   Procedure Laterality Date    CHOLECYSTECTOMY, LAPAROSCOPIC  01/31/2022    CHOLECYSTECTOMY LAPAROSCOPIC ROBOTIC WITH FIREFLY     CHOLECYSTECTOMY, LAPAROSCOPIC N/A 1/31/2022    CHOLECYSTECTOMY LAPAROSCOPIC ROBOTIC WITH FIREFLY performed by Red Akins IV, DO at [facility] Left lesion biopsy    UPPER GASTROINTESTINAL ENDOSCOPY      chicken stuck in throat    UPPER GASTROINTESTINAL ENDOSCOPY N/A 1/14/2020    -bx(eosinophilic esophagitis,neg H-Pylori)    WISDOM TOOTH EXTRACTION         CURRENT MEDICATIONS       Previous Medications    ALBUTEROL SULFATE HFA (PROVENTIL HFA) 108 (90 BASE) MCG/ACT INHALER    Inhale 1-2 puffs into the lungs every 4 hours as needed for Wheezing or Shortness of Breath (Space out to every 6 hours as symptoms improve) Space out to every 6 hours as symptoms improve. LURASIDONE (LATUDA) 20 MG TABS TABLET    Take 20 mg by mouth daily    ONDANSETRON (ZOFRAN) 4 MG TABLET    Take 1 tablet by mouth 3 times daily as needed for Nausea or Vomiting    TRIHEXYPHENIDYL (ARTANE) 5 MG TABLET    Take 5 mg by mouth daily        ALLERGIES     is allergic to abilify [aripiprazole], adhesive tape, doxycycline, pollen extract, seroquel [quetiapine fumarate], symbicort [budesonide-formoterol fumarate], topamax [topiramate], and gabapentin. FAMILY HISTORY     She indicated that her mother is alive. She indicated that her father is alive. SOCIAL HISTORY      reports that she has never smoked. She has never used smokeless tobacco. She reports previous alcohol use. She reports current drug use. Drug: Marijuana Roddana Capes). PHYSICAL EXAM     INITIAL VITALS: LMP 05/30/2022   Gen: NAD  Head: Normocephalic, atraumatic  Eye: Pupils equal round reactive to light, no conjunctivitis  Heart: Regular rate and rhythm no murmurs  Lungs: Clear to auscultation bilaterally, no respiratory distress  Chest wall: No crepitus, no tenderness palpation  Abdomen: Soft, nontender, nondistended, with no peritoneal signs  Skin: No diaphoresis. no lacerations. MSK: No cva ttp  Neurologic: Patient is alert and oriented x3, motor and sensation is intact in all 4 extremities, speech is fluent  Extremities: Full range of motion, no cyanosis, no edema, no signs of trauma, no tenderness to palpation    MEDICAL DECISION MAKING:     MDM  36 y.o. female with depression, suicidal thoughts, suicidal plan. previous suicide attempt. The patient does not appear intoxicated. The patient is showing no signs of any acute active toxidrome. The patient denies any overdose attempt or  medical complaints at this time. We'll screen for any acetaminophen or salicylate ingestion, we'll check baseline renal function, liver function, a drug screen, cbc and reassess. Emergency Department course:    UA appears infected. Starting abx. Preg negative. Laboratory studies reviewed and unremarkable. Patient is medically clear for psychiatric evaluation.  ti discuss the case with the psychiatry service, anticipate inpatient psychiatric admission. DIAGNOSTIC RESULTS     LABS: All lab results were reviewed by myself, and all abnormals are listed below.   Labs Reviewed   URINALYSIS WITH REFLEX TO CULTURE - Abnormal; Notable for the following components:       Result Value    Turbidity UA Cloudy (*)     Leukocyte Esterase, Urine SMALL (*)     All other components within normal limits   CBC WITH AUTO DIFFERENTIAL - Abnormal; Notable for the following components:    WBC 12.7 (*)     Lymphocytes 23 (*)     Monocytes 8 (*)     Eosinophils % 6 (*)     Absolute Eos # 0.80 (*)     All other components within normal limits   COMPREHENSIVE METABOLIC PANEL - Abnormal; Notable for the following components: Total Bilirubin 0.24 (*)     All other components within normal limits   URINE DRUG SCREEN - Abnormal; Notable for the following components:    Cannabinoid Scrn, Ur POSITIVE (*)     All other components within normal limits   ACETAMINOPHEN LEVEL - Abnormal; Notable for the following components:    Acetaminophen Level <5 (*)     All other components within normal limits   SALICYLATE LEVEL - Abnormal; Notable for the following components:    Salicylate Lvl <1 (*)     All other components within normal limits   MICROSCOPIC URINALYSIS - Abnormal; Notable for the following components:    Bacteria, UA FEW (*)     All other components within normal limits   CULTURE, URINE   HCG, SERUM, QUALITATIVE   ETHANOL       EMERGENCY DEPARTMENT COURSE:   Vitals: There were no vitals filed for this visit. The patient was given the following medications while in the emergency department:  Orders Placed This Encounter   Medications    cephALEXin (KEFLEX) capsule 500 mg     Order Specific Question:   Antimicrobial Indications     Answer:   Urinary Tract Infection    phenazopyridine (PYRIDIUM) tablet 100 mg     -------------------------  CRITICAL CARE:   CONSULTS: None  PROCEDURES: Procedures     FINAL IMPRESSION      1. Suicidal ideation    2. Depression with suicidal ideation    3. Acute cystitis without hematuria          DISPOSITION/PLAN   DISPOSITION Decision To Admit 06/06/2022 11:49:20 PM      PATIENT REFERRED TO:  No follow-up provider specified.     DISCHARGE MEDICATIONS:  New Prescriptions    No medications on file         Sarah Weiner MD  Attending Emergency Physician Donal Rodríguez MD  06/06/22 2289

## 2022-06-07 NOTE — GROUP NOTE
Group Therapy Note    Date: 6/7/2022    Group Start Time: 1430  Group End Time: 5935  Group Topic: Cognitive Skills    GO Dyson, CTRS        Group Therapy Note    Attendees: 3/15       Pt did not participate in Cognitive Skills Group at 1430 when encouraged by RT due to resting in room. Pt was offered talk time as an alternative to group but declined.          Discipline Responsible: Psychoeducational Specialist        Signature:  Luis Raza

## 2022-06-07 NOTE — GROUP NOTE
Group Therapy Note    Date: 6/7/2022    Group Start Time: 1100  Group End Time: 3676  Group Topic: Cognitive Skills    SUSANA Garcia        Group Therapy Note    Attendees: 6/18         Patient's Goal:  To increase social interaction and to decision making, and communication skills. Notes: Pt participated fully in group task. Pt was able to practice decision making, and communication skills idependently. Status After Intervention:  Improved     Participation Level:  Active Listener,  appropriate     Participation Quality:  Appropriate, Attentive,supportive of peers        Speech:  Normal     Thought Process/Content: Logical ,linear r/t task     Affective Functioning: Congruent, brightened and enjoyed humor     Mood: Euthymic     Level of consciousness:  Alert, and Attentive        Response to Learning:  Able to verbalize current knowledge, able to acknowledge/verbalize new learning,  and Progressing to goal        Endings: None Reported     Modes of Intervention: Education, Support, Socialization, Exploration, Clarifying and Problem-solving        Discipline Responsible: Psychoeducational Specialist        Signature:  SUSANA Mckinney

## 2022-06-07 NOTE — GROUP NOTE
Group Therapy Note    Date: 6/7/2022    Group Start Time: 2730  Group End Time: 7899  Group Topic: Psychotherapy    [facility] D    Lydia Talley        Group Therapy Note    Attendees: 7/18         Patient's Goal: PT will demonstrate increased interpersonal interaction and a clear understanding on multiple types of coping skills relating to the here-and-now therapeutic practice. Notes: Patient is making progress, AEB participating in group discussion, actively listening, and supporting other group members. PT participates in group and encourages others to participate        Status After Intervention:  Improved    Participation Level: Active Listener and Interactive    Participation Quality: Appropriate, Attentive and Sharing      Speech:  normal      Thought Process/Content: Logical      Affective Functioning: Flat      Mood: depressed      Level of consciousness:  Alert, Oriented x4 and Attentive      Response to Learning: Able to verbalize/acknowledge new learning and Progressing to goal      Endings: None Reported    Modes of Intervention: Support, Socialization, Exploration, Clarifying and Problem-solving      Discipline Responsible: /Counselor      Signature:   Lydia Talley

## 2022-06-07 NOTE — PROGRESS NOTES
Behavioral Services  Medicare Certification Upon Admission    I certify that this patient's inpatient psychiatric hospital admission is medically necessary for:    [x] (1) Treatment which could reasonably be expected to improve this patient's condition,       [x] (2) Or for diagnostic study;     AND     [x](2) The inpatient psychiatric services are provided while the individual is under the care of a physician and are included in the individualized plan of care.     Estimated length of stay/service 2-9 days    Plan for post-hospital care - outpatient care    Electronically signed by Lila Trotter MD on 6/7/2022 at 3:18 PM

## 2022-06-07 NOTE — GROUP NOTE
Group Therapy Note    Date: 6/7/2022    Group Start Time: 0900  Group End Time: 0915  Group Topic: Group Therapy    CZ BHYENNIFER Mao        Group Therapy Note    Attendees: 4/18         Patient's Goal:  Talk with doctor and start medications     Status After Intervention:  Unchanged    Participation Level:  Active Listener and Interactive    Participation Quality: Appropriate and Attentive      Speech:  normal      Thought Process/Content: Linear      Affective Functioning: Congruent      Mood: euthymic      Level of consciousness:  Alert and Oriented x4      Response to Learning: Able to verbalize current knowledge/experience and Able to verbalize/acknowledge new learning      Endings: None Reported    Modes of Intervention: Education and Support      Discipline Responsible: [address] [facility] [employer]      Signature:  Sb Blanco

## 2022-06-07 NOTE — BH NOTE
Patient approached nurse's station stating \"this gown is making me itch, is there something viral I can have? \" Patient's neck and chest were red and covered in hives and she could not stop scratching. Writer received permission from management for patient to have her clothes and BHT-2 helped her get into the shower. Patient received 50mg of Atarax to help relieve the itching. Patient said that after the shower,, clothing change, and Atarax was effective and the itching was starting to go away.

## 2022-06-07 NOTE — BH NOTE
Pt attempted to meet with pt to complete RT assessment; however, pt was meeting with Nurse Practitioner. RT will seek out pt later in this shift.

## 2022-06-07 NOTE — ED NOTES
medications she is unable to take (pills) and they refused to provide her with a new prescription, so she went to [facility]. During her intake with [facility], they were \"scared for her\" and they referred her to [facility] which was across the street. Patient was given 2 choices to either \"go to [facility] on her own\" or they would send her to [facility]. Patient chose to come to [facility]. Patient reports she is unable to swallow pills and needs medications prescribed in the form of an injection or liquid. Patient has been on Depakote and reports it \"works really well\". Since patient has been off her medications, she has been struggling with ongoing suicidal ideation and reported the \"other day\" she \"filled up my bathtub and set the razor out\". Patient stated that she started to cry and called her sister, and the patient has not been alone since. Patient stated she does not trust herself to be alone and her family does not trust to leave her alone either. Patient denies any drug or alcohol use. Patient denies any hallucinations. Patient reports she \"just wants help, I just want to be on my meds\". Level of Care Disposition: This writer consulted with psychiatry who recommended inpatient hospitalization for safety and stabilization. Patient signed application for voluntary admission to [facility].

## 2022-06-07 NOTE — PLAN OF CARE
[facility]  Initial Interdisciplinary Treatment Plan NOTE      Original treatment plan Date & Time: 6/7/2022                  801am    Admission Type:  Admission Type: Voluntary    Reason for admission:   Reason for Admission: suicidal thoughts increased depression off medication    Estimated Length of Stay:  5-7days  Estimated Discharge Date: to be determined by physician    PATIENT STRENGTHS:  Patient Strengths:   Patient Strengths and Limitations:   Addictive Behavior: Addictive Behavior  In the Past 3 Months, Have You Felt or Has Someone Told You That You Have a Problem With  : None  Medical Problems:  Past Medical History:   Diagnosis Date    Asthma     Back pain     Chicken pox     Dysphagia     H/O echocardiogram 05/18/2018    EF 60%    History of Holter monitoring 04/27/2018    Baseline ECG shows sinus rhythm. Holter showed sinus tachy 61% of the time. Rare premature ventricular complexes were recorded. No episodes of superventricuarl or ventriuclar tacycardial was seen. Pt reported episodes of SOB and speedy heart with monitroing showing sinus tachy.      Lordosis     Pneumonia     PTSD (post-traumatic stress disorder)     Schizophrenic disorder (HCC)     Tachycardia, unspecified     UTI (urinary tract infection)      Status EXAM:Mental Status and Behavioral Exam  Normal: No  Level of Assistance: Independent/Self  Facial Expression: Brightened  Affect: Constricted  Level of Consciousness: Alert  Frequency of Checks: 4 times per hour, close  Mood:Normal: No  Mood: Anxious,Helpless,Sad  Motor Activity:Normal: Yes  Eye Contact: Fair  Observed Behavior: Preoccupied,Impulsive,Cooperative,Friendly  Sexual Misconduct History: Current - no  Preception: [place] to person,[place] to time,[place] to place,[place] to situation  Attention:Normal: No  Attention: Unable to concentrate  Thought Processes: Circumstantial,Flight of ideas  Thought Content:Normal: No  Thought Content: Preoccupations  Depression Symptoms: Feelings of helplessness,Feelings of hopelessess,Impaired concentration  Anxiety Symptoms: Generalized  Denise Symptoms: No problems reported or observed.   Hallucinations: None  Delusions: No  Memory:Normal: No  Memory: Poor recent  Insight and Judgment: No  Insight and Judgment: Poor judgment,Poor insight    EDUCATION:   Learner Progress Toward Treatment Goals: reviewed group plans and strategies for care    Method:group therapy, medication compliance, individualized assessments and care planning    Outcome: needs reinforcement    PATIENT GOALS: to be discussed with patient within 72 hours    PLAN/TREATMENT RECOMMENDATIONS:     continue group therapy , medications compliance, goal setting, individualized assessments and care, continue to monitor pt on unit      SHORT-TERM GOALS:   Time frame for Short-Term Goals: 5-7 days    LONG-TERM GOALS:  Time frame for Long-Term Goals: 6 months  Members Present in Team Meeting: See Signature Sheet    Kina Velasquez

## 2022-06-07 NOTE — BH NOTE
[facility]  Admission Note     Admission Type:   Admission Type: Voluntary    Reason for admission:  Reason for Admission: suicidal thoughts increased depression off medication    PATIENT STRENGTHS:       Patient Strengths and Limitations:       Addictive Behavior:   Addictive Behavior  In the Past 3 Months, Have You Felt or Has Someone Told You That You Have a Problem With  : None    Medical Problems:   Past Medical History:   Diagnosis Date    Asthma     Back pain     Chicken pox     Dysphagia     H/O echocardiogram 05/18/2018    EF 60%    History of Holter monitoring 04/27/2018    Baseline ECG shows sinus rhythm. Holter showed sinus tachy 61% of the time. Rare premature ventricular complexes were recorded. No episodes of superventricuarl or ventriuclar tacycardial was seen. Pt reported episodes of SOB and speedy heart with monitroing showing sinus tachy.  Lordosis     Pneumonia     PTSD (post-traumatic stress disorder)     Schizophrenic disorder (HCC)     Tachycardia, unspecified     UTI (urinary tract infection)        Status EXAM:  Mental Status and Behavioral Exam  Normal: No  Level of Assistance: Independent/Self  Facial Expression: Brightened  Affect: Constricted  Level of Consciousness: Alert  Frequency of Checks: 4 times per hour, close  Mood:Normal: No  Mood: Anxious,Helpless,Sad  Motor Activity:Normal: Yes  Eye Contact: Fair  Observed Behavior: Preoccupied,Impulsive,Cooperative,Friendly  Sexual Misconduct History: Current - no  Preception: [place] to person,[place] to time,[place] to place,[place] to situation  Attention:Normal: No  Attention: Unable to concentrate  Thought Processes: Circumstantial,Flight of ideas  Thought Content:Normal: No  Thought Content: Preoccupations  Depression Symptoms: Feelings of helplessness,Feelings of hopelessess,Impaired concentration  Anxiety Symptoms: Generalized  Denise Symptoms: No problems reported or observed.   Hallucinations: None  Delusions: No  Memory:Normal: No  Memory: Poor recent  Insight and Judgment: No  Insight and Judgment: Poor judgment,Poor insight    Tobacco Screening:  Practical Counseling, on admission, misa X, if applicable and completed (first 3 are required if patient doesn't refuse):            ( )  Recognizing danger situations (included triggers and roadblocks)                    ( )  Coping skills (new ways to manage stress, exercise, relaxation techniques, changing routine, distraction)                                                           ( )  Basic information about quitting (benefits of quitting, techniques in how to quit, available resources  ( ) Referral for counseling faxed to [facility]                                           ( ) Patient refused counseling  ( ) Patient has not smoked in the last 30 days    Metabolic Screening:    Lab Results   Component Value Date    LABA1C 5.0 01/10/2022       Lab Results   Component Value Date    CHOL 195 01/10/2022    CHOL 214 (H) 12/30/2019    CHOL 190 02/09/2019    CHOL 201 (H) 09/25/2018     Lab Results   Component Value Date    TRIG 136 01/10/2022    TRIG 213 (H) 12/30/2019    TRIG 230 (H) 02/09/2019    TRIG 133 09/25/2018     Lab Results   Component Value Date    HDL 48 01/10/2022    HDL 38 (L) 12/30/2019    HDL 44 02/09/2019    HDL 46 09/25/2018     No components found for: LDLCAL  No results found for: LABVLDL      Body mass index is 39.15 kg/m². BP Readings from Last 2 Encounters:   06/07/22 132/84   02/10/22 124/86           Pt admitted with followings belongings:  Dental Appliances: None  Vision - Corrective Lenses: None  Hearing Aid: None  Jewelry: None  Body Piercings Removed: N/A  Clothing: Footwear,Pants,Shirt,Socks,Undergarments,Sweater  Other Valuables: Other (Comment) (none)     Patient's home medications were reviewed. Patient oriented to surroundings and program expectations and copy of patient rights given.  Received admission packet:  yes. Consents reviewed, signed yes. Refused no. Patient verbalize understanding:  yes. Patient education on precautions: yes                   Cherrie Welch RN       Patient to the [facility] AN AFFILIATE OF [facility] at the advise of her current [address], she reports she has been off medication for the past few weeks due to a change in address and inability to swallow pills (esoinophilic esophagitis) she reports compliance with medication that is liquid, can be crushed or injection. She reports depressed mood and passive thoughts of not wanting to wake up, she denies homicidal ideation and hallucinations reports good sleep and appetite with utilization of medical marijuana gummies. Patient searched per unit policy, offered a meal and oriented to unit. Numbers obtained from cell phone prior to transfer.

## 2022-06-07 NOTE — PLAN OF CARE
Problem: Anxiety  Goal: Will report anxiety at manageable levels  Description: INTERVENTIONS:  1. Administer medication as ordered  2. Teach and rehearse alternative coping skills  3. Provide emotional support with 1:1 interaction with staff  6/7/2022 0831 by Burak Calixto  Outcome: Progressing  Flowsheets (Taken 6/7/2022 0831)  Will report anxiety at manageable levels:   Provide emotional support with 1:1 interaction with staff   Teach and rehearse alternative coping skills  Note: Patient is accepting of 1:1 talk time with staff. Patient admits to feeling anxious and depressed. Patient denies suicidal and homicidal ideation and auditory and visual hallucinations and verbally agrees to approach staff if thoughts of self harm arises. Patient is hopeless and helpless. Patient is medication compliant. Patient is out, aloof of peers. Reassurance and support provided. Q15 minute checks maintained. Patient remains safe at this time.

## 2022-06-07 NOTE — CARE COORDINATION
BHI Biopsychosocial Assessment    Current Level of Psychosocial Functioning      Independent   Dependent    Minimal Assist X     Comments:   Patient has a principle diagnosis of Depression with suicidal ideations, which is the condition established to be chiefly responsible for the admission of the client on this date. Patient documentation in Epic provides prior diagnoses of Schizoaffective disorder, bipolar type; Hx of suicidal ideations; MDD; Borderline personality disorder    Psychosocial High-Risk Factors (check all that apply)     Unable to obtain meds   Chronic illness/pain    Not taking medications X  Substance abuse X  Lack of Family Support   Addictive Behaviors  Financial stress   Isolation X  Inadequate Community Resources X  Intentional suicide  Suicidal ideations   Homicidal ideations  Self-mutilation  Victim of crime   Legal issues  Developmental Delay  Unable to manage personal needs    Age 74 or older   Homeless  No transportation   Readmission within 30 days   Unemployment  Traumatic Event X    Psychiatric Advanced Directives:   Nothing reported     Family to Involve in Treatment:  Sister is supportive and emergency contacts are listed on face-sheet     Sexual Orientation:   Single female     Patient Strengths:  Patient has housing. Patient has support in family (sister and ). Patient very med compliant. Patient follows up with outpatient. Patient has insight. Patient has insurance. Patient Barriers:   Patient been off her medications for 2.5 months. Trauma and Abuse/History of Violence:  Patient reports SI in the form of cutting her wrists. Patient reports a week ago she \"filled up my bathtub and set the razor out\". Patient reports she started crying and called her sister, and she has not been alone since. Patient reports her last suicide attempt was \"about 5 years ago\" she attempted to cut her wrists     Opiate/AOD Referral and/or Education Provided:    Tox positive for cannabis     CMHC/mental health history:   [facility] of Care:  Medication management, group/individual therapies, family meetings, psychoeducation, 1:1 counseling, treatment team meetings to assist with stabilization      Initial Discharge Plan:  Stabilize mood and medications; explore social support systems within community to increase socialization; provide 24/7 local and national hotline numbers for additional support; safety plan to be completed; disclose/discuss suicidal ideas will improve, decrease depressive symptoms, absence of self-harm. Discharge Location:  Return to address on face-sheet; follow-up at [facility]; look into DBT skills group in area for BPD treatment     Clinical Summary:   David Tamayo is a 33-year-old female who presented to [facility] ED after experiencing suicidal ideation. Pt does not have a clear plan or intent to harm herself, but reports she has cut her wrists in the past.  Pt was last at the [facility] on 12/28/2019 for having suicidal ideations with a plan to cut her wrist.  Writer meets with Pt and she is A&Ox4, appropriate eye contact, pleasant and willing to participate. Pt reports she has been off her medications for 2-months and reports she was unable to get her medications refilled at [facility] and this is because she needed new prescriptions from her [address] Unfortunately, Pt has not been compliant with her appointments at [facility] and that is the reason her medications were not refilled. Pt is not living in [place], but is living at the address on her face-sheet in [place] and was linked after her last discharge 2019 from [facility]. Pt confirm an increase in depression and suicidal thoughts over the past few days and thinks it is because of being off her medications. Pt is current reporting auditory hallucinations but unable to provide specifics.   Pt currently appears absent of self-harm and able to contract for safety, confirming she will reach out to staff if suicidal thoughts begin to increase. Pt denies all delusions. Pt denies any legal issues. Pt reports [place] [address] [place] and raised [place]. Pt reports raised by both parents and they are alive, both provide some support, and believes mother suffers from depression. Pt reports in a 4-year relationship; no children; 1 sister who is , does not like her brother-in-law because Colten Russoutselwyn took advantage of me when I was really sick. \"  Pt reports 1 friend, Irma Espinal and helps her with her Autistic son. Pt reports receives SSDI and food stamps and boyfriend works daily. Pt reports she keeps herself busy during the day with new \"emotional support puppy,\" arts & crafts, watching original [employer] and spending time with her boyfriend. Bruce Ty will continue to provide therapeutic support, empathetic care and psycho education. Writer encourages group, 1:1 therapy and treatment team participation to assist with stabilization of mood and thoughts. Writer provides client with a journal as well as handout on diagnosis.
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
## 2022-06-07 NOTE — PLAN OF CARE
Problem: Pain  Goal: Verbalizes/displays adequate comfort level or baseline comfort level  Outcome: Progressing     Problem: Self Harm/Suicidality  Goal: Will have no self-injury during hospital stay  Description: INTERVENTIONS:  1. Q 30 MINUTES: Routine safety checks  2. Q SHIFT & PRN: Assess risk to determine if routine checks are adequate to maintain patient safety  Outcome: Progressing     Problem: Depression  Goal: Will be euthymic at discharge  Description: INTERVENTIONS:  1. Administer medication as ordered  2. Provide emotional support via 1:1 interaction with staff  3. Encourage involvement in milieu/groups/activities  4. Monitor for social isolation  Outcome: Progressing     Problem: Anxiety  Goal: Will report anxiety at manageable levels  Description: INTERVENTIONS:  1. Administer medication as ordered  2. Teach and rehearse alternative coping skills  3.  Provide emotional support with 1:1 interaction with staff  Outcome: Progressing

## 2022-06-07 NOTE — BH NOTE
BPA for suicide precautions populated, provider notified. Pt agreed to seek staff at anytime he/she felt like any urges to harm self would arise. Safety checks maintained [id number].

## 2022-06-07 NOTE — PROGRESS NOTES
Pharmacy Medication History Note      List of current medications patient is taking is complete. Source of information: [facility] [address] MANOJ RE[place]Donald, [place], [facility]    Changes made to medication list:  Medications removed (include reason, ex. therapy complete or physician discontinued, noncompliance): Ondansetron (therapy completed), Lurasidone (list clean up - last filled February), Trihexyphenidyl (list clean up - last filled February)    Medications added/doses adjusted: Added Divalproex 125 mg sprinkles, 500 mg (four capsules) twice daily  Added Medical Marijuana as needed      Current Home Medication List at Time of Admission:  Prior to Admission medications    Medication Sig   medical marijuana Take 1 each by mouth as needed. divalproex (DEPAKOTE SPRINKLE) 125 MG capsule Take 500 mg by mouth 2 times daily   albuterol sulfate HFA (PROVENTIL HFA) 108 (90 Base) MCG/ACT inhaler Inhale 1-2 puffs into the lungs every 4 hours as needed for Wheezing or Shortness of Breath (Space out to every 6 hours as symptoms improve) Space out to every 6 hours as symptoms improve. Please let me know if you have any questions about this encounter. Thank you!     Electronically signed by PEDRITO Cha [facility] on 6/7/2022 at 11:03 AM

## 2022-06-07 NOTE — H&P
[facility]  Gerri Gayle MD; Pat Bloom MD; Sherrie Hernandez MD; MD Leonela Soto MD; Oren Loyola MD    [facility]   [facility]    HISTORY AND PHYSICAL EXAMINATION            Date:   6/7/2022  Patient name:  Demetrice Molina  Date of admission:  6/6/2022  9:20 PM  MRN:   [id number]  Account:  [de-identified]  YOB: 1990  PCP:    OSKAR Alfaro CNP  Room:   [id number]  Code Status:    Full Code    Chief Complaint:     Chief Complaint   Patient presents with   [address] Problem     needs medication adjustment, been off for 2 months    Suicidal     has the same plan and was working on the plan a 1.5 week ago     uti  Upper quadrant pain  History Obtained From:     Patient medical record nursing staff    History of Present Illness:     Demetrice Molina is a 36 y.o. Non- / non  female who presents with Mental Health Problem (needs medication adjustment, been off for 2 months) and Suicidal (has the same plan and was working on the plan a 1.5 week ago)   and is admitted to the hospital for the management of Depression with suicidal ideation.   68-year-old  lady morbid obese weighs 221 pounds history of cholecystectomy history of uterine fibroid Raynaud's phenomenon  History of eosinophilic esophagitis and dysphagia  Right upper quadrant intermittent for very long time not associated with nausea vomiting patient has history of recently esophagitis leading to intermittent dysphagia unable to tolerate steak but able able to tolerate well chewed food no significant weight loss  History of asthma mild intermittent uses albuterol inhaler  Mild dysuria without any fever no flank pain  Past Medical History:     Past Medical History:   Diagnosis Date    Asthma     Back pain     Chicken pox     Dysphagia     H/O echocardiogram 05/18/2018    EF 60%  History of Holter monitoring 04/27/2018    Baseline ECG shows sinus rhythm. Holter showed sinus tachy 61% of the time. Rare premature ventricular complexes were recorded. No episodes of superventricuarl or ventriuclar tacycardial was seen. Pt reported episodes of SOB and speedy heart with monitroing showing sinus tachy.  Lordosis     Pneumonia     PTSD (post-traumatic stress disorder)     Schizophrenic disorder (HCC)     Tachycardia, unspecified     UTI (urinary tract infection)         Past Surgical History:     Past Surgical History:   Procedure Laterality Date    CHOLECYSTECTOMY, LAPAROSCOPIC  01/31/2022    CHOLECYSTECTOMY LAPAROSCOPIC ROBOTIC WITH FIREFLY     CHOLECYSTECTOMY, LAPAROSCOPIC N/A 1/31/2022    CHOLECYSTECTOMY LAPAROSCOPIC ROBOTIC WITH FIREFLY performed by Crystal Akins IV, DO at [address] Left lesion biopsy    UPPER GASTROINTESTINAL ENDOSCOPY      chicken stuck in throat    UPPER GASTROINTESTINAL ENDOSCOPY N/A 1/14/2020    -bx(eosinophilic esophagitis,neg H-Pylori)   [employer] WISDOM TOOTH EXTRACTION          Medications Prior to Admission:     Prior to Admission medications    Medication Sig Start Date End Date Taking? Authorizing Provider   medical marijuana Take 1 each by mouth as needed. Yes Historical Provider, MD   ondansetron (ZOFRAN) 4 MG tablet Take 1 tablet by mouth 3 times daily as needed for Nausea or Vomiting 1/31/22   Lila Dejesus DO   lurasidone (LATUDA) 20 MG TABS tablet Take 20 mg by mouth daily 1/21/22   Historical Provider, MD   albuterol sulfate HFA (PROVENTIL HFA) 108 (90 Base) MCG/ACT inhaler Inhale 1-2 puffs into the lungs every 4 hours as needed for Wheezing or Shortness of Breath (Space out to every 6 hours as symptoms improve) Space out to every 6 hours as symptoms improve.  9/9/21   OSKAR Robles - CNP   trihexyphenidyl (ARTANE) 5 MG tablet Take 5 mg by mouth daily  8/24/20   Historical Provider, MD Allergies:     Doxycycline, Aripiprazole, Adhesive tape, Pollen extract, Seroquel [quetiapine fumarate], Symbicort [budesonide-formoterol fumarate], Topamax [topiramate], and Gabapentin    Social History:     Tobacco:    reports that she has never smoked. She has never used smokeless tobacco.  Alcohol:      reports previous alcohol use. Drug Use:  reports current drug use. Drug: Marijuana [place] [employer]). Family History:     Family History   Problem Relation Age of Onset    Asthma Mother     Cancer Mother     High Blood Pressure Father     Diabetes Father        Review of Systems:     Positive and Negative as described in HPI. CONSTITUTIONAL:  negative for fevers, chills, sweats, fatigue, weight loss  HEENT:  negative for vision, hearing changes, runny nose, throat pain  RESPIRATORY:  negative for shortness of breath, cough, congestion, wheezing  CARDIOVASCULAR:  negative for chest pain, palpitations  GASTROINTESTINAL: Positive for intermittent dysphagia and right upper quadrant pain   GENITOURINARY: Increased frequency and dysuria   INTEGUMENT:  negative for rash, skin lesions, easy bruising   HEMATOLOGIC/LYMPHATIC:  negative for swelling/edema   ALLERGIC/IMMUNOLOGIC:  negative for urticaria , itching  ENDOCRINE:  negative increase in drinking, increase in urination, hot or cold intolerance  MUSCULOSKELETAL:  negative joint pains, muscle aches, swelling of joints  NEUROLOGICAL:  negative for headaches, dizziness, lightheadedness,     Physical Exam:   /71   Pulse 92   Temp 98.3 °F (36.8 °C) (Oral)   Resp 14   Ht 5' 3\" (1.6 m)   Wt 221 lb (100.2 kg)   LMP 2022   SpO2 97%   BMI 39.15 kg/m²   Temp (24hrs), Av.4 °F (36.9 °C), Min:98.3 °F (36.8 °C), Max:98.5 °F (36.9 °C)    No results for input(s): POCGLU in the last 72 hours.   No intake or output data in the 24 hours ending 22 1032  Morbid obese  General Appearance: alert, well appearing, and in no acute distress  Mental status: oriented to person, place, and time  Head: normocephalic, atraumatic  Eye: no icterus, redness, pupils equal and reactive, extraocular eye movements intact, conjunctiva clear  Ear: normal external ear, no discharge, hearing intact  Nose: no drainage noted  Mouth: mucous membranes moist  Neck: supple, no carotid bruits, thyroid not palpable  Lungs: Bilateral equal air entry, clear to ausculation, no wheezing, rales or rhonchi, normal effort  Cardiovascular: normal rate, regular rhythm, no murmur, gallop, rub  Abdomen: Soft, nontender, nondistended, normal bowel sounds, no hepatomegaly or splenomegaly  Neurologic: There are no new focal motor or sensory deficits, normal muscle tone and bulk, no abnormal sensation, normal speech, cranial nerves II through XII grossly intact  Skin: No gross lesions, rashes, bruising or bleeding on exposed skin area  Extremities: peripheral pulses palpable, no pedal edema or calf pain with     Investigations:      Laboratory Testing:  Recent Results (from the past 24 hour(s))   Urinalysis with Reflex to Culture    Collection Time: 06/06/22  9:00 PM    Specimen: Urine   Result Value Ref Range    Color, UA Yellow Yellow    Turbidity UA Cloudy (A) Clear    Glucose, Ur NEGATIVE NEGATIVE    Bilirubin Urine NEGATIVE NEGATIVE    Ketones, Urine NEGATIVE NEGATIVE    Specific Gravity, UA 1.008 1.000 - 1.030    Urine Hgb NEGATIVE NEGATIVE    pH, UA 6.0 5.0 - 8.0    Protein, UA NEGATIVE NEGATIVE    Urobilinogen, Urine Normal Normal    Nitrite, Urine NEGATIVE NEGATIVE    Leukocyte Esterase, Urine SMALL (A) NEGATIVE   Drug screen multi urine    Collection Time: 06/06/22  9:00 PM   Result Value Ref Range    Amphetamine Screen, Ur NEGATIVE NEGATIVE    Barbiturate Screen, Ur NEGATIVE NEGATIVE    Benzodiazepine Screen, Urine NEGATIVE NEGATIVE    Cocaine Metabolite, Urine NEGATIVE NEGATIVE    Methadone Screen, Urine NEGATIVE NEGATIVE    Opiates, Urine NEGATIVE NEGATIVE    Phencyclidine, Urine NEGATIVE NEGATIVE    Cannabinoid Scrn, Ur POSITIVE (A) NEGATIVE    Oxycodone Screen, Ur NEGATIVE NEGATIVE    Test Information       Assay provides medical screening only. The absence of expected drug(s) and/or metabolite(s) may indicate diluted or adulterated urine, limitations of testing or timing of collection.    Microscopic Urinalysis    Collection Time: 06/06/22  9:00 PM   Result Value Ref Range    WBC, UA 21 TO 50 /HPF    RBC, UA 0 TO 2 /HPF    Casts UA 0 TO 2 /LPF    Epithelial Cells UA 6 TO 9 /HPF    Bacteria, UA FEW (A) None   CBC with Auto Differential    Collection Time: 06/06/22 10:02 PM   Result Value Ref Range    WBC 12.7 (H) 3.5 - 11.0 k/uL    RBC 4.81 4.0 - 5.2 m/uL    Hemoglobin 14.0 12.0 - 16.0 g/dL    Hematocrit 40.8 36 - 46 %    MCV 84.8 80 - 100 fL    MCH 29.1 26 - 34 pg    MCHC 34.3 31 - 37 g/dL    RDW 13.4 11.5 - 14.9 %    Platelets 297 058 - 251 k/uL    MPV 7.1 6.0 - 12.0 fL    Seg Neutrophils 62 36 - 66 %    Lymphocytes 23 (L) 24 - 44 %    Monocytes 8 (H) 1 - 7 %    Eosinophils % 6 (H) 0 - 4 %    Basophils 1 0 - 2 %    Segs Absolute 7.90 1.3 - 9.1 k/uL    Absolute Lymph # 2.90 1.0 - 4.8 k/uL    Absolute Mono # 1.00 0.1 - 1.3 k/uL    Absolute Eos # 0.80 (H) 0.0 - 0.4 k/uL    Basophils Absolute 0.10 0.0 - 0.2 k/uL   Comprehensive Metabolic Panel    Collection Time: 06/06/22 10:02 PM   Result Value Ref Range    Glucose 96 70 - 99 mg/dL    BUN 8 6 - 20 mg/dL    CREATININE 0.64 0.50 - 0.90 mg/dL    Calcium 9.5 8.6 - 10.4 mg/dL    Sodium 137 135 - 144 mmol/L    Potassium 3.8 3.7 - 5.3 mmol/L    Chloride 100 98 - 107 mmol/L    CO2 25 20 - 31 mmol/L    Anion Gap 12 9 - 17 mmol/L    Alkaline Phosphatase 75 35 - 104 U/L    ALT 24 5 - 33 U/L    AST 21 <32 U/L    Total Bilirubin 0.24 (L) 0.3 - 1.2 mg/dL    Total Protein 7.5 6.4 - 8.3 g/dL    Albumin 4.5 3.5 - 5.2 g/dL    GFR Non-African American >60 >60 mL/min    GFR African American >60 >60 mL/min    GFR Comment         HCG Screen, Blood    Collection Time: 06/06/22 10:02 PM   Result Value Ref Range    hCG Qual NEGATIVE NEGATIVE   Acetaminophen level    Collection Time: 06/06/22 10:02 PM   Result Value Ref Range    Acetaminophen Level <5 (L) 10 - 30 ug/mL   Salicylate    Collection Time: 06/06/22 10:02 PM   Result Value Ref Range    Salicylate Lvl <1 (L) 3 - 10 mg/dL   Ethanol    Collection Time: 06/06/22 10:02 PM   Result Value Ref Range    Ethanol <10 <10 mg/dL    Ethanol percent <0.010 %       Imaging/Diagnostics:  No results found. Assessment :      Hospital Problems           Last Modified POA    * (Principal) Depression with suicidal ideation 6/6/2022 Yes          Plan:   82-year-old  lady morbidly obese history of eosinophilic esophagitis intolerant to steak and food that she cannot chew well status post EGD  History of mild intermittent asthma on albuterol inhaler we will start Ventolin  Dysuria with a possible UTI cultures pending Cipro 250 twice a day for 3 days  Mild intermittent right upper quadrant pain history of cholecystectomy  Nonspecific Will observe for now will need outpatient GI eval  Raynaud's phenomena no cyanosis noted at this time patient has no history of scleroderma              Liana Lewis MD  6/7/2022  10:32 AM    Copy sent to Dr. Ted Rios, APRN - CNP    Please note that this chart was generated using voice recognition Dragon dictation software. Although every effort was made to ensure the accuracy of this automated transcription, some errors in transcription may have occurred.

## 2022-06-08 PROCEDURE — 6370000000 HC RX 637 (ALT 250 FOR IP): Performed by: PSYCHIATRY & NEUROLOGY

## 2022-06-08 PROCEDURE — APPSS30 APP SPLIT SHARED TIME 16-30 MINUTES

## 2022-06-08 PROCEDURE — 1240000000 HC EMOTIONAL WELLNESS R&B

## 2022-06-08 PROCEDURE — 99232 SBSQ HOSP IP/OBS MODERATE 35: CPT | Performed by: PSYCHIATRY & NEUROLOGY

## 2022-06-08 PROCEDURE — 6370000000 HC RX 637 (ALT 250 FOR IP): Performed by: INTERNAL MEDICINE

## 2022-06-08 PROCEDURE — 6370000000 HC RX 637 (ALT 250 FOR IP)

## 2022-06-08 RX ORDER — NAPROXEN 250 MG/1
250 TABLET ORAL 2 TIMES DAILY PRN
Status: DISCONTINUED | OUTPATIENT
Start: 2022-06-08 | End: 2022-06-09 | Stop reason: HOSPADM

## 2022-06-08 RX ORDER — GABAPENTIN 100 MG/1
100 CAPSULE ORAL 3 TIMES DAILY
Status: DISCONTINUED | OUTPATIENT
Start: 2022-06-08 | End: 2022-06-09 | Stop reason: HOSPADM

## 2022-06-08 RX ADMIN — RISPERIDONE 0.5 MG: 0.5 TABLET, ORALLY DISINTEGRATING ORAL at 21:05

## 2022-06-08 RX ADMIN — CIPROFLOXACIN 250 MG: 250 TABLET, FILM COATED ORAL at 21:01

## 2022-06-08 RX ADMIN — NAPROXEN 250 MG: 250 TABLET ORAL at 19:24

## 2022-06-08 RX ADMIN — RISPERIDONE 0.5 MG: 0.5 TABLET, ORALLY DISINTEGRATING ORAL at 08:27

## 2022-06-08 RX ADMIN — GABAPENTIN 100 MG: 100 CAPSULE ORAL at 21:01

## 2022-06-08 RX ADMIN — GABAPENTIN 100 MG: 100 CAPSULE ORAL at 17:42

## 2022-06-08 RX ADMIN — ACETAMINOPHEN 650 MG: 325 TABLET ORAL at 08:28

## 2022-06-08 RX ADMIN — ALUMINUM HYDROXIDE, MAGNESIUM HYDROXIDE, AND SIMETHICONE 30 ML: 200; 200; 20 SUSPENSION ORAL at 10:50

## 2022-06-08 RX ADMIN — CIPROFLOXACIN 250 MG: 250 TABLET, FILM COATED ORAL at 08:27

## 2022-06-08 ASSESSMENT — PAIN - FUNCTIONAL ASSESSMENT: PAIN_FUNCTIONAL_ASSESSMENT: 0-10

## 2022-06-08 ASSESSMENT — PAIN DESCRIPTION - LOCATION: LOCATION: HEAD

## 2022-06-08 ASSESSMENT — PAIN SCALES - GENERAL
PAINLEVEL_OUTOF10: 3
PAINLEVEL_OUTOF10: 7

## 2022-06-08 ASSESSMENT — PAIN DESCRIPTION - DESCRIPTORS: DESCRIPTORS: ACHING;THROBBING

## 2022-06-08 NOTE — PLAN OF CARE
5 [facility]  Day 3 Interdisciplinary Treatment Plan NOTE    Review Date & Time: 6/8/2022                         1315    Admission Type:   Admission Type: Voluntary    Reason for admission:  Reason for Admission: suicidal thoughts increased depression off medication  Estimated Length of Stay: 5-7 days  Estimated Discharge Date Update: to be determined by physician    PATIENT STRENGTHS:  Patient Strengths    Patient Strengths and Limitations:Limitations: Difficulty problem solving/relies on others to help solve problems  Addictive Behavior:Addictive Behavior  In the Past 3 Months, Have You Felt or Has Someone Told You That You Have a Problem With  : None  Medical Problems:  Past Medical History:   Diagnosis Date    Asthma     Back pain     Chicken pox     Dysphagia     H/O echocardiogram 05/18/2018    EF 60%    History of Holter monitoring 04/27/2018    Baseline ECG shows sinus rhythm. Holter showed sinus tachy 61% of the time. Rare premature ventricular complexes were recorded. No episodes of superventricuarl or ventriuclar tacycardial was seen. Pt reported episodes of SOB and speedy heart with monitroing showing sinus tachy.      Lordosis     Pneumonia     PTSD (post-traumatic stress disorder)     Schizophrenic disorder (HCC)     Tachycardia, unspecified     UTI (urinary tract infection)        Risk:  Fall Risk   David Scale David Scale Score: 22  BVC    Change in scores no Changes to plan of Care no    Status EXAM:   Mental Status and Behavioral Exam  Normal: No  Level of Assistance: Independent/Self  Facial Expression: Worried  Affect: Appropriate  Level of Consciousness: Alert  Frequency of Checks: 4 times per hour, close  Mood:Normal: No  Mood: Depressed,Anxious  Motor Activity:Normal: Yes  Eye Contact: Fair  Observed Behavior: Cooperative,Friendly  Sexual Misconduct History: Current - no  Preception: [place] to person,[place] to time,[place] to place,[place] to situation  Attention:Normal: Yes  Attention: Unable to concentrate  Thought Processes: Circumstantial,Flight of ideas  Thought Content:Normal: Yes  Thought Content: Preoccupations  Depression Symptoms: Feelings of helplessness,Feelings of hopelessess  Anxiety Symptoms: Generalized  Denise Symptoms: No problems reported or observed. Hallucinations: None  Delusions: No  Memory:Normal: No  Memory: Poor recent  Insight and Judgment: Yes  Insight and Judgment: Poor judgment,Poor insight    Daily Assessment Last Entry:   Daily Sleep (WDL): Within Defined Limits            Daily Nutrition (WDL): Within Defined Limits  Level of Assistance: Independent/Self    Patient Monitoring:  Frequency of Checks: 4 times per hour, close    Psychiatric Symptoms:   Depression Symptoms  Depression Symptoms: Feelings of helplessness,Feelings of hopelessess  Anxiety Symptoms  Anxiety Symptoms: Generalized  Denise Symptoms  Denise Symptoms: No problems reported or observed. Suicide Risk CSSR-S:  1) Within the past month, have you wished you were dead or wished you could go to sleep and not wake up? : Yes  2) Have you actually had any thoughts of killing yourself? : Yes  3) Have you been thinking about how you might kill yourself? : No  5) Have you started to work out or worked out the details of how to kill yourself?  Do you intend to carry out this plan? : No  6) Have you ever done anything, started to do anything, or prepared to do anything to end your life?: Yes  Change in Result                     no                       Change in Plan of care                 no      EDUCATION:   EDUCATION:   Learner Progress Toward Treatment Goals: Reviewed results and recommendations of this team, Reviewed group plan and strategies, Reviewed signs, symptoms and risk of self harm and violent behavior, Reviewed goals and plan of care    Method:small group, individual verbal education    Outcome:verbalized by patient, but needs reinforcement to obtain goals    PATIENT GOALS:  Short term:\"get regulated on meds, get stable enough to focus on my daily tasks/goals\"\". Long term:\" stay on meds,something that works, get stable enough to adopt a child, work with [id number] [facility] [employer] supports\".     PLAN/TREATMENT RECOMMENDATIONS UPDATE: continue with group therapies, increased socialization, continue planning for after discharge goals, continue with medication compliance    SHORT-TERM GOALS UPDATE:   Time frame for Short-Term Goals: 5-7 days    LONG-TERM GOALS UPDATE:   Time frame for Long-Term Goals: 6 months  Members Present in Team Meeting: See Signature Sheet    Luis Raza

## 2022-06-08 NOTE — PROGRESS NOTES
Daily Progress Note  6/8/2022    Patient Name: Damaso Gutierrez:  Depression with suicidal ideation         SUBJECTIVE:      Patient is seen today for a follow up assessment. Patient is compliant with scheduled medications. Patient has not required emergency medications in the past 24 hours. Patient is agreeable to interview at bedside today. She continues to endorse depression and anxiety however reports some improvement in her mood today. She reports that she slept well and states, \"I feel like I've been sleeping a lot. \"  She states that prior to her hospitalization she had gone a couple days with very poor sleep. She reports that her appetite is good and she was happy that she has been able to chew her food well here. [place] endorses fleeting suicidal ideation with thoughts that continue to come and go throughout the day. She denies homicidal ideation. She denies auditory and visual hallucinations. She continues to feel that she would be unsafe out of the hospital at this time due to continued suicidal thoughts. She reports some improvement in feelings of hopelessness and helplessness. She states that she feels like her \"focus\" has improved today as well. [place] reports that she was able to have nursing staff do some research on Risperdal and cost and she is happy to report she does not believe the disintegrating tablet will be expensive once discharged. She is ambivalent about receiving MORROW at this time. She denies any side effects to medication at this time. [place] does report she has a headache this morning and states she often takes Naproxen for pain. We will order this for patient.       Appetite:  [x] Normal/Adequate/Unchanged  [] Increased  [] Decreased      Sleep:       [x] Normal/Adequate/Unchanged  [] Fair  [] Poor      Group Attendance on Unit:   [x] Yes  [] Selectively    [] No    Medication Side Effects:  Patient denies any medication side effects at the time of assessment. Mental Status Exam  Level of consciousness: Alert and awake. Appearance: Appropriate attire for setting, seated on bed, with fair  grooming and hygiene. Behavior/Motor: Approachable, no psychomotor abnormalities. Attitude toward examiner: Cooperative, attentive, good eye contact. Speech: Normal rate, normal volume, normal tone. Mood:  Patient reports \"feeling better\". Affect: Anxious  Thought processes: Linear and coherent. Thought content: Denies homicidal ideation. Suicidal Ideation: Fleeting suicidal ideations  Delusions: No evidence of delusions. Denies paranoia. Perceptual Disturbance: Patient does not appear to be responding to internal stimuli. Denies auditory hallucinations. Denies visual hallucinations. Cognition: Oriented to self, location, time, and situation. Memory: Intact. Insight & Judgement: Poor. Data   height is 5' 3\" (1.6 m) and weight is 221 lb (100.2 kg). Her oral temperature is 97.9 °F (36.6 °C). Her blood pressure is 117/85 and her pulse is 89. Her respiration is 14 and oxygen saturation is 97%.    Labs:   Admission on 06/06/2022   Component Date Value Ref Range Status    Color, UA 06/06/2022 Yellow  Yellow Final    Turbidity UA 06/06/2022 Cloudy* Clear Final    Glucose, Ur 06/06/2022 NEGATIVE  NEGATIVE Final    Bilirubin Urine 06/06/2022 NEGATIVE  NEGATIVE Final    Ketones, Urine 06/06/2022 NEGATIVE  NEGATIVE Final    Specific Gravity, UA 06/06/2022 1.008  1.000 - 1.030 Final    Urine Hgb 06/06/2022 NEGATIVE  NEGATIVE Final    pH, UA 06/06/2022 6.0  5.0 - 8.0 Final    Protein, UA 06/06/2022 NEGATIVE  NEGATIVE Final    Urobilinogen, Urine 06/06/2022 Normal  Normal Final    Nitrite, Urine 06/06/2022 NEGATIVE  NEGATIVE Final    Leukocyte Esterase, Urine 06/06/2022 SMALL* NEGATIVE Final    WBC 06/06/2022 12.7* 3.5 - 11.0 k/uL Final    RBC 06/06/2022 4.81  4.0 - 5.2 m/uL Final    Hemoglobin 06/06/2022 14.0  12.0 - 16.0 g/dL Final    Hematocrit 06/06/2022 40.8  36 - 46 % Final    MCV 06/06/2022 84.8  80 - 100 fL Final    MCH 06/06/2022 29.1  26 - 34 pg Final    MCHC 06/06/2022 34.3  31 - 37 g/dL Final    RDW 06/06/2022 13.4  11.5 - 14.9 % Final    Platelets 31/92/6621 420  150 - 450 k/uL Final    MPV 06/06/2022 7.1  6.0 - 12.0 fL Final    Seg Neutrophils 06/06/2022 62  36 - 66 % Final    Lymphocytes 06/06/2022 23* 24 - 44 % Final    Monocytes 06/06/2022 8* 1 - 7 % Final    Eosinophils % 06/06/2022 6* 0 - 4 % Final    Basophils 06/06/2022 1  0 - 2 % Final    Segs Absolute 06/06/2022 7.90  1.3 - 9.1 k/uL Final    Absolute Lymph # 06/06/2022 2.90  1.0 - 4.8 k/uL Final    Absolute Mono # 06/06/2022 1.00  0.1 - 1.3 k/uL Final    Absolute Eos # 06/06/2022 0.80* 0.0 - 0.4 k/uL Final    Basophils Absolute 06/06/2022 0.10  0.0 - 0.2 k/uL Final    Glucose 06/06/2022 96  70 - 99 mg/dL Final    BUN 06/06/2022 8  6 - 20 mg/dL Final    CREATININE 06/06/2022 0.64  0.50 - 0.90 mg/dL Final    Calcium 06/06/2022 9.5  8.6 - 10.4 mg/dL Final    Sodium 06/06/2022 137  135 - 144 mmol/L Final    Potassium 06/06/2022 3.8  3.7 - 5.3 mmol/L Final    Chloride 06/06/2022 100  98 - 107 mmol/L Final    CO2 06/06/2022 25  20 - 31 mmol/L Final    Anion Gap 06/06/2022 12  9 - 17 mmol/L Final    Alkaline Phosphatase 06/06/2022 75  35 - 104 U/L Final    ALT 06/06/2022 24  5 - 33 U/L Final    AST 06/06/2022 21  <32 U/L Final    Total Bilirubin 06/06/2022 0.24* 0.3 - 1.2 mg/dL Final    Total Protein 06/06/2022 7.5  6.4 - 8.3 g/dL Final    Albumin 06/06/2022 4.5  3.5 - 5.2 g/dL Final    GFR Non- 06/06/2022 >60  >60 mL/min Final    GFR  06/06/2022 >60  >60 mL/min Final    GFR Comment 06/06/2022        Final    Comment: Average GFR for 20-28 years old:   80 mL/min/1.73sq m  Chronic Kidney Disease:   <60 mL/min/1.73sq m  Kidney failure:   <15 mL/min/1.73sq m              eGFR calculated using average adult body mass. Additional eGFR calculator available at:        [email or web address].[employer]            hCG Qual 06/06/2022 NEGATIVE  NEGATIVE Final    Comment: Specimens with hCG levels near the threshold of the test (25 mIU/mL) may give a negative or   indeterminate result. In such cases, another test should be performed with a new specimen   in 48-72 hours. If early pregnancy is suspected clinically in this setting, correlation   with quantitative serum b-hCG level is suggested.  Amphetamine Screen, Ur 06/06/2022 NEGATIVE  NEGATIVE Final    Comment:       (Positive cutoff 1000 ng/mL)                  Barbiturate Screen, Ur 06/06/2022 NEGATIVE  NEGATIVE Final    Comment:       (Positive cutoff 200 ng/mL)                  Benzodiazepine Screen, Urine 06/06/2022 NEGATIVE  NEGATIVE Final    Comment:       (Positive cutoff 200 ng/mL)                  Cocaine Metabolite, Urine 06/06/2022 NEGATIVE  NEGATIVE Final    Comment:       (Positive cutoff 300 ng/mL)                  Methadone Screen, Urine 06/06/2022 NEGATIVE  NEGATIVE Final    Comment:       (Positive cutoff 300 ng/mL)                  Opiates, Urine 06/06/2022 NEGATIVE  NEGATIVE Final    Comment:       (Positive cutoff 300 ng/mL)                  Phencyclidine, Urine 06/06/2022 NEGATIVE  NEGATIVE Final    Comment:       (Positive cutoff 25 ng/mL)                  Cannabinoid Scrn, Ur 06/06/2022 POSITIVE* NEGATIVE Final    Comment:       (Positive cutoff 50 ng/mL)                  Oxycodone Screen, Ur 06/06/2022 NEGATIVE  NEGATIVE Final    Comment:       (Positive cutoff 100 ng/mL)                  Test Information 06/06/2022 Assay provides medical screening only. The absence of expected drug(s) and/or metabolite(s) may indicate diluted or adulterated urine, limitations of testing or timing of collection. Final    Comment: Testing for legal purposes should be confirmed by another method.   To request confirmation   of test result, please call the lab within 7 days of sample submission.  Acetaminophen Level 06/06/2022 <5* 10 - 30 ug/mL Final    Salicylate Lvl 17/14/0607 <1* 3 - 10 mg/dL Final    Ethanol 06/06/2022 <10  <10 mg/dL Final    Ethanol percent 06/06/2022 <0.010  % Final    WBC, UA 06/06/2022 21 TO 50  /HPF Final    RBC, UA 06/06/2022 0 TO 2  /HPF Final    Casts UA 06/06/2022 0 TO 2  /LPF Final    Epithelial Cells UA 06/06/2022 6 TO 9  /HPF Final    Bacteria, UA 06/06/2022 FEW* None Final    Specimen Description 06/06/2022 . CLEAN CATCH URINE   Final    Culture 06/06/2022 NO SIGNIFICANT GROWTH   Final         Reviewed patient's current plan of care and vital signs with nursing staff. Labs reviewed: [x] Yes    Medications  Current Facility-Administered Medications: acetaminophen (TYLENOL) tablet 650 mg, 650 mg, Oral, Q4H PRN  aluminum & magnesium hydroxide-simethicone (MAALOX) 200-200-20 MG/5ML suspension 30 mL, 30 mL, Oral, Q6H PRN  hydrOXYzine HCl (ATARAX) tablet 50 mg, 50 mg, Oral, TID PRN  ibuprofen (ADVIL;MOTRIN) tablet 400 mg, 400 mg, Oral, Q6H PRN  nicotine polacrilex (NICORETTE) gum 2 mg, 2 mg, Oral, PRN  polyethylene glycol (GLYCOLAX) packet 17 g, 17 g, Oral, Daily PRN  traZODone (DESYREL) tablet 50 mg, 50 mg, Oral, Nightly PRN  phenazopyridine (PYRIDIUM) tablet 100 mg, 100 mg, Oral, Once  ciprofloxacin (CIPRO) tablet 250 mg, 250 mg, Oral, 2 times per day  risperiDONE (RISPERDAL M-TABS) disintegrating tablet 0.5 mg, 0.5 mg, Oral, BID  cephALEXin (KEFLEX) capsule 500 mg, 500 mg, Oral, Once    ASSESSMENT  Schizoaffective disorder, bipolar type ([facility] [address]         HANDOFF  Patient symptoms are: Remains Unstable. Medications as discussed with attending physician  Continue current medications at current dose  Monitor need and frequency of PRN medications. Encourage participation in groups and milieu.   Probable discharge is to be determined by MD.     Electronically signed by OSKAR Rose CNP on 6/8/2022 at 12:34 PM    **This report has been created using voice recognition software. It may contain minor errors which are inherent in voice recognition technology. **  I independently saw and evaluated the patient. I reviewed the  documentation above. Any additional comments or changes to the   documentation are stated below otherwise agree with assessment. The patient has been able to take risperidone M-Tab. She has gone to the pharmacy group and is interested in getting the subcutaneous long-acting injection. The patient continues to be anxious. She slept little better. The patient's mood remains low. Start the patient on Perseris 90 mg subcutaneous monthly and gabapentin 100 mg 3 times daily. PLAN  Medications as noted above  Attempt to develop insight  Psycho-education conducted. Supportive Therapy conducted.   Follow-up daily while on inpatient unit    Electronically signed by Georgia Contreras MD on 6/8/22 at 4:24 PM EDT

## 2022-06-08 NOTE — PLAN OF CARE
Problem: Pain  Goal: Verbalizes/displays adequate comfort level or baseline comfort level  6/7/2022 2111 by Michel Pendleton RN  Outcome: Progressing   Pt is satisfied with pain management    Problem: Self Harm/Suicidality  Goal: Will have no self-injury during hospital stay  Description: INTERVENTIONS:  1. Q 30 MINUTES: Routine safety checks  2. Q SHIFT & PRN: Assess risk to determine if routine checks are adequate to maintain patient safety  6/7/2022 2111 by Michel Pendleton RN  Outcome: Progressing   Pt denies thoughts of harming themself and verbally agrees to remain safe while on the unit. No self harming behaviors are noted this shift       Problem: Behavior  Goal: Pt/Family maintain appropriate behavior and adhere to behavioral management agreement, if implemented  Description: INTERVENTIONS:  1. Assess patient/family's coping skills and  non-compliant behavior (including use of illegal substances)  2. Notify security of behavior or suspected illegal substances which indicate the need for search of the patient and/or belongings  3. Encourage verbalization of thoughts and concerns in a socially appropriate manner  4. Utilize positive, consistent limit setting strategies supporting safety of patient, staff and others  5. Encourage participation in the decision making process about the behavioral management agreement  6. Implement a Health Care Agreement if patient meets criteria  7. If a patient's behavior jeopardizes the safety of the patient, staff, or others refer to organization policy. If a visitor's behavior poses a threat to safety call refer to organization policy. 8. Initiate consult with , Psychosocial CNS, Spiritual Care as appropriate  6/7/2022 2111 by Michel Pendleton RN  Outcome: Progressing   Pt is visible in the milieu social with staff and peers. She eats well at snack and accepts all medication.  She is hopeful that the new medication will help    Problem: Anxiety  Goal: Will report anxiety at manageable levels  Description: INTERVENTIONS:  1. Administer medication as ordered  2. Teach and rehearse alternative coping skills  3.  Provide emotional support with 1:1 interaction with staff  6/7/2022 2111 by Colby Mathis RN  Outcome: Progressing   She admits to feeling anxious at times

## 2022-06-08 NOTE — PROGRESS NOTES
Pharmacy Med Education Group Note    Date: 6/8/22  Start Time: 1425  End Time: 1500    Number Participants in Group:  3    Goal:  Patient will demonstrate an understanding of the medications intended purpose and possible adverse effects  Topic: [place] for Pharmacy Med Ed Group    Discipline Responsible:     OT  AT  [facility].  RT     X Other       Participation Level:     None  Minimal      X Active Listener    X Interactive    Monopolizing         Participation Quality:    X Appropriate  Inappropriate     X       Attentive        Intrusive          Sharing        Resistant          Supportive        Lethargic       Affective:     X Congruent  Incongruent  Blunted  Flat    Constricted  Anxious  Elated  Angry    Labile  Depressed  Other         Cognitive:    X Alert  Oriented PPTP     Concentration   X G  F  P   Attention Span   X G  F  P   Short-Term Memory   X G  F  P   Long-Term Memory  G  F  P   ProblemSolving/  Decision Making  G  F  P   Ability to Process  Information   X G  F  P      Contributing Factors             Delusional             Hallucinating             Flight of Ideas             Other:       Modes of Intervention:    X Education   X Support  Exploration    Clarifying  Problem Solving  Confrontation    Socialization  Limit Setting  Reality Testing    Activity  Movement  Media    Other:            Response to Learning:    X Able to verbalize current knowledge/experience    Able to verbalize/acknowledge new learning    Able to retain information    Capable of insight    Able to change behavior    Progressing to goal    Other:        Comments:     Kim Salomon PharmD, BCPS, BCPP  6/8/2022 3:03 PM  [phone]

## 2022-06-08 NOTE — GROUP NOTE
Group Therapy Note    Date: 6/8/2022    Group Start Time: 1100  Group End Time: 4013  Group Topic: Cognitive Skills    [facility]    SUSANA Bowling        Group Therapy Note    Attendees: 6/15         Patient's Goal:  To increase social interaction and to problem solving, and communication skills. Notes: Pt attended and was attentive to group, pt did not participate in group task (stating she would \"just watch\"but did engage in conversation with peers about group topics. Status After Intervention:  Improved     Participation Level:  Active Listener,  appropriate     Participation Quality:  Appropriate, Attentive,supportive of peers        Speech:  Normal     Thought Process/Content: Logical ,linear r/t group conversation about task     Affective Functioning: Congruent, brightened      Mood: Euthymic     Level of consciousness:  Alert, and Attentive        Response to Learning:  Able to verbalize current knowledge, able to acknowledge/verbalize new learning,  and Progressing to goal        Endings: None Reported     Modes of Intervention: Education, Support, Socialization, Exploration, Clarifying and Problem-solving        Discipline Responsible: Psychoeducational Specialist        Signature:  SUSANA Kent

## 2022-06-08 NOTE — PLAN OF CARE
Problem: Depression  Goal: Will be euthymic at discharge  Description: INTERVENTIONS:  1. Administer medication as ordered  2. Provide emotional support via 1:1 interaction with staff  3. Encourage involvement in milieu/groups/activities  4. Monitor for social isolation  6/8/2022 1824 by Venu Galo LPN  Outcome: Progressing     Problem: Behavior  Goal: Pt/Family maintain appropriate behavior and adhere to behavioral management agreement, if implemented  Description: INTERVENTIONS:  1. Assess patient/family's coping skills and  non-compliant behavior (including use of illegal substances)  2. Notify security of behavior or suspected illegal substances which indicate the need for search of the patient and/or belongings  3. Encourage verbalization of thoughts and concerns in a socially appropriate manner  4. Utilize positive, consistent limit setting strategies supporting safety of patient, staff and others  5. Encourage participation in the decision making process about the behavioral management agreement  6. Implement a Health Care Agreement if patient meets criteria  7. If a patient's behavior jeopardizes the safety of the patient, staff, or others refer to organization policy. If a visitor's behavior poses a threat to safety call refer to organization policy. 8. Initiate consult with , Psychosocial CNS, Spiritual Care as appropriate  6/8/2022 1824 by Venu Galo LPN  Outcome: Progressing  No violent or negative behaviors noted at this time. Will cont to provide safe, calm, environment and use verbal de-escalation techniques when needed. Support and reassurance given as needed.

## 2022-06-08 NOTE — GROUP NOTE
Group Therapy Note    Date: 6/8/2022    Group Start Time: 1000  Group End Time: 1030  Group Topic: Psychotherapy    [facility]    Scottie Velez        Group Therapy Note    Attendees: 5/15         Patient's Goal:  PT will demonstrate increased interpersonal interaction and a clear understanding on multiple types of coping skills relating to the here-and-now therapeutic practice. Notes:  Patient is making progress, AEB participating in group discussion, actively listening, and supporting other group members. PT participates in group and encourages others to participate        Status After Intervention:  Improved    Participation Level: Active Listener and Interactive    Participation Quality: Appropriate, Attentive and Sharing      Speech:  normal      Thought Process/Content: Logical      Affective Functioning: Flat      Mood: depressed      Level of consciousness:  Alert, Oriented x4 and Attentive      Response to Learning: Able to verbalize/acknowledge new learning and Progressing to goal      Endings: None Reported    Modes of Intervention: Education, Support, Socialization and Exploration      Discipline Responsible: /Counselor      Signature:   Scottie Velez

## 2022-06-09 VITALS
HEART RATE: 84 BPM | TEMPERATURE: 97.9 F | WEIGHT: 221 LBS | DIASTOLIC BLOOD PRESSURE: 70 MMHG | SYSTOLIC BLOOD PRESSURE: 105 MMHG | HEIGHT: 63 IN | RESPIRATION RATE: 14 BRPM | BODY MASS INDEX: 39.16 KG/M2 | OXYGEN SATURATION: 97 %

## 2022-06-09 PROCEDURE — 6370000000 HC RX 637 (ALT 250 FOR IP): Performed by: INTERNAL MEDICINE

## 2022-06-09 PROCEDURE — 6370000000 HC RX 637 (ALT 250 FOR IP): Performed by: PSYCHIATRY & NEUROLOGY

## 2022-06-09 PROCEDURE — 99239 HOSP IP/OBS DSCHRG MGMT >30: CPT | Performed by: PSYCHIATRY & NEUROLOGY

## 2022-06-09 RX ORDER — GABAPENTIN 100 MG/1
100 CAPSULE ORAL 3 TIMES DAILY
Qty: 90 CAPSULE | Refills: 0 | Status: SHIPPED | OUTPATIENT
Start: 2022-06-09 | End: 2022-07-09

## 2022-06-09 RX ORDER — CIPROFLOXACIN 250 MG/1
250 TABLET, FILM COATED ORAL EVERY 12 HOURS SCHEDULED
Qty: 1 TABLET | Refills: 0 | Status: SHIPPED | OUTPATIENT
Start: 2022-06-09 | End: 2022-06-10

## 2022-06-09 RX ADMIN — GABAPENTIN 100 MG: 100 CAPSULE ORAL at 08:37

## 2022-06-09 RX ADMIN — CIPROFLOXACIN 250 MG: 250 TABLET, FILM COATED ORAL at 08:37

## 2022-06-09 RX ADMIN — RISPERIDONE 0.5 MG: 0.5 TABLET, ORALLY DISINTEGRATING ORAL at 08:37

## 2022-06-09 RX ADMIN — GABAPENTIN 100 MG: 100 CAPSULE ORAL at 13:21

## 2022-06-09 ASSESSMENT — PAIN SCALES - GENERAL: PAINLEVEL_OUTOF10: 0

## 2022-06-09 NOTE — BH NOTE
[facility]  Discharge Note    Pt discharged with followings belongings:   Dental Appliances: None  Vision - Corrective Lenses: None  Hearing Aid: None  Jewelry: None  Body Piercings Removed: N/A  Clothing: Footwear,Pants,Shirt,Socks,Undergarments,Sweater  Other Valuables: Other (Comment) (none)   Valuables returned to patient. Patient education on aftercare instructions: yes, medications and follow up appointments  Information faxed to [facility] by nursing  at 1:58 PM .Patient verbalize understanding of AVS:  Yes. Patient discharged home to private residence. Status EXAM upon discharge:  Mental Status and Behavioral Exam  Normal: No  Level of Assistance: Independent/Self  Facial Expression: Flat  Affect: Appropriate  Level of Consciousness: Alert  Frequency of Checks: 4 times per hour, close  Mood:Normal: No  Mood: Depressed,Anxious  Motor Activity:Normal: Yes  Eye Contact: Good  Observed Behavior: Cooperative,Friendly  Sexual Misconduct History: Current - no  Preception: [place] to person,[place] to time,[place] to place,[place] to situation  Attention:Normal: Yes  Attention: Distractible  Thought Processes: Circumstantial  Thought Content:Normal: No  Thought Content: Preoccupations  Depression Symptoms: Feelings of hopelessess  Anxiety Symptoms: Generalized  Denise Symptoms: No problems reported or observed.   Hallucinations: None  Delusions: No  Memory:Normal: Yes  Memory: Poor recent  Insight and Judgment: Yes  Insight and Judgment: Poor judgment,Poor insight      Metabolic Screening:    Lab Results   Component Value Date    LABA1C 5.0 01/10/2022       Lab Results   Component Value Date    CHOL 195 01/10/2022    CHOL 214 (H) 12/30/2019    CHOL 190 02/09/2019    CHOL 201 (H) 09/25/2018     Lab Results   Component Value Date    TRIG 136 01/10/2022    TRIG 213 (H) 12/30/2019    TRIG 230 (H) 02/09/2019    TRIG 133 09/25/2018     Lab Results   Component Value Date    HDL 48 01/10/2022 HDL 38 (L) 12/30/2019    HDL 44 02/09/2019    HDL 46 09/25/2018     No components found for: LDLCAL  No results found for: Chance Sandoval LPN

## 2022-06-09 NOTE — PLAN OF CARE
Problem: Pain  Goal: Verbalizes/displays adequate comfort level or baseline comfort level  6/8/2022 2319 by Jaylen Machado LPN  Outcome: Progressing  Note: Patient admits to pain this shift. Patient states pain at a 7 on a 0-10 scale. Patient given prn medication, was accepting. Patient educated and agrees to come to staff if pain worsens or does not go away. Patient monitored every 15 minutes with environmental safety checks. Problem: Self Harm/Suicidality  Goal: Will have no self-injury during hospital stay  Description: INTERVENTIONS:  1. Q 30 MINUTES: Routine safety checks  2. Q SHIFT & PRN: Assess risk to determine if routine checks are adequate to maintain patient safety  6/8/2022 2319 by Jaylen Machado LPN  Outcome: Progressing  Note: Patient is free of self harm at this time. Patient agrees to seek out staff if thoughts to harm self arise. Staff will provide support and reassurance as needed. Safety checks maintained every 15 minutes. Problem: Depression  Goal: Will be euthymic at discharge  Description: INTERVENTIONS:  1. Administer medication as ordered  2. Provide emotional support via 1:1 interaction with staff  3. Encourage involvement in milieu/groups/activities  4. Monitor for social isolation  6/8/2022 2319 by Jaylen Machado LPN  Outcome: Progressing  Note: Patient admits to depression this shift, but states its getting better. Patient admits its due to her grandfather being sick and he might pass soon. Patient out in dayroom, pleasant and cooperative with staff. Patient denies suicidal/homicidal ideations and hallucinations at this time. Patient educated and agrees to come to staff if this occurs. Patient monitored every 15 minutes with environmental safety checks. Problem: Behavior  Goal: Pt/Family maintain appropriate behavior and adhere to behavioral management agreement, if implemented  Description: INTERVENTIONS:  1.  Assess patient/family's coping skills and  non-compliant behavior (including use of illegal substances)  2. Notify security of behavior or suspected illegal substances which indicate the need for search of the patient and/or belongings  3. Encourage verbalization of thoughts and concerns in a socially appropriate manner  4. Utilize positive, consistent limit setting strategies supporting safety of patient, staff and others  5. Encourage participation in the decision making process about the behavioral management agreement  6. Implement a Health Care Agreement if patient meets criteria  7. If a patient's behavior jeopardizes the safety of the patient, staff, or others refer to organization policy. If a visitor's behavior poses a threat to safety call refer to organization policy. 8. Initiate consult with , Psychosocial CNS, Spiritual Care as appropriate  6/8/2022 2319 by Melani Hood LPN  Outcome: Progressing     Problem: Anxiety  Goal: Will report anxiety at manageable levels  Description: INTERVENTIONS:  1. Administer medication as ordered  2. Teach and rehearse alternative coping skills  3. Provide emotional support with 1:1 interaction with staff  6/8/2022 2319 by Melani Hood LPN  Outcome: Progressing  Note: Patient admits to some anxiety this shift. Patient states anxiety has gotten better. Patient educated and agrees to come to staff if needed this shift. Patient monitored every 15 minutes with environmental safety checks.

## 2022-06-09 NOTE — GROUP NOTE
Group Therapy Note    Date: 6/9/2022    Group Start Time: 1000  Group End Time: 1030  Group Topic: Psychotherapy    [facility]    Mansoor Wright        Group Therapy Note    Attendees: 6/16       Patient's Goal:  PT will demonstrate increased interpersonal interaction and a clear understanding on multiple types of coping skills relating to the here-and-now therapeutic practice. Notes:  Patient is making progress, AEB participating in group discussion, actively listening, and supporting other group members. PT participates in group and encourages others to participate        Status After Intervention:  Improved    Participation Level: Active Listener and Interactive    Participation Quality: Appropriate, Attentive and Sharing      Speech:  normal      Thought Process/Content: Logical      Affective Functioning: Flat      Mood: depressed      Level of consciousness:  Alert, Oriented x4 and Attentive      Response to Learning: Able to verbalize/acknowledge new learning and Progressing to goal      Endings: None Reported    Modes of Intervention: Support, Socialization, Exploration, Clarifying and Problem-solving      Discipline Responsible: /Counselor      Signature:   Mansoor Wright
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
## 2022-06-09 NOTE — CARE COORDINATION
Name: Ernie Blount    : 1990    Discharge Date: 2022    Primary Auth/Cert #: [id number]    Destination: home    Discharge Medications:      Medication List      START taking these medications    ciprofloxacin 250 mg tablet  Commonly known as: CIPRO  Take 1 tablet by mouth every 12 hours for 1 dose  Notes to patient: Antibiotic for infection     gabapentin 100 MG capsule  Commonly known as: NEURONTIN  Take 1 capsule by mouth 3 times daily for 30 days. Notes to patient: For neuropathy      risperiDONE ER 90 MG Prsy injection  Commonly known as: Perseris  Inject 0.6 mLs into the skin every 30 days  Start taking on: 2022  Notes to patient: For mood        CONTINUE taking these medications    albuterol sulfate  (90 Base) MCG/ACT inhaler  Commonly known as: Proventil HFA  Inhale 1-2 puffs into the lungs every 4 hours as needed for Wheezing or Shortness of Breath (Space out to every 6 hours as symptoms improve) Space out to every 6 hours as symptoms improve. Notes to patient:  For wheezing/shortness of breath     medical marijuana  Notes to patient: Appetite stimulant        STOP taking these medications    divalproex 125 MG capsule  Commonly known as: DEPAKOTE SPRINKLE           Where to Get Your Medications      These medications were sent to [address] [place] [address], [address] [place] [address]    Phone: [phone]   · ciprofloxacin 250 mg tablet  · gabapentin 100 MG capsule  · risperiDONE ER 90 MG Prsy injection         Follow Up Appointment: [facility]  [address]  [place], [address]  [phone]  fax 1 [phone]  On 6/10/2022  You have a scheduled appointment on  at 9:30 am with with Dr. Shannan Rea     Please discharge PT to:   [address]   [place], [address]  On 2022  Please let SW know if PT does not have a ride home at discharge

## 2022-06-09 NOTE — GROUP NOTE
Group Therapy Note    Date: 6/9/2022    Group Start Time: 0915  Group End Time: 0930  Group Topic: Healthy Living/Wellness    [facility] D Cobb, RENE        Group Therapy Note    Attendees: 5 out of 16         Patient's Goal:  Discuss meds working with doc/ getting into rehab    Notes:      Status After Intervention:  Improved    Participation Level:  Active Listener    Participation Quality: Appropriate      Speech:  normal      Thought Process/Content: Logical      Affective Functioning: Congruent      Mood: stable      Level of consciousness:  Alert      Response to Learning: Able to verbalize current knowledge/experience      Endings: None Reported    Modes of Intervention: Education      Discipline Responsible: Registered Nurse      Signature:  Viridiana Granda RN

## 2022-06-09 NOTE — GROUP NOTE
Group Therapy Note    Date: 6/9/2022    Group Start Time: 1100  Group End Time: 8805  Group Topic: Cognitive Skills    CZ BHYENNIFER Richardson, CTRS        Group Therapy Note    Attendees: 4/15       Patient's Goal:  To increase social interaction and to practice problem solving, deductive reasoning, and communication skills. Notes: Pt attended and fully participated in group. Pt was able to practice problem solving, deductive reasoning, and communication skills independently. Status After Intervention:  Improved     Participation Level:  Active Listener,  appropriate, sharing     Participation Quality:  Appropriate, Attentive,supportive of peers        Speech:  Normal     Thought Process/Content: Logical ,linear r/t group task     Affective Functioning: Congruent, brightened      Mood: Euthymic     Level of consciousness:  Alert, and Attentive        Response to Learning:  Able to verbalize current knowledge, able to acknowledge/verbalize new learning,  and Progressing to goal        Endings: None Reported     Modes of Intervention: Education, Support, Socialization, Exploration, Clarifying and Problem-solving        Discipline Responsible: Psychoeducational Specialist        Signature:  VIJAYA OzunaS

## 2022-07-06 ENCOUNTER — HOSPITAL ENCOUNTER (EMERGENCY)
Age: 32
Discharge: HOME | End: 2022-07-06
Attending: EMERGENCY MEDICINE
Payer: MEDICARE

## 2022-07-06 VITALS
TEMPERATURE: 97.5 F | RESPIRATION RATE: 18 BRPM | WEIGHT: 220 LBS | OXYGEN SATURATION: 98 % | HEART RATE: 84 BPM | SYSTOLIC BLOOD PRESSURE: 108 MMHG | HEIGHT: 63 IN | DIASTOLIC BLOOD PRESSURE: 61 MMHG | BODY MASS INDEX: 38.98 KG/M2

## 2022-07-06 DIAGNOSIS — N93.8 DYSFUNCTIONAL UTERINE BLEEDING: Primary | ICD-10-CM

## 2022-07-06 LAB
-: ABNORMAL
ABSOLUTE EOS #: 1.07 K/UL (ref 0–0.44)
ABSOLUTE IMMATURE GRANULOCYTE: 0.07 K/UL (ref 0–0.3)
ABSOLUTE LYMPH #: 2.9 K/UL (ref 1.1–3.7)
ABSOLUTE MONO #: 0.88 K/UL (ref 0.1–1.2)
ALBUMIN SERPL-MCNC: 4.8 G/DL (ref 3.5–5.2)
ALBUMIN/GLOBULIN RATIO: 1.8 (ref 1–2.5)
ALP BLD-CCNC: 73 U/L (ref 35–104)
ALT SERPL-CCNC: 35 U/L (ref 5–33)
ANION GAP SERPL CALCULATED.3IONS-SCNC: 13 MMOL/L (ref 9–17)
AST SERPL-CCNC: 24 U/L
BACTERIA: ABNORMAL
BASOPHILS # BLD: 1 % (ref 0–2)
BASOPHILS ABSOLUTE: 0.07 K/UL (ref 0–0.2)
BILIRUB SERPL-MCNC: 0.21 MG/DL (ref 0.3–1.2)
BILIRUBIN URINE: NEGATIVE
BUN BLDV-MCNC: 10 MG/DL (ref 6–20)
BUN/CREAT BLD: 19 (ref 9–20)
CALCIUM SERPL-MCNC: 9.5 MG/DL (ref 8.6–10.4)
CHLORIDE BLD-SCNC: 102 MMOL/L (ref 98–107)
CO2: 22 MMOL/L (ref 20–31)
COLOR: YELLOW
CREAT SERPL-MCNC: 0.53 MG/DL (ref 0.5–0.9)
EOSINOPHILS RELATIVE PERCENT: 11 % (ref 1–4)
EPITHELIAL CELLS UA: ABNORMAL /HPF (ref 0–25)
GFR AFRICAN AMERICAN: >60 ML/MIN
GFR NON-AFRICAN AMERICAN: >60 ML/MIN
GFR SERPL CREATININE-BSD FRML MDRD: ABNORMAL ML/MIN/{1.73_M2}
GFR SERPL CREATININE-BSD FRML MDRD: ABNORMAL ML/MIN/{1.73_M2}
GLUCOSE BLD-MCNC: 91 MG/DL (ref 70–99)
GLUCOSE URINE: NEGATIVE
HCG(URINE) PREGNANCY TEST: NEGATIVE
HCT VFR BLD CALC: 42.3 % (ref 36.3–47.1)
HEMOGLOBIN: 14.2 G/DL (ref 11.9–15.1)
IMMATURE GRANULOCYTES: 1 %
KETONES, URINE: NEGATIVE
LEUKOCYTE ESTERASE, URINE: NEGATIVE
LYMPHOCYTES # BLD: 29 % (ref 24–43)
MCH RBC QN AUTO: 29.3 PG (ref 25.2–33.5)
MCHC RBC AUTO-ENTMCNC: 33.6 G/DL (ref 28.4–34.8)
MCV RBC AUTO: 87.4 FL (ref 82.6–102.9)
MONOCYTES # BLD: 9 % (ref 3–12)
NITRITE, URINE: NEGATIVE
NRBC AUTOMATED: 0 PER 100 WBC
PDW BLD-RTO: 12.4 % (ref 11.8–14.4)
PH UA: 7.5 (ref 5–9)
PLATELET # BLD: 381 K/UL (ref 138–453)
PMV BLD AUTO: 9.6 FL (ref 8.1–13.5)
POTASSIUM SERPL-SCNC: 4.1 MMOL/L (ref 3.7–5.3)
PROTEIN UA: NEGATIVE
RBC # BLD: 4.84 M/UL (ref 3.95–5.11)
RBC UA: ABNORMAL /HPF (ref 0–2)
SEG NEUTROPHILS: 49 % (ref 36–65)
SEGMENTED NEUTROPHILS ABSOLUTE COUNT: 4.98 K/UL (ref 1.5–8.1)
SODIUM BLD-SCNC: 137 MMOL/L (ref 135–144)
SPECIFIC GRAVITY UA: 1.01 (ref 1.01–1.02)
TOTAL PROTEIN: 7.4 G/DL (ref 6.4–8.3)
TURBIDITY: CLEAR
URINE HGB: ABNORMAL
UROBILINOGEN, URINE: NORMAL
WBC # BLD: 10 K/UL (ref 3.5–11.3)
WBC UA: ABNORMAL /HPF (ref 0–5)

## 2022-07-06 PROCEDURE — 6360000002 HC RX W HCPCS: Performed by: EMERGENCY MEDICINE

## 2022-07-06 PROCEDURE — 80053 COMPREHEN METABOLIC PANEL: CPT

## 2022-07-06 PROCEDURE — 81025 URINE PREGNANCY TEST: CPT

## 2022-07-06 PROCEDURE — 81001 URINALYSIS AUTO W/SCOPE: CPT

## 2022-07-06 PROCEDURE — 36415 COLL VENOUS BLD VENIPUNCTURE: CPT

## 2022-07-06 PROCEDURE — 99284 EMERGENCY DEPT VISIT MOD MDM: CPT

## 2022-07-06 PROCEDURE — 2580000003 HC RX 258: Performed by: EMERGENCY MEDICINE

## 2022-07-06 PROCEDURE — 87591 N.GONORRHOEAE DNA AMP PROB: CPT

## 2022-07-06 PROCEDURE — 87491 CHLMYD TRACH DNA AMP PROBE: CPT

## 2022-07-06 PROCEDURE — 96374 THER/PROPH/DIAG INJ IV PUSH: CPT

## 2022-07-06 PROCEDURE — 85025 COMPLETE CBC W/AUTO DIFF WBC: CPT

## 2022-07-06 RX ORDER — 0.9 % SODIUM CHLORIDE 0.9 %
1000 INTRAVENOUS SOLUTION INTRAVENOUS ONCE
Status: COMPLETED | OUTPATIENT
Start: 2022-07-06 | End: 2022-07-06

## 2022-07-06 RX ORDER — KETOROLAC TROMETHAMINE 30 MG/ML
30 INJECTION, SOLUTION INTRAMUSCULAR; INTRAVENOUS ONCE
Status: COMPLETED | OUTPATIENT
Start: 2022-07-06 | End: 2022-07-06

## 2022-07-06 RX ORDER — HYDROXYZINE HYDROCHLORIDE 10 MG/1
10 TABLET, FILM COATED ORAL 3 TIMES DAILY PRN
COMMUNITY

## 2022-07-06 RX ORDER — SERTRALINE HYDROCHLORIDE 25 MG/1
25 TABLET, FILM COATED ORAL DAILY
COMMUNITY

## 2022-07-06 RX ORDER — LAMOTRIGINE 25 MG/1
25 TABLET ORAL DAILY
COMMUNITY

## 2022-07-06 RX ADMIN — KETOROLAC TROMETHAMINE 30 MG: 30 INJECTION, SOLUTION INTRAMUSCULAR at 19:52

## 2022-07-06 RX ADMIN — SODIUM CHLORIDE 1000 ML: 9 INJECTION, SOLUTION INTRAVENOUS at 18:45

## 2022-07-06 ASSESSMENT — PAIN SCALES - GENERAL
PAINLEVEL_OUTOF10: 7
PAINLEVEL_OUTOF10: 6

## 2022-07-06 ASSESSMENT — PAIN - FUNCTIONAL ASSESSMENT: PAIN_FUNCTIONAL_ASSESSMENT: 0-10

## 2022-07-06 NOTE — ED PROVIDER NOTES
[facility]  EMERGENCY DEPARTMENT ENCOUNTER      Pt Name: Rasheeda Marcos  MRN: [id number]  [place] 1990  Date of evaluation: 2022  Provider: Gonzalo Hay MD    CHIEF COMPLAINT       Chief Complaint   Patient presents with    Vaginal Bleeding     Pt is on her cycle and reports she is bleeding much heavier than normal.     Abdominal Cramping     Pt c/o generalized cramping, pt sent by OBGYN         HISTORY OF PRESENT ILLNESS   (Location/Symptom, Timing/Onset, Context/Setting, Quality, Duration, Modifying Factors, Severity)  Note limiting factors. Rasheeda Marcos is a 29 y.o. female who presents to the emergency department     82-year-old female presents emergency department she is a  1 AB 1 last menstrual period began 5 days prior to ED arrival.  The patient relates that she did feel somewhat lightheaded. She did not pass out. She denies any chest discomfort. She denies any history for blood dyscrasias. On a.m. of ED arrival patient relates that she soaked a pad completely and thereafter soaked super tampon. The patient is being followed by gynecologist from the [address] [place] [address]. She has been evaluated in the past by Dr. Ryan Coronado. Nursing Notes were reviewed. REVIEW OF SYSTEMS    (2-9 systems for level 4, 10 or more for level 5)     Review of Systems   All other systems reviewed and are negative. Except as noted above the remainder of the review of systems was reviewed and negative. PAST MEDICAL HISTORY     Past Medical History:   Diagnosis Date    Asthma     Back pain     Chicken pox     Dysphagia     H/O echocardiogram 2018    EF 60%    History of Holter monitoring 2018    Baseline ECG shows sinus rhythm. Holter showed sinus tachy 61% of the time. Rare premature ventricular complexes were recorded. No episodes of superventricuarl or ventriuclar tacycardial was seen.  Pt reported episodes of SOB and speedy heart with monitroing As certified below, I, or a nurse practitioner or physician assistant working with me, had a face-to-face encounter that meets the physician face-to-face encounter requirements. fumarate], Topamax [topiramate], and Gabapentin    FAMILY HISTORY       Family History   Problem Relation Age of Onset    Asthma Mother     Cancer Mother     High Blood Pressure Father     Diabetes Father           SOCIAL HISTORY       Social History     Socioeconomic History    Marital status: Single     Spouse name: None    Number of children: 0    Years of education: 25    Highest education level: Bachelor's degree (e.g., BA, AB, BS)   Occupational History    Occupation: Disability   Tobacco Use    Smoking status: Never Smoker    Smokeless tobacco: Never Used   Vaping Use    Vaping Use: Never used   Substance and Sexual Activity    Alcohol use: Not Currently     Comment: Rare    Drug use: Yes     Types: Marijuana Bhupinder Navarro)     Comment: has medical card    Sexual activity: Yes     Partners: Male   Other Topics Concern    None   Social History Narrative    None     Social Determinants of Health     Financial Resource Strain: Low Risk     Difficulty of Paying Living Expenses: Not hard at all   Food Insecurity: No Food Insecurity    Worried About Running Out of Food in the Last Year: Never true    Jayme of Food in the Last Year: Never true   Transportation Needs:     Lack of Transportation (Medical): Not on file    Lack of Transportation (Non-Medical):  Not on file   Physical Activity:     Days of Exercise per Week: Not on file    Minutes of Exercise per Session: Not on file   Stress:     Feeling of Stress : Not on file   Social Connections:     Frequency of Communication with Friends and Family: Not on file    Frequency of Social Gatherings with Friends and Family: Not on file    Attends Judaism Services: Not on file    Active Member of Clubs or Organizations: Not on file    Attends Club or Organization Meetings: Not on file    Marital Status: Not on file   Intimate Partner Violence:     Fear of Current or Ex-Partner: Not on file    Emotionally Abused: Not on file    Physically Abused: Not on file    Sexually Abused: Not on file   Housing Stability:     Unable to Pay for Housing in the Last Year: Not on file    Number of Places Lived in the Last Year: Not on file    Unstable Housing in the Last Year: Not on file       SCREENINGS                        PHYSICAL EXAM    (up to 7 for level 4, 8 or more for level 5)     ED Triage Vitals [07/06/22 1814]   BP Temp Temp Source Heart Rate Resp SpO2 Height Weight   (!) 127/57 97.5 °F (36.4 °C) Tympanic 84 16 98 % 5' 3\" (1.6 m) 220 lb (99.8 kg)       Physical Exam  Vitals and nursing note reviewed. HENT:      Head: Normocephalic. Nose: Nose normal.      Mouth/Throat:      Mouth: Mucous membranes are moist.   Eyes:      Extraocular Movements: Extraocular movements intact. Pupils: Pupils are equal, round, and reactive to light. Neck:      Vascular: No carotid bruit. Cardiovascular:      Rate and Rhythm: Normal rate and regular rhythm. Pulses: Normal pulses. Heart sounds: Normal heart sounds. Pulmonary:      Effort: Pulmonary effort is normal.      Breath sounds: Normal breath sounds. No rales. Chest:      Chest wall: No tenderness. Abdominal:      General: Abdomen is flat. There is no distension. Palpations: Abdomen is soft. There is no mass. Genitourinary:     Comments: The patient prefers to forego a pelvic examination  Musculoskeletal:         General: Normal range of motion. Cervical back: Normal range of motion. Right lower leg: No edema. Left lower leg: No edema. Lymphadenopathy:      Cervical: No cervical adenopathy. Skin:     General: Skin is warm. Capillary Refill: Capillary refill takes less than 2 seconds. Findings: No lesion or rash. Neurological:      General: No focal deficit present. Mental Status: She is alert and oriented to person, place, and time. Cranial Nerves: No cranial nerve deficit.    Psychiatric:         Mood and Affect: Mood normal. DIAGNOSTIC RESULTS     EKG: All EKG's are interpreted by the Emergency Department Physician who either signs or Co-signs this chart in the absence of a cardiologist.      RADIOLOGY:   Non-plain film images such as CT, Ultrasound and MRI are read by the radiologist. Plain radiographic images are visualized and preliminarily interpreted by the emergency physician with the below findings:      Interpretation per the Radiologist below, if available at the time of this note:    No orders to display         ED BEDSIDE ULTRASOUND:   Performed by ED Physician - none    LABS:  Labs Reviewed   URINALYSIS WITH MICROSCOPIC - Abnormal; Notable for the following components:       Result Value    Urine Hgb 3+ (*)     Bacteria, UA 1+ (*)     All other components within normal limits   CBC WITH AUTO DIFFERENTIAL - Abnormal; Notable for the following components:    Eosinophils % 11 (*)     Immature Granulocytes 1 (*)     Absolute Eos # 1.07 (*)     All other components within normal limits   COMPREHENSIVE METABOLIC PANEL - Abnormal; Notable for the following components:    ALT 35 (*)     Total Bilirubin 0.21 (*)     All other components within normal limits   C.TRACHOMATIS N.GONORRHOEAE DNA, URINE   PREGNANCY, URINE       All other labs were within normal range or not returned as of this dictation. EMERGENCY DEPARTMENT COURSE and DIFFERENTIAL DIAGNOSIS/MDM:   Vitals:    Vitals:    07/06/22 1845 07/06/22 1851 07/06/22 1906 07/06/22 1921   BP: 108/61      Pulse:       Resp:       Temp:       TempSrc:       SpO2: 97% 98% 98% 98%   Weight:       Height:             MDM  Number of Diagnoses or Management Options  Dysfunctional uterine bleeding  Diagnosis management comments: 42-year-old patient presents emergency department with concerns of increased vaginal bleeding.     History also as documented in HPI    Patient has been followed by Dr. Sahil Henry and she also relates that she is being seen and evaluated by gynecologist at [facility]    Laboratory studies have been reviewed discussed with the patient    The patient remains hemodynamically stable nontoxic-appearing orthostatic vitals are stable    The patient prefers to forego pelvic examination and also was in agreement without performing any diagnostic imaging studies i.e. pelvic ultrasound etc.    Patient knowledges discharge diagnosis and importance of timely follow-up have no additional questions or concerns was released in stable condition        REASSESSMENT     Patient remains hemodynamically stable nontoxic-appearing during emergency department visitation  ED Course as of 07/08/22 0424 Wed Jul 06, 2022 1927 Pregnancy, Urine:    HCG(Urine) Pregnancy Test NEGATIVE [RS]   1927 CMPCharlie Motts ):    GLUCOSE, FASTING,GF 91   BUN,BUNPL 10   Creatinine 0.53   Bun/Cre Ratio 19   CALCIUM, SERUM, 994496 9.5   Sodium 137   Potassium 4.1   Chloride 102   CO2 22   Anion Gap 13   Alk Phos 73   ALT 35(!)   AST 24   Bilirubin 0.21(!)   Total Protein 7.4   Albumin 4.8   ALBUMIN/GLOBULIN RATIO 1.8   GFR Non- >60   GFR  >60   GFR Comment        GFR Staging      [RS]   1927 CBC with Auto Differential(!):    WBC 10.0   RBC 4.84   Hemoglobin Quant 14.2   Hematocrit 42.3   MCV 87.4   MCH 29.3   MCHC 33.6   RDW 12.4   Platelet Count 686   MPV 9.6   NRBC Automated 0.0   Seg Neutrophils 49   Lymphocytes 29   Monocytes 9   Eosinophils % 11(!)   Basophils 1   Immature Granulocytes 1(!)   Segs Absolute 4.98   Absolute Lymph # 2.90   Absolute Mono # 0.88   Absolute Eos # 1.07(!)   Basophils Absolute 0.07   Absolute Immature Granulocyte 0.07 [RS]   1927 Urinalysis with Microscopic(!):    Color, UA Yellow   Turbidity UA Clear   Glucose, UA NEGATIVE   Bilirubin, Urine NEGATIVE   Ketones, Urine NEGATIVE   Specific [place], UA 1.015   Urine Hgb 3+(!)   pH, UA 7.5   Protein, UA NEGATIVE   Urobilinogen, Urine Normal   Nitrite, Urine NEGATIVE   Leukocyte Esterase, Urine NEGATIVE   -        WBC, UA 2 TO 5   RBC, UA 5 TO 10   Epithelial Cells, UA 2 TO 5   Bacteria, UA 1+(!) [RS]      ED Course User Index  [RS] Vivienne Landry MD         CRITICAL CARE TIME   Total Critical Care time was minutes, excluding separately reportable procedures. There was a high probability of clinically significant/life threatening deterioration in the patient's condition which required my urgent intervention. CONSULTS:  None    PROCEDURES:  Unless otherwise noted below, none     Procedures    FINAL IMPRESSION      1. Dysfunctional uterine bleeding          DISPOSITION/PLAN   DISPOSITION Decision To Discharge 07/06/2022 08:11:31 PM      PATIENT REFERRED TO:  Your gynecologist    Call in 3 days        DISCHARGE MEDICATIONS:  Discharge Medication List as of 7/6/2022  7:51 PM        Controlled Substances Monitoring:     RX Monitoring 11/26/2017   Attestation The Prescription Monitoring Report for this patient was reviewed today.    Periodic Controlled Substance Monitoring -       (Please note that portions of this note were completed with a voice recognition program.  Efforts were made to edit the dictations but occasionally words are mis-transcribed.)    Vivienne Landry MD (electronically signed)  Attending Emergency Physician            Vivienne Landry MD  07/08/22 0961

## 2022-07-08 LAB
C. TRACHOMATIS DNA ,URINE: NEGATIVE
N. GONORRHOEAE DNA, URINE: NEGATIVE
SPECIMEN DESCRIPTION: NORMAL

## 2022-08-24 NOTE — TELEPHONE ENCOUNTER
Kya Welch is a 64 y.o. male follow up for DVT. 1. Have you been to the ER, urgent care clinic since your last visit? Hospitalized since your last visit?no     2. Have you seen or consulted any other health care providers outside of the [address] [place] [address] since your last visit? Include any pap smears or colon screening.  no Patient was seen in ED 04/10/2019 and states they didn't give her any pain medication so she finished an old prescription of flexeril      Patient states she is almost out and would like a refill and asking if PCP can send this in since she is still having back spasms    Xrays were normal- patient did not call for f/u    Health Maintenance   Topic Date Due    HPV vaccine (1 - Female 3-dose series) 06/15/2007    Cervical cancer screen  06/15/2013    A1C test (Diabetic or Prediabetic)  02/19/2020    DTaP/Tdap/Td vaccine (2 - Td) 06/25/2024    Flu vaccine  Completed    HIV screen  Completed    Pneumococcal 0-64 years Vaccine  Aged Out             (applicable per patient's age: Cancer Screenings, Depression Screening, Fall Risk Screening, Immunizations)    Hemoglobin A1C (%)   Date Value   02/19/2019 5.7     LDL Cholesterol (mg/dL)   Date Value   02/09/2019 100     AST (U/L)   Date Value   02/09/2019 13     ALT (U/L)   Date Value   02/09/2019 15     BUN (mg/dL)   Date Value   03/04/2019 13      (goal A1C is < 7)   (goal LDL is <100) need 30-50% reduction from baseline     BP Readings from Last 3 Encounters:   04/10/19 110/64   03/19/19 104/68   03/04/19 125/70    (goal /80)      All Future Testing planned in [facility]:  Lab Frequency Next Occurrence   Tilt table test Once 06/07/2018   Lipid Panel Once 09/12/2019   Prolactin Once 09/27/2019   XR WRIST LEFT (MIN 3 VIEWS) Once 02/04/2020       Next Visit Date:  No future appointments.          Patient Active Problem List:     Severe bipolar I disorder, current or most recent episode depressed (HCC)     Class 2 obesity due to excess calories with serious comorbidity and body mass index (BMI) of 38.0 to 38.9 in adult     Other seasonal allergic rhinitis     Suicidal ideation     Depression with suicidal ideation

## 2023-07-11 NOTE — BH NOTE
Pt has complaints of increased anxiety at this time and being unable to fall asleep.  Pt was offered As needed atarax and melatonin and patient was accepting of these medications Statement Selected

## (undated) DEVICE — GLOVE SURG SZ 75 CRM LTX FREE POLYISOPRENE POLYMER BEAD ANTI

## (undated) DEVICE — INSUFFLATION NEEDLE TO ESTABLISH PNEUMOPERITONEUM.: Brand: INSUFFLATION NEEDLE

## (undated) DEVICE — ELECTRODE PT RET AD L9FT HI MOIST COND ADH HYDRGEL CORDED

## (undated) DEVICE — CANNULA ORAL NSL AD CO2 N INTUB O2 DEL DISP TRU LNK

## (undated) DEVICE — SOLUTION ANTIFOG VIS SYS CLEARIFY LAPSCP

## (undated) DEVICE — MHPB BASIC LAP PACK: Brand: MEDLINE INDUSTRIES, INC.

## (undated) DEVICE — SUTURE VCRL + SZ 0 L27IN ABSRB VLT L26MM UR-6 5/8 CIR VCP603H

## (undated) DEVICE — ADHESIVE SKIN CLSR 0.7ML TOP DERMBND ADV

## (undated) DEVICE — CANNULA SEAL

## (undated) DEVICE — SOLUTION IV IRRIG POUR BRL 0.9% SODIUM CHL 2F7124

## (undated) DEVICE — SYRINGE MED 10ML SLIP TIP BLNT FILL AND LUERLOCK DISP

## (undated) DEVICE — PENCIL ES L3M BTTN SWCH HOLSTER W/ BLDE ELECTRD EDGE

## (undated) DEVICE — FORCEPS BX L240CM JAW DIA2.4MM ORNG L CAP W/ NDL DISP RAD

## (undated) DEVICE — TROCAR: Brand: KII FIOS FIRST ENTRY

## (undated) DEVICE — BAG SPEC REM 224ML W4XL6IN DIA10MM 1 HND GYN DISP ENDOPCH

## (undated) DEVICE — PROTECTOR ULN NRV PUR FOAM HK LOOP STRP ANATOMICALLY

## (undated) DEVICE — DEVICE TRCR 12X9X3IN WHT CLSR DISP OMNICLOSE

## (undated) DEVICE — STRAP,POSITIONING,KNEE/BODY,FOAM,4X60": Brand: MEDLINE

## (undated) DEVICE — APPLICATOR MEDICATED 26 CC SOLUTION HI LT ORNG CHLORAPREP

## (undated) DEVICE — BLANKET WRM W29.9XL79.1IN UP BODY FORC AIR MISTRAL-AIR

## (undated) DEVICE — DVD-R MAXWELL ROBOTIC SURGERY

## (undated) DEVICE — DISPOSABLE BIOPSY FORCEPS

## (undated) DEVICE — MEDI-VAC NON-CONDUCTIVE TUBING7MM X 30.5 (100FT): Brand: CARDINAL HEALTH

## (undated) DEVICE — 40580 - THE PINK PAD - ADVANCED TRENDELENBURG POSITIONING KIT: Brand: 40580 - THE PINK PAD - ADVANCED TRENDELENBURG POSITIONING KIT

## (undated) DEVICE — GOWN,AURORA,NONRNF,XL,30/CS: Brand: MEDLINE

## (undated) DEVICE — BLADELESS OBTURATOR: Brand: WECK VISTA

## (undated) DEVICE — CLIP INT L POLYMER LOK LIG HEM O LOK

## (undated) DEVICE — STERILE POLYISOPRENE POWDER-FREE SURGICAL GLOVES WITH EMOLLIENT COATING: Brand: PROTEXIS

## (undated) DEVICE — SUTURE MCRYL + SZ 4 0 L18IN ABSRB UD PC 3 L16MM 3 8 CIR PRIM MCP845G

## (undated) DEVICE — ARM DRAPE